# Patient Record
Sex: FEMALE | Race: WHITE | NOT HISPANIC OR LATINO | Employment: OTHER | URBAN - METROPOLITAN AREA
[De-identification: names, ages, dates, MRNs, and addresses within clinical notes are randomized per-mention and may not be internally consistent; named-entity substitution may affect disease eponyms.]

---

## 2017-04-11 ENCOUNTER — COMMUNICATION - HEALTHEAST (OUTPATIENT)
Dept: INTERNAL MEDICINE | Facility: CLINIC | Age: 72
End: 2017-04-11

## 2017-07-13 ENCOUNTER — HOSPITAL ENCOUNTER (OUTPATIENT)
Dept: MAMMOGRAPHY | Facility: HOSPITAL | Age: 72
Discharge: HOME | End: 2017-07-13
Attending: INTERNAL MEDICINE

## 2017-07-13 ENCOUNTER — OFFICE VISIT - HEALTHEAST (OUTPATIENT)
Dept: INTERNAL MEDICINE | Facility: CLINIC | Age: 72
End: 2017-07-13

## 2017-07-13 DIAGNOSIS — Z78.9 FULL CODE STATUS: ICD-10-CM

## 2017-07-13 DIAGNOSIS — M79.605 LOW BACK PAIN RADIATING TO LEFT LEG: ICD-10-CM

## 2017-07-13 DIAGNOSIS — K21.9 GERD (GASTROESOPHAGEAL REFLUX DISEASE): ICD-10-CM

## 2017-07-13 DIAGNOSIS — M25.562 BILATERAL KNEE PAIN: ICD-10-CM

## 2017-07-13 DIAGNOSIS — N95.1 MENOPAUSAL HOT FLUSHES: ICD-10-CM

## 2017-07-13 DIAGNOSIS — M54.50 LOW BACK PAIN: ICD-10-CM

## 2017-07-13 DIAGNOSIS — G43.909 MIGRAINE HEADACHE: ICD-10-CM

## 2017-07-13 DIAGNOSIS — M25.561 BILATERAL KNEE PAIN: ICD-10-CM

## 2017-07-13 DIAGNOSIS — M54.50 LOW BACK PAIN RADIATING TO LEFT LEG: ICD-10-CM

## 2017-07-13 DIAGNOSIS — Z12.31 VISIT FOR SCREENING MAMMOGRAM: ICD-10-CM

## 2017-07-13 DIAGNOSIS — Z00.00 ROUTINE PHYSICAL EXAMINATION: ICD-10-CM

## 2017-07-13 DIAGNOSIS — L82.1 SK (SEBORRHEIC KERATOSIS): ICD-10-CM

## 2018-07-19 ENCOUNTER — COMMUNICATION - HEALTHEAST (OUTPATIENT)
Dept: INTERNAL MEDICINE | Facility: CLINIC | Age: 73
End: 2018-07-19

## 2018-08-02 ENCOUNTER — COMMUNICATION - HEALTHEAST (OUTPATIENT)
Dept: INTERNAL MEDICINE | Facility: CLINIC | Age: 73
End: 2018-08-02

## 2018-09-06 ENCOUNTER — OFFICE VISIT - HEALTHEAST (OUTPATIENT)
Dept: INTERNAL MEDICINE | Facility: CLINIC | Age: 73
End: 2018-09-06

## 2018-09-06 DIAGNOSIS — Z98.890 S/P COLONOSCOPY: ICD-10-CM

## 2018-09-06 DIAGNOSIS — R53.83 FATIGUE: ICD-10-CM

## 2018-09-06 DIAGNOSIS — F41.9 ANXIETY: ICD-10-CM

## 2018-09-06 DIAGNOSIS — Z53.20 COLONOSCOPY REFUSED: ICD-10-CM

## 2018-09-06 DIAGNOSIS — L98.9 SKIN LESION: ICD-10-CM

## 2018-09-06 DIAGNOSIS — M25.50 MULTIPLE JOINT PAIN: ICD-10-CM

## 2018-09-06 DIAGNOSIS — N95.1 MENOPAUSAL HOT FLUSHES: ICD-10-CM

## 2018-09-06 DIAGNOSIS — Z00.00 ROUTINE PHYSICAL EXAMINATION: ICD-10-CM

## 2018-09-06 LAB
ANION GAP SERPL CALCULATED.3IONS-SCNC: 9 MMOL/L (ref 5–18)
BUN SERPL-MCNC: 18 MG/DL (ref 8–28)
C REACTIVE PROTEIN LHE: 0.6 MG/DL (ref 0–0.8)
CALCIUM SERPL-MCNC: 10.4 MG/DL (ref 8.5–10.5)
CHLORIDE BLD-SCNC: 108 MMOL/L (ref 98–107)
CO2 SERPL-SCNC: 27 MMOL/L (ref 22–31)
CREAT SERPL-MCNC: 0.8 MG/DL (ref 0.6–1.1)
ERYTHROCYTE [DISTWIDTH] IN BLOOD BY AUTOMATED COUNT: 12.1 % (ref 11–14.5)
ERYTHROCYTE [SEDIMENTATION RATE] IN BLOOD BY WESTERGREN METHOD: 9 MM/HR (ref 0–20)
GFR SERPL CREATININE-BSD FRML MDRD: >60 ML/MIN/1.73M2
GLUCOSE BLD-MCNC: 86 MG/DL (ref 70–125)
HCT VFR BLD AUTO: 41.6 % (ref 35–47)
HGB BLD-MCNC: 13.9 G/DL (ref 12–16)
MCH RBC QN AUTO: 30 PG (ref 27–34)
MCHC RBC AUTO-ENTMCNC: 33.5 G/DL (ref 32–36)
MCV RBC AUTO: 90 FL (ref 80–100)
PLATELET # BLD AUTO: 288 THOU/UL (ref 140–440)
PMV BLD AUTO: 7.6 FL (ref 7–10)
POTASSIUM BLD-SCNC: 4 MMOL/L (ref 3.5–5)
RBC # BLD AUTO: 4.64 MILL/UL (ref 3.8–5.4)
SODIUM SERPL-SCNC: 144 MMOL/L (ref 136–145)
TSH SERPL DL<=0.005 MIU/L-ACNC: 0.91 UIU/ML (ref 0.3–5)
WBC: 6.2 THOU/UL (ref 4–11)

## 2018-09-06 ASSESSMENT — MIFFLIN-ST. JEOR: SCORE: 1396.14

## 2018-09-13 ENCOUNTER — HOSPITAL ENCOUNTER (OUTPATIENT)
Dept: MAMMOGRAPHY | Facility: CLINIC | Age: 73
Discharge: HOME | End: 2018-09-13
Attending: INTERNAL MEDICINE

## 2018-09-13 DIAGNOSIS — Z12.31 VISIT FOR SCREENING MAMMOGRAM: ICD-10-CM

## 2018-10-17 ENCOUNTER — COMMUNICATION - HEALTHEAST (OUTPATIENT)
Dept: INTERNAL MEDICINE | Facility: CLINIC | Age: 73
End: 2018-10-17

## 2018-11-05 ENCOUNTER — COMMUNICATION - HEALTHEAST (OUTPATIENT)
Dept: INTERNAL MEDICINE | Facility: CLINIC | Age: 73
End: 2018-11-05

## 2018-11-07 ENCOUNTER — COMMUNICATION - HEALTHEAST (OUTPATIENT)
Dept: INTERNAL MEDICINE | Facility: CLINIC | Age: 73
End: 2018-11-07

## 2019-01-11 ENCOUNTER — COMMUNICATION - HEALTHEAST (OUTPATIENT)
Dept: INTERNAL MEDICINE | Facility: CLINIC | Age: 74
End: 2019-01-11

## 2019-01-12 ENCOUNTER — COMMUNICATION - HEALTHEAST (OUTPATIENT)
Dept: INTERNAL MEDICINE | Facility: CLINIC | Age: 74
End: 2019-01-12

## 2019-03-26 ENCOUNTER — COMMUNICATION - HEALTHEAST (OUTPATIENT)
Dept: INTERNAL MEDICINE | Facility: CLINIC | Age: 74
End: 2019-03-26

## 2019-04-16 ENCOUNTER — COMMUNICATION - HEALTHEAST (OUTPATIENT)
Dept: SCHEDULING | Facility: CLINIC | Age: 74
End: 2019-04-16

## 2019-04-16 ENCOUNTER — OFFICE VISIT - HEALTHEAST (OUTPATIENT)
Dept: INTERNAL MEDICINE | Facility: CLINIC | Age: 74
End: 2019-04-16

## 2019-04-16 DIAGNOSIS — M54.41 ACUTE BILATERAL LOW BACK PAIN WITH BILATERAL SCIATICA: ICD-10-CM

## 2019-04-16 DIAGNOSIS — M54.42 ACUTE BILATERAL LOW BACK PAIN WITH BILATERAL SCIATICA: ICD-10-CM

## 2019-04-16 DIAGNOSIS — Z12.11 SCREEN FOR COLON CANCER: ICD-10-CM

## 2019-04-19 ENCOUNTER — OFFICE VISIT - HEALTHEAST (OUTPATIENT)
Dept: INTERNAL MEDICINE | Facility: CLINIC | Age: 74
End: 2019-04-19

## 2019-04-19 ENCOUNTER — COMMUNICATION - HEALTHEAST (OUTPATIENT)
Dept: PHARMACY | Facility: HOSPITAL | Age: 74
End: 2019-04-19

## 2019-04-19 DIAGNOSIS — M54.41 ACUTE RIGHT-SIDED LOW BACK PAIN WITH RIGHT-SIDED SCIATICA: ICD-10-CM

## 2019-04-19 DIAGNOSIS — L98.9 SKIN LESION: ICD-10-CM

## 2019-04-19 DIAGNOSIS — Z53.20 COLONOSCOPY REFUSED: ICD-10-CM

## 2019-04-20 ENCOUNTER — COMMUNICATION - HEALTHEAST (OUTPATIENT)
Dept: INTERNAL MEDICINE | Facility: CLINIC | Age: 74
End: 2019-04-20

## 2019-04-21 ENCOUNTER — COMMUNICATION - HEALTHEAST (OUTPATIENT)
Dept: INTERNAL MEDICINE | Facility: CLINIC | Age: 74
End: 2019-04-21

## 2019-04-21 DIAGNOSIS — M54.41 ACUTE RIGHT-SIDED LOW BACK PAIN WITH RIGHT-SIDED SCIATICA: ICD-10-CM

## 2019-04-25 ENCOUNTER — COMMUNICATION - HEALTHEAST (OUTPATIENT)
Dept: INTERNAL MEDICINE | Facility: CLINIC | Age: 74
End: 2019-04-25

## 2019-05-03 ENCOUNTER — OFFICE VISIT - HEALTHEAST (OUTPATIENT)
Dept: INTERNAL MEDICINE | Facility: CLINIC | Age: 74
End: 2019-05-03

## 2019-05-03 DIAGNOSIS — M54.41 RIGHT-SIDED LOW BACK PAIN WITH RIGHT-SIDED SCIATICA, UNSPECIFIED CHRONICITY: ICD-10-CM

## 2019-05-03 DIAGNOSIS — R11.0 NAUSEA: ICD-10-CM

## 2019-05-07 ENCOUNTER — COMMUNICATION - HEALTHEAST (OUTPATIENT)
Dept: INTERNAL MEDICINE | Facility: CLINIC | Age: 74
End: 2019-05-07

## 2019-05-07 DIAGNOSIS — M54.41 RIGHT-SIDED LOW BACK PAIN WITH RIGHT-SIDED SCIATICA, UNSPECIFIED CHRONICITY: ICD-10-CM

## 2019-05-10 ENCOUNTER — COMMUNICATION - HEALTHEAST (OUTPATIENT)
Dept: INTERNAL MEDICINE | Facility: CLINIC | Age: 74
End: 2019-05-10

## 2019-05-10 ENCOUNTER — RECORDS - HEALTHEAST (OUTPATIENT)
Dept: ADMINISTRATIVE | Facility: OTHER | Age: 74
End: 2019-05-10

## 2019-05-10 DIAGNOSIS — M54.41 RIGHT-SIDED LOW BACK PAIN WITH RIGHT-SIDED SCIATICA, UNSPECIFIED CHRONICITY: ICD-10-CM

## 2019-05-14 ENCOUNTER — COMMUNICATION - HEALTHEAST (OUTPATIENT)
Dept: INTERNAL MEDICINE | Facility: CLINIC | Age: 74
End: 2019-05-14

## 2019-05-15 ENCOUNTER — COMMUNICATION - HEALTHEAST (OUTPATIENT)
Dept: INTERNAL MEDICINE | Facility: CLINIC | Age: 74
End: 2019-05-15

## 2019-05-18 ENCOUNTER — COMMUNICATION - HEALTHEAST (OUTPATIENT)
Dept: INTERNAL MEDICINE | Facility: CLINIC | Age: 74
End: 2019-05-18

## 2019-05-20 ENCOUNTER — COMMUNICATION - HEALTHEAST (OUTPATIENT)
Dept: INTERNAL MEDICINE | Facility: CLINIC | Age: 74
End: 2019-05-20

## 2019-05-21 ENCOUNTER — COMMUNICATION - HEALTHEAST (OUTPATIENT)
Dept: INTERNAL MEDICINE | Facility: CLINIC | Age: 74
End: 2019-05-21

## 2019-05-23 ENCOUNTER — HOSPITAL ENCOUNTER (OUTPATIENT)
Dept: LAB | Age: 74
Setting detail: SPECIMEN
Discharge: HOME | End: 2019-05-23

## 2019-05-23 ENCOUNTER — OFFICE VISIT - HEALTHEAST (OUTPATIENT)
Dept: INTERNAL MEDICINE | Facility: CLINIC | Age: 74
End: 2019-05-23

## 2019-05-23 DIAGNOSIS — Z01.810 PREOPERATIVE CARDIOVASCULAR EXAMINATION: ICD-10-CM

## 2019-05-23 DIAGNOSIS — Z78.9 FULL CODE STATUS: ICD-10-CM

## 2019-05-23 DIAGNOSIS — Z98.890 PONV (POSTOPERATIVE NAUSEA AND VOMITING): ICD-10-CM

## 2019-05-23 DIAGNOSIS — C43.61 MELANOMA OF UPPER ARM, RIGHT (H): ICD-10-CM

## 2019-05-23 DIAGNOSIS — M54.41 RIGHT-SIDED LOW BACK PAIN WITH RIGHT-SIDED SCIATICA, UNSPECIFIED CHRONICITY: ICD-10-CM

## 2019-05-23 DIAGNOSIS — K21.9 GASTROESOPHAGEAL REFLUX DISEASE, ESOPHAGITIS PRESENCE NOT SPECIFIED: ICD-10-CM

## 2019-05-23 DIAGNOSIS — J31.0 CHRONIC RHINITIS: ICD-10-CM

## 2019-05-23 DIAGNOSIS — R11.2 PONV (POSTOPERATIVE NAUSEA AND VOMITING): ICD-10-CM

## 2019-05-23 DIAGNOSIS — Z78.9 NONSMOKER: ICD-10-CM

## 2019-05-23 DIAGNOSIS — G60.9 IDIOPATHIC PERIPHERAL NEUROPATHY: ICD-10-CM

## 2019-05-23 LAB
ANION GAP SERPL CALCULATED.3IONS-SCNC: 9 MMOL/L (ref 5–18)
ATRIAL RATE - MUSE: 83 BPM
BUN SERPL-MCNC: 14 MG/DL (ref 8–28)
CALCIUM SERPL-MCNC: 10.3 MG/DL (ref 8.5–10.5)
CHLORIDE BLD-SCNC: 107 MMOL/L (ref 98–107)
CO2 SERPL-SCNC: 27 MMOL/L (ref 22–31)
CREAT SERPL-MCNC: 0.81 MG/DL (ref 0.6–1.1)
DIASTOLIC BLOOD PRESSURE - MUSE: NORMAL MMHG
ERYTHROCYTE [DISTWIDTH] IN BLOOD BY AUTOMATED COUNT: 12.5 % (ref 11–14.5)
GFR SERPL CREATININE-BSD FRML MDRD: >60 ML/MIN/1.73M2
GLUCOSE BLD-MCNC: 96 MG/DL (ref 70–125)
HCT VFR BLD AUTO: 41.6 % (ref 35–47)
HGB BLD-MCNC: 14 G/DL (ref 12–16)
INTERPRETATION ECG - MUSE: NORMAL
MCH RBC QN AUTO: 30.4 PG (ref 27–34)
MCHC RBC AUTO-ENTMCNC: 33.6 G/DL (ref 32–36)
MCV RBC AUTO: 90 FL (ref 80–100)
P AXIS - MUSE: 50 DEGREES
PLATELET # BLD AUTO: 313 THOU/UL (ref 140–440)
PMV BLD AUTO: 7 FL (ref 7–10)
POTASSIUM BLD-SCNC: 4.2 MMOL/L (ref 3.5–5)
PR INTERVAL - MUSE: 154 MS
QRS DURATION - MUSE: 76 MS
QT - MUSE: 378 MS
QTC - MUSE: 444 MS
R AXIS - MUSE: 16 DEGREES
RBC # BLD AUTO: 4.6 MILL/UL (ref 3.8–5.4)
SODIUM SERPL-SCNC: 143 MMOL/L (ref 136–145)
SYSTOLIC BLOOD PRESSURE - MUSE: NORMAL MMHG
T AXIS - MUSE: 88 DEGREES
VENTRICULAR RATE- MUSE: 83 BPM
WBC: 5.2 THOU/UL (ref 4–11)

## 2019-05-23 ASSESSMENT — MIFFLIN-ST. JEOR: SCORE: 1325.26

## 2019-05-28 ENCOUNTER — RECORDS - HEALTHEAST (OUTPATIENT)
Dept: ADMINISTRATIVE | Facility: OTHER | Age: 74
End: 2019-05-28

## 2019-05-29 ENCOUNTER — COMMUNICATION - HEALTHEAST (OUTPATIENT)
Dept: INTERNAL MEDICINE | Facility: CLINIC | Age: 74
End: 2019-05-29

## 2019-05-31 ENCOUNTER — RECORDS - HEALTHEAST (OUTPATIENT)
Dept: ADMINISTRATIVE | Facility: OTHER | Age: 74
End: 2019-05-31

## 2019-06-05 ENCOUNTER — COMMUNICATION - HEALTHEAST (OUTPATIENT)
Dept: INTERNAL MEDICINE | Facility: CLINIC | Age: 74
End: 2019-06-05

## 2019-06-10 ENCOUNTER — RECORDS - HEALTHEAST (OUTPATIENT)
Dept: ADMINISTRATIVE | Facility: OTHER | Age: 74
End: 2019-06-10

## 2019-06-11 ENCOUNTER — COMMUNICATION - HEALTHEAST (OUTPATIENT)
Dept: INTERNAL MEDICINE | Facility: CLINIC | Age: 74
End: 2019-06-11

## 2019-06-12 ENCOUNTER — RECORDS - HEALTHEAST (OUTPATIENT)
Dept: ADMINISTRATIVE | Facility: OTHER | Age: 74
End: 2019-06-12

## 2019-06-17 ENCOUNTER — RECORDS - HEALTHEAST (OUTPATIENT)
Dept: ADMINISTRATIVE | Facility: OTHER | Age: 74
End: 2019-06-17

## 2019-06-19 ENCOUNTER — COMMUNICATION - HEALTHEAST (OUTPATIENT)
Dept: INTERNAL MEDICINE | Facility: CLINIC | Age: 74
End: 2019-06-19

## 2019-06-20 ENCOUNTER — COMMUNICATION - HEALTHEAST (OUTPATIENT)
Dept: INTERNAL MEDICINE | Facility: CLINIC | Age: 74
End: 2019-06-20

## 2019-06-25 ENCOUNTER — RECORDS - HEALTHEAST (OUTPATIENT)
Dept: ADMINISTRATIVE | Facility: OTHER | Age: 74
End: 2019-06-25

## 2019-07-01 ENCOUNTER — COMMUNICATION - HEALTHEAST (OUTPATIENT)
Dept: INTERNAL MEDICINE | Facility: CLINIC | Age: 74
End: 2019-07-01

## 2019-07-01 ENCOUNTER — OFFICE VISIT - HEALTHEAST (OUTPATIENT)
Dept: INTERNAL MEDICINE | Facility: CLINIC | Age: 74
End: 2019-07-01

## 2019-07-01 DIAGNOSIS — G89.29 CHRONIC MIDLINE LOW BACK PAIN WITH RIGHT-SIDED SCIATICA: ICD-10-CM

## 2019-07-01 DIAGNOSIS — R94.8 ABNORMAL POSITRON EMISSION TOMOGRAPHY (PET) SCAN: ICD-10-CM

## 2019-07-01 DIAGNOSIS — C43.61 MELANOMA OF RIGHT UPPER ARM (H): ICD-10-CM

## 2019-07-01 DIAGNOSIS — M54.41 CHRONIC MIDLINE LOW BACK PAIN WITH RIGHT-SIDED SCIATICA: ICD-10-CM

## 2019-07-01 DIAGNOSIS — R20.0 LEFT LEG NUMBNESS: ICD-10-CM

## 2019-07-02 ENCOUNTER — COMMUNICATION - HEALTHEAST (OUTPATIENT)
Dept: INTERNAL MEDICINE | Facility: CLINIC | Age: 74
End: 2019-07-02

## 2019-07-11 ENCOUNTER — HOSPITAL ENCOUNTER (OUTPATIENT)
Dept: MRI IMAGING | Facility: HOSPITAL | Age: 74
Discharge: HOME | End: 2019-07-11
Attending: INTERNAL MEDICINE

## 2019-07-11 ENCOUNTER — HOSPITAL ENCOUNTER (OUTPATIENT)
Dept: MRI IMAGING | Facility: HOSPITAL | Age: 74
Discharge: HOME | End: 2019-07-11

## 2019-07-11 DIAGNOSIS — C43.9 CUTANEOUS MELANOMA (H): ICD-10-CM

## 2019-07-11 DIAGNOSIS — G89.29 CHRONIC MIDLINE LOW BACK PAIN WITH RIGHT-SIDED SCIATICA: ICD-10-CM

## 2019-07-11 DIAGNOSIS — M54.41 CHRONIC MIDLINE LOW BACK PAIN WITH RIGHT-SIDED SCIATICA: ICD-10-CM

## 2019-07-11 LAB
CREAT BLD-MCNC: 0.8 MG/DL (ref 0.6–1.1)
GFR SERPL CREATININE-BSD FRML MDRD: >60 ML/MIN/1.73M2

## 2019-07-12 ENCOUNTER — COMMUNICATION - HEALTHEAST (OUTPATIENT)
Dept: INTERNAL MEDICINE | Facility: CLINIC | Age: 74
End: 2019-07-12

## 2019-07-18 ENCOUNTER — RECORDS - HEALTHEAST (OUTPATIENT)
Dept: ADMINISTRATIVE | Facility: OTHER | Age: 74
End: 2019-07-18

## 2019-07-24 ENCOUNTER — COMMUNICATION - HEALTHEAST (OUTPATIENT)
Dept: INTERNAL MEDICINE | Facility: CLINIC | Age: 74
End: 2019-07-24

## 2019-07-30 ENCOUNTER — OFFICE VISIT - HEALTHEAST (OUTPATIENT)
Dept: INTERNAL MEDICINE | Facility: CLINIC | Age: 74
End: 2019-07-30

## 2019-07-30 ENCOUNTER — COMMUNICATION - HEALTHEAST (OUTPATIENT)
Dept: INTERNAL MEDICINE | Facility: CLINIC | Age: 74
End: 2019-07-30

## 2019-07-30 DIAGNOSIS — K21.9 GASTROESOPHAGEAL REFLUX DISEASE, ESOPHAGITIS PRESENCE NOT SPECIFIED: ICD-10-CM

## 2019-07-30 DIAGNOSIS — C43.61 MELANOMA OF RIGHT UPPER ARM (H): ICD-10-CM

## 2019-07-30 DIAGNOSIS — M54.17 LUMBOSACRAL RADICULOPATHY AT L5: ICD-10-CM

## 2019-07-30 DIAGNOSIS — M48.062 SPINAL STENOSIS, LUMBAR REGION, WITH NEUROGENIC CLAUDICATION: ICD-10-CM

## 2019-08-08 ENCOUNTER — RECORDS - HEALTHEAST (OUTPATIENT)
Dept: ADMINISTRATIVE | Facility: OTHER | Age: 74
End: 2019-08-08

## 2019-08-12 ENCOUNTER — HOSPITAL ENCOUNTER (OUTPATIENT)
Dept: INTERVENTIONAL RADIOLOGY/VASCULAR | Facility: HOSPITAL | Age: 74
Discharge: HOME | End: 2019-08-12
Attending: INTERNAL MEDICINE | Admitting: RADIOLOGY

## 2019-08-12 ENCOUNTER — COMMUNICATION - HEALTHEAST (OUTPATIENT)
Dept: INTERNAL MEDICINE | Facility: CLINIC | Age: 74
End: 2019-08-12

## 2019-08-12 DIAGNOSIS — M54.17 LUMBOSACRAL RADICULOPATHY AT L5: ICD-10-CM

## 2019-08-12 DIAGNOSIS — M48.062 SPINAL STENOSIS, LUMBAR REGION, WITH NEUROGENIC CLAUDICATION: ICD-10-CM

## 2019-08-12 ASSESSMENT — MIFFLIN-ST. JEOR: SCORE: 1334.57

## 2019-08-23 ENCOUNTER — COMMUNICATION - HEALTHEAST (OUTPATIENT)
Dept: INTERNAL MEDICINE | Facility: CLINIC | Age: 74
End: 2019-08-23

## 2019-08-27 ENCOUNTER — COMMUNICATION - HEALTHEAST (OUTPATIENT)
Dept: INTERNAL MEDICINE | Facility: CLINIC | Age: 74
End: 2019-08-27

## 2019-08-29 ENCOUNTER — RECORDS - HEALTHEAST (OUTPATIENT)
Dept: ADMINISTRATIVE | Facility: OTHER | Age: 74
End: 2019-08-29

## 2019-09-17 ENCOUNTER — HOSPITAL ENCOUNTER (OUTPATIENT)
Dept: MAMMOGRAPHY | Facility: CLINIC | Age: 74
Discharge: HOME | End: 2019-09-17
Attending: INTERNAL MEDICINE

## 2019-09-17 ENCOUNTER — OFFICE VISIT - HEALTHEAST (OUTPATIENT)
Dept: INTERNAL MEDICINE | Facility: CLINIC | Age: 74
End: 2019-09-17

## 2019-09-17 DIAGNOSIS — G89.29 CHRONIC MIDLINE LOW BACK PAIN WITH RIGHT-SIDED SCIATICA: ICD-10-CM

## 2019-09-17 DIAGNOSIS — G43.909 MIGRAINE WITHOUT STATUS MIGRAINOSUS, NOT INTRACTABLE, UNSPECIFIED MIGRAINE TYPE: ICD-10-CM

## 2019-09-17 DIAGNOSIS — G60.9 IDIOPATHIC PERIPHERAL NEUROPATHY: ICD-10-CM

## 2019-09-17 DIAGNOSIS — M48.062 SPINAL STENOSIS, LUMBAR REGION, WITH NEUROGENIC CLAUDICATION: ICD-10-CM

## 2019-09-17 DIAGNOSIS — Z12.31 VISIT FOR SCREENING MAMMOGRAM: ICD-10-CM

## 2019-09-17 DIAGNOSIS — Z00.00 MEDICARE ANNUAL WELLNESS VISIT, SUBSEQUENT: ICD-10-CM

## 2019-09-17 DIAGNOSIS — M54.41 CHRONIC MIDLINE LOW BACK PAIN WITH RIGHT-SIDED SCIATICA: ICD-10-CM

## 2019-09-17 DIAGNOSIS — N95.1 MENOPAUSAL HOT FLUSHES: ICD-10-CM

## 2019-09-17 DIAGNOSIS — M54.17 LUMBOSACRAL RADICULOPATHY AT L5: ICD-10-CM

## 2019-09-17 DIAGNOSIS — K21.9 GASTROESOPHAGEAL REFLUX DISEASE, ESOPHAGITIS PRESENCE NOT SPECIFIED: ICD-10-CM

## 2019-09-17 DIAGNOSIS — C43.61 MELANOMA OF RIGHT UPPER ARM (H): ICD-10-CM

## 2019-09-17 ASSESSMENT — ANXIETY QUESTIONNAIRES
5. BEING SO RESTLESS THAT IT IS HARD TO SIT STILL: NOT AT ALL
1. FEELING NERVOUS, ANXIOUS, OR ON EDGE: NOT AT ALL
GAD7 TOTAL SCORE: 3
3. WORRYING TOO MUCH ABOUT DIFFERENT THINGS: SEVERAL DAYS
6. BECOMING EASILY ANNOYED OR IRRITABLE: NOT AT ALL
IF YOU CHECKED OFF ANY PROBLEMS ON THIS QUESTIONNAIRE, HOW DIFFICULT HAVE THESE PROBLEMS MADE IT FOR YOU TO DO YOUR WORK, TAKE CARE OF THINGS AT HOME, OR GET ALONG WITH OTHER PEOPLE: SOMEWHAT DIFFICULT
4. TROUBLE RELAXING: SEVERAL DAYS
7. FEELING AFRAID AS IF SOMETHING AWFUL MIGHT HAPPEN: NOT AT ALL
2. NOT BEING ABLE TO STOP OR CONTROL WORRYING: SEVERAL DAYS

## 2019-09-17 ASSESSMENT — MIFFLIN-ST. JEOR: SCORE: 1358.95

## 2019-09-19 ENCOUNTER — RECORDS - HEALTHEAST (OUTPATIENT)
Dept: ADMINISTRATIVE | Facility: OTHER | Age: 74
End: 2019-09-19

## 2019-09-23 ENCOUNTER — COMMUNICATION - HEALTHEAST (OUTPATIENT)
Dept: INTERNAL MEDICINE | Facility: CLINIC | Age: 74
End: 2019-09-23

## 2019-09-27 ENCOUNTER — COMMUNICATION - HEALTHEAST (OUTPATIENT)
Dept: INTERNAL MEDICINE | Facility: CLINIC | Age: 74
End: 2019-09-27

## 2019-10-03 ENCOUNTER — COMMUNICATION - HEALTHEAST (OUTPATIENT)
Dept: INTERNAL MEDICINE | Facility: CLINIC | Age: 74
End: 2019-10-03

## 2019-10-10 ENCOUNTER — RECORDS - HEALTHEAST (OUTPATIENT)
Dept: ADMINISTRATIVE | Facility: OTHER | Age: 74
End: 2019-10-10

## 2019-10-29 ENCOUNTER — COMMUNICATION - HEALTHEAST (OUTPATIENT)
Dept: INTERNAL MEDICINE | Facility: CLINIC | Age: 74
End: 2019-10-29

## 2019-11-12 ENCOUNTER — HOSPITAL ENCOUNTER (OUTPATIENT)
Dept: PHYSICAL MEDICINE AND REHAB | Facility: CLINIC | Age: 74
Discharge: HOME | End: 2019-11-12
Attending: INTERNAL MEDICINE

## 2019-11-12 DIAGNOSIS — M79.18 MYOFASCIAL PAIN: ICD-10-CM

## 2019-11-12 DIAGNOSIS — M48.062 SPINAL STENOSIS OF LUMBAR REGION WITH NEUROGENIC CLAUDICATION: ICD-10-CM

## 2019-11-12 DIAGNOSIS — M51.26 LUMBAR DISC HERNIATION: ICD-10-CM

## 2019-11-12 DIAGNOSIS — M47.816 SPONDYLOSIS OF LUMBAR REGION WITHOUT MYELOPATHY OR RADICULOPATHY: ICD-10-CM

## 2019-11-12 DIAGNOSIS — M54.16 LUMBAR RADICULITIS: ICD-10-CM

## 2019-11-12 ASSESSMENT — MIFFLIN-ST. JEOR: SCORE: 1367.68

## 2019-11-19 ENCOUNTER — AMBULATORY - HEALTHEAST (OUTPATIENT)
Dept: OTHER | Facility: CLINIC | Age: 74
End: 2019-11-19

## 2019-11-21 ENCOUNTER — RECORDS - HEALTHEAST (OUTPATIENT)
Dept: ADMINISTRATIVE | Facility: OTHER | Age: 74
End: 2019-11-21

## 2019-11-25 ENCOUNTER — RECORDS - HEALTHEAST (OUTPATIENT)
Dept: ADMINISTRATIVE | Facility: OTHER | Age: 74
End: 2019-11-25

## 2019-12-05 ENCOUNTER — HOSPITAL ENCOUNTER (OUTPATIENT)
Dept: CT IMAGING | Facility: HOSPITAL | Age: 74
Discharge: HOME | End: 2019-12-05

## 2019-12-05 DIAGNOSIS — C43.9 MALIGNANT MELANOMA (H): ICD-10-CM

## 2019-12-05 LAB
CREAT BLD-MCNC: 0.8 MG/DL (ref 0.6–1.1)
GFR SERPL CREATININE-BSD FRML MDRD: >60 ML/MIN/1.73M2

## 2019-12-06 ENCOUNTER — COMMUNICATION - HEALTHEAST (OUTPATIENT)
Dept: INTERNAL MEDICINE | Facility: CLINIC | Age: 74
End: 2019-12-06

## 2019-12-10 ENCOUNTER — COMMUNICATION - HEALTHEAST (OUTPATIENT)
Dept: PHYSICAL MEDICINE AND REHAB | Facility: CLINIC | Age: 74
End: 2019-12-10

## 2019-12-12 ENCOUNTER — RECORDS - HEALTHEAST (OUTPATIENT)
Dept: ADMINISTRATIVE | Facility: OTHER | Age: 74
End: 2019-12-12

## 2019-12-28 ENCOUNTER — COMMUNICATION - HEALTHEAST (OUTPATIENT)
Dept: PHYSICAL MEDICINE AND REHAB | Facility: CLINIC | Age: 74
End: 2019-12-28

## 2019-12-28 DIAGNOSIS — M51.26 LUMBAR DISC HERNIATION: ICD-10-CM

## 2019-12-28 DIAGNOSIS — M54.16 LUMBAR RADICULITIS: ICD-10-CM

## 2019-12-28 DIAGNOSIS — M48.062 SPINAL STENOSIS OF LUMBAR REGION WITH NEUROGENIC CLAUDICATION: ICD-10-CM

## 2019-12-29 ENCOUNTER — COMMUNICATION - HEALTHEAST (OUTPATIENT)
Dept: INTERNAL MEDICINE | Facility: CLINIC | Age: 74
End: 2019-12-29

## 2019-12-30 ENCOUNTER — COMMUNICATION - HEALTHEAST (OUTPATIENT)
Dept: PHYSICAL MEDICINE AND REHAB | Facility: CLINIC | Age: 74
End: 2019-12-30

## 2020-02-13 ENCOUNTER — RECORDS - HEALTHEAST (OUTPATIENT)
Dept: ADMINISTRATIVE | Facility: OTHER | Age: 75
End: 2020-02-13

## 2020-02-22 ENCOUNTER — HOME CARE/HOSPICE - HEALTHEAST (OUTPATIENT)
Dept: HOME HEALTH SERVICES | Facility: HOME HEALTH | Age: 75
End: 2020-02-22

## 2020-02-23 ENCOUNTER — COMMUNICATION - HEALTHEAST (OUTPATIENT)
Dept: HOME HEALTH SERVICES | Facility: HOME HEALTH | Age: 75
End: 2020-02-23

## 2020-02-25 ENCOUNTER — COMMUNICATION - HEALTHEAST (OUTPATIENT)
Dept: HOME HEALTH SERVICES | Facility: HOME HEALTH | Age: 75
End: 2020-02-25

## 2020-02-25 ENCOUNTER — HOME CARE/HOSPICE - HEALTHEAST (OUTPATIENT)
Dept: HOME HEALTH SERVICES | Facility: HOME HEALTH | Age: 75
End: 2020-02-25

## 2020-02-26 ENCOUNTER — HOME CARE/HOSPICE - HEALTHEAST (OUTPATIENT)
Dept: HOME HEALTH SERVICES | Facility: HOME HEALTH | Age: 75
End: 2020-02-26

## 2020-02-27 ENCOUNTER — OFFICE VISIT - HEALTHEAST (OUTPATIENT)
Dept: INTERNAL MEDICINE | Facility: CLINIC | Age: 75
End: 2020-02-27

## 2020-02-27 DIAGNOSIS — G25.0 ESSENTIAL TREMOR: ICD-10-CM

## 2020-02-27 DIAGNOSIS — M65.4 TENOSYNOVITIS, DE QUERVAIN: ICD-10-CM

## 2020-02-27 DIAGNOSIS — I63.9 ISCHEMIC CEREBROVASCULAR ACCIDENT (CVA) OF FRONTAL LOBE (H): ICD-10-CM

## 2020-02-27 DIAGNOSIS — C43.61 MELANOMA OF RIGHT UPPER ARM (H): ICD-10-CM

## 2020-02-27 DIAGNOSIS — R47.01 APHASIA: ICD-10-CM

## 2020-02-27 DIAGNOSIS — M48.062 SPINAL STENOSIS OF LUMBAR REGION WITH NEUROGENIC CLAUDICATION: ICD-10-CM

## 2020-02-27 DIAGNOSIS — Z09 HOSPITAL DISCHARGE FOLLOW-UP: ICD-10-CM

## 2020-02-27 DIAGNOSIS — N95.1 MENOPAUSAL HOT FLUSHES: ICD-10-CM

## 2020-02-27 DIAGNOSIS — G43.909 MIGRAINE WITHOUT STATUS MIGRAINOSUS, NOT INTRACTABLE, UNSPECIFIED MIGRAINE TYPE: ICD-10-CM

## 2020-02-27 ASSESSMENT — MIFFLIN-ST. JEOR: SCORE: 1386.73

## 2020-02-28 ENCOUNTER — HOME CARE/HOSPICE - HEALTHEAST (OUTPATIENT)
Dept: HOME HEALTH SERVICES | Facility: HOME HEALTH | Age: 75
End: 2020-02-28

## 2020-02-28 ENCOUNTER — AMBULATORY - HEALTHEAST (OUTPATIENT)
Dept: PHARMACY | Facility: CLINIC | Age: 75
End: 2020-02-28

## 2020-02-28 ENCOUNTER — COMMUNICATION - HEALTHEAST (OUTPATIENT)
Dept: INTERNAL MEDICINE | Facility: CLINIC | Age: 75
End: 2020-02-28

## 2020-02-28 DIAGNOSIS — N95.1 MENOPAUSAL HOT FLUSHES: ICD-10-CM

## 2020-02-28 DIAGNOSIS — M48.062 SPINAL STENOSIS OF LUMBAR REGION WITH NEUROGENIC CLAUDICATION: ICD-10-CM

## 2020-02-28 DIAGNOSIS — K21.9 GASTROESOPHAGEAL REFLUX DISEASE, ESOPHAGITIS PRESENCE NOT SPECIFIED: ICD-10-CM

## 2020-02-28 DIAGNOSIS — J30.2 SEASONAL ALLERGIC RHINITIS, UNSPECIFIED TRIGGER: ICD-10-CM

## 2020-02-28 DIAGNOSIS — I63.9 ISCHEMIC CEREBROVASCULAR ACCIDENT (CVA) OF FRONTAL LOBE (H): ICD-10-CM

## 2020-03-01 ENCOUNTER — COMMUNICATION - HEALTHEAST (OUTPATIENT)
Dept: PHYSICAL MEDICINE AND REHAB | Facility: CLINIC | Age: 75
End: 2020-03-01

## 2020-03-02 ENCOUNTER — HOME CARE/HOSPICE - HEALTHEAST (OUTPATIENT)
Dept: HOME HEALTH SERVICES | Facility: HOME HEALTH | Age: 75
End: 2020-03-02

## 2020-03-04 ENCOUNTER — HOME CARE/HOSPICE - HEALTHEAST (OUTPATIENT)
Dept: HOME HEALTH SERVICES | Facility: HOME HEALTH | Age: 75
End: 2020-03-04

## 2020-03-05 ENCOUNTER — RECORDS - HEALTHEAST (OUTPATIENT)
Dept: ADMINISTRATIVE | Facility: OTHER | Age: 75
End: 2020-03-05

## 2020-03-06 ENCOUNTER — COMMUNICATION - HEALTHEAST (OUTPATIENT)
Dept: INTERNAL MEDICINE | Facility: CLINIC | Age: 75
End: 2020-03-06

## 2020-03-10 ENCOUNTER — HOSPITAL ENCOUNTER (OUTPATIENT)
Dept: CARDIOLOGY | Facility: HOSPITAL | Age: 75
Discharge: HOME | End: 2020-03-10
Attending: INTERNAL MEDICINE

## 2020-03-10 DIAGNOSIS — I63.9 ISCHEMIC CEREBROVASCULAR ACCIDENT (CVA) OF FRONTAL LOBE (H): ICD-10-CM

## 2020-03-11 ENCOUNTER — HOME CARE/HOSPICE - HEALTHEAST (OUTPATIENT)
Dept: HOME HEALTH SERVICES | Facility: HOME HEALTH | Age: 75
End: 2020-03-11

## 2020-03-13 ENCOUNTER — HOME CARE/HOSPICE - HEALTHEAST (OUTPATIENT)
Dept: HOME HEALTH SERVICES | Facility: HOME HEALTH | Age: 75
End: 2020-03-13

## 2020-03-13 ENCOUNTER — COMMUNICATION - HEALTHEAST (OUTPATIENT)
Dept: PHYSICAL MEDICINE AND REHAB | Facility: CLINIC | Age: 75
End: 2020-03-13

## 2020-03-13 DIAGNOSIS — M48.062 SPINAL STENOSIS OF LUMBAR REGION WITH NEUROGENIC CLAUDICATION: ICD-10-CM

## 2020-03-13 DIAGNOSIS — M54.16 LUMBAR RADICULITIS: ICD-10-CM

## 2020-03-13 DIAGNOSIS — M51.26 LUMBAR DISC HERNIATION: ICD-10-CM

## 2020-03-16 ENCOUNTER — COMMUNICATION - HEALTHEAST (OUTPATIENT)
Dept: PHYSICAL MEDICINE AND REHAB | Facility: CLINIC | Age: 75
End: 2020-03-16

## 2020-03-16 ENCOUNTER — HOME CARE/HOSPICE - HEALTHEAST (OUTPATIENT)
Dept: HOME HEALTH SERVICES | Facility: HOME HEALTH | Age: 75
End: 2020-03-16

## 2020-03-16 DIAGNOSIS — M48.062 SPINAL STENOSIS OF LUMBAR REGION WITH NEUROGENIC CLAUDICATION: ICD-10-CM

## 2020-03-16 DIAGNOSIS — M54.16 LUMBAR RADICULITIS: ICD-10-CM

## 2020-03-16 DIAGNOSIS — M51.26 LUMBAR DISC HERNIATION: ICD-10-CM

## 2020-03-17 ENCOUNTER — COMMUNICATION - HEALTHEAST (OUTPATIENT)
Dept: INTERNAL MEDICINE | Facility: CLINIC | Age: 75
End: 2020-03-17

## 2020-03-17 ENCOUNTER — COMMUNICATION - HEALTHEAST (OUTPATIENT)
Dept: PHYSICAL MEDICINE AND REHAB | Facility: CLINIC | Age: 75
End: 2020-03-17

## 2020-03-17 DIAGNOSIS — I63.9 ISCHEMIC CEREBROVASCULAR ACCIDENT (CVA) OF FRONTAL LOBE (H): ICD-10-CM

## 2020-03-17 DIAGNOSIS — K21.9 GASTROESOPHAGEAL REFLUX DISEASE, ESOPHAGITIS PRESENCE NOT SPECIFIED: ICD-10-CM

## 2020-03-17 DIAGNOSIS — N95.1 MENOPAUSAL HOT FLUSHES: ICD-10-CM

## 2020-03-18 ENCOUNTER — HOME CARE/HOSPICE - HEALTHEAST (OUTPATIENT)
Dept: HOME HEALTH SERVICES | Facility: HOME HEALTH | Age: 75
End: 2020-03-18

## 2020-03-23 ENCOUNTER — HOME CARE/HOSPICE - HEALTHEAST (OUTPATIENT)
Dept: HOME HEALTH SERVICES | Facility: HOME HEALTH | Age: 75
End: 2020-03-23

## 2020-03-24 ENCOUNTER — COMMUNICATION - HEALTHEAST (OUTPATIENT)
Dept: INTERNAL MEDICINE | Facility: CLINIC | Age: 75
End: 2020-03-24

## 2020-03-24 DIAGNOSIS — G89.29 CHRONIC MIDLINE LOW BACK PAIN WITH RIGHT-SIDED SCIATICA: ICD-10-CM

## 2020-03-24 DIAGNOSIS — M54.41 CHRONIC MIDLINE LOW BACK PAIN WITH RIGHT-SIDED SCIATICA: ICD-10-CM

## 2020-03-24 DIAGNOSIS — J31.0 CHRONIC RHINITIS: ICD-10-CM

## 2020-03-25 ENCOUNTER — HOME CARE/HOSPICE - HEALTHEAST (OUTPATIENT)
Dept: HOME HEALTH SERVICES | Facility: HOME HEALTH | Age: 75
End: 2020-03-25

## 2020-03-30 ENCOUNTER — HOME CARE/HOSPICE - HEALTHEAST (OUTPATIENT)
Dept: HOME HEALTH SERVICES | Facility: HOME HEALTH | Age: 75
End: 2020-03-30

## 2020-04-01 ENCOUNTER — HOME CARE/HOSPICE - HEALTHEAST (OUTPATIENT)
Dept: HOME HEALTH SERVICES | Facility: HOME HEALTH | Age: 75
End: 2020-04-01

## 2020-04-01 ENCOUNTER — COMMUNICATION - HEALTHEAST (OUTPATIENT)
Dept: PHYSICAL MEDICINE AND REHAB | Facility: CLINIC | Age: 75
End: 2020-04-01

## 2020-04-02 ENCOUNTER — COMMUNICATION - HEALTHEAST (OUTPATIENT)
Dept: INTERNAL MEDICINE | Facility: CLINIC | Age: 75
End: 2020-04-02

## 2020-04-02 ENCOUNTER — COMMUNICATION - HEALTHEAST (OUTPATIENT)
Dept: PHYSICAL MEDICINE AND REHAB | Facility: CLINIC | Age: 75
End: 2020-04-02

## 2020-04-02 ENCOUNTER — HOSPITAL ENCOUNTER (OUTPATIENT)
Dept: PHYSICAL MEDICINE AND REHAB | Facility: CLINIC | Age: 75
Discharge: HOME | End: 2020-04-02
Attending: PAIN MEDICINE

## 2020-04-02 DIAGNOSIS — G60.9 IDIOPATHIC PERIPHERAL NEUROPATHY: ICD-10-CM

## 2020-04-02 DIAGNOSIS — M54.17 LUMBOSACRAL RADICULOPATHY AT L5: ICD-10-CM

## 2020-04-02 DIAGNOSIS — M48.062 SPINAL STENOSIS OF LUMBAR REGION WITH NEUROGENIC CLAUDICATION: ICD-10-CM

## 2020-04-02 DIAGNOSIS — M79.18 MYOFASCIAL PAIN: ICD-10-CM

## 2020-04-02 DIAGNOSIS — R26.81 GAIT INSTABILITY: ICD-10-CM

## 2020-04-02 DIAGNOSIS — M47.816 SPONDYLOSIS OF LUMBAR REGION WITHOUT MYELOPATHY OR RADICULOPATHY: ICD-10-CM

## 2020-04-02 DIAGNOSIS — M54.16 LUMBAR RADICULITIS: ICD-10-CM

## 2020-04-02 DIAGNOSIS — M51.26 LUMBAR DISC HERNIATION: ICD-10-CM

## 2020-04-03 ENCOUNTER — HOME CARE/HOSPICE - HEALTHEAST (OUTPATIENT)
Dept: HOME HEALTH SERVICES | Facility: HOME HEALTH | Age: 75
End: 2020-04-03

## 2020-04-04 ENCOUNTER — COMMUNICATION - HEALTHEAST (OUTPATIENT)
Dept: HOME HEALTH SERVICES | Facility: HOME HEALTH | Age: 75
End: 2020-04-04

## 2020-04-04 ENCOUNTER — HOME CARE/HOSPICE - HEALTHEAST (OUTPATIENT)
Dept: HOME HEALTH SERVICES | Facility: HOME HEALTH | Age: 75
End: 2020-04-04

## 2020-04-06 ENCOUNTER — HOME CARE/HOSPICE - HEALTHEAST (OUTPATIENT)
Dept: HOME HEALTH SERVICES | Facility: HOME HEALTH | Age: 75
End: 2020-04-06

## 2020-04-06 RX ORDER — MENTHOL 40 MG/ML
1 GEL TOPICAL DAILY PRN
Status: SHIPPED | COMMUNITY
Start: 2020-04-06

## 2020-04-07 ENCOUNTER — HOME CARE/HOSPICE - HEALTHEAST (OUTPATIENT)
Dept: HOME HEALTH SERVICES | Facility: HOME HEALTH | Age: 75
End: 2020-04-07

## 2020-04-08 ENCOUNTER — HOME CARE/HOSPICE - HEALTHEAST (OUTPATIENT)
Dept: HOME HEALTH SERVICES | Facility: HOME HEALTH | Age: 75
End: 2020-04-08

## 2020-04-09 ENCOUNTER — HOME CARE/HOSPICE - HEALTHEAST (OUTPATIENT)
Dept: HOME HEALTH SERVICES | Facility: HOME HEALTH | Age: 75
End: 2020-04-09

## 2020-04-13 ENCOUNTER — HOME CARE/HOSPICE - HEALTHEAST (OUTPATIENT)
Dept: HOME HEALTH SERVICES | Facility: HOME HEALTH | Age: 75
End: 2020-04-13

## 2020-04-15 ENCOUNTER — AMBULATORY - HEALTHEAST (OUTPATIENT)
Dept: OTHER | Facility: CLINIC | Age: 75
End: 2020-04-15

## 2020-04-15 ENCOUNTER — DOCUMENTATION ONLY (OUTPATIENT)
Dept: OTHER | Facility: CLINIC | Age: 75
End: 2020-04-15

## 2020-04-16 ENCOUNTER — RECORDS - HEALTHEAST (OUTPATIENT)
Dept: ADMINISTRATIVE | Facility: OTHER | Age: 75
End: 2020-04-16

## 2020-04-17 ENCOUNTER — HOME CARE/HOSPICE - HEALTHEAST (OUTPATIENT)
Dept: HOME HEALTH SERVICES | Facility: HOME HEALTH | Age: 75
End: 2020-04-17

## 2020-04-17 ENCOUNTER — COMMUNICATION - HEALTHEAST (OUTPATIENT)
Dept: HOME HEALTH SERVICES | Facility: HOME HEALTH | Age: 75
End: 2020-04-17

## 2020-04-21 ENCOUNTER — HOME CARE/HOSPICE - HEALTHEAST (OUTPATIENT)
Dept: HOME HEALTH SERVICES | Facility: HOME HEALTH | Age: 75
End: 2020-04-21

## 2020-04-23 ENCOUNTER — HOME CARE/HOSPICE - HEALTHEAST (OUTPATIENT)
Dept: HOME HEALTH SERVICES | Facility: HOME HEALTH | Age: 75
End: 2020-04-23

## 2020-04-28 ENCOUNTER — HOME CARE/HOSPICE - HEALTHEAST (OUTPATIENT)
Dept: HOME HEALTH SERVICES | Facility: HOME HEALTH | Age: 75
End: 2020-04-28

## 2020-04-30 ENCOUNTER — HOME CARE/HOSPICE - HEALTHEAST (OUTPATIENT)
Dept: HOME HEALTH SERVICES | Facility: HOME HEALTH | Age: 75
End: 2020-04-30

## 2020-04-30 ENCOUNTER — COMMUNICATION - HEALTHEAST (OUTPATIENT)
Dept: INTERNAL MEDICINE | Facility: CLINIC | Age: 75
End: 2020-04-30

## 2020-04-30 ENCOUNTER — HOSPITAL ENCOUNTER (OUTPATIENT)
Dept: PHYSICAL MEDICINE AND REHAB | Facility: CLINIC | Age: 75
Discharge: HOME | End: 2020-04-30
Attending: PAIN MEDICINE

## 2020-04-30 ENCOUNTER — COMMUNICATION - HEALTHEAST (OUTPATIENT)
Dept: PHYSICAL MEDICINE AND REHAB | Facility: CLINIC | Age: 75
End: 2020-04-30

## 2020-04-30 DIAGNOSIS — M54.16 LUMBAR RADICULITIS: ICD-10-CM

## 2020-04-30 DIAGNOSIS — M47.816 SPONDYLOSIS OF LUMBAR REGION WITHOUT MYELOPATHY OR RADICULOPATHY: ICD-10-CM

## 2020-04-30 DIAGNOSIS — M79.18 MYOFASCIAL PAIN: ICD-10-CM

## 2020-04-30 DIAGNOSIS — N30.00 ACUTE CYSTITIS WITHOUT HEMATURIA: ICD-10-CM

## 2020-04-30 DIAGNOSIS — M48.062 SPINAL STENOSIS OF LUMBAR REGION WITH NEUROGENIC CLAUDICATION: ICD-10-CM

## 2020-04-30 DIAGNOSIS — M51.26 LUMBAR DISC HERNIATION: ICD-10-CM

## 2020-05-01 ENCOUNTER — COMMUNICATION - HEALTHEAST (OUTPATIENT)
Dept: INTERNAL MEDICINE | Facility: CLINIC | Age: 75
End: 2020-05-01

## 2020-05-07 ENCOUNTER — RECORDS - HEALTHEAST (OUTPATIENT)
Dept: ADMINISTRATIVE | Facility: OTHER | Age: 75
End: 2020-05-07

## 2020-05-11 ENCOUNTER — COMMUNICATION - HEALTHEAST (OUTPATIENT)
Dept: SCHEDULING | Facility: CLINIC | Age: 75
End: 2020-05-11

## 2020-05-11 ENCOUNTER — COMMUNICATION - HEALTHEAST (OUTPATIENT)
Dept: INTERNAL MEDICINE | Facility: CLINIC | Age: 75
End: 2020-05-11

## 2020-05-11 ENCOUNTER — RECORDS - HEALTHEAST (OUTPATIENT)
Dept: ADMINISTRATIVE | Facility: OTHER | Age: 75
End: 2020-05-11

## 2020-05-12 ENCOUNTER — OFFICE VISIT - HEALTHEAST (OUTPATIENT)
Dept: INTERNAL MEDICINE | Facility: CLINIC | Age: 75
End: 2020-05-12

## 2020-05-12 DIAGNOSIS — K21.9 GASTROESOPHAGEAL REFLUX DISEASE, ESOPHAGITIS PRESENCE NOT SPECIFIED: ICD-10-CM

## 2020-05-12 DIAGNOSIS — R11.0 NAUSEA: ICD-10-CM

## 2020-05-12 DIAGNOSIS — N30.00 ACUTE CYSTITIS WITHOUT HEMATURIA: ICD-10-CM

## 2020-05-12 DIAGNOSIS — R79.89 ELEVATED LFTS: ICD-10-CM

## 2020-05-12 DIAGNOSIS — Z79.899 ON ANTINEOPLASTIC CHEMOTHERAPY: ICD-10-CM

## 2020-05-12 DIAGNOSIS — K80.20 CALCULUS OF GALLBLADDER WITHOUT CHOLECYSTITIS WITHOUT OBSTRUCTION: ICD-10-CM

## 2020-05-12 DIAGNOSIS — R30.0 DYSURIA: ICD-10-CM

## 2020-05-12 DIAGNOSIS — R00.2 PALPITATIONS: ICD-10-CM

## 2020-05-12 DIAGNOSIS — R51.9 NONINTRACTABLE HEADACHE, UNSPECIFIED CHRONICITY PATTERN, UNSPECIFIED HEADACHE TYPE: ICD-10-CM

## 2020-05-12 DIAGNOSIS — M65.311 TRIGGER THUMB, RIGHT THUMB: ICD-10-CM

## 2020-05-13 ENCOUNTER — AMBULATORY - HEALTHEAST (OUTPATIENT)
Dept: LAB | Facility: CLINIC | Age: 75
End: 2020-05-13

## 2020-05-13 ENCOUNTER — COMMUNICATION - HEALTHEAST (OUTPATIENT)
Dept: INTERNAL MEDICINE | Facility: CLINIC | Age: 75
End: 2020-05-13

## 2020-05-13 DIAGNOSIS — R30.0 DYSURIA: ICD-10-CM

## 2020-05-13 DIAGNOSIS — N30.00 ACUTE CYSTITIS WITHOUT HEMATURIA: ICD-10-CM

## 2020-05-13 LAB
ALBUMIN UR-MCNC: NEGATIVE MG/DL
APPEARANCE UR: CLEAR
BACTERIA #/AREA URNS HPF: ABNORMAL HPF
BILIRUB UR QL STRIP: NEGATIVE
COLOR UR AUTO: YELLOW
GLUCOSE UR STRIP-MCNC: NEGATIVE MG/DL
HGB UR QL STRIP: NEGATIVE
KETONES UR STRIP-MCNC: NEGATIVE MG/DL
LEUKOCYTE ESTERASE UR QL STRIP: ABNORMAL
NITRATE UR QL: NEGATIVE
PH UR STRIP: 6 [PH] (ref 5–8)
RBC #/AREA URNS AUTO: ABNORMAL HPF
SP GR UR STRIP: 1.01 (ref 1–1.03)
SQUAMOUS #/AREA URNS AUTO: ABNORMAL LPF
UROBILINOGEN UR STRIP-ACNC: ABNORMAL
WBC #/AREA URNS AUTO: ABNORMAL HPF

## 2020-05-15 LAB — BACTERIA SPEC CULT: ABNORMAL

## 2020-05-21 ENCOUNTER — OFFICE VISIT - HEALTHEAST (OUTPATIENT)
Dept: INTERNAL MEDICINE | Facility: CLINIC | Age: 75
End: 2020-05-21

## 2020-05-21 DIAGNOSIS — R79.89 ELEVATED LFTS: ICD-10-CM

## 2020-05-21 DIAGNOSIS — R05.9 COUGH: ICD-10-CM

## 2020-05-21 DIAGNOSIS — M54.2 NECK PAIN: ICD-10-CM

## 2020-05-21 DIAGNOSIS — C43.61 MELANOMA OF RIGHT UPPER ARM (H): ICD-10-CM

## 2020-05-21 DIAGNOSIS — N30.00 ACUTE CYSTITIS WITHOUT HEMATURIA: ICD-10-CM

## 2020-05-21 DIAGNOSIS — Z79.899 ON ANTINEOPLASTIC CHEMOTHERAPY: ICD-10-CM

## 2020-05-21 DIAGNOSIS — M65.311 TRIGGER THUMB, RIGHT THUMB: ICD-10-CM

## 2020-05-21 DIAGNOSIS — R00.2 PALPITATIONS: ICD-10-CM

## 2020-05-21 DIAGNOSIS — G25.0 ESSENTIAL TREMOR: ICD-10-CM

## 2020-05-21 DIAGNOSIS — K80.20 CALCULUS OF GALLBLADDER WITHOUT CHOLECYSTITIS WITHOUT OBSTRUCTION: ICD-10-CM

## 2020-05-21 LAB
ALBUMIN SERPL-MCNC: 3.7 G/DL (ref 3.5–5)
ALP SERPL-CCNC: 436 U/L (ref 45–120)
ALT SERPL W P-5'-P-CCNC: 274 U/L (ref 0–45)
ANION GAP SERPL CALCULATED.3IONS-SCNC: 10 MMOL/L (ref 5–18)
AST SERPL W P-5'-P-CCNC: 231 U/L (ref 0–40)
BILIRUB SERPL-MCNC: 0.5 MG/DL (ref 0–1)
BUN SERPL-MCNC: 13 MG/DL (ref 8–28)
C REACTIVE PROTEIN LHE: 1 MG/DL (ref 0–0.8)
CALCIUM SERPL-MCNC: 9.5 MG/DL (ref 8.5–10.5)
CHLORIDE BLD-SCNC: 108 MMOL/L (ref 98–107)
CO2 SERPL-SCNC: 24 MMOL/L (ref 22–31)
CREAT SERPL-MCNC: 0.75 MG/DL (ref 0.6–1.1)
ERYTHROCYTE [DISTWIDTH] IN BLOOD BY AUTOMATED COUNT: 12.5 % (ref 11–14.5)
GFR SERPL CREATININE-BSD FRML MDRD: >60 ML/MIN/1.73M2
GLUCOSE BLD-MCNC: 104 MG/DL (ref 70–125)
HCT VFR BLD AUTO: 38.1 % (ref 35–47)
HGB BLD-MCNC: 12.7 G/DL (ref 12–16)
LIPASE SERPL-CCNC: 44 U/L (ref 0–52)
MCH RBC QN AUTO: 30.2 PG (ref 27–34)
MCHC RBC AUTO-ENTMCNC: 33.3 G/DL (ref 32–36)
MCV RBC AUTO: 91 FL (ref 80–100)
PLATELET # BLD AUTO: 306 THOU/UL (ref 140–440)
PMV BLD AUTO: 7 FL (ref 7–10)
POTASSIUM BLD-SCNC: 4.1 MMOL/L (ref 3.5–5)
PROT SERPL-MCNC: 6.5 G/DL (ref 6–8)
RBC # BLD AUTO: 4.2 MILL/UL (ref 3.8–5.4)
SODIUM SERPL-SCNC: 142 MMOL/L (ref 136–145)
WBC: 5.8 THOU/UL (ref 4–11)

## 2020-05-22 ENCOUNTER — COMMUNICATION - HEALTHEAST (OUTPATIENT)
Dept: INTERNAL MEDICINE | Facility: CLINIC | Age: 75
End: 2020-05-22

## 2020-05-22 DIAGNOSIS — K80.20 CALCULUS OF GALLBLADDER WITHOUT CHOLECYSTITIS WITHOUT OBSTRUCTION: ICD-10-CM

## 2020-05-22 DIAGNOSIS — R79.89 ELEVATED LFTS: ICD-10-CM

## 2020-05-26 ENCOUNTER — COMMUNICATION - HEALTHEAST (OUTPATIENT)
Dept: INTERNAL MEDICINE | Facility: CLINIC | Age: 75
End: 2020-05-26

## 2020-05-26 ENCOUNTER — RECORDS - HEALTHEAST (OUTPATIENT)
Dept: ADMINISTRATIVE | Facility: OTHER | Age: 75
End: 2020-05-26

## 2020-05-26 LAB
ALT SERPL W/O P-5'-P-CCNC: 212 U/L (ref 7–40)
AST SERPL-CCNC: 119 U/L (ref 13–40)
CREAT SERPL-MCNC: 0.87 MG/DL (ref 0.5–1.2)
GFR ESTIMATE EXT - HISTORICAL: 65.1 ML/MIN/1.73M^2

## 2020-05-27 ENCOUNTER — HOSPITAL ENCOUNTER (OUTPATIENT)
Dept: MRI IMAGING | Facility: HOSPITAL | Age: 75
Discharge: HOME | End: 2020-05-27
Attending: INTERNAL MEDICINE

## 2020-05-27 DIAGNOSIS — K80.20 CALCULUS OF GALLBLADDER WITHOUT CHOLECYSTITIS WITHOUT OBSTRUCTION: ICD-10-CM

## 2020-05-27 DIAGNOSIS — R79.89 ELEVATED LFTS: ICD-10-CM

## 2020-05-27 DIAGNOSIS — C43.61 MELANOMA OF RIGHT UPPER ARM (H): ICD-10-CM

## 2020-05-28 ENCOUNTER — RECORDS - HEALTHEAST (OUTPATIENT)
Dept: ADMINISTRATIVE | Facility: OTHER | Age: 75
End: 2020-05-28

## 2020-05-29 ENCOUNTER — OFFICE VISIT - HEALTHEAST (OUTPATIENT)
Dept: INTERNAL MEDICINE | Facility: CLINIC | Age: 75
End: 2020-05-29

## 2020-05-29 DIAGNOSIS — C43.61 MELANOMA OF RIGHT UPPER ARM (H): ICD-10-CM

## 2020-05-29 DIAGNOSIS — Z79.899 ON ANTINEOPLASTIC CHEMOTHERAPY: ICD-10-CM

## 2020-05-29 DIAGNOSIS — R79.89 ELEVATED LFTS: ICD-10-CM

## 2020-05-29 DIAGNOSIS — M48.062 SPINAL STENOSIS OF LUMBAR REGION WITH NEUROGENIC CLAUDICATION: ICD-10-CM

## 2020-05-29 DIAGNOSIS — K80.50 CHOLEDOCHOLITHIASIS: ICD-10-CM

## 2020-05-29 DIAGNOSIS — K80.71 CALCULUS OF GALLBLADDER AND BILE DUCT WITH OBSTRUCTION WITHOUT CHOLECYSTITIS: ICD-10-CM

## 2020-05-29 DIAGNOSIS — R93.3 ABNORMAL MAGNETIC RESONANCE CHOLANGIOPANCREATOGRAPHY (MRCP): ICD-10-CM

## 2020-05-29 DIAGNOSIS — I63.9 ISCHEMIC CEREBROVASCULAR ACCIDENT (CVA) OF FRONTAL LOBE (H): ICD-10-CM

## 2020-05-29 ASSESSMENT — PATIENT HEALTH QUESTIONNAIRE - PHQ9: SUM OF ALL RESPONSES TO PHQ QUESTIONS 1-9: 2

## 2020-05-30 ENCOUNTER — AMBULATORY - HEALTHEAST (OUTPATIENT)
Dept: INTERNAL MEDICINE | Facility: CLINIC | Age: 75
End: 2020-05-30

## 2020-05-30 DIAGNOSIS — N30.00 ACUTE CYSTITIS WITHOUT HEMATURIA: ICD-10-CM

## 2020-06-01 ENCOUNTER — COMMUNICATION - HEALTHEAST (OUTPATIENT)
Dept: INTERNAL MEDICINE | Facility: CLINIC | Age: 75
End: 2020-06-01

## 2020-06-03 ENCOUNTER — RECORDS - HEALTHEAST (OUTPATIENT)
Dept: ADMINISTRATIVE | Facility: OTHER | Age: 75
End: 2020-06-03

## 2020-06-04 ENCOUNTER — COMMUNICATION - HEALTHEAST (OUTPATIENT)
Dept: INTERNAL MEDICINE | Facility: CLINIC | Age: 75
End: 2020-06-04

## 2020-06-04 ENCOUNTER — AMBULATORY - HEALTHEAST (OUTPATIENT)
Dept: SURGERY | Facility: HOSPITAL | Age: 75
End: 2020-06-04

## 2020-06-04 DIAGNOSIS — Z11.59 ENCOUNTER FOR SCREENING FOR OTHER VIRAL DISEASES: ICD-10-CM

## 2020-06-05 ENCOUNTER — RECORDS - HEALTHEAST (OUTPATIENT)
Dept: HEALTH INFORMATION MANAGEMENT | Facility: CLINIC | Age: 75
End: 2020-06-05

## 2020-06-09 ENCOUNTER — HOSPITAL ENCOUNTER (OUTPATIENT)
Dept: PET IMAGING | Facility: HOSPITAL | Age: 75
Discharge: HOME | End: 2020-06-09

## 2020-06-09 DIAGNOSIS — C43.61 MELANOMA OF RIGHT UPPER ARM (H): ICD-10-CM

## 2020-06-09 LAB — GLUCOSE BLDC GLUCOMTR-MCNC: 101 MG/DL (ref 70–139)

## 2020-06-09 ASSESSMENT — MIFFLIN-ST. JEOR: SCORE: 1375.39

## 2020-06-10 ENCOUNTER — COMMUNICATION - HEALTHEAST (OUTPATIENT)
Dept: INTERNAL MEDICINE | Facility: CLINIC | Age: 75
End: 2020-06-10

## 2020-06-16 ENCOUNTER — OFFICE VISIT - HEALTHEAST (OUTPATIENT)
Dept: INTERNAL MEDICINE | Facility: CLINIC | Age: 75
End: 2020-06-16

## 2020-06-16 DIAGNOSIS — R79.89 ELEVATED LFTS: ICD-10-CM
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
## 2020-06-16 DIAGNOSIS — K21.9 GASTROESOPHAGEAL REFLUX DISEASE, ESOPHAGITIS PRESENCE NOT SPECIFIED: ICD-10-CM

## 2020-06-16 DIAGNOSIS — K80.71 CALCULUS OF GALLBLADDER AND BILE DUCT WITH OBSTRUCTION WITHOUT CHOLECYSTITIS: ICD-10-CM

## 2020-06-16 DIAGNOSIS — Z01.810 PREOPERATIVE CARDIOVASCULAR EXAMINATION: ICD-10-CM

## 2020-06-16 DIAGNOSIS — M51.26 LUMBAR DISC HERNIATION: ICD-10-CM

## 2020-06-16 DIAGNOSIS — R26.81 GAIT INSTABILITY: ICD-10-CM

## 2020-06-16 DIAGNOSIS — M54.17 LUMBOSACRAL RADICULOPATHY AT L5: ICD-10-CM

## 2020-06-16 DIAGNOSIS — K80.50 CHOLEDOCHOLITHIASIS: ICD-10-CM

## 2020-06-16 DIAGNOSIS — M54.16 LUMBAR RADICULITIS: ICD-10-CM

## 2020-06-16 DIAGNOSIS — M48.062 SPINAL STENOSIS OF LUMBAR REGION WITH NEUROGENIC CLAUDICATION: ICD-10-CM

## 2020-06-16 DIAGNOSIS — C43.61 MELANOMA OF RIGHT UPPER ARM (H): ICD-10-CM

## 2020-06-17 ENCOUNTER — AMBULATORY - HEALTHEAST (OUTPATIENT)
Dept: FAMILY MEDICINE | Facility: CLINIC | Age: 75
End: 2020-06-17

## 2020-06-17 ENCOUNTER — COMMUNICATION - HEALTHEAST (OUTPATIENT)
Dept: PHYSICAL MEDICINE AND REHAB | Facility: CLINIC | Age: 75
End: 2020-06-17

## 2020-06-17 ENCOUNTER — COMMUNICATION - HEALTHEAST (OUTPATIENT)
Dept: INTERNAL MEDICINE | Facility: CLINIC | Age: 75
End: 2020-06-17

## 2020-06-17 DIAGNOSIS — Z11.59 ENCOUNTER FOR SCREENING FOR OTHER VIRAL DISEASES: ICD-10-CM

## 2020-06-18 ASSESSMENT — MIFFLIN-ST. JEOR: SCORE: 1406

## 2020-06-19 ENCOUNTER — SURGERY - HEALTHEAST (OUTPATIENT)
Dept: SURGERY | Facility: HOSPITAL | Age: 75
End: 2020-06-19

## 2020-06-19 ENCOUNTER — ANESTHESIA - HEALTHEAST (OUTPATIENT)
Dept: SURGERY | Facility: HOSPITAL | Age: 75
End: 2020-06-19

## 2020-06-19 LAB
ALT SERPL W/O P-5'-P-CCNC: 15 U/L (ref 7–40)
AST SERPL-CCNC: 23 U/L (ref 13–40)
CREAT SERPL-MCNC: 0.81 MG/DL (ref 0.5–1.2)
GFR ESTIMATE EXT - HISTORICAL: 70.9 ML/MIN/1.73M2

## 2020-06-21 ENCOUNTER — COMMUNICATION - HEALTHEAST (OUTPATIENT)
Dept: INTERNAL MEDICINE | Facility: CLINIC | Age: 75
End: 2020-06-21

## 2020-06-22 ENCOUNTER — COMMUNICATION - HEALTHEAST (OUTPATIENT)
Dept: ADMINISTRATIVE | Facility: CLINIC | Age: 75
End: 2020-06-22

## 2020-06-23 ENCOUNTER — OFFICE VISIT - HEALTHEAST (OUTPATIENT)
Dept: SURGERY | Facility: CLINIC | Age: 75
End: 2020-06-23

## 2020-06-23 ENCOUNTER — COMMUNICATION - HEALTHEAST (OUTPATIENT)
Dept: INTERNAL MEDICINE | Facility: CLINIC | Age: 75
End: 2020-06-23

## 2020-06-23 DIAGNOSIS — K80.50 CHOLEDOCHOLITHIASIS: ICD-10-CM

## 2020-06-26 ENCOUNTER — HOSPITAL ENCOUNTER (OUTPATIENT)
Dept: ADMINISTRATIVE | Facility: OTHER | Age: 75
Discharge: HOME | End: 2020-06-26

## 2020-06-26 ENCOUNTER — OFFICE VISIT - HEALTHEAST (OUTPATIENT)
Dept: INTERNAL MEDICINE | Facility: CLINIC | Age: 75
End: 2020-06-26

## 2020-06-26 DIAGNOSIS — Z01.810 PREOPERATIVE CARDIOVASCULAR EXAMINATION: ICD-10-CM

## 2020-06-26 DIAGNOSIS — K80.20 SYMPTOMATIC CHOLELITHIASIS: ICD-10-CM

## 2020-06-26 DIAGNOSIS — K80.50 CHOLEDOCHOLITHIASIS: ICD-10-CM

## 2020-06-26 DIAGNOSIS — I63.9 ISCHEMIC CEREBROVASCULAR ACCIDENT (CVA) OF FRONTAL LOBE (H): ICD-10-CM

## 2020-06-26 DIAGNOSIS — C43.61 MELANOMA OF RIGHT UPPER ARM (H): ICD-10-CM

## 2020-06-26 DIAGNOSIS — R79.89 ELEVATED LFTS: ICD-10-CM

## 2020-06-26 DIAGNOSIS — K21.9 GASTROESOPHAGEAL REFLUX DISEASE, ESOPHAGITIS PRESENCE NOT SPECIFIED: ICD-10-CM

## 2020-06-26 DIAGNOSIS — K80.71 CALCULUS OF GALLBLADDER AND BILE DUCT WITH OBSTRUCTION WITHOUT CHOLECYSTITIS: ICD-10-CM

## 2020-06-26 DIAGNOSIS — M48.062 SPINAL STENOSIS OF LUMBAR REGION WITH NEUROGENIC CLAUDICATION: ICD-10-CM

## 2020-06-26 LAB
ALBUMIN SERPL-MCNC: 3.8 G/DL (ref 3.5–5)
ALBUMIN UR-MCNC: NEGATIVE MG/DL
ALP SERPL-CCNC: 100 U/L (ref 45–120)
ALT SERPL W P-5'-P-CCNC: 19 U/L (ref 0–45)
ANION GAP SERPL CALCULATED.3IONS-SCNC: 9 MMOL/L (ref 5–18)
APPEARANCE UR: CLEAR
AST SERPL W P-5'-P-CCNC: 17 U/L (ref 0–40)
BILIRUB DIRECT SERPL-MCNC: 0.2 MG/DL
BILIRUB SERPL-MCNC: 0.4 MG/DL (ref 0–1)
BILIRUB UR QL STRIP: NEGATIVE
BUN SERPL-MCNC: 13 MG/DL (ref 8–28)
CALCIUM SERPL-MCNC: 9.3 MG/DL (ref 8.5–10.5)
CHLORIDE BLD-SCNC: 107 MMOL/L (ref 98–107)
CO2 SERPL-SCNC: 25 MMOL/L (ref 22–31)
COLOR UR AUTO: YELLOW
CREAT SERPL-MCNC: 0.81 MG/DL (ref 0.6–1.1)
ERYTHROCYTE [DISTWIDTH] IN BLOOD BY AUTOMATED COUNT: 12 % (ref 11–14.5)
GFR SERPL CREATININE-BSD FRML MDRD: >60 ML/MIN/1.73M2
GLUCOSE BLD-MCNC: 94 MG/DL (ref 70–125)
GLUCOSE UR STRIP-MCNC: NEGATIVE MG/DL
HCT VFR BLD AUTO: 39 % (ref 35–47)
HGB BLD-MCNC: 13.3 G/DL (ref 12–16)
HGB UR QL STRIP: NEGATIVE
KETONES UR STRIP-MCNC: NEGATIVE MG/DL
LEUKOCYTE ESTERASE UR QL STRIP: NEGATIVE
LIPASE SERPL-CCNC: 36 U/L (ref 0–52)
MCH RBC QN AUTO: 29.8 PG (ref 27–34)
MCHC RBC AUTO-ENTMCNC: 34.1 G/DL (ref 32–36)
MCV RBC AUTO: 87 FL (ref 80–100)
NITRATE UR QL: NEGATIVE
PH UR STRIP: 7 [PH] (ref 5–8)
PLATELET # BLD AUTO: 274 THOU/UL (ref 140–440)
PMV BLD AUTO: 7 FL (ref 7–10)
POTASSIUM BLD-SCNC: 4.2 MMOL/L (ref 3.5–5)
PROT SERPL-MCNC: 6.2 G/DL (ref 6–8)
RBC # BLD AUTO: 4.46 MILL/UL (ref 3.8–5.4)
SODIUM SERPL-SCNC: 141 MMOL/L (ref 136–145)
SP GR UR STRIP: 1.01 (ref 1–1.03)
UROBILINOGEN UR STRIP-ACNC: NORMAL
WBC: 6.1 THOU/UL (ref 4–11)

## 2020-06-26 ASSESSMENT — MIFFLIN-ST. JEOR: SCORE: 1390.13

## 2020-06-30 ENCOUNTER — AMBULATORY - HEALTHEAST (OUTPATIENT)
Dept: SURGERY | Facility: AMBULATORY SURGERY CENTER | Age: 75
End: 2020-06-30

## 2020-06-30 ENCOUNTER — COMMUNICATION - HEALTHEAST (OUTPATIENT)
Dept: SURGERY | Facility: CLINIC | Age: 75
End: 2020-06-30

## 2020-06-30 DIAGNOSIS — Z11.59 ENCOUNTER FOR SCREENING FOR OTHER VIRAL DISEASES: ICD-10-CM

## 2020-07-02 ENCOUNTER — RECORDS - HEALTHEAST (OUTPATIENT)
Dept: ADMINISTRATIVE | Facility: OTHER | Age: 75
End: 2020-07-02

## 2020-07-03 ENCOUNTER — COMMUNICATION - HEALTHEAST (OUTPATIENT)
Dept: INTERNAL MEDICINE | Facility: CLINIC | Age: 75
End: 2020-07-03

## 2020-07-03 DIAGNOSIS — N95.1 MENOPAUSAL HOT FLUSHES: ICD-10-CM

## 2020-07-03 DIAGNOSIS — I63.9 ISCHEMIC CEREBROVASCULAR ACCIDENT (CVA) OF FRONTAL LOBE (H): ICD-10-CM

## 2020-07-03 DIAGNOSIS — K21.9 GASTROESOPHAGEAL REFLUX DISEASE, ESOPHAGITIS PRESENCE NOT SPECIFIED: ICD-10-CM

## 2020-07-09 ENCOUNTER — RECORDS - HEALTHEAST (OUTPATIENT)
Dept: ADMINISTRATIVE | Facility: OTHER | Age: 75
End: 2020-07-09

## 2020-07-09 ASSESSMENT — MIFFLIN-ST. JEOR: SCORE: 1367.45

## 2020-07-10 ENCOUNTER — COMMUNICATION - HEALTHEAST (OUTPATIENT)
Dept: INTERNAL MEDICINE | Facility: CLINIC | Age: 75
End: 2020-07-10

## 2020-07-10 DIAGNOSIS — K80.20 SYMPTOMATIC CHOLELITHIASIS: ICD-10-CM

## 2020-07-10 DIAGNOSIS — K80.50 CHOLEDOCHOLITHIASIS: ICD-10-CM

## 2020-07-10 DIAGNOSIS — R79.89 ELEVATED LFTS: ICD-10-CM

## 2020-07-11 ENCOUNTER — AMBULATORY - HEALTHEAST (OUTPATIENT)
Dept: FAMILY MEDICINE | Facility: CLINIC | Age: 75
End: 2020-07-11

## 2020-07-11 DIAGNOSIS — Z11.59 ENCOUNTER FOR SCREENING FOR OTHER VIRAL DISEASES: ICD-10-CM

## 2020-07-13 ENCOUNTER — COMMUNICATION - HEALTHEAST (OUTPATIENT)
Dept: INTERNAL MEDICINE | Facility: CLINIC | Age: 75
End: 2020-07-13

## 2020-07-13 ENCOUNTER — AMBULATORY - HEALTHEAST (OUTPATIENT)
Dept: LAB | Facility: CLINIC | Age: 75
End: 2020-07-13

## 2020-07-13 ENCOUNTER — ANESTHESIA - HEALTHEAST (OUTPATIENT)
Dept: SURGERY | Facility: AMBULATORY SURGERY CENTER | Age: 75
End: 2020-07-13

## 2020-07-13 DIAGNOSIS — K80.50 CHOLEDOCHOLITHIASIS: ICD-10-CM

## 2020-07-13 DIAGNOSIS — R79.89 ELEVATED LFTS: ICD-10-CM

## 2020-07-13 DIAGNOSIS — K80.20 SYMPTOMATIC CHOLELITHIASIS: ICD-10-CM

## 2020-07-13 LAB
ALBUMIN SERPL-MCNC: 3.6 G/DL (ref 3.5–5)
ALP SERPL-CCNC: 582 U/L (ref 45–120)
ALT SERPL W P-5'-P-CCNC: 248 U/L (ref 0–45)
ANION GAP SERPL CALCULATED.3IONS-SCNC: 11 MMOL/L (ref 5–18)
AST SERPL W P-5'-P-CCNC: 230 U/L (ref 0–40)
BILIRUB SERPL-MCNC: 2.4 MG/DL (ref 0–1)
BUN SERPL-MCNC: 11 MG/DL (ref 8–28)
C REACTIVE PROTEIN LHE: 1.2 MG/DL (ref 0–0.8)
CALCIUM SERPL-MCNC: 9.3 MG/DL (ref 8.5–10.5)
CHLORIDE BLD-SCNC: 108 MMOL/L (ref 98–107)
CO2 SERPL-SCNC: 25 MMOL/L (ref 22–31)
CREAT SERPL-MCNC: 0.77 MG/DL (ref 0.6–1.1)
ERYTHROCYTE [DISTWIDTH] IN BLOOD BY AUTOMATED COUNT: 12.5 % (ref 11–14.5)
GFR SERPL CREATININE-BSD FRML MDRD: >60 ML/MIN/1.73M2
GLUCOSE BLD-MCNC: 99 MG/DL (ref 70–125)
HCT VFR BLD AUTO: 38.8 % (ref 35–47)
HGB BLD-MCNC: 13 G/DL (ref 12–16)
MCH RBC QN AUTO: 29.1 PG (ref 27–34)
MCHC RBC AUTO-ENTMCNC: 33.4 G/DL (ref 32–36)
MCV RBC AUTO: 87 FL (ref 80–100)
PLATELET # BLD AUTO: 277 THOU/UL (ref 140–440)
PMV BLD AUTO: 7.9 FL (ref 7–10)
POTASSIUM BLD-SCNC: 3.7 MMOL/L (ref 3.5–5)
PROT SERPL-MCNC: 6.5 G/DL (ref 6–8)
RBC # BLD AUTO: 4.45 MILL/UL (ref 3.8–5.4)
SODIUM SERPL-SCNC: 144 MMOL/L (ref 136–145)
WBC: 5.4 THOU/UL (ref 4–11)

## 2020-07-14 ENCOUNTER — SURGERY - HEALTHEAST (OUTPATIENT)
Dept: SURGERY | Facility: AMBULATORY SURGERY CENTER | Age: 75
End: 2020-07-14
Payer: MEDICARE

## 2020-07-14 ENCOUNTER — SURGERY - HEALTHEAST (OUTPATIENT)
Dept: SURGERY | Facility: HOSPITAL | Age: 75
End: 2020-07-14

## 2020-07-14 ENCOUNTER — ANESTHESIA - HEALTHEAST (OUTPATIENT)
Dept: SURGERY | Facility: HOSPITAL | Age: 75
End: 2020-07-14

## 2020-07-14 ASSESSMENT — MIFFLIN-ST. JEOR
SCORE: 1376.52
SCORE: 1376.52

## 2020-07-15 ENCOUNTER — ANESTHESIA - HEALTHEAST (OUTPATIENT)
Dept: SURGERY | Facility: HOSPITAL | Age: 75
End: 2020-07-15

## 2020-07-15 ENCOUNTER — SURGERY - HEALTHEAST (OUTPATIENT)
Dept: SURGERY | Facility: HOSPITAL | Age: 75
End: 2020-07-15

## 2020-07-15 ASSESSMENT — MIFFLIN-ST. JEOR
SCORE: 1400.11
SCORE: 1505.79
SCORE: 1505.79
SCORE: 1400.11

## 2020-07-16 ENCOUNTER — COMMUNICATION - HEALTHEAST (OUTPATIENT)
Dept: INTERNAL MEDICINE | Facility: CLINIC | Age: 75
End: 2020-07-16

## 2020-07-17 ENCOUNTER — COMMUNICATION - HEALTHEAST (OUTPATIENT)
Dept: NURSING | Facility: CLINIC | Age: 75
End: 2020-07-17

## 2020-07-23 ENCOUNTER — HOSPITAL ENCOUNTER (OUTPATIENT)
Dept: CT IMAGING | Facility: HOSPITAL | Age: 75
Discharge: HOME | End: 2020-07-23

## 2020-07-23 DIAGNOSIS — C43.9 MALIGNANT MELANOMA OF SKIN (H): ICD-10-CM

## 2020-07-24 ENCOUNTER — COMMUNICATION - HEALTHEAST (OUTPATIENT)
Dept: PHYSICAL MEDICINE AND REHAB | Facility: CLINIC | Age: 75
End: 2020-07-24

## 2020-07-28 ENCOUNTER — COMMUNICATION - HEALTHEAST (OUTPATIENT)
Dept: INTERNAL MEDICINE | Facility: CLINIC | Age: 75
End: 2020-07-28

## 2020-07-28 ENCOUNTER — OFFICE VISIT - HEALTHEAST (OUTPATIENT)
Dept: INTERNAL MEDICINE | Facility: CLINIC | Age: 75
End: 2020-07-28

## 2020-07-28 DIAGNOSIS — R79.89 ELEVATED LFTS: ICD-10-CM

## 2020-07-28 DIAGNOSIS — M48.062 SPINAL STENOSIS OF LUMBAR REGION WITH NEUROGENIC CLAUDICATION: ICD-10-CM

## 2020-07-28 DIAGNOSIS — Z98.890 HISTORY OF MELANOMA EXCISION: ICD-10-CM

## 2020-07-28 DIAGNOSIS — R26.81 GAIT INSTABILITY: ICD-10-CM

## 2020-07-28 DIAGNOSIS — C43.61 MELANOMA OF RIGHT UPPER ARM (H): ICD-10-CM

## 2020-07-28 DIAGNOSIS — Z91.89 DRIVING SAFETY ISSUE: ICD-10-CM

## 2020-07-28 DIAGNOSIS — Z85.820 HISTORY OF MELANOMA EXCISION: ICD-10-CM

## 2020-07-28 DIAGNOSIS — K80.50 CHOLEDOCHOLITHIASIS: ICD-10-CM

## 2020-07-28 DIAGNOSIS — Z86.006 PERSONAL HISTORY OF MELANOMA IN-SITU: ICD-10-CM

## 2020-07-28 DIAGNOSIS — Z90.49 S/P CHOLECYSTECTOMY: ICD-10-CM

## 2020-07-28 LAB
ALBUMIN SERPL-MCNC: 3.6 G/DL (ref 3.5–5)
ALP SERPL-CCNC: 733 U/L (ref 45–120)
ALT SERPL W P-5'-P-CCNC: 668 U/L (ref 0–45)
AST SERPL W P-5'-P-CCNC: 748 U/L (ref 0–40)
BILIRUB DIRECT SERPL-MCNC: 1.3 MG/DL
BILIRUB SERPL-MCNC: 2 MG/DL (ref 0–1)
PROT SERPL-MCNC: 6.9 G/DL (ref 6–8)

## 2020-07-29 ENCOUNTER — COMMUNICATION - HEALTHEAST (OUTPATIENT)
Dept: INTERNAL MEDICINE | Facility: CLINIC | Age: 75
End: 2020-07-29

## 2020-07-29 ENCOUNTER — HOSPITAL ENCOUNTER (OUTPATIENT)
Dept: PHYSICAL MEDICINE AND REHAB | Facility: CLINIC | Age: 75
Discharge: HOME | End: 2020-07-29
Attending: PAIN MEDICINE

## 2020-07-29 DIAGNOSIS — M48.062 SPINAL STENOSIS OF LUMBAR REGION WITH NEUROGENIC CLAUDICATION: ICD-10-CM

## 2020-07-29 DIAGNOSIS — M54.16 LUMBAR RADICULITIS: ICD-10-CM

## 2020-07-29 DIAGNOSIS — M51.26 LUMBAR DISC HERNIATION: ICD-10-CM

## 2020-07-30 ENCOUNTER — COMMUNICATION - HEALTHEAST (OUTPATIENT)
Dept: INTERNAL MEDICINE | Facility: CLINIC | Age: 75
End: 2020-07-30

## 2020-07-30 ENCOUNTER — HOME CARE/HOSPICE - HEALTHEAST (OUTPATIENT)
Dept: HOME HEALTH SERVICES | Facility: HOME HEALTH | Age: 75
End: 2020-07-30

## 2020-07-30 ENCOUNTER — RECORDS - HEALTHEAST (OUTPATIENT)
Dept: ADMINISTRATIVE | Facility: OTHER | Age: 75
End: 2020-07-30

## 2020-07-31 ENCOUNTER — COMMUNICATION - HEALTHEAST (OUTPATIENT)
Dept: HOME HEALTH SERVICES | Facility: HOME HEALTH | Age: 75
End: 2020-07-31

## 2020-07-31 ENCOUNTER — OFFICE VISIT - HEALTHEAST (OUTPATIENT)
Dept: SURGERY | Facility: CLINIC | Age: 75
End: 2020-07-31

## 2020-07-31 DIAGNOSIS — K80.50 CHOLEDOCHOLITHIASIS: ICD-10-CM

## 2020-07-31 DIAGNOSIS — K81.9 CHOLECYSTITIS: ICD-10-CM

## 2020-08-05 ENCOUNTER — COMMUNICATION - HEALTHEAST (OUTPATIENT)
Dept: INTERNAL MEDICINE | Facility: CLINIC | Age: 75
End: 2020-08-05

## 2020-08-05 ENCOUNTER — AMBULATORY - HEALTHEAST (OUTPATIENT)
Dept: LAB | Facility: CLINIC | Age: 75
End: 2020-08-05

## 2020-08-05 DIAGNOSIS — Z98.890 HISTORY OF MELANOMA EXCISION: ICD-10-CM

## 2020-08-05 DIAGNOSIS — Z85.820 HISTORY OF MELANOMA EXCISION: ICD-10-CM

## 2020-08-05 DIAGNOSIS — R79.89 ELEVATED LFTS: ICD-10-CM

## 2020-08-05 LAB
ALBUMIN SERPL-MCNC: 3.7 G/DL (ref 3.5–5)
ALP SERPL-CCNC: 470 U/L (ref 45–120)
ALT SERPL W P-5'-P-CCNC: 357 U/L (ref 0–45)
ANION GAP SERPL CALCULATED.3IONS-SCNC: 9 MMOL/L (ref 5–18)
AST SERPL W P-5'-P-CCNC: 224 U/L (ref 0–40)
BILIRUB SERPL-MCNC: 1.4 MG/DL (ref 0–1)
BUN SERPL-MCNC: 13 MG/DL (ref 8–28)
C REACTIVE PROTEIN LHE: 0.3 MG/DL (ref 0–0.8)
CALCIUM SERPL-MCNC: 9.5 MG/DL (ref 8.5–10.5)
CHLORIDE BLD-SCNC: 104 MMOL/L (ref 98–107)
CO2 SERPL-SCNC: 26 MMOL/L (ref 22–31)
CREAT SERPL-MCNC: 0.77 MG/DL (ref 0.6–1.1)
ERYTHROCYTE [DISTWIDTH] IN BLOOD BY AUTOMATED COUNT: 12.9 % (ref 11–14.5)
GFR SERPL CREATININE-BSD FRML MDRD: >60 ML/MIN/1.73M2
GLUCOSE BLD-MCNC: 88 MG/DL (ref 70–125)
HCT VFR BLD AUTO: 37.5 % (ref 35–47)
HGB BLD-MCNC: 12.4 G/DL (ref 12–16)
INR PPP: 1.09 (ref 0.9–1.1)
MCH RBC QN AUTO: 28.9 PG (ref 27–34)
MCHC RBC AUTO-ENTMCNC: 33.1 G/DL (ref 32–36)
MCV RBC AUTO: 87 FL (ref 80–100)
PLATELET # BLD AUTO: 298 THOU/UL (ref 140–440)
PMV BLD AUTO: 7.6 FL (ref 7–10)
POTASSIUM BLD-SCNC: 4.3 MMOL/L (ref 3.5–5)
PROT SERPL-MCNC: 6.6 G/DL (ref 6–8)
RBC # BLD AUTO: 4.31 MILL/UL (ref 3.8–5.4)
SODIUM SERPL-SCNC: 139 MMOL/L (ref 136–145)
WBC: 4.3 THOU/UL (ref 4–11)

## 2020-08-06 ENCOUNTER — COMMUNICATION - HEALTHEAST (OUTPATIENT)
Dept: INTERNAL MEDICINE | Facility: CLINIC | Age: 75
End: 2020-08-06

## 2020-08-06 ENCOUNTER — RECORDS - HEALTHEAST (OUTPATIENT)
Dept: HEALTH INFORMATION MANAGEMENT | Facility: CLINIC | Age: 75
End: 2020-08-06

## 2020-08-06 DIAGNOSIS — R79.89 ELEVATED LFTS: ICD-10-CM

## 2020-08-07 ENCOUNTER — COMMUNICATION - HEALTHEAST (OUTPATIENT)
Dept: INTERNAL MEDICINE | Facility: CLINIC | Age: 75
End: 2020-08-07

## 2020-08-11 ENCOUNTER — HOSPITAL ENCOUNTER (OUTPATIENT)
Dept: PHYSICAL MEDICINE AND REHAB | Facility: CLINIC | Age: 75
Discharge: HOME | End: 2020-08-11
Attending: PAIN MEDICINE

## 2020-08-11 ENCOUNTER — RECORDS - HEALTHEAST (OUTPATIENT)
Dept: ADMINISTRATIVE | Facility: OTHER | Age: 75
End: 2020-08-11

## 2020-08-11 DIAGNOSIS — M51.26 LUMBAR DISC HERNIATION: ICD-10-CM

## 2020-08-11 DIAGNOSIS — M54.16 LUMBAR RADICULITIS: ICD-10-CM

## 2020-08-11 DIAGNOSIS — M48.062 SPINAL STENOSIS OF LUMBAR REGION WITH NEUROGENIC CLAUDICATION: ICD-10-CM

## 2020-08-12 ENCOUNTER — COMMUNICATION - HEALTHEAST (OUTPATIENT)
Dept: INTERNAL MEDICINE | Facility: CLINIC | Age: 75
End: 2020-08-12

## 2020-08-12 DIAGNOSIS — G60.9 IDIOPATHIC PERIPHERAL NEUROPATHY: ICD-10-CM

## 2020-08-12 DIAGNOSIS — M48.062 SPINAL STENOSIS OF LUMBAR REGION WITH NEUROGENIC CLAUDICATION: ICD-10-CM

## 2020-08-13 ENCOUNTER — HOME CARE/HOSPICE - HEALTHEAST (OUTPATIENT)
Dept: HOME HEALTH SERVICES | Facility: HOME HEALTH | Age: 75
End: 2020-08-13

## 2020-08-14 ENCOUNTER — HOME CARE/HOSPICE - HEALTHEAST (OUTPATIENT)
Dept: HOME HEALTH SERVICES | Facility: HOME HEALTH | Age: 75
End: 2020-08-14

## 2020-08-14 ENCOUNTER — COMMUNICATION - HEALTHEAST (OUTPATIENT)
Dept: HOME HEALTH SERVICES | Facility: HOME HEALTH | Age: 75
End: 2020-08-14

## 2020-08-17 ENCOUNTER — HOME CARE/HOSPICE - HEALTHEAST (OUTPATIENT)
Dept: HOME HEALTH SERVICES | Facility: HOME HEALTH | Age: 75
End: 2020-08-17

## 2020-08-18 ENCOUNTER — HOME CARE/HOSPICE - HEALTHEAST (OUTPATIENT)
Dept: HOME HEALTH SERVICES | Facility: HOME HEALTH | Age: 75
End: 2020-08-18

## 2020-08-19 ENCOUNTER — RECORDS - HEALTHEAST (OUTPATIENT)
Dept: ADMINISTRATIVE | Facility: OTHER | Age: 75
End: 2020-08-19

## 2020-08-20 ENCOUNTER — HOME CARE/HOSPICE - HEALTHEAST (OUTPATIENT)
Dept: HOME HEALTH SERVICES | Facility: HOME HEALTH | Age: 75
End: 2020-08-20

## 2020-08-21 ENCOUNTER — COMMUNICATION - HEALTHEAST (OUTPATIENT)
Dept: HOME HEALTH SERVICES | Facility: HOME HEALTH | Age: 75
End: 2020-08-21

## 2020-08-25 ENCOUNTER — HOSPITAL ENCOUNTER (OUTPATIENT)
Dept: PHYSICAL MEDICINE AND REHAB | Facility: CLINIC | Age: 75
Discharge: HOME | End: 2020-08-25
Attending: PAIN MEDICINE

## 2020-08-25 ENCOUNTER — HOME CARE/HOSPICE - HEALTHEAST (OUTPATIENT)
Dept: HOME HEALTH SERVICES | Facility: HOME HEALTH | Age: 75
End: 2020-08-25

## 2020-08-25 ENCOUNTER — COMMUNICATION - HEALTHEAST (OUTPATIENT)
Dept: PHYSICAL MEDICINE AND REHAB | Facility: CLINIC | Age: 75
End: 2020-08-25

## 2020-08-25 DIAGNOSIS — M54.16 LUMBAR RADICULITIS: ICD-10-CM

## 2020-08-25 DIAGNOSIS — M47.816 SPONDYLOSIS OF LUMBAR REGION WITHOUT MYELOPATHY OR RADICULOPATHY: ICD-10-CM

## 2020-08-25 DIAGNOSIS — M51.26 LUMBAR DISC HERNIATION: ICD-10-CM

## 2020-08-25 DIAGNOSIS — M79.18 MYOFASCIAL PAIN: ICD-10-CM

## 2020-08-25 DIAGNOSIS — M48.062 SPINAL STENOSIS OF LUMBAR REGION WITH NEUROGENIC CLAUDICATION: ICD-10-CM

## 2020-08-26 ENCOUNTER — COMMUNICATION - HEALTHEAST (OUTPATIENT)
Dept: PHYSICAL MEDICINE AND REHAB | Facility: CLINIC | Age: 75
End: 2020-08-26

## 2020-08-26 ENCOUNTER — AMBULATORY - HEALTHEAST (OUTPATIENT)
Dept: LAB | Facility: CLINIC | Age: 75
End: 2020-08-26

## 2020-08-26 ENCOUNTER — HOME CARE/HOSPICE - HEALTHEAST (OUTPATIENT)
Dept: HOME HEALTH SERVICES | Facility: HOME HEALTH | Age: 75
End: 2020-08-26

## 2020-08-26 DIAGNOSIS — R79.89 ELEVATED LFTS: ICD-10-CM

## 2020-08-26 LAB
ALBUMIN SERPL-MCNC: 3.9 G/DL (ref 3.5–5)
ALP SERPL-CCNC: 163 U/L (ref 45–120)
ALT SERPL W P-5'-P-CCNC: 23 U/L (ref 0–45)
AST SERPL W P-5'-P-CCNC: 23 U/L (ref 0–40)
BILIRUB DIRECT SERPL-MCNC: 0.4 MG/DL
BILIRUB SERPL-MCNC: 1 MG/DL (ref 0–1)
PROT SERPL-MCNC: 6.5 G/DL (ref 6–8)

## 2020-08-27 ENCOUNTER — COMMUNICATION - HEALTHEAST (OUTPATIENT)
Dept: INTERNAL MEDICINE | Facility: CLINIC | Age: 75
End: 2020-08-27

## 2020-08-27 ENCOUNTER — HOME CARE/HOSPICE - HEALTHEAST (OUTPATIENT)
Dept: HOME HEALTH SERVICES | Facility: HOME HEALTH | Age: 75
End: 2020-08-27

## 2020-09-01 ENCOUNTER — COMMUNICATION - HEALTHEAST (OUTPATIENT)
Dept: INTERNAL MEDICINE | Facility: CLINIC | Age: 75
End: 2020-09-01

## 2020-09-01 ENCOUNTER — HOME CARE/HOSPICE - HEALTHEAST (OUTPATIENT)
Dept: HOME HEALTH SERVICES | Facility: HOME HEALTH | Age: 75
End: 2020-09-01

## 2020-09-03 ENCOUNTER — HOME CARE/HOSPICE - HEALTHEAST (OUTPATIENT)
Dept: HOME HEALTH SERVICES | Facility: HOME HEALTH | Age: 75
End: 2020-09-03

## 2020-09-08 ENCOUNTER — HOME CARE/HOSPICE - HEALTHEAST (OUTPATIENT)
Dept: HOME HEALTH SERVICES | Facility: HOME HEALTH | Age: 75
End: 2020-09-08

## 2020-09-11 ENCOUNTER — HOME CARE/HOSPICE - HEALTHEAST (OUTPATIENT)
Dept: HOME HEALTH SERVICES | Facility: HOME HEALTH | Age: 75
End: 2020-09-11

## 2020-09-14 ENCOUNTER — HOME CARE/HOSPICE - HEALTHEAST (OUTPATIENT)
Dept: ADMINISTRATIVE | Facility: OTHER | Age: 75
End: 2020-09-14

## 2020-09-17 ENCOUNTER — COMMUNICATION - HEALTHEAST (OUTPATIENT)
Dept: INTERNAL MEDICINE | Facility: CLINIC | Age: 75
End: 2020-09-17

## 2020-09-17 DIAGNOSIS — M54.41 CHRONIC MIDLINE LOW BACK PAIN WITH RIGHT-SIDED SCIATICA: ICD-10-CM

## 2020-09-17 DIAGNOSIS — N95.1 MENOPAUSAL HOT FLUSHES: ICD-10-CM

## 2020-09-17 DIAGNOSIS — G89.29 CHRONIC MIDLINE LOW BACK PAIN WITH RIGHT-SIDED SCIATICA: ICD-10-CM

## 2020-09-17 RX ORDER — NAPROXEN 500 MG/1
TABLET ORAL
Qty: 180 TABLET | Refills: 3 | Status: SHIPPED | OUTPATIENT
Start: 2020-09-17 | End: 2022-07-06

## 2020-09-18 ENCOUNTER — OFFICE VISIT - HEALTHEAST (OUTPATIENT)
Dept: INTERNAL MEDICINE | Facility: CLINIC | Age: 75
End: 2020-09-18

## 2020-09-18 ENCOUNTER — COMMUNICATION - HEALTHEAST (OUTPATIENT)
Dept: INTERNAL MEDICINE | Facility: CLINIC | Age: 75
End: 2020-09-18

## 2020-09-18 DIAGNOSIS — Z53.20 COLONOSCOPY REFUSED: ICD-10-CM

## 2020-09-18 DIAGNOSIS — Z00.00 MEDICARE ANNUAL WELLNESS VISIT, SUBSEQUENT: ICD-10-CM

## 2020-09-18 DIAGNOSIS — G25.0 ESSENTIAL TREMOR: ICD-10-CM

## 2020-09-18 DIAGNOSIS — N95.1 MENOPAUSAL HOT FLUSHES: ICD-10-CM

## 2020-09-18 DIAGNOSIS — M25.50 MULTIPLE JOINT PAIN: ICD-10-CM

## 2020-09-18 DIAGNOSIS — M48.062 SPINAL STENOSIS OF LUMBAR REGION WITH NEUROGENIC CLAUDICATION: ICD-10-CM

## 2020-09-18 DIAGNOSIS — K21.9 GASTROESOPHAGEAL REFLUX DISEASE, ESOPHAGITIS PRESENCE NOT SPECIFIED: ICD-10-CM

## 2020-09-18 DIAGNOSIS — H93.13 TINNITUS OF BOTH EARS: ICD-10-CM

## 2020-09-18 DIAGNOSIS — M54.2 NECK PAIN: ICD-10-CM

## 2020-09-18 DIAGNOSIS — Z23 IMMUNIZATION DUE: ICD-10-CM

## 2020-09-18 DIAGNOSIS — G60.9 IDIOPATHIC PERIPHERAL NEUROPATHY: ICD-10-CM

## 2020-09-18 DIAGNOSIS — I63.9 ISCHEMIC CEREBROVASCULAR ACCIDENT (CVA) OF FRONTAL LOBE (H): ICD-10-CM

## 2020-09-18 DIAGNOSIS — K80.50 CHOLEDOCHOLITHIASIS: ICD-10-CM

## 2020-09-18 DIAGNOSIS — G43.909 MIGRAINE WITHOUT STATUS MIGRAINOSUS, NOT INTRACTABLE, UNSPECIFIED MIGRAINE TYPE: ICD-10-CM

## 2020-09-18 DIAGNOSIS — C43.61 MELANOMA OF RIGHT UPPER ARM (H): ICD-10-CM

## 2020-09-18 LAB
ALBUMIN SERPL-MCNC: 3.7 G/DL (ref 3.5–5)
ALP SERPL-CCNC: 83 U/L (ref 45–120)
ALT SERPL W P-5'-P-CCNC: 17 U/L (ref 0–45)
ANION GAP SERPL CALCULATED.3IONS-SCNC: 8 MMOL/L (ref 5–18)
AST SERPL W P-5'-P-CCNC: 21 U/L (ref 0–40)
BILIRUB SERPL-MCNC: 0.5 MG/DL (ref 0–1)
BUN SERPL-MCNC: 18 MG/DL (ref 8–28)
C REACTIVE PROTEIN LHE: 0.3 MG/DL (ref 0–0.8)
CALCIUM SERPL-MCNC: 9.5 MG/DL (ref 8.5–10.5)
CHLORIDE BLD-SCNC: 109 MMOL/L (ref 98–107)
CHOLEST SERPL-MCNC: 157 MG/DL
CO2 SERPL-SCNC: 26 MMOL/L (ref 22–31)
CREAT SERPL-MCNC: 0.8 MG/DL (ref 0.6–1.1)
ERYTHROCYTE [DISTWIDTH] IN BLOOD BY AUTOMATED COUNT: 13.3 % (ref 11–14.5)
ERYTHROCYTE [SEDIMENTATION RATE] IN BLOOD BY WESTERGREN METHOD: 8 MM/HR (ref 0–20)
FASTING STATUS PATIENT QL REPORTED: ABNORMAL
GFR SERPL CREATININE-BSD FRML MDRD: >60 ML/MIN/1.73M2
GLUCOSE BLD-MCNC: 96 MG/DL (ref 70–125)
HCT VFR BLD AUTO: 38.3 % (ref 35–47)
HDLC SERPL-MCNC: 49 MG/DL
HGB BLD-MCNC: 12.6 G/DL (ref 12–16)
LDLC SERPL CALC-MCNC: 89 MG/DL
MCH RBC QN AUTO: 28.2 PG (ref 27–34)
MCHC RBC AUTO-ENTMCNC: 32.9 G/DL (ref 32–36)
MCV RBC AUTO: 86 FL (ref 80–100)
PLATELET # BLD AUTO: 240 THOU/UL (ref 140–440)
PMV BLD AUTO: 7 FL (ref 7–10)
POTASSIUM BLD-SCNC: 4.9 MMOL/L (ref 3.5–5)
PROT SERPL-MCNC: 6 G/DL (ref 6–8)
RBC # BLD AUTO: 4.46 MILL/UL (ref 3.8–5.4)
SODIUM SERPL-SCNC: 143 MMOL/L (ref 136–145)
TRIGL SERPL-MCNC: 95 MG/DL
WBC: 5.8 THOU/UL (ref 4–11)

## 2020-09-18 ASSESSMENT — ANXIETY QUESTIONNAIRES
4. TROUBLE RELAXING: SEVERAL DAYS
3. WORRYING TOO MUCH ABOUT DIFFERENT THINGS: NOT AT ALL
5. BEING SO RESTLESS THAT IT IS HARD TO SIT STILL: NOT AT ALL
7. FEELING AFRAID AS IF SOMETHING AWFUL MIGHT HAPPEN: NOT AT ALL
6. BECOMING EASILY ANNOYED OR IRRITABLE: NOT AT ALL
GAD7 TOTAL SCORE: 1
1. FEELING NERVOUS, ANXIOUS, OR ON EDGE: NOT AT ALL
IF YOU CHECKED OFF ANY PROBLEMS ON THIS QUESTIONNAIRE, HOW DIFFICULT HAVE THESE PROBLEMS MADE IT FOR YOU TO DO YOUR WORK, TAKE CARE OF THINGS AT HOME, OR GET ALONG WITH OTHER PEOPLE: SOMEWHAT DIFFICULT
2. NOT BEING ABLE TO STOP OR CONTROL WORRYING: NOT AT ALL

## 2020-09-18 ASSESSMENT — PATIENT HEALTH QUESTIONNAIRE - PHQ9: SUM OF ALL RESPONSES TO PHQ QUESTIONS 1-9: 3

## 2020-09-18 ASSESSMENT — MIFFLIN-ST. JEOR: SCORE: 1391.49

## 2020-09-19 ENCOUNTER — AMBULATORY - HEALTHEAST (OUTPATIENT)
Dept: INTERNAL MEDICINE | Facility: CLINIC | Age: 75
End: 2020-09-19

## 2020-09-19 ENCOUNTER — COMMUNICATION - HEALTHEAST (OUTPATIENT)
Dept: INTERNAL MEDICINE | Facility: CLINIC | Age: 75
End: 2020-09-19

## 2020-09-19 DIAGNOSIS — E78.2 MIXED HYPERLIPIDEMIA: ICD-10-CM

## 2020-09-19 RX ORDER — ROSUVASTATIN CALCIUM 5 MG/1
5 TABLET, COATED ORAL DAILY
Qty: 90 TABLET | Refills: 3 | Status: SHIPPED | OUTPATIENT
Start: 2020-09-19 | End: 2021-08-12

## 2020-10-07 ENCOUNTER — HOSPITAL ENCOUNTER (OUTPATIENT)
Dept: CT IMAGING | Facility: HOSPITAL | Age: 75
Discharge: HOME | End: 2020-10-07

## 2020-10-07 DIAGNOSIS — C43.9 MELANOMA OF SKIN (H): ICD-10-CM

## 2020-10-14 ENCOUNTER — HOSPITAL ENCOUNTER (OUTPATIENT)
Dept: MAMMOGRAPHY | Facility: CLINIC | Age: 75
Discharge: HOME | End: 2020-10-14
Attending: INTERNAL MEDICINE

## 2020-10-14 DIAGNOSIS — Z12.31 VISIT FOR SCREENING MAMMOGRAM: ICD-10-CM

## 2020-10-20 ENCOUNTER — HOSPITAL ENCOUNTER (OUTPATIENT)
Dept: PHYSICAL MEDICINE AND REHAB | Facility: CLINIC | Age: 75
Discharge: HOME | End: 2020-10-20
Attending: PAIN MEDICINE

## 2020-10-20 DIAGNOSIS — M51.26 LUMBAR DISC HERNIATION: ICD-10-CM

## 2020-10-20 DIAGNOSIS — M48.062 SPINAL STENOSIS OF LUMBAR REGION WITH NEUROGENIC CLAUDICATION: ICD-10-CM

## 2020-10-20 DIAGNOSIS — M47.816 SPONDYLOSIS OF LUMBAR REGION WITHOUT MYELOPATHY OR RADICULOPATHY: ICD-10-CM

## 2020-10-20 DIAGNOSIS — M79.18 MYOFASCIAL PAIN: ICD-10-CM

## 2020-10-20 DIAGNOSIS — M54.16 LUMBAR RADICULITIS: ICD-10-CM

## 2020-11-07 ENCOUNTER — COMMUNICATION - HEALTHEAST (OUTPATIENT)
Dept: INTERNAL MEDICINE | Facility: CLINIC | Age: 75
End: 2020-11-07

## 2020-11-07 DIAGNOSIS — M51.26 LUMBAR DISC HERNIATION: ICD-10-CM

## 2020-11-07 DIAGNOSIS — M48.062 SPINAL STENOSIS OF LUMBAR REGION WITH NEUROGENIC CLAUDICATION: ICD-10-CM

## 2020-11-07 DIAGNOSIS — M54.16 LUMBAR RADICULITIS: ICD-10-CM

## 2020-11-17 ENCOUNTER — COMMUNICATION - HEALTHEAST (OUTPATIENT)
Dept: INTERNAL MEDICINE | Facility: CLINIC | Age: 75
End: 2020-11-17

## 2020-12-01 ENCOUNTER — COMMUNICATION - HEALTHEAST (OUTPATIENT)
Dept: INTERNAL MEDICINE | Facility: CLINIC | Age: 75
End: 2020-12-01

## 2020-12-01 DIAGNOSIS — M48.062 SPINAL STENOSIS OF LUMBAR REGION WITH NEUROGENIC CLAUDICATION: ICD-10-CM

## 2020-12-01 DIAGNOSIS — M51.26 LUMBAR DISC HERNIATION: ICD-10-CM

## 2020-12-01 RX ORDER — CYCLOBENZAPRINE HCL 5 MG
5 TABLET ORAL 3 TIMES DAILY PRN
Qty: 90 TABLET | Refills: 1 | Status: SHIPPED | OUTPATIENT
Start: 2020-12-01 | End: 2021-12-21

## 2020-12-10 ENCOUNTER — RECORDS - HEALTHEAST (OUTPATIENT)
Dept: ADMINISTRATIVE | Facility: OTHER | Age: 75
End: 2020-12-10

## 2021-01-03 ENCOUNTER — COMMUNICATION - HEALTHEAST (OUTPATIENT)
Dept: INTERNAL MEDICINE | Facility: CLINIC | Age: 76
End: 2021-01-03

## 2021-01-18 ENCOUNTER — COMMUNICATION - HEALTHEAST (OUTPATIENT)
Dept: INTERNAL MEDICINE | Facility: CLINIC | Age: 76
End: 2021-01-18

## 2021-01-21 ENCOUNTER — OFFICE VISIT - HEALTHEAST (OUTPATIENT)
Dept: INTERNAL MEDICINE | Facility: CLINIC | Age: 76
End: 2021-01-21

## 2021-01-21 DIAGNOSIS — M79.642 BILATERAL HAND PAIN: ICD-10-CM

## 2021-01-21 DIAGNOSIS — R19.5 DARK STOOLS: ICD-10-CM

## 2021-01-21 DIAGNOSIS — M79.641 BILATERAL HAND PAIN: ICD-10-CM

## 2021-01-21 DIAGNOSIS — R19.8 CHANGE IN BOWEL FUNCTION: ICD-10-CM

## 2021-01-21 DIAGNOSIS — G25.0 ESSENTIAL TREMOR: ICD-10-CM

## 2021-01-21 DIAGNOSIS — M48.062 SPINAL STENOSIS OF LUMBAR REGION WITH NEUROGENIC CLAUDICATION: ICD-10-CM

## 2021-01-21 DIAGNOSIS — M70.61 TROCHANTERIC BURSITIS OF RIGHT HIP: ICD-10-CM

## 2021-01-21 DIAGNOSIS — K59.00 CONSTIPATION, UNSPECIFIED CONSTIPATION TYPE: ICD-10-CM

## 2021-01-21 DIAGNOSIS — C43.61 MELANOMA OF RIGHT UPPER ARM (H): ICD-10-CM

## 2021-01-21 LAB
ALBUMIN SERPL-MCNC: 4.1 G/DL (ref 3.5–5)
ALP SERPL-CCNC: 64 U/L (ref 45–120)
ALT SERPL W P-5'-P-CCNC: 16 U/L (ref 0–45)
ANION GAP SERPL CALCULATED.3IONS-SCNC: 9 MMOL/L (ref 5–18)
AST SERPL W P-5'-P-CCNC: 20 U/L (ref 0–40)
BILIRUB SERPL-MCNC: 0.4 MG/DL (ref 0–1)
BUN SERPL-MCNC: 16 MG/DL (ref 8–28)
C REACTIVE PROTEIN LHE: 0.2 MG/DL (ref 0–0.8)
CALCIUM SERPL-MCNC: 9.4 MG/DL (ref 8.5–10.5)
CHLORIDE BLD-SCNC: 107 MMOL/L (ref 98–107)
CO2 SERPL-SCNC: 26 MMOL/L (ref 22–31)
CREAT SERPL-MCNC: 0.84 MG/DL (ref 0.6–1.1)
ERYTHROCYTE [DISTWIDTH] IN BLOOD BY AUTOMATED COUNT: 12.8 % (ref 11–14.5)
ERYTHROCYTE [SEDIMENTATION RATE] IN BLOOD BY WESTERGREN METHOD: 8 MM/HR (ref 0–20)
GFR SERPL CREATININE-BSD FRML MDRD: >60 ML/MIN/1.73M2
GLUCOSE BLD-MCNC: 104 MG/DL (ref 70–125)
HCT VFR BLD AUTO: 40.5 % (ref 35–47)
HGB BLD-MCNC: 13.4 G/DL (ref 12–16)
MCH RBC QN AUTO: 28.2 PG (ref 27–34)
MCHC RBC AUTO-ENTMCNC: 33 G/DL (ref 32–36)
MCV RBC AUTO: 86 FL (ref 80–100)
PLATELET # BLD AUTO: 248 THOU/UL (ref 140–440)
PMV BLD AUTO: 8.1 FL (ref 7–10)
POTASSIUM BLD-SCNC: 4.3 MMOL/L (ref 3.5–5)
PROT SERPL-MCNC: 6.6 G/DL (ref 6–8)
RBC # BLD AUTO: 4.73 MILL/UL (ref 3.8–5.4)
RHEUMATOID FACT SERPL-ACNC: <15 IU/ML (ref 0–30)
SODIUM SERPL-SCNC: 142 MMOL/L (ref 136–145)
WBC: 6.2 THOU/UL (ref 4–11)

## 2021-01-26 ENCOUNTER — COMMUNICATION - HEALTHEAST (OUTPATIENT)
Dept: PHYSICAL MEDICINE AND REHAB | Facility: CLINIC | Age: 76
End: 2021-01-26

## 2021-01-26 ENCOUNTER — COMMUNICATION - HEALTHEAST (OUTPATIENT)
Dept: INTERNAL MEDICINE | Facility: CLINIC | Age: 76
End: 2021-01-26

## 2021-01-26 ENCOUNTER — AMBULATORY - HEALTHEAST (OUTPATIENT)
Dept: INTERNAL MEDICINE | Facility: CLINIC | Age: 76
End: 2021-01-26

## 2021-01-26 LAB — CCP AB SER IA-ACNC: 0.6 U/ML

## 2021-01-27 ENCOUNTER — COMMUNICATION - HEALTHEAST (OUTPATIENT)
Dept: INTERNAL MEDICINE | Facility: CLINIC | Age: 76
End: 2021-01-27

## 2021-02-05 ENCOUNTER — OFFICE VISIT - HEALTHEAST (OUTPATIENT)
Dept: INTERNAL MEDICINE | Facility: CLINIC | Age: 76
End: 2021-02-05

## 2021-02-05 DIAGNOSIS — M48.062 SPINAL STENOSIS OF LUMBAR REGION WITH NEUROGENIC CLAUDICATION: ICD-10-CM

## 2021-02-05 DIAGNOSIS — K59.00 CONSTIPATION, UNSPECIFIED CONSTIPATION TYPE: ICD-10-CM

## 2021-02-05 DIAGNOSIS — M19.049 HAND ARTHRITIS: ICD-10-CM

## 2021-02-05 DIAGNOSIS — M70.61 TROCHANTERIC BURSITIS OF RIGHT HIP: ICD-10-CM

## 2021-02-06 ENCOUNTER — COMMUNICATION - HEALTHEAST (OUTPATIENT)
Dept: INTERNAL MEDICINE | Facility: CLINIC | Age: 76
End: 2021-02-06

## 2021-02-08 ENCOUNTER — COMMUNICATION - HEALTHEAST (OUTPATIENT)
Dept: INTERNAL MEDICINE | Facility: CLINIC | Age: 76
End: 2021-02-08

## 2021-02-15 ENCOUNTER — COMMUNICATION - HEALTHEAST (OUTPATIENT)
Dept: INTERNAL MEDICINE | Facility: CLINIC | Age: 76
End: 2021-02-15

## 2021-02-16 ENCOUNTER — OFFICE VISIT - HEALTHEAST (OUTPATIENT)
Dept: INTERNAL MEDICINE | Facility: CLINIC | Age: 76
End: 2021-02-16

## 2021-02-16 DIAGNOSIS — K59.00 CONSTIPATION, UNSPECIFIED CONSTIPATION TYPE: ICD-10-CM

## 2021-02-16 DIAGNOSIS — M19.049 HAND ARTHRITIS: ICD-10-CM

## 2021-02-16 DIAGNOSIS — K21.9 GASTROESOPHAGEAL REFLUX DISEASE, UNSPECIFIED WHETHER ESOPHAGITIS PRESENT: ICD-10-CM

## 2021-02-16 DIAGNOSIS — G25.0 ESSENTIAL TREMOR: ICD-10-CM

## 2021-02-16 DIAGNOSIS — N95.1 MENOPAUSAL HOT FLUSHES: ICD-10-CM

## 2021-02-16 DIAGNOSIS — M70.61 TROCHANTERIC BURSITIS OF RIGHT HIP: ICD-10-CM

## 2021-02-16 ASSESSMENT — PATIENT HEALTH QUESTIONNAIRE - PHQ9: SUM OF ALL RESPONSES TO PHQ QUESTIONS 1-9: 3

## 2021-02-19 ENCOUNTER — COMMUNICATION - HEALTHEAST (OUTPATIENT)
Dept: INTERNAL MEDICINE | Facility: CLINIC | Age: 76
End: 2021-02-19

## 2021-02-24 ENCOUNTER — AMBULATORY - HEALTHEAST (OUTPATIENT)
Dept: NURSING | Facility: CLINIC | Age: 76
End: 2021-02-24

## 2021-02-24 ENCOUNTER — COMMUNICATION - HEALTHEAST (OUTPATIENT)
Dept: INTERNAL MEDICINE | Facility: CLINIC | Age: 76
End: 2021-02-24

## 2021-02-26 ENCOUNTER — RECORDS - HEALTHEAST (OUTPATIENT)
Dept: ADMINISTRATIVE | Facility: OTHER | Age: 76
End: 2021-02-26

## 2021-03-01 ENCOUNTER — COMMUNICATION - HEALTHEAST (OUTPATIENT)
Dept: INTERNAL MEDICINE | Facility: CLINIC | Age: 76
End: 2021-03-01

## 2021-03-02 ENCOUNTER — COMMUNICATION - HEALTHEAST (OUTPATIENT)
Dept: INTERNAL MEDICINE | Facility: CLINIC | Age: 76
End: 2021-03-02

## 2021-03-02 DIAGNOSIS — M06.041 SERONEGATIVE RHEUMATOID ARTHRITIS OF BOTH HANDS (H): ICD-10-CM

## 2021-03-02 DIAGNOSIS — M06.042 SERONEGATIVE RHEUMATOID ARTHRITIS OF BOTH HANDS (H): ICD-10-CM

## 2021-03-02 RX ORDER — HYDROXYCHLOROQUINE SULFATE 200 MG/1
200 TABLET, FILM COATED ORAL 2 TIMES DAILY
Qty: 180 TABLET | Refills: 3 | Status: SHIPPED | OUTPATIENT
Start: 2021-03-02 | End: 2022-03-25

## 2021-03-07 ENCOUNTER — HEALTH MAINTENANCE LETTER (OUTPATIENT)
Age: 76
End: 2021-03-07

## 2021-03-08 ENCOUNTER — COMMUNICATION - HEALTHEAST (OUTPATIENT)
Dept: INTERNAL MEDICINE | Facility: CLINIC | Age: 76
End: 2021-03-08

## 2021-03-09 ENCOUNTER — COMMUNICATION - HEALTHEAST (OUTPATIENT)
Dept: INTERNAL MEDICINE | Facility: CLINIC | Age: 76
End: 2021-03-09

## 2021-03-09 ENCOUNTER — OFFICE VISIT - HEALTHEAST (OUTPATIENT)
Dept: INTERNAL MEDICINE | Facility: CLINIC | Age: 76
End: 2021-03-09

## 2021-03-09 DIAGNOSIS — J32.9 RHINOSINUSITIS: ICD-10-CM

## 2021-03-09 DIAGNOSIS — M19.049 HAND ARTHRITIS: ICD-10-CM

## 2021-03-09 DIAGNOSIS — M70.61 TROCHANTERIC BURSITIS OF RIGHT HIP: ICD-10-CM

## 2021-03-09 DIAGNOSIS — M65.322 TRIGGER FINGER, LEFT INDEX FINGER: ICD-10-CM

## 2021-03-09 DIAGNOSIS — G25.0 ESSENTIAL TREMOR: ICD-10-CM

## 2021-03-09 DIAGNOSIS — K59.00 CONSTIPATION, UNSPECIFIED CONSTIPATION TYPE: ICD-10-CM

## 2021-03-09 DIAGNOSIS — M65.332 TRIGGER FINGER, LEFT MIDDLE FINGER: ICD-10-CM

## 2021-03-09 DIAGNOSIS — M65.311 TRIGGER THUMB, RIGHT THUMB: ICD-10-CM

## 2021-03-11 ENCOUNTER — COMMUNICATION - HEALTHEAST (OUTPATIENT)
Dept: INTERNAL MEDICINE | Facility: CLINIC | Age: 76
End: 2021-03-11

## 2021-03-11 DIAGNOSIS — M48.062 SPINAL STENOSIS OF LUMBAR REGION WITH NEUROGENIC CLAUDICATION: ICD-10-CM

## 2021-03-11 DIAGNOSIS — M51.26 LUMBAR DISC HERNIATION: ICD-10-CM

## 2021-03-11 DIAGNOSIS — M54.16 LUMBAR RADICULITIS: ICD-10-CM

## 2021-03-11 RX ORDER — GABAPENTIN 300 MG/1
CAPSULE ORAL
Qty: 270 CAPSULE | Refills: 3 | Status: SHIPPED | OUTPATIENT
Start: 2021-03-11 | End: 2021-07-20

## 2021-03-12 ENCOUNTER — COMMUNICATION - HEALTHEAST (OUTPATIENT)
Dept: INTERNAL MEDICINE | Facility: CLINIC | Age: 76
End: 2021-03-12

## 2021-03-17 ENCOUNTER — AMBULATORY - HEALTHEAST (OUTPATIENT)
Dept: NURSING | Facility: CLINIC | Age: 76
End: 2021-03-17

## 2021-03-23 ENCOUNTER — COMMUNICATION - HEALTHEAST (OUTPATIENT)
Dept: INTERNAL MEDICINE | Facility: CLINIC | Age: 76
End: 2021-03-23

## 2021-03-23 DIAGNOSIS — J32.9 RHINOSINUSITIS: ICD-10-CM

## 2021-03-24 ENCOUNTER — COMMUNICATION - HEALTHEAST (OUTPATIENT)
Dept: INTERNAL MEDICINE | Facility: CLINIC | Age: 76
End: 2021-03-24

## 2021-04-01 ENCOUNTER — COMMUNICATION - HEALTHEAST (OUTPATIENT)
Dept: INTERNAL MEDICINE | Facility: CLINIC | Age: 76
End: 2021-04-01

## 2021-04-05 ENCOUNTER — HOSPITAL ENCOUNTER (OUTPATIENT)
Dept: PET IMAGING | Facility: HOSPITAL | Age: 76
Discharge: HOME | End: 2021-04-05

## 2021-04-05 DIAGNOSIS — C44.90 MALIGNANT NEOPLASM OF SKIN: ICD-10-CM

## 2021-04-05 DIAGNOSIS — C43.61 MALIGNANT MELANOMA OF RIGHT UPPER EXTREMITY (H): ICD-10-CM

## 2021-04-05 LAB — GLUCOSE BLDC GLUCOMTR-MCNC: 108 MG/DL (ref 70–139)

## 2021-04-06 ENCOUNTER — OFFICE VISIT - HEALTHEAST (OUTPATIENT)
Dept: INTERNAL MEDICINE | Facility: CLINIC | Age: 76
End: 2021-04-06

## 2021-04-06 DIAGNOSIS — M19.049 HAND ARTHRITIS: ICD-10-CM

## 2021-04-06 DIAGNOSIS — J32.9 RHINOSINUSITIS: ICD-10-CM

## 2021-04-06 DIAGNOSIS — C43.61 MELANOMA OF RIGHT UPPER ARM (H): ICD-10-CM

## 2021-04-06 DIAGNOSIS — R32 URINARY INCONTINENCE, UNSPECIFIED TYPE: ICD-10-CM

## 2021-04-06 DIAGNOSIS — R05.3 PERSISTENT COUGH: ICD-10-CM

## 2021-04-06 LAB
ALBUMIN UR-MCNC: NEGATIVE G/DL
APPEARANCE UR: CLEAR
BILIRUB UR QL STRIP: NEGATIVE
COLOR UR AUTO: YELLOW
GLUCOSE UR STRIP-MCNC: NEGATIVE MG/DL
HGB UR QL STRIP: NEGATIVE
KETONES UR STRIP-MCNC: NEGATIVE MG/DL
LEUKOCYTE ESTERASE UR QL STRIP: NEGATIVE
NITRATE UR QL: NEGATIVE
PH UR STRIP: 7 [PH] (ref 5–8)
SP GR UR STRIP: 1.02 (ref 1–1.03)
UROBILINOGEN UR STRIP-ACNC: NORMAL

## 2021-04-08 ENCOUNTER — RECORDS - HEALTHEAST (OUTPATIENT)
Dept: ADMINISTRATIVE | Facility: OTHER | Age: 76
End: 2021-04-08

## 2021-04-12 ENCOUNTER — COMMUNICATION - HEALTHEAST (OUTPATIENT)
Dept: PHYSICAL MEDICINE AND REHAB | Facility: CLINIC | Age: 76
End: 2021-04-12

## 2021-04-15 ENCOUNTER — COMMUNICATION - HEALTHEAST (OUTPATIENT)
Dept: INTERNAL MEDICINE | Facility: CLINIC | Age: 76
End: 2021-04-15

## 2021-04-15 DIAGNOSIS — K21.9 GASTROESOPHAGEAL REFLUX DISEASE: ICD-10-CM

## 2021-04-15 RX ORDER — PANTOPRAZOLE SODIUM 40 MG
TABLET, DELAYED RELEASE (ENTERIC COATED) ORAL
Qty: 90 TABLET | Refills: 3 | Status: SHIPPED | OUTPATIENT
Start: 2021-04-15 | End: 2021-08-19

## 2021-04-16 ENCOUNTER — RECORDS - HEALTHEAST (OUTPATIENT)
Dept: ADMINISTRATIVE | Facility: OTHER | Age: 76
End: 2021-04-16

## 2021-04-20 ENCOUNTER — HOSPITAL ENCOUNTER (OUTPATIENT)
Dept: PHYSICAL MEDICINE AND REHAB | Facility: CLINIC | Age: 76
Discharge: HOME | End: 2021-04-20
Attending: PAIN MEDICINE

## 2021-04-20 DIAGNOSIS — M54.16 LUMBAR RADICULITIS: ICD-10-CM

## 2021-04-20 DIAGNOSIS — M79.18 MYOFASCIAL PAIN: ICD-10-CM

## 2021-04-20 DIAGNOSIS — M48.062 SPINAL STENOSIS OF LUMBAR REGION WITH NEUROGENIC CLAUDICATION: ICD-10-CM

## 2021-04-20 DIAGNOSIS — M47.816 SPONDYLOSIS OF LUMBAR REGION WITHOUT MYELOPATHY OR RADICULOPATHY: ICD-10-CM

## 2021-04-20 DIAGNOSIS — M51.26 LUMBAR DISC HERNIATION: ICD-10-CM

## 2021-04-23 ENCOUNTER — COMMUNICATION - HEALTHEAST (OUTPATIENT)
Dept: INTERNAL MEDICINE | Facility: CLINIC | Age: 76
End: 2021-04-23

## 2021-04-27 ENCOUNTER — RECORDS - HEALTHEAST (OUTPATIENT)
Dept: ADMINISTRATIVE | Facility: OTHER | Age: 76
End: 2021-04-27

## 2021-05-07 ENCOUNTER — HOSPITAL ENCOUNTER (OUTPATIENT)
Dept: PHYSICAL MEDICINE AND REHAB | Facility: CLINIC | Age: 76
Discharge: HOME | End: 2021-05-07
Attending: PAIN MEDICINE

## 2021-05-07 DIAGNOSIS — M54.16 LUMBAR RADICULITIS: ICD-10-CM

## 2021-05-07 DIAGNOSIS — M48.062 SPINAL STENOSIS OF LUMBAR REGION WITH NEUROGENIC CLAUDICATION: ICD-10-CM

## 2021-05-07 DIAGNOSIS — M51.26 LUMBAR DISC HERNIATION: ICD-10-CM

## 2021-05-13 ENCOUNTER — COMMUNICATION - HEALTHEAST (OUTPATIENT)
Dept: INTERNAL MEDICINE | Facility: CLINIC | Age: 76
End: 2021-05-13
Payer: COMMERCIAL

## 2021-05-21 ENCOUNTER — HOSPITAL ENCOUNTER (OUTPATIENT)
Dept: PHYSICAL MEDICINE AND REHAB | Facility: CLINIC | Age: 76
Discharge: HOME | End: 2021-05-21
Attending: PAIN MEDICINE

## 2021-05-21 DIAGNOSIS — M51.26 LUMBAR DISC HERNIATION: ICD-10-CM

## 2021-05-21 DIAGNOSIS — M47.816 SPONDYLOSIS OF LUMBAR REGION WITHOUT MYELOPATHY OR RADICULOPATHY: ICD-10-CM

## 2021-05-21 DIAGNOSIS — M54.16 LUMBAR RADICULITIS: ICD-10-CM

## 2021-05-21 DIAGNOSIS — M79.18 MYOFASCIAL PAIN: ICD-10-CM

## 2021-05-21 DIAGNOSIS — M70.61 GREATER TROCHANTERIC BURSITIS OF RIGHT HIP: ICD-10-CM

## 2021-05-21 DIAGNOSIS — M48.062 SPINAL STENOSIS OF LUMBAR REGION WITH NEUROGENIC CLAUDICATION: ICD-10-CM

## 2021-05-25 ENCOUNTER — COMMUNICATION - HEALTHEAST (OUTPATIENT)
Dept: INTERNAL MEDICINE | Facility: CLINIC | Age: 76
End: 2021-05-25

## 2021-05-25 ENCOUNTER — RECORDS - HEALTHEAST (OUTPATIENT)
Dept: ADMINISTRATIVE | Facility: CLINIC | Age: 76
End: 2021-05-25

## 2021-05-28 ENCOUNTER — RECORDS - HEALTHEAST (OUTPATIENT)
Dept: ADMINISTRATIVE | Facility: CLINIC | Age: 76
End: 2021-05-28

## 2021-05-28 ASSESSMENT — ANXIETY QUESTIONNAIRES
GAD7 TOTAL SCORE: 1
GAD7 TOTAL SCORE: 3

## 2021-05-28 NOTE — PROGRESS NOTES
Internal Medicine Office Visit  [facility]  Patient Name: Lashawn Ortega  Patient Age: 74 y.o.  YOB: 1945  MRN: [id number]    Date of Visit: 2019  Reason for Office Visit:   Chief Complaint   Patient presents with     Hip Pain     Ongoing pain for 5 days on the right side of buttock/hip. Extends down the right leg.      Back Pain     Lower back pain. Helps with taking care of  and has had pain for 5 days.            Assessment / Plan / Medical Decision Makin. Acute bilateral low back pain with bilateral sciatica  - predniSONE (DELTASONE) 20 MG tablet; Take 40 mg by mouth daily.  Dispense: 10 tablet; Refill: 0  - cyclobenzaprine (FLEXERIL) 5 MG tablet; Take 1 tablet (5 mg total) by mouth at bedtime.  Dispense: 10 tablet; Refill: 0    Advised she may utilize over-the-counter pain reliever as needed she was scheduled for follow-up with her primary in 1 week, sooner if needed.    Patient advised if symptoms persist, worsen or new symptoms arise they are to seek medical care.  All patients questions addressed. Patient verbalized understanding and agreement with plan.     No orders of the defined types were placed in this encounter.  Followup: Return in about 3 days (around 2019). earlier if needed.    Health Maintenance Review  Health Maintenance   Topic Date Due     ZOSTER VACCINES (2 of 3) 2014     COLONOSCOPY  2020 (Originally 2019)     INFLUENZA VACCINE RULE BASED (Season Ended) 2019     FALL RISK ASSESSMENT  2020     MAMMOGRAM  2020     ADVANCE DIRECTIVES DISCUSSED WITH PATIENT  2022     TD 18+ HE  2025     DXA SCAN  Completed     PNEUMOCOCCAL POLYSACCHARIDE VACCINE AGE 65 AND OVER  Completed     PNEUMOCOCCAL CONJUGATE VACCINE FOR ADULTS (PCV13 OR PREVNAR)  Completed         I am having Rogelio Ortega start on predniSONE and cyclobenzaprine. I am also having her maintain her  diphenhydrAMINE, multivitamin with minerals, fluticasone propionate, pantoprazole, naproxen, and venlafaxine.      HPI:  Lashawn Ortega is a 74 y.o. year old who presents to the office today with a chief concern of back pain radiating down her right leg.  Patient has.  She reports that her back symptoms are intermittent.  She states that does not recall any specific injury but does do quite a bit of lifting.  She denies any changes in bowel or bladder.  She denies any.  She is utilizing some over-the-counter pain reliever has some mild relief though not total relief of symptoms.        Current Scheduled Meds:  Outpatient Encounter Medications as of 4/16/2019   Medication Sig Dispense Refill     diphenhydrAMINE (BENADRYL) 25 mg capsule Take 25 mg by mouth every 6 (six) hours as needed for itching.       fluticasone (FLONASE) 50 mcg/actuation nasal spray PLACE TWO SPRAYS INTO EACH NOSTRIL ONCE DAILY.  Please keep this Rx on file for the next RF. 48 g 3     multivitamin with minerals (THERA-M) 9 mg iron-400 mcg Tab tablet Take 1 tablet by mouth daily.       naproxen (NAPROSYN) 500 MG tablet Take 1 tablet (500 mg total) by mouth 2 (two) times a day as needed. Please keep this Rx on file for the next RF. 180 tablet 3     pantoprazole (PROTONIX) 40 MG tablet TAKE ONE TABLET BY MOUTH EVERY DAY 90 tablet 3     venlafaxine (EFFEXOR-XR) 37.5 MG 24 hr capsule Take 1 capsule (37.5 mg total) by mouth daily. 90 capsule 3     cyclobenzaprine (FLEXERIL) 5 MG tablet Take 1 tablet (5 mg total) by mouth at bedtime. 10 tablet 0     predniSONE (DELTASONE) 20 MG tablet Take 40 mg by mouth daily. 10 tablet 0     No facility-administered encounter medications on file as of 4/16/2019.      Past Medical History:   Diagnosis Date     GERD (gastroesophageal reflux disease)      LBP (low back pain)      Migraine headache      Nonsmoker      NORMAL COLONOSCOPY - 2009 - Average risk      Past Surgical History:   Procedure Laterality Date      "HYSTERECTOMY       TOTAL ABDOMINAL HYSTERECTOMY W/ BILATERAL SALPINGOOPHORECTOMY  1989     Social History     Tobacco Use     Smoking status: Never Smoker     Smokeless tobacco: Never Used   Substance Use Topics     Alcohol use: Yes     Alcohol/week: 0.0 - 0.6 oz     Drug use: Not on file       Objective / Physical Examination:  Vitals:    04/16/19 1148   BP: 122/66   Pulse: 86   Resp: 20   Temp: 97.8  F (36.6  C)   SpO2: 96%   Height: 5' 6.25\" (1.683 m)     Wt Readings from Last 3 Encounters:   04/19/19 186 lb (84.4 kg)   09/06/18 195 lb 3.2 oz (88.5 kg)   07/13/17 185 lb 12.8 oz (84.3 kg)     Body mass index is 31.27 kg/m .     General Appearance: Alert and oriented, cooperative, affect appropriate, speech clear, in no apparent distress  Head: Normocephalic, atraumatic  Eyes:   Conjunctivae clear and sclerae non-icteric  Throat: Lips and mucosa moist  Lungs:  Normal inspiratory and expiratory effort  Back: Assist in the seated position normal curvature.  She does right lower back discomfort upon palpation with a positive straight leg right.  Reflexes are 1+ and symmetrical strength of lower extremities equal throughout  Integumentary: Warm and dry. Without suspicious looking lesions  Neuro: Alert and oriented, follows commands appropriately.     No results found.  No results found for this or any previous visit (from the past 240 hour(s)).          Ce Castañeda, CNP    "

## 2021-05-28 NOTE — TELEPHONE ENCOUNTER
Medication Question or Clarification  Who is calling: [facility]  What medication are you calling about? (include dose and sig)    Disp Refills Start End    HYDROcodone-acetaminophen 5-325 mg per tablet 20 tablet 0 5/7/2019     Sig - Route: Take 1 tablet by mouth 3 (three) times a day as needed for pain. - Oral    Sent to pharmacy as: HYDROcodone-acetaminophen 5-325 mg per tablet    Earliest Fill Date: 5/7/2019    E-Prescribing Status: Receipt confirmed by pharmacy (5/7/2019 10:09 PM CDT)      Who prescribed the medication?: Renzo Newell MD   What is your question/concern?: Caller stated he just needs a verification that Renzo Newell MD is aware the patient has an allergy to codeine and that he is okay that patient is dispensed this medication. Please call the provider line back at [phone]. Caller stated patient wishes to have this shipped to her as soon as possible.  Pharmacy: n/a  Okay to leave a detailed message?: No  Site CMT - Please call the pharmacy to obtain any additional needed information.

## 2021-05-28 NOTE — PROGRESS NOTES
Wt Readings from Last 20 Encounters:   04/19/19 186 lb (84.4 kg)   09/06/18 195 lb 3.2 oz (88.5 kg)   07/13/17 185 lb 12.8 oz (84.3 kg)   07/11/16 187 lb 9.6 oz (85.1 kg)   08/11/15 180 lb (81.6 kg)   06/30/15 179 lb (81.2 kg)   05/19/15 181 lb (82.1 kg)

## 2021-05-28 NOTE — PATIENT INSTRUCTIONS - HE
Please follow up if you have any further issues.    You may contact me by phone or [employer] if you are worsening or if things are not improving.    Thank you for coming in today.

## 2021-05-28 NOTE — TELEPHONE ENCOUNTER
Please call [facility] pharmacy back.  May leave message.    Cancel hydrocodone prescription as it was sent to them in error.    Renzo Newell MD  [facility]  5/10/2019, 9:01 PM

## 2021-05-28 NOTE — TELEPHONE ENCOUNTER
Called to  pharmacy and this medication was shipped to pt. Informed that they spoke to pt and pt confirmed that she has taken medication in the past.  Called to [facility] pharmacy and informed that Rx will need to be on hold until next refill.

## 2021-05-28 NOTE — PATIENT INSTRUCTIONS - HE
The new shingles shot (Shingrix) is 2 separate shots  by 2-6 months.    The cost out of pocket is around $390 for the 2 shots, so you might want to see if your insurance covers it or a portion of it prior to having it done.  Often by having it done at a pharmacy rather than a clinic, it can be cheaper for you.    It is estimated that any person's risk of shingles over their lifetime is around 10-20%.    The old shingles vaccine (Zostavax) is about 50% effective, reducing your risk of shingles to about 5-10% over your lifetime.    The new shot is 90% effective, reducing your risk of shingles to about 1-2% over your lifetime.    Because the shot is strong, it is very common to have flu like symptoms for 2-3 days after the shot.  25-50% of patients will have fever, muscle aches or headache.    I believe that whether or not you have this vaccine is your own decision depending on your values and preferences.  The information above I give you to make an informed decision.    Renzo Newell MD  [facility]  ______________________________________________________________________      Future Appointments   Date Time Provider Department Center   5/3/2019  1:25 PM Renzo Newell MD [facility]

## 2021-05-29 ENCOUNTER — RECORDS - HEALTHEAST (OUTPATIENT)
Dept: ADMINISTRATIVE | Facility: CLINIC | Age: 76
End: 2021-05-29

## 2021-05-29 NOTE — PATIENT INSTRUCTIONS - HE
Please follow up if you have any further issues.    You may contact me by phone or "[employer]hart if you are worsening or if things are not improving.    Thank you for coming in today.

## 2021-05-29 NOTE — TELEPHONE ENCOUNTER
Please call patient -    ______________________________________________________________________     Home phone:  [phone] (home)     Cell phone:   Telephone Information:   Mobile [phone]       Other contacts:  Name Home Phone Work Phone Mobile Phone Relationship Lgl Grd   DEYSI [phone] NiJARON TateNE [phone]   Niece      ______________________________________________________________________     Please schedule a follow up appointment with me in the next 2 weeks in a reserved slot to follow up on her back issues and possible knee issues.    Thanks.    Renzo Newell MD  [facility]  6/19/2019, 10:00 PM

## 2021-05-29 NOTE — TELEPHONE ENCOUNTER
Please schedule this patient for an appointment with me on:    ______________________________________________________________________     Thursday, May 23rd  Time of appointment:  10:00 am     Reason for appointment:  Preop melanoma removal    ______________________________________________________________________     Patient already notified.  No need to notify the patient.    Thank you.    Renzo Newell MD  [facility]  5/21/2019, 7:36 AM

## 2021-05-30 NOTE — TELEPHONE ENCOUNTER
FYI - Status Update  Who is Calling: Patient  Update: Patient stated she was able to get in for her MRIs next Thursday.  Okay to leave a detailed message?:  No return call needed

## 2021-05-30 NOTE — PATIENT INSTRUCTIONS - HE
To call to schedule your test, please call the radiology scheduling line at [phone].  This can be done at [facility].    Please update me 1 week after it is done to let me know how it went.    Best wishes with your upcoming cancer treatment!    Renzo Newell MD  [facility]  7/30/2019, 10:51 PM      ______________________________________________________________________

## 2021-05-30 NOTE — PATIENT INSTRUCTIONS - HE
To call to schedule your test, please call the radiology scheduling line at [phone].  This can be done at [facility], [facility], or [facility].

## 2021-05-30 NOTE — TELEPHONE ENCOUNTER
Patient Returning Call  Reason for call:  Message from clinic  Information relayed to patient:  Shoshana Smith, CMA 2:27 PM   Note      LVM for pt to call back for message from PCP - please relay appt information and PCP note        Patient has additional questions:  No  If YES, what are your questions/concerns:  Patient will keep the July 1st appointment.  Okay to leave a detailed message?: No call back needed

## 2021-05-31 VITALS — BODY MASS INDEX: 29.54 KG/M2 | WEIGHT: 185.8 LBS

## 2021-05-31 NOTE — PROCEDURES
[facility] Post-Procedure    Procedure: Fluoroscopic guided transforaminal epidural steroid injection (Right L5-S1)    Radiologist: Allen Ragsdale    Contrast: 1 mL Omnipaque 180    Fluoro time: 1.3 minutes  Fluoro dose: 79 mGy    Medications:   Dexamethasone 1 cc (10 mg/cc)  Lidocaine 1% 3 cc    Sedation Time: N/A    EBL: Minimal  Complications: None    Preliminary findings: (see dictation for full detail)  Technically successful fluoroscopic guided transforaminal epidural steroid injection (Right L5-S1)    Assess/Plan:   Routine post procedure monitoing  Bedrest for 1 hour  Report to ordering provider    Allen Ragsdale  [phone]

## 2021-06-01 VITALS — BODY MASS INDEX: 31.37 KG/M2 | WEIGHT: 195.2 LBS | HEIGHT: 66 IN

## 2021-06-01 NOTE — PROGRESS NOTES
The patient was provided with suggestions to help her develop a healthy physical lifestyle.   Information on urinary incontinence and treatment options given to patient.  The patient was provided with suggestions to help her develop a healthy emotional lifestyle.   She is at risk for falling and has been provided with information to reduce the risk of falling at home.  Patient's advanced directive was discussed and I am comfortable with the patient's wishes.

## 2021-06-02 VITALS — HEIGHT: 66 IN | BODY MASS INDEX: 31.27 KG/M2

## 2021-06-02 VITALS — WEIGHT: 182.2 LBS | BODY MASS INDEX: 29.28 KG/M2 | HEIGHT: 66 IN

## 2021-06-02 VITALS — BODY MASS INDEX: 29.6 KG/M2 | WEIGHT: 184.8 LBS

## 2021-06-02 VITALS — WEIGHT: 186 LBS | BODY MASS INDEX: 29.8 KG/M2

## 2021-06-03 VITALS — BODY MASS INDEX: 30.99 KG/M2 | WEIGHT: 186 LBS | HEIGHT: 65 IN

## 2021-06-03 VITALS — BODY MASS INDEX: 29.24 KG/M2 | WEIGHT: 186.3 LBS | HEIGHT: 67 IN

## 2021-06-03 VITALS
SYSTOLIC BLOOD PRESSURE: 126 MMHG | HEIGHT: 66 IN | BODY MASS INDEX: 30.05 KG/M2 | WEIGHT: 187 LBS | DIASTOLIC BLOOD PRESSURE: 80 MMHG | HEART RATE: 87 BPM | OXYGEN SATURATION: 97 %

## 2021-06-03 VITALS — WEIGHT: 186 LBS | BODY MASS INDEX: 30.48 KG/M2

## 2021-06-03 VITALS — BODY MASS INDEX: 30.48 KG/M2 | WEIGHT: 186 LBS

## 2021-06-03 NOTE — PROGRESS NOTES
ASSESSMENT: Lashawn Ortega is a 74 y.o. female who presents for consultation at the request of HE PCP Renzo Newell MD, with a past medical history significant for reflux, migraine headache, low back pain, peripheral neuropathy, melanoma of right upper arm who presents today for new patient evaluation of low back pain and right greater than left lower extremity pain:    -Overall patient's physical exam is reassuring that she has normal strength and reflexes.  Her pain is likely secondary to lumbar stenosis with neurogenic claudication.    Patient is neurologically intact on exam. No myelopathic or red flag symptoms.     SANNA Score: 46    WHO 5: 17     Diagnoses and all orders for this visit:    Spinal stenosis of lumbar region with neurogenic claudication  -     gabapentin (NEURONTIN) 300 MG capsule; Take 1 capsule (300 mg total) by mouth 3 (three) times a day. Increase to 3 tablets three times a day as instructed.  Dispense: 270 capsule; Refill: 1  -     OPS TFESI Lumbar Sacral Unilateral; Future; Expected date: 11/12/2019    Lumbar disc herniation  -     gabapentin (NEURONTIN) 300 MG capsule; Take 1 capsule (300 mg total) by mouth 3 (three) times a day. Increase to 3 tablets three times a day as instructed.  Dispense: 270 capsule; Refill: 1  -     OPS TFESI Lumbar Sacral Unilateral; Future; Expected date: 11/12/2019    Lumbar radiculitis  -     gabapentin (NEURONTIN) 300 MG capsule; Take 1 capsule (300 mg total) by mouth 3 (three) times a day. Increase to 3 tablets three times a day as instructed.  Dispense: 270 capsule; Refill: 1  -     OPS TFESI Lumbar Sacral Unilateral; Future; Expected date: 11/12/2019    Myofascial pain    Spondylosis of lumbar region without myelopathy or radiculopathy      PLAN:  Reviewed spine anatomy and disease process. Discussed diagnosis and treatment options with the patient today. A shared decision making model was used.  The patient's values and choices were respected. The  following represents what was discussed and decided upon by the provider and the patient.      -DIAGNOSTIC TESTS:  Images were personally reviewed and interpreted and explained to patient today using spine model.   --MRI of the lumbar spine dated 7/11/2019 is personally reviewed and images interpreted and shown to patient.  There is multilevel degenerative changes in lumbar spine.  There is severe spinal canal stenosis at L3-4.  There is moderate spinal canal stenosis at L4-5.  There is mild to moderate spinal canal stenosis at L2-3.  There is no severe foraminal stenosis.    -PHYSICAL THERAPY: Patient has had physical therapy over the years and continues to do home exercises on a daily basis.  She has recently restarted during therapies at home.  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  Discussed the importance of core strengthening, ROM, stretching exercises with the patient and how each of these entities is important in decreasing pain.  Explained to the patient that the purpose of physical therapy is to teach the patient a home exercise program.  These exercises need to be performed every day in order to decrease pain and prevent future occurrences of pain.        -MEDICATIONS: Gabapentin and given her chart of how want her to increase this medication.  -  Discussed multiple medication options today with patient. Discussed risks, side effects, and proper use of medications. Patient verbalized understanding.    -INTERVENTIONS: I ordered a right L3-4 transforaminal epidural steroid injection.  She will need a  for this injection.  Discussed risks and benefits of injections with patient today.    -PATIENT EDUCATION: We discussed pain management in the multiple to fashion including physical therapy, medication management, future injections.    -FOLLOW-UP:   Patient will follow-up 2 weeks after the injection.    Advised patient to call the [facility] if symptoms worsen or you have problems controlling bladder and bowel function.   ______________________________________________________________________    SUBJECTIVE:  HPI:  Lashawn Ortega  Is a 74 y.o. female who presents today for new patient evaluation of low back pain.  Patient was seen by her primary care provider on 9/17/2019 with severe radicular pain down her right leg.  She is being treated for melanoma with chemotherapy invasive treatments at this time are likely not an option.  The patient was referred to the spine center.  Patient has had low back and right greater than left lower extremity pain that is worse with standing or walking.  This has been worsening since March 2019.  She can stand for 2 to 3 minutes before she needs to sit down.  If she sits down the pain does improve.  She is not able to walk far without needing to sit down as well.  She uses a walker which helps.  When she lays flat on her back pain is also worse.  Pain is down her right leg down the posterior lateral buttock thigh calf.  Down her left leg he has pain into the same distribution.  She does note some anterior right thigh pain as well.  She has numbness and tingling in her feet.  She denies any bowel or bladder changes, fevers, chills, unintentional weight loss.  Pain today is 5-6/10 at its worst is 10/10 at its best is 5/10.  She notes that in August 2019 she had a right L5-S1 transforaminal epidural steroid injection.  She did have 1 to 2 days of relief with this and has felt somewhat better since that injection, however continues to have pain.  Her  recently passed away.  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  She has been to physical therapy many times in the past and has religiously done therapies at home until over the last 2 years when her  was not doing well.  She has recently restarted doing home exercises without improvement.   She denies any bowel or bladder changes, fevers, chills, unintentional weight loss.  Of note she is recently been treated for melanoma is on immunotherapy for the next 6 months.    -Treatment to Date: MRI of lumbar spine dated 7/11/2019.  Right L5-S1 transforaminal epidural steroid injection done on 8/12/2019.    -Medications:    Current Outpatient Medications on File Prior to Encounter   Medication Sig Dispense Refill     acetaminophen (TYLENOL EX STR RAPID RELEASE ORAL)        diphenhydrAMINE-acetaminophen (PERCOGESIC) 12.5-325 mg Tab        fluticasone propionate (FLONASE) 50 mcg/actuation nasal spray PLACE TWO SPRAYS INTO EACH NOSTRIL ONCE DAILY. 48 g 3     naproxen (NAPROSYN) 500 MG tablet Take 1 tablet (500 mg total) by mouth 2 (two) times a day as needed. Please keep this Rx on file for the next RF. 180 tablet 3     pantoprazole (PROTONIX) 40 MG tablet Take 1 tablet (40 mg total) by mouth daily. 90 tablet 3     venlafaxine (EFFEXOR XR) 37.5 MG 24 hr capsule Take 1 capsule (37.5 mg total) by mouth daily. 90 capsule 3     ondansetron (ZOFRAN ODT) 4 MG disintegrating tablet Take 1 tablet (4 mg total) by mouth every 8 (eight) hours as needed for nausea. 20 tablet 0     No current facility-administered medications on file prior to encounter.        Allergies   Allergen Reactions     Codeine Dizziness     Other: extreme weakness in legspt stopped taking medication and sxs were gone within a few hours       Erythromycin Base      Hydrocodone-Acetaminophen Headache and Nausea Only     Other Environmental Allergy      mildew     Other Food Allergy      Blue crest tosthpaste     Penicillins      Retinol      HYDROGEL RETINOL CAPSULE     Shellfish Containing Products      shrimp     Sulfa (Sulfonamide Antibiotics)      Tramadol Diarrhea, Dizziness, Headache and Nausea And Vomiting     Vitamin D3 Complete [Mv-Mn-Iron-Fa-Herbal Cmplx#190]      Higher dosage makes her break out       Past Medical History:   Diagnosis Date  "    Asthma      GERD (gastroesophageal reflux disease)      LBP (low back pain)      Migraine headache      Nonsmoker      NORMAL COLONOSCOPY - 2009 - Average risk      PN (peripheral neuropathy)         Patient Active Problem List   Diagnosis     GERD (gastroesophageal reflux disease)     Migraine headache     LBP (low back pain)     Nonsmoker     Menopausal hot flushes     Full code status     NORMAL COLONOSCOPY - 2009 - Average risk     PN (peripheral neuropathy)     Melanoma of right upper arm (H) - On Keytruda       Past Surgical History:   Procedure Laterality Date     HYSTERECTOMY  1988     IR LUMBAR EPIDURAL STEROID INJECTION  8/12/2019     TOTAL ABDOMINAL HYSTERECTOMY W/ BILATERAL SALPINGOOPHORECTOMY  1988       Family History   Problem Relation Age of Onset     Pancreatic cancer Mother 82     Melanoma Father 75        Metastatic to colon       Reviewed past medical, surgical, and family history with patient found on new patient intake packet located in EMR Media tab.     SOCIAL HX: The patient is recently .  She denies smoking or using recreational drugs.  She drinks alcohol occasionally.    ROS: Specifically negative for bowel/bladder dysfunction, balance changes, headache, dizziness, foot drop, fevers, chills, appetite changes, nausea/vomiting, unexplained weight loss. Otherwise 13 systems reviewed are negative. Please see the patient's intake questionnaire from today for details.    OBJECTIVE:  /72 (Patient Site: Right Arm, Patient Position: Sitting, Cuff Size: Adult Large)   Pulse 92   Temp 98.5  F (36.9  C) (Oral)   Resp 18   Ht 5' 7\" (1.702 m)   Wt 186 lb 4.8 oz (84.5 kg)   LMP 09/17/1988   SpO2 98%   BMI 29.18 kg/m      PHYSICAL EXAMINATION:    --CONSTITUTIONAL:  Vital signs as above.  No acute distress.  The patient is well nourished and well groomed.  --PSYCHIATRIC:  Appropriate mood and affect. The patient is awake, alert, oriented to person, place, time and answering " questions appropriately with clear speech.    --SKIN:  Skin over the face, bilateral lower extremities, and posterior torso is clean, dry, intact without rashes.    --RESPIRATORY: Normal rhythm and effort. No abnormal accessory muscle breathing patterns noted.   --ABDOMINAL:  Soft, non-distended, non-tender throughout all quadrants.   --STANDING EXAMINATION:  Normal lumbar lordosis noted, no lateral shift.  --MUSCULOSKELETAL: Lumbar spine inspection reveals no evidence of deformity. Range of motion is not limited in lumbar flexion, extension, lateral rotation.  She has tenderness palpation along the bilateral lower paraspinal muscles in the lumbar spine. Straight leg raising in the supine position is positive for radicular pain on the right.   --SACROILIAC JOINT: Negative distraction.  Positive bilaterally Diogenes's with reproduction of pain to affected extremity.  One Finger point test is negative.  --GROSS MOTOR: Gait is non-antalgic. Easily arises from a seated position. Toe walking and heel walking are normal without significant difficulty.    --LOWER EXTREMITY MOTOR TESTING:  Plantar flexion left 5/5, right 5/5   Dorsiflexion left 5/5, right 5/5   Great toe MTP extension left 5/5, right 5/5  Knee flexion left 5/5, right 5/5  Knee extension left 5/5, right 5/5   Hip flexion left 5/5, right 5/5  Hip abduction left 5/5, right 5/5  Hip adduction left 5/5, right 5/5   --HIPS: Full range of motion bilaterally.  Stinchfield test produces low back pain.  --NEUROLOGICAL:  2/4 patellar, medial hamstring, and achilles reflexes bilaterally.  Sensation is decreased in bilateral feet.  Babinski is negative. No clonus.    --VASCULAR:  2/4 dorsalis pedis and posterior tibialsi pulses bilaterally.  Bilateral lower extremities are warm.  There is no pitting edema of the bilateral lower extremities.    RESULTS: Prior medical records from [facility] primary care provider were reviewed today.    Imaging: Lumbar spine Imaging was  personally reviewed and interpreted today. The images were shown to the patient and the findings were explained using a spine model.

## 2021-06-03 NOTE — PATIENT INSTRUCTIONS - HE
Prescribed Gabapentin today, 300 mg tablets, to be titrated up to 3 tablets 3 times a day as tolerated for your nerve pain. Please follow Gabapentin dosing chart below.    Gabapentin 300mg Dosing Chart    DATE  MORNING AFTERNOON BEDTIME    Day 1 0 0 1    Day 2 0 0 1    Day 3 0 0 1    Day 4 1 0 1    Day 5 1 0 1    Day 6 1 0 1    Day 7 1 1 1    Day 8 1 1 1    Day 9 1 1 1    Day 10 1 1 2    Day 11 1 1 2    Day 12 1 1 2    Day 13 2 1 2    Day 14 2 1 2    Day 15 2 1 2    Day 16 2 2 2    Day 17 2 2 2    Day 18 2 2 2    Day 19 2 2 3    Day 20 2 2 3    Day 21 2 2 3    Day 22 3 2 3    Day 23 3 2 3    Day 24 3 2 3    Day 25 3 3 3    Day 26 3 3 3    Day 27 3 3 3     Continue medication, taking 3 capsules three times daily    Please call if you have any questions regarding how to take your medication  Clinic Phone # [phone]        I recommend that you continue doing your home exercises on a daily basis.    I have ordered an injection for you.  You will need a  for this injection.    ~Please call Nurse Navigation line [phone] with any questions or concerns about your treatment plan, if symptoms worsen and you would like to be seen urgently, or if you have problems controlling bladder and bowel function.

## 2021-06-04 VITALS
WEIGHT: 188 LBS | BODY MASS INDEX: 29.89 KG/M2 | WEIGHT: 188 LBS | HEIGHT: 67 IN | BODY MASS INDEX: 29.89 KG/M2 | BODY MASS INDEX: 29.89 KG/M2 | BODY MASS INDEX: 29.89 KG/M2 | HEIGHT: 67 IN

## 2021-06-04 VITALS — HEIGHT: 67 IN | WEIGHT: 188 LBS | BODY MASS INDEX: 29.51 KG/M2

## 2021-06-04 NOTE — TELEPHONE ENCOUNTER
Could you please see what amount of gabapentin the patient is taking now? This could then be updated with pharmacy so that it doesn't look like the supply is 3 months.  I could then see her at her scheduled appointment.    Thanks

## 2021-06-04 NOTE — TELEPHONE ENCOUNTER
"Phone call to patient. She reports she is taking 900 mg three times a day. She is finding to be somewhat helpful. \"It's not helping as much as I was hoping for. Insurance denied it because they were saying I was filling it too soon. Also I think they want me to fill for 90 days.\" Explained provider would be updated. Prescription will be rewritten to show current dosing. If [facility] would like to fill a 90 day supply, that will be 810 capsules. Would you lie to fill with 90 capsules of Gabapentin 600 mg and 90 capsules of Gabapentin 300 mg to equal the 900 mg three times a day?   "

## 2021-06-04 NOTE — TELEPHONE ENCOUNTER
Phone call to patient to discuss. Phone call to patient to inform that prescription was authorized and has been sent to her pharmacy. Left detailed message on dedicated voicemail.

## 2021-06-04 NOTE — TELEPHONE ENCOUNTER
CT Chest Abdomen Pelvis Without Oral With IV Contrast  Narrative: EXAM: CT CHEST ABDOMEN PELVIS WO ORAL W IV CONTRAST  LOCATION: [facility]  DATE/TIME: 12/5/2019 12:26 PM    INDICATION: Malignant melanoma of skin, unspecified. Clinical stage IB. Pathologic stage IIIA.  COMPARISON: PET/CT 06/10/2019.  TECHNIQUE: CT scan of the chest, abdomen, and pelvis was performed following injection of IV contrast. Multiplanar reformats were obtained. Dose reduction techniques were used.   CONTRAST: Iohexol (Omni) 100 mL.    FINDINGS:   LUNGS AND PLEURA: Motion artifact. Stable 3 mm left lower lobe subpleural nodule (series 4, image 139). 3 mm medial left lower lobe nodule may have been present on the prior PET/CT (image 158), though compromised from motion on that exam. 3 mm left lower   lobe nodule (image 128), also difficult in stability on the prior. No pleural effusion.    MEDIASTINUM/AXILLAE: No lower cervical, mediastinal, hilar or axillary adenopathy. Minimal left ventricular apex subendocardial fat for which old myocardial infarction, cannot be excluded.    HEPATOBILIARY: 5 mm hepatic dome and 8 mm gallbladder fossa hepatic hypodensities were difficult to see on the the prior noncontrast PET/CT (series 4, image 221). Stable other hepatic hypodensities up to 70 mm, larger lesions suggestive of cysts.     PANCREAS: Negative.    SPLEEN: Normal.    ADRENAL GLANDS: Normal.    KIDNEYS/BLADDER: Stable right renal 5.1 cm cyst. No mass.    BOWEL: Unremarkable.    LYMPH NODES: No abdominal or pelvic adenopathy. No peritoneal nodularity.    VASCULATURE: Unremarkable.    PELVIC ORGANS: Hysterectomy.    OTHER: None.    MUSCULOSKELETAL: Stable heterogeneous lucency right iliac measuring roughly 9-10 x 35 mm (series 4, image 366) and minimal lucency left iliac (image 360). These were not FDG avid on prior PET/CT. Stable tiny L1 sclerotic focus (sagittal series image 98).  Impression: 1.  Some findings are difficult in  stability to the prior PET/CT secondary to small size, noncontrast PET/CT exam and PET/CT motion artifact.    2.  A few tiny pulmonary nodules are seen on today's exam, two are difficult in stability from size and motion artifact on the prior PET/CT.    3.  Several stable hepatic hypodensities, mostly suggestive of cysts. However, two smaller hypodensities were not well seen on the prior PET/CT, which could reflect noncontrast PET/CT exam.    4.  Previously stated findings probably represent differences in exams. However, recommend stability.    5.  Otherwise, no evidence of metastatic disease in the chest, abdomen or pelvis.    NOTE: ABNORMAL REPORT    THE DICTATION ABOVE DESCRIBES AN ABNORMALITY FOR WHICH FOLLOW-UP IS NEEDED.

## 2021-06-06 NOTE — TELEPHONE ENCOUNTER
Physical Therapy [facility] is requesting verbal ok for PT eval for home care needs, home safety and management.  SLP evaluation for aphasia   You can respond to this inbasket with the ok or call and leave a message at [phone]. Thank you

## 2021-06-06 NOTE — TELEPHONE ENCOUNTER
Okay to proceed.    Renzo Newell MD  General Internal Medicine  [facility]  2/25/2020, 2:40 PM

## 2021-06-06 NOTE — PATIENT INSTRUCTIONS - HE
Please follow up if you have any further issues.    You may contact me by phone or MyChart if you are worsening or if things are not improving.    Thank you for coming in today.      ______________________________________________________________________      Future Appointments   Date Time Provider Department Center   2/28/2020  2:30 PM Antoni Cortes, PharmD [facility]

## 2021-06-06 NOTE — TELEPHONE ENCOUNTER
Last Office Visit  2/27/2020 Renzo Newell MD  Notes:      Last Filled:  atorvastatin (LIPITOR) 40 MG tablet  30 tablet  0  2/22/2020   No    Sig - Route: Take 1 tablet (40 mg total) by mouth at bedtime. - Oral    Sent to pharmacy as: atorvastatin 40 mg tablet (LIPITOR)    E-Prescribing Status: Receipt confirmed by pharmacy (2/22/2020 12:17 PM CST)      Lab Results   Component Value Date    CHOL 174 02/22/2020    CHOL 187 07/11/2016    CHOL 212 (H) 06/25/2014     Lab Results   Component Value Date    HDL 45 (L) 02/22/2020    HDL 57 07/11/2016    HDL 59 06/25/2014     Lab Results   Component Value Date    LDLCALC 108 02/22/2020    LDLCALC 98 07/11/2016    LDLCALC 125 06/25/2014     Lab Results   Component Value Date    TRIG 104 02/22/2020    TRIG 160 (H) 07/11/2016    TRIG 140 06/25/2014       Next OV:  Visit date not found        Medication teed up for provider signature

## 2021-06-06 NOTE — PROGRESS NOTES
______________________________________________________________________    Review of HCC items was performed at this visit.    Encounter Diagnoses   Name Primary?     Tenosynovitis, de Quervain Yes     Ischemic cerebrovascular accident (CVA) of frontal lobe (H) Work-up reviewed and discussed today.     Melanoma of right upper arm (H) Follow up with dermatology and oncology          ______________________________________________________________________

## 2021-06-06 NOTE — TELEPHONE ENCOUNTER
Dr. Newell or covering MD    New verbal order needed for PHYSICAL THERAPY start of care on 2/25/20 due to patient's request.     This is outside of the original 48 hour referral order from hospital.     Tiffany Villa RN

## 2021-06-06 NOTE — TELEPHONE ENCOUNTER
Last appointment: 11/12/2019  Next appointment: None    Notes/Comments: Dr. Worthington patient      Rx request(s) has been reviewed. Rx(s) cued up for auth, to be e-scribed to pharm. Thanks.

## 2021-06-06 NOTE — PROGRESS NOTES
MTM Transition of Care Encounter  Assessment & Plan                                                     Post Discharge Medication Reconciliation Status: discharge medications reconciled and changed, per note/orders (see AVS)    .Medication Adherence/Access: No concerns noted - Pt seems very involved in her medications and has an appropriate understanding of how to use them.     CVA: Improved - Pt appropriately on statin and aspirin. BP at goal <130/80. Speech improving, continuing with speech therapy. Pt is scheduled for Holter monitor placement.   -Continue current therapy       Spinal Stenosis/Lumbar Radiculitis: Partially improved - Pt feels like gabapentin is helping, but still has some inflammatory pain, especially with increased activity. Discussed that Naproxen can be hard on the stomach and kidneys. However, pt's BP is at goal <130/80, BMP WNL, and not having worsening GERD symptoms so likely okay to continue PRN use at this time. Could consider topical Diclofenac as a safer alternative, if needed. Reviewed that Diphenhydramine is typically not recommended in older adults due to anticholinergic side effects, which pt is not experiencing. However, for safety, may benefit from switching to plain Acetaminophen product. May get better benefit from scheduled doses of Acetaminophen. With low dose scheduled Acetaminophen, pt could still use an additional tablet of Percogesic if needed for migraines.   -Okay to continue Naproxen as needed   -Consider Topical Diclofenac 1% gel. Pt to discuss with PCP   -Recommend taking Acetaminophen 325 mg three times a day, scheduled   -Okay to use 1 tab of Percogesic as needed in addition to plain Acetaminophen       GERD: Improved - symptoms well controlled per pt report. Naproxen use does not appear to be worsening reflux symptoms.   -Continue current therapy       Menopausal hot flushes: Improved- symptoms well controlled with current therapy.   -Continue current therapy  "    Allergies: Improved - symptoms well controlled with Flonase use.   -Continue current therapy          Follow Up  No follow-ups on file.  Schedule with PharmD as needed       Subjective & Objective                                                       Lashawn Ortega is a 74 y.o. female called for a transitions of care visit. she was discharged from [facility] on 2/22/2020 for Ischemic cerebrovascular accident (CVA) of the frontal lobe. .    Chief Complaint: Medication Management - questions about pain medicines.     Medication Adherence/Access: Pt prefers 3 month medications via mail order. Working to transfer to [facility].     CVA: EKG with heart rate of 82, QTc 439, normal sinus rhythm. Started on Atorvastatin 40 mg daily and Aspirin 81 mg daily. \"Patient to follow-up with neurology in 6 weeks as well as to be scheduled for 30-day Holter event monitor, to assess for atrial fibrillation or other arrhythmias.\"    Speech has improved since discharge. Pt is practicing by talking to herself. Does still have some hiccups and trouble with finding words. Is engaged in speech therapy.     Is scheduled on 3/10 to have a Holter monitor put on.      BP Readings from Last 3 Encounters:   02/27/20 122/60   02/26/20 128/71   02/25/20 142/90         Spinal Stenosis/Lumbar Radiculitis: Prescribed Gabapentin 300 mg 3 caps three times a day, Lidocaine 4% cream four times a day as needed, Naproxen 500 mg two times a day, Tizanidine 2 mg Q8H as needed. Gabapentin is getting better at helping with pain. Wondering what the risks are of long-term use of Naproxen and Percogesic.     Rarely uses Tizanidine - has it for \"emergencies\" when bursitis flares.     Tends to alternate Percogesic with Naproxen      Denies dry mouth, regulates constipation with olive oil and raisins. Denies drowsiness.     Doesn't feel like Acetaminophen helps when pain flares from activity. When she gets migraines, percogesic is the only thing that " helps prevent getting       GERD: Prescribed Pantoprazole 40 mg daily. Has acid reflux. Has been trying to calm this down with diet. Symptoms tend to flare if she's eaten and having to bed over to pick something up, otherwise if sitting and doing regular activities it hasn't been bothersome.       Menopausal hot flushes: Prescribed Venlafaxine ER 37.5 mg daily. Was still having mood swings going off of premarin. Didn't have this for the first couple of days in the hospital. Didn't have any mood swings, but did have hot flushes. Seem to be well controlled with medication.     Allergies: Prescribed Flonase 50 mcg 1 spray two times a day. Spring and Fall are the worst time so she has been using more regularly since discharge.         PMH: reviewed in EPIC   Allergies/ADRs: reviewed in EPIC   Alcohol:   Social History     Substance and Sexual Activity   Alcohol Use Yes     Alcohol/week: 0.0 - 1.0 standard drinks       Tobacco:   Social History     Tobacco Use   Smoking Status Former Smoker     Packs/day: 0.00   Smokeless Tobacco Never Used     Recent Vitals:   BP Readings from Last 3 Encounters:   02/25/20 142/90   02/22/20 161/85   11/12/19 138/72      Wt Readings from Last 3 Encounters:   02/21/20 192 lb 11.2 oz (87.4 kg)   11/12/19 186 lb 4.8 oz (84.5 kg)   09/17/19 187 lb (84.8 kg)     ----------------    The patient was given a summary of these recommendations by mail    I spent 45 minutes with this patient today; An extra 15 minutes was spent creating the Medication Action Plan.. All changes were made via collaborative practice agreement with Renzo Newell MD. A copy of the visit note was provided to the patient's provider.     Antoni Cortes, Seda  Medication Therapy Management (MTM) Pharmacist  [facility]           Current Outpatient Medications   Medication Sig Dispense Refill     aloe vera Gel Apply 1 application topically 2 (two) times a day as needed.       aspirin 81 MG EC tablet  Take 1 tablet (81 mg total) by mouth daily. 30 tablet 0     atorvastatin (LIPITOR) 40 MG tablet Take 1 tablet (40 mg total) by mouth at bedtime. 30 tablet 0     dextromethorphan-guaiFENesin (ROBAFEN DM)  mg/5 mL liquid Take 5 mL by mouth every 12 (twelve) hours as needed.       diphenhydrAMINE-acetaminophen (PERCOGESIC) 12.5-325 mg Tab Take 1-2 tablets by mouth every 8 (eight) hours as needed.        fluticasone propionate (FLONASE) 50 mcg/actuation nasal spray Apply 1-2 sprays into each nostril daily as needed for rhinitis.       gabapentin (NEURONTIN) 300 MG capsule Take 3 capsules three (3) times a day. 270 capsule 1     guaiFENesin-dextromethorphan (MUCINEX DM) 600-30 mg Tb12 Take 1 tablet by mouth 2 (two) times a day as needed.       lidocaine HCL (ASPERCREME, LIDOCAINE,) 4 % Crea Apply 1 application topically 4 (four) times a day as needed.       naproxen (NAPROSYN) 500 MG tablet Take 1 tablet (500 mg total) by mouth 2 (two) times a day as needed. Please keep this Rx on file for the next RF. 180 tablet 3     neomycin-bacitracin-polymyxin (NEOSPORIN) ointment Apply 1 application topically 2 (two) times a day as needed.       pantoprazole (PROTONIX) 40 MG tablet Take 1 tablet (40 mg total) by mouth daily. 90 tablet 3     sodium chloride 0.9% SolP 100 mL with pembrolizumab 25 mg/mL Soln 200 mg Infuse 200 mg into a venous catheter every 21 days. Indications: malignant melanoma, a type of skin cancer       tiZANidine (ZANAFLEX) 2 MG tablet Take 2 mg by mouth every 8 (eight) hours as needed.       venlafaxine (EFFEXOR XR) 37.5 MG 24 hr capsule Take 1 capsule (37.5 mg total) by mouth daily. 90 capsule 3     No current facility-administered medications for this visit.

## 2021-06-06 NOTE — TELEPHONE ENCOUNTER
Okay to proceed.    Renzo Newell MD  General Internal Medicine  [facility]  2/23/2020, 1:51 PM

## 2021-06-06 NOTE — TELEPHONE ENCOUNTER
Ice and heat  Ice helps prevent swelling and reduce pain. Place ice on the painful area for 10 to 15 minutes. Repeat the icing several times a day.   These can be crushed or cubed ice in a plastic bag or a bag of frozen vegetables wrapped in a thin towel.     Apply a heating pad or hot towels to the tendon for 20 to 30 minutes 2 or 3 times a day.  This can include a hot shower, hot bath, or a moist towel warmed in a microwave.     Try each and use the method that feels best, for 15 to 20 minutes several times a day.

## 2021-06-07 NOTE — TELEPHONE ENCOUNTER
Left message for pt to call back.    If pt calls back please inform pt    Dr. Newell would like the patient to schedule a virtual visit for follow up of her medical issues in the next 2 weeks if she is willing please schedule her.     If the patient has a smartphone with a camera, we can schedule a video visit using [employer] or FACETIME without having the patient signing up for AMWELL.      If the patient wants to use and IPAD, tablet or computer, then the patient should sign up for AMWELL (AW TOUCHPOINT) prior to the visit.  We can also use [email or web address] if they wish.     If patient has no device with a webcam, then please schedule a telephone visit for their follow up.     Please note DOXIMITY, AMWELL, or FACETIME in the reason for visit when scheduling the appointment.   Also note contact information (cell phone or email).

## 2021-06-07 NOTE — TELEPHONE ENCOUNTER
Please reach out to and call patient -    ______________________________________________________________________     Home phone:  [phone] (home)     Cell phone:   Telephone Information:   Mobile [phone]       Other contacts:  Name Home Phone Work Phone Mobile Phone Relationship Lgl Grd   DEYSI [phone] Niece CAMPBELL Cardenas [phone]   Niece No     ______________________________________________________________________     I would like the patient to schedule a virtual visit for follow up of her medical issues in the next 4 weeks if she is willing.    If the patient has a smartphone with a camera, we can schedule a video visit using DOXIMITY or FACETIME without having the patient signing up for AMWELL.     If the patient wants to use and IPAD, tablet or computer, then the patient should sign up for AMWELL (AW TOUCHPOINT) prior to the visit.  We can also use FACETIME if they wish.    If patient has no device with a webcam, then please schedule a telephone visit for their follow up.    Please note DOXIMITY, AMWELL, or FACETIME in the reason for visit when scheduling the appointment.   Also note contact information (cell phone or email).    Thank you!    Renzo Newell MD  [facility]  4/30/2020, 10:02 PM

## 2021-06-07 NOTE — TELEPHONE ENCOUNTER
Request for Orders    Who s Requesting: Home Care Physical Therapist    Orders being requested: extend home care physical therapy 2x/week x 2 weeks to continue to progress strength, balance, gait, pain management.     Where to send Orders: Please reply to this message    Thank you,   Samia Cason, PT

## 2021-06-07 NOTE — TELEPHONE ENCOUNTER
RN cannot approve Refill Request    RN can NOT refill this medication med is not covered by policy/route to provider and historical medication requested. Last office visit: 2/27/2020 Renzo Newell MD Last Physical: 9/17/2019 Last MTM visit: Visit date not found Last visit same specialty: 2/27/2020 Renzo Newell MD.  Next visit within 3 mo: Visit date not found  Next physical within 3 mo: Visit date not found      Stephie Goel, [facility] Triage/Med Refill 3/24/2020    Requested Prescriptions   Pending Prescriptions Disp Refills     naproxen (NAPROSYN) 500 MG tablet 180 tablet 3     Sig: Take 1 tablet (500 mg total) by mouth 2 (two) times a day as needed. Please keep this Rx on file for the next RF.       There is no refill protocol information for this order        fluticasone propionate (FLONASE) 50 mcg/actuation nasal spray 16 g 0     Sig: Apply 1-2 sprays into each nostril daily as needed for rhinitis.       Nasal Steroid Refill Protocol Passed - 3/24/2020  2:57 PM        Passed - Patient has had office visit/physical in last 2 years     Last office visit with prescriber/PCP: 2/27/2020 OR same dept: 2/27/2020 Renzo Newell MD OR same specialty: 2/27/2020 Renzo Newell MD Last physical: 9/17/2019 Last MTM visit: Visit date not found    Next appt within 3 mo: Visit date not found  Next physical within 3 mo: Visit date not found  Prescriber OR PCP: Renzo Newell MD  Last diagnosis associated with med order: 1. Chronic midline low back pain with right-sided sciatica  - naproxen (NAPROSYN) 500 MG tablet; Take 1 tablet (500 mg total) by mouth 2 (two) times a day as needed. Please keep this Rx on file for the next RF.  Dispense: 180 tablet; Refill: 3     If protocol passes may refill for 12 months if within 3 months of last provider visit (or a total of 15 months).                      Last office visit:2/27/20      Last refill: Historical       fluticasone propionate (FLONASE) 50 mcg/actuation  nasal spray      --    Sig - Route: Apply 1-2 sprays into each nostril daily as needed for rhinitis. - Each Nare    Class: Historical Med        naproxen (NAPROSYN) 500 MG tablet  180 tablet  3  9/17/2019   No    Sig - Route: Take 1 tablet (500 mg total) by mouth 2 (two) times a day as needed. Please keep this Rx on file for the next RF. - Oral    Sent to pharmacy as: naproxen (NAPROSYN) 500 MG tablet    E-Prescribing Status: Receipt confirmed by pharmacy (9/17/2019  9:21 AM CDT)

## 2021-06-07 NOTE — TELEPHONE ENCOUNTER
Called to patient and relayed message. Appointment is scheduled for 5/12. Patient will download aw touchpoint [email or web address]. Instructions sent via [employer]

## 2021-06-07 NOTE — PROGRESS NOTES
"Lashawn Ortega is a 75 y.o. female who is being evaluated via a billable telephone visit.      The patient has been notified of following:     \"This telephone visit will be conducted via a call between you and your physician/provider. We have found that certain health care needs can be provided without the need for a physical exam.  This service lets us provide the care you need with a short phone conversation.  If a prescription is necessary we can send it directly to your pharmacy.  If lab work is needed we can place an order for that and you can then stop by our lab to have the test done at a later time.    Telephone visits are billed at different rates depending on your insurance coverage. During this emergency period, for some insurers they may be billed the same as an in-person visit.  Please reach out to your insurance provider with any questions.    If during the course of the call the physician/provider feels a telephone visit is not appropriate, you will not be charged for this service.\"    Patient has given verbal consent to a Telephone visit? Yes    Patient would like to receive their AVS by AVS Preference: Salvador.    Additional provider notes:     Assessment:     Diagnoses and all orders for this visit:    Spinal stenosis of lumbar region with neurogenic claudication    Lumbar disc herniation    Lumbar radiculitis    Myofascial pain    Spondylosis of lumbar region without myelopathy or radiculopathy       Lashawn Ortega is a 75 y.o. y.o. female with past medical history significant for significant for reflux, migraine headache, low back pain, peripheral neuropathy, melanoma of right upper arm , stroke who presents today for follow-up regarding low back pain and left lower extremity pain with ambulation:    -Patient's pain is likely secondary to lumbar spinal stenosis with neurogenic claudication.  She will continue with at home physical therapy exercises and call if no progress is made or worsening " in the future.     Plan:     A shared decision making plan was used. The patient's values and choices were respected. Prior medical records from our last visit on 4/2/2020 were reviewed today. The following represents what was discussed and decided upon by the provider and the patient.        -DIAGNOSTIC TESTS: Images were personally reviewed and interpreted.   --MRI of the lumbar spine dated 7/11/2019 is personally reviewed and images interpreted and shown to patient.  There is multilevel degenerative changes in lumbar spine.  There is severe spinal canal stenosis at L3-4.  There is moderate spinal canal stenosis at L4-5.  There is mild to moderate spinal canal stenosis at L2-3.  There is no severe foraminal stenosis.    -INTERVENTIONS: We discussed right L3-4 transfemoral epidural steroid injection.  At this time we will [place] to COVID-19 pandemic.    -MEDICATIONS: Patient is currently taking gabapentin 900 mg 3 times a day as well as Tylenol and naproxen.  -  Discussed side effects of medications and proper use. Patient verbalized understanding.    -PHYSICAL THERAPY: She has had several sessions of in-home physical therapy which has been very helpful.  She is having her last session today and will continue with home exercises.  Discussed the importance of core strengthening, ROM, stretching exercises with the patient and how each of these entities is important in decreasing pain.  Explained to the patient that the purpose of physical therapy is to teach the patient a home exercise program.  These exercises need to be performed every day in order to decrease pain and prevent future occurrences of pain.        -PATIENT EDUCATION: We discussed pain management in multiple fashion including physical therapy, medication management, possible future injections.    -FOLLOW UP: Patient will follow-up as needed.  Advised to contact clinic if symptoms worsen or change.    Subjective:     Lashawn Ortega is a 75 y.o. female  who presents today for follow-up regarding low back and right lower extremity pain.  We attempted to do a video visit today, however only the audio is working on her phone and not her camera today.  This was converted into the telephone visit.  Patient reports that she continues to have low back pain and pain down her right lower extremity down her buttock posterior thigh and calf into the ankle.  This is unchanged from previous.  She does report that her pain has improved some with the physical therapy she has been having at home.  Her pain is worse with standing and walking.  Her pain today is 3/10.  Pain is also improved if she sits or lies down.  She is having her last session of in-home physical therapy today and will continue with doing her exercises as she started to see benefit especially in the last 3 days.  If things are to worsen then she would call in for another appointment.    -Treatment to Date: MRI of lumbar spine dated 7/11/2019.  Right L5-S1 transforaminal epidural steroid injection done on 8/12/2019.    Patient Active Problem List   Diagnosis     GERD (gastroesophageal reflux disease)     Migraine headache     LBP (low back pain)     Nonsmoker     Menopausal hot flushes     Full code status     NORMAL COLONOSCOPY - 2009 - Average risk     Peripheral neuropathy     Melanoma of right upper arm (H) - On Keytruda     Ischemic cerebrovascular accident (CVA) of frontal lobe (H) - 2/2020     Aphasia     Lumbar spinal stenosis     Essential tremor       Current Outpatient Medications on File Prior to Encounter   Medication Sig Dispense Refill     acetaminophen (TYLENOL) 500 MG tablet Take 1,000 mg by mouth 3 (three) times a day. Indications: pain       aloe vera Gel Apply 1 application topically 2 (two) times a day as needed.       aspirin 81 MG EC tablet Take 1 tablet (81 mg total) by mouth daily. 30 tablet 0     atorvastatin (LIPITOR) 40 MG tablet Take 1 tablet (40 mg total) by mouth at bedtime. 90  tablet 3     dextromethorphan-guaiFENesin (ROBAFEN DM)  mg/5 mL liquid Take 5 mL by mouth every 12 (twelve) hours as needed.       diphenhydrAMINE-acetaminophen (PERCOGESIC) 12.5-325 mg Tab Take 1-2 tablets by mouth every 8 (eight) hours as needed.        fluticasone propionate (FLONASE) 50 mcg/actuation nasal spray Apply 1-2 sprays into each nostril daily as needed for rhinitis. 16 g 1     gabapentin (NEURONTIN) 300 MG capsule Take 3 capsules three (3) times a day. 270 capsule 0     guaiFENesin-dextromethorphan (MUCINEX DM) 600-30 mg Tb12 Take 1 tablet by mouth 2 (two) times a day as needed.       menthol (BIOFREEZE, MENTHOL,) 4 % Gel Apply topically for pain  Indications: pain associated with arthritis       naproxen (NAPROSYN) 500 MG tablet Take 1 tablet (500 mg total) by mouth 2 (two) times a day as needed. Please keep this Rx on file for the next RF. 180 tablet 3     neomycin-bacitracin-polymyxin (NEOSPORIN) ointment Apply 1 application topically 2 (two) times a day as needed.       pantoprazole (PROTONIX) 40 MG tablet Take 1 tablet (40 mg total) by mouth daily. 90 tablet 3     sodium chloride 0.9% SolP 100 mL with pembrolizumab 25 mg/mL Soln 200 mg Infuse 200 mg into a venous catheter every 21 days. Indications: malignant melanoma, a type of skin cancer       UNABLE TO FIND Hurt Blocker Pro - apply to affected areas for pain  Indications: Pain       venlafaxine (EFFEXOR XR) 37.5 MG 24 hr capsule Take 1 capsule (37.5 mg total) by mouth daily. 90 capsule 3     tiZANidine (ZANAFLEX) 2 MG tablet Take 2 mg by mouth every 8 (eight) hours as needed.       No current facility-administered medications on file prior to encounter.        Allergies   Allergen Reactions     Codeine Dizziness     Other: extreme weakness in legspt stopped taking medication and sxs were gone within a few hours       Erythromycin Base Unknown     Hydrocodone Nausea Only and Headache     Tramadol Diarrhea, Dizziness, Headache and Nausea  And Vomiting     Other Environmental Allergy Rash     mildew     Penicillins Swelling and Rash     Retinol Rash     HYDROGEL RETINOL CAPSULE     Shellfish Containing Products Swelling and Rash     shrimp     Sulfa (Sulfonamide Antibiotics) Rash     Burning body rash     Vitamin D3 Complete [Mv-Mn-Iron-Fa-Herbal Cmplx#190] Rash     Higher dosage makes her break out       Past Medical History:   Diagnosis Date     Asthma      GERD (gastroesophageal reflux disease)      LBP (low back pain)      Lumbar spinal stenosis 2/28/2020    EXAM: MR LUMBAR SPINE W WO CONTRAST LOCATION: [facility] DATE/TIME: 7/11/2019 4:49 PM   INDICATION: Back pain going down the right leg.  Going on for 3 months.  Not improving. See above. History of melanoma. COMPARISON: None. CONTRAST: Gadavist 9. TECHNIQUE: Without and with IV contrast   FINDINGS:  Nomenclature is based on 5 lumbar type vertebral bodies. There is no concerning marrow rep     Malignant melanoma of right upper arm (H)      Migraine headache      Nonsmoker      NORMAL COLONOSCOPY - 2009 - Average risk      PN (peripheral neuropathy)         Review of Systems  ROS:  Specifically negative for bowel/bladder dysfunction, balance changes, headache, dizziness, foot drop, fevers, chills, appetite changes, nausea/vomiting, unexplained weight loss. Otherwise 13 systems reviewed are negative. Please see the patient's intake questionnaire from today for details.    Reviewed Social, Family, Past Medical and Past Surgical history with patient, no significant changes noted since prior visit.     Objective:       RESULTS:   Imaging: Lumbar spine imaging was reviewed today. The images were shown to the patient and the findings were explained using a spine model.    Phone call duration: 17 minutes    Carlos Alberto Worthington DO

## 2021-06-07 NOTE — TELEPHONE ENCOUNTER
Okay to proceed.    Renzo Newell MD  General Internal Medicine  [facility]  4/5/2020, 9:32 AM

## 2021-06-07 NOTE — PROGRESS NOTES
Patient goal: improve gait, balance strength, pain management, and improve ability to do household chores- with increased standing tolerance.  Reason for this episode: spinal stenosis, back pain, referred from PCP  Pertinent medical history: spinal stenosis, hx CVA, hx bursitis in shoulders  Precautions/safety: (ex. contact precautions, cognition, activity restrictions, falls, surgical precautions, etc.):falls risk  Prior Level of Funct  ion: I in ADLs and mobility, able to be I in household chores 1 year ago  Current Level of Function: Pt having increased difficulty doing household chores due to pain, and has no assistance with this. pain is 5-8/10, low back, radiates into buttocks and down B LEs. Decreased balance with TUG 27 seconds without AD. Pt ambulated 100 feet x 1 without AD and 150 feet x 1 with 4WW, SBA, foward flexed posture, decreased step length and foot c  learance, decreased gait speed, reaches for walls and furniture when not using AD, step length and foot clearance improves with use of walker, increased pain with ambulation- more without use of AD. Pt demonstrates I and safe transfers to/from furniture. SBA and cues needed for safe shower transfer and recommendations to use shower chair. B UE and LE MMT WNL. B UE and LE MMT generally 4/5 t/o. R innominate anterior rotation noted upon a  ssessment, with associated sharp pain at R SI joint.  Persons present for visit: pt, PT  Home Environment: independent 1 level home, no stairs to enter   Assistance Available: intermittent from family and neighbors as needed   Multidisciplinary Plan/Follow up Needs:   PT: 1x1, 2x2 PT/PTA visits for gait, balance, strength, transfers, and endurance training, pain management, education in HEP and falls prevention.

## 2021-06-07 NOTE — TELEPHONE ENCOUNTER
Germaine verbal orders for Home care Physical therapy as requested.    For home care, assisted living and nursing home needs for initiation of orders that pertain to nursing, physical therapy, occupational therapy and social work.  Established patient, seen by [facility] care provider within the last 2 years (2/27/20)

## 2021-06-07 NOTE — TELEPHONE ENCOUNTER
Medication discrepancies:    Pt reports taking Tylenol 500 mg tab, 2 tabs, 3x/day for pain. This is not on her med list, please advise.    Pt reports using using Biofreeze, PRN for pain management.    Pt reports she was told no longer to take naproxen or not to take it very often. However, admits to taking 1 tab about 2 nights ago for pain relief.    Pt reports no longer using lidocaine cream, because it didn't help.    Pt reports using: Hurt Blocker Pro (cream) PRN for pain management.     Please advise by replying to this message. Thank you,  Samia Cason, PT

## 2021-06-07 NOTE — TELEPHONE ENCOUNTER
Request for Orders    Who s Requesting: Home Care Physical Therapist    Orders being requested: Home Care Physical Therapy 1x/week x 1 week, 2x/week x 2 weeks, PT/PTA visits for gait, balance, strength, transfers, and endurance training, pain management, education in HEP and falls prevention.      Where to send Orders: please reply to this message      Thank you,  Samia Cason, PT

## 2021-06-07 NOTE — PROGRESS NOTES
Your patient was discharged from [facility] on 4/30/2020 because goals met, max potential reached.  Thank you for allowing us to provide care to this patient!  A Discharge summary is available upon requestâ  [phone]

## 2021-06-07 NOTE — PATIENT INSTRUCTIONS - HE
Please continue to do your physical therapy exercises.      ~Please call Nurse Navigation line [phone] with any questions or concerns about your treatment plan, if symptoms worsen and you would like to be seen urgently, or if you have problems controlling bladder and bowel function.

## 2021-06-07 NOTE — PROGRESS NOTES
"Lashawn Ortega is a 74 y.o. female who is being evaluated via a billable telephone visit.      The patient has been notified of following:     \"This telephone visit will be conducted via a call between you and your physician/provider. We have found that certain health care needs can be provided without the need for a physical exam.  This service lets us provide the care you need with a short phone conversation.  If a prescription is necessary we can send it directly to your pharmacy.  If lab work is needed we can place an order for that and you can then stop by our lab to have the test done at a later time.    If during the course of the call the physician/provider feels a telephone visit is not appropriate, you will not be charged for this service.\"     Patient has given verbal consent to a Telephone visit? Yes    Lashawn Ortega complains of    Chief Complaint   Patient presents with     Leg Pain     Back Pain       I have reviewed and updated the patient's Past Medical History, Social History, Family History and Medication List.    ALLERGIES  Codeine; Erythromycin base; Hydrocodone; Tramadol; Other environmental allergy; Penicillins; Retinol; Shellfish containing products; Sulfa (sulfonamide antibiotics); and Vitamin d3 complete [mv-mn-iron-fa-herbal cmplx#[id number]]    Assessment/plan  Lumbar spinal stenosis with neurogenic claudication  Lumbar disc herniation  Lumbar radiculitis  Lumbar spondylosis without myelopathy  Myofascial pain    Lashawn Ortega is a 74 y.o. female who spoken with on the phone over telephone visit with a past medical history significant for reflux, migraine headache, low back pain, peripheral neuropathy, melanoma of right upper arm , stroke, and is following up regarding low back and right greater than left lower extremity pain with ambulation:    Patient's pain is likely secondary to lumbar spinal stenosis with neurogenic claudication.  She also has lumbar spondylosis and could be " contributory to back pain especially when she is laying down.  She had a stroke and February 2020.  She no longer is taking naproxen because of this.  I recommend that she continues with gabapentin 900 mg 3 times a day.  I also recommend that she continue taking acetaminophen, however increase it to 1000 mg 3 times a day instead of 500 mg 2-3 times daily.  She has in-home physical therapy that been ordered.  I recommend that she does this.  Could be that she is not going to be able to do this for a while secondary to coronavirus, however I recommend that she call and get on the list.  I have ordered a right L3-4 transforaminal epidural steroid injection back in November 2019 when I saw her last.  We can consider this down the line, however I did let her know that we are not doing injections at this time.    Patient reports that she continues to have pain in her low back and right greater than left lower extremity.  Pain in her back is worse with sleeping positions and overdoing exercises.  Pain down her right greater than left lower extremity is worse with standing and walking.  All of these are improved with sitting and resting.  Her pain today is 5/10 as worst is 10/10 as best as 3/10.  She reports that her pain is worse over the last few weeks.  She is currently taking gabapentin 900 mg 3 times a day and finds it this is helpful.  She notes that she is able to walk a bit further than she had been without it.  She is taking Tylenol 500 mg 2 or 3 times daily and does not find that this is very helpful.  She no longer is taking naproxen as it was advised for her to stop this after she had her stroke.  She reports that she has no strength deficits after her stroke, however is having word finding trouble that is improving.  She just finished up speech therapy.  Her pain is in her low back right greater than left with pain down her right buttock posterior thigh and into the back of the calf on the right to just above  her ankle bone.  She denies any bowel or bladder changes, fevers, chills, unintentional weight loss.  She does use a walker to ambulate and this is helpful for her stability.    Phone call duration: 18 minutes    Carlos Alberto Worthington, DO

## 2021-06-07 NOTE — TELEPHONE ENCOUNTER
PSP:  Dr. Worthington  Last clinic visit:  Telephone visit on 4/30/2020  Reason for call: Gabapentin 300mg capsule refill  Clinical information:  Pt calling as she just had telephone visit with Dr. Worthington and forgot to mention that she needs a refill of her gabapentin 300mg. She is taking 3-3-3. Pharmacy verified.   Advice given to patient: Order prepped and will be sent to provider for review.   Provider to address: Please refill if appropriate.

## 2021-06-07 NOTE — TELEPHONE ENCOUNTER
Please reach out to and call patient -    ______________________________________________________________________     Home phone:  [phone] (home)     Cell phone:   Telephone Information:   Mobile [phone]       Other contacts:  Name Home Phone Work Phone Mobile Phone Relationship Lgl Grd   DEYSI [phone] Niece CAMPBELL Cardenas [phone]   Niece No     ______________________________________________________________________     I would like the patient to schedule a virtual visit for follow up of her medical issues in the next 2 weeks if she is willing.    If the patient has a smartphone with a camera, we can schedule a video visit using DOXIMITY or FACETIME without having the patient signing up for AMWELL.     If the patient wants to use and IPAD, tablet or computer, then the patient should sign up for AMWELL (AW TOUCHPOINT) prior to the visit.  We can also use FACETIME if they wish.    If patient has no device with a webcam, then please schedule a telephone visit for their follow up.    Please note DOXIMITY, AMWELL, or FACETIME in the reason for visit when scheduling the appointment.   Also note contact information (cell phone or email).    Thank you!    Renzo Newell MD  [facility]  5/1/2020, 2:39 PM

## 2021-06-08 NOTE — TELEPHONE ENCOUNTER
Please call patient -    ______________________________________________________________________     Home phone:  [phone] (home)     Cell phone:   Telephone Information:   Mobile [phone]       Other contacts:  Name Home Phone Work Phone Mobile Phone Relationship Lgl Grd   DEYSI [phone] Niece No   CAMPBELL BERRIOS [phone]   Niece No     ______________________________________________________________________     Please help the patient to schedule a follow up with me in clinic next week - Thursday or Friday.    Please also make sure she is not taking acetaminophen (Tylenol) or alcohol at this time.  She needs to hold on this if she is.    I left her a message related to her results in [facility].  If she has questions, the message is below.    Renzo Newell MD  [facility]  5/22/2020, 8:44 AM      ______________________________________________________________________     Your liver tests are worse.  This is likely related to your chemotherapy.  You will likely need to put this on hold.    Please also stop the atorvastatin (Lipitor) as this could contribute to this.    I will send a copy of the labs to Dr. Gloria but reach out to them as well.    Your pancreas tests were normal.     I would recommend an MRI scan of your liver and bile system to make sure nothing is going wrong with this.  This would likely be at our clinic or [facility].    I am going to have someone call you to schedule a follow up with me next week.    Thank you,    Renzo Newell MD  General Internal Medicine  [facility]  5/22/2020, 8:44 AM

## 2021-06-08 NOTE — PATIENT INSTRUCTIONS - HE
Please follow up if you have any further issues.    You may contact me by phone or e-channelhart if you are worsening or if things are not improving.    Thank you for coming in today.

## 2021-06-08 NOTE — TELEPHONE ENCOUNTER
Upcoming Appointment Question  When is the appointment: Tomorrow  What is your appointment for?: AW Touchpoint  Who is your appointment scheduled with?: Dr. Newell  What is your question/concern?: She needs to speak to the clinical assistant working with Dr. Newell.  She is having trouble getting set up for her video chat.  Okay to leave a detailed message?: Yes

## 2021-06-08 NOTE — PATIENT INSTRUCTIONS - HE
To schedule your appointment with [facility], please call [phone].     ______________________________________________________________________     Schedule consult with our general surgeons at [phone].    Some surgeons I recommend are the following:    Dr. Negro Montilla  ______________________________________________________________________      Future Appointments   Date Time Provider Department [place]   6/9/2020 10:30 AM JN PET CTC JN PET JN   6/9/2020 11:30 AM JN PET CT 1 JN PET JN   6/16/2020  3:15 PM [facility]

## 2021-06-08 NOTE — TELEPHONE ENCOUNTER
Rogelio is scheduled for a surgery consult on 6/23/20.  She wanted to wait until after her procedure on 6/19. Thanks!

## 2021-06-08 NOTE — TELEPHONE ENCOUNTER
Please call [facility] surgery.    I placed an order for this patient to have a consult in relationship to her gallstones with need for gallbladder removal.    She has not heard anything from them related to this.  She is having ERCP done on June 19th.    Please help her to schedule a consult..    Renzo Newell MD  General Internal Medicine  [facility]  6/4/2020, 9:19 AM

## 2021-06-08 NOTE — PATIENT INSTRUCTIONS - HE
1. Please come in to have a urinalysis done in the clinic in the next 24-48 hours.  2. Follow up in clinic on Thursday May 21st at 10:40 am.  We will do blood work at that time.  3. Let the oncology department know you have elevated liver tests.  4. Follow up if you are having increased abdominal symptoms or fever and chills.  5. I think you may have a trigger thumb causing your thumb pain.

## 2021-06-08 NOTE — TELEPHONE ENCOUNTER
Called and tried to help patient- she will sign up for AW  touchpoint and if she continues to have issues she will reach out

## 2021-06-08 NOTE — PATIENT INSTRUCTIONS - HE
Please follow up if you have any further issues.    You may contact me by phone or MyChart if you are worsening or if things are not improving.    Thank you for coming in today.    Future Appointments   Date Time Provider Department Center   6/23/2020 12:45 PM Lynn Christina MD [facility]

## 2021-06-08 NOTE — TELEPHONE ENCOUNTER
Triage call:     Issues over the last couple days   Saturday AM- changed her sheets- she noted that he heart rate was rapid and bounding  Became Dizzy and nauseated - she states that she laid down on her bed and fell asleep  Chest pressure in her chest   All day Sunday was very tired and couldn't stay awake  Area over her breast bone is very tender to touch    Today - she reports that she has a headache today and some sinus congestion - directly over her eyes  Last 30 minutes she wasn't able to open her right hand with out excruciating pain - now is sore and numb     Recent hx of a CVA     Triaged to call 911 for EMS evaluation - reviewed additional care advice with patient and she verbalizes understanding.     Patient states that she would prefer to not call 911 but if she cannot find a ride that she would press her life alert button. She states that she will go to the ER today to be evaluated.     Preeti Paredes RN BS[facility] Triage/Med Refill 5/11/2020 3:00 PM    Reason for Disposition    New neurologic deficit that is present NOW, sudden onset of ANY of the following: * Weakness of the face, arm, or leg on one side of the body * Numbness of the face, arm, or leg on one side of the body * Loss of speech or garbled speech    Protocols used: NEUROLOGIC DEFICIT-A-OH

## 2021-06-08 NOTE — TELEPHONE ENCOUNTER
Informed pt of Dr. Newell's message.    She states an understanding.    Pt states she has not taken Tylenol since yesterday and will hold Tylenol and alcohol at this time.     Pt scheduled for 5/29/20 at 11:05.    She thanked for the call.

## 2021-06-09 NOTE — ANESTHESIA POSTPROCEDURE EVALUATION
Patient: Lashawn Ortega  Procedure(s):  CHOLECYSTECTOMY, LAPAROSCOPIC  CHOLANGIOGRAMS  Anesthesia type: general    Patient location: PACU  Last vitals:   Vitals Value Taken Time   /70 7/14/2020  1:30 PM   Temp 36.9  C (98.4  F) 7/14/2020 12:50 PM   Pulse 69 7/14/2020  1:33 PM   Resp 25 7/14/2020  1:33 PM   SpO2 97 % 7/14/2020  1:33 PM   Vitals shown include unvalidated device data.  Post vital signs: stable  Level of consciousness: answers questions readily  Post-anesthesia pain: pain controlled  Post-anesthesia nausea and vomiting: no  Pulmonary: supplemental oxygen  Cardiovascular: stable and blood pressure at baseline  Hydration: adequate  Anesthetic events: no    QCDR Measures:  ASA# 11 - Edna-op Cardiac Arrest: ASA11B - Patient did NOT experience unanticipated cardiac arrest  ASA# 12 - Edna-op Mortality Rate: ASA12B - Patient did NOT die  ASA# 13 - PACU Re-Intubation Rate: ASA13B - Patient did NOT require a new airway mgmt  ASA# 10 - Composite Anes Safety: ASA10A - No serious adverse event    Additional Notes:

## 2021-06-09 NOTE — ANESTHESIA CARE TRANSFER NOTE
Last vitals:   Vitals:    06/19/20 1013   BP: 135/69   Pulse: 78   Resp: 16   Temp:    SpO2: 100%     Patient's level of consciousness is drowsy  Spontaneous respirations: yes  Maintains airway independently: yes  Dentition unchanged: yes  Oropharynx: oropharynx clear of all foreign objects    QCDR Measures:  ASA# 20 - Surgical Safety Checklist: WHO surgical safety checklist completed prior to induction    PQRS# 430 - Adult PONV Prevention: 4558F - Pt received => 2 anti-emetic agents (different classes) preop & intraop  ASA# 8 - Peds PONV Prevention: NA - Not pediatric patient, not GA or 2 or more risk factors NOT present  PQRS# 424 - Edna-op Temp Management: 4559F - At least one body temp DOCUMENTED => 35.5C or 95.9F within required timeframe  PQRS# 426 - PACU Transfer Protocol: - Transfer of care checklist used  ASA# 14 - Acute Post-op Pain: ASA14B - Patient did NOT experience pain >= 7 out of 10

## 2021-06-09 NOTE — TELEPHONE ENCOUNTER
Please call the patient and have her get a lab appointment today.  Needs this today as she has surgery scheduled tomorrow.    Renzo Newell MD  General Internal Medicine  [facility]  7/13/2020, 7:40 AM

## 2021-06-09 NOTE — ANESTHESIA PREPROCEDURE EVALUATION
Anesthesia Evaluation      Patient summary reviewed   History of anesthetic complications (severe PONV)     Airway   Mallampati: II  Neck ROM: full   Pulmonary - normal exam    breath sounds clear to auscultation  (+) asthma    (-) not a smoker                         Cardiovascular - negative ROS  (+) , hypercholesterolemia,     (-) murmur  Rhythm: regular  Rate: normal,    no murmur      Neuro/Psych    (+) neuromuscular disease (peripheral neuropathy),  CVA (aphasia) ,     Comments: Migraines    Endo/Other    (+) obesity,      GI/Hepatic/Renal    (+) GERD,        Other findings: 7/15/20: Na 140, K 4.2, BUN 10, Cr 0.71    7/13/20: Hgb 13.0, Plt 277    7/11/20: COVID negative      Dental - normal exam                        Anesthesia Plan  Planned anesthetic: general endotracheal and total IV anesthesia  Avoid midazolam, scopolamine, diphenhydramine and meperidine 2/2 age and increased risk of postop delirium.  GETA.  Hx of PONV and plan for scopolamine patch, dexamethasone, zofran and TIVA.    ASA 3   Induction: intravenous   Anesthetic plan and risks discussed with: patient  Anesthesia plan special considerations: antiemetics,   Post-op plan: routine recovery

## 2021-06-09 NOTE — ANESTHESIA CARE TRANSFER NOTE
Last vitals:   Vitals:    07/15/20 1537   BP: 146/76   Pulse: 66   Resp: 16   Temp: 36.2  C (97.1  F)   SpO2: 100%     Patient's level of consciousness is drowsy  Spontaneous respirations: yes  Maintains airway independently: yes  Dentition unchanged: yes  Oropharynx: oropharynx clear of all foreign objects    QCDR Measures:  ASA# 20 - Surgical Safety Checklist: WHO surgical safety checklist completed prior to induction    PQRS# 430 - Adult PONV Prevention: 4558F - Pt received => 2 anti-emetic agents (different classes) preop & intraop  ASA# 8 - Peds PONV Prevention: NA - Not pediatric patient, not GA or 2 or more risk factors NOT present  PQRS# 424 - Edna-op Temp Management: 4559F - At least one body temp DOCUMENTED => 35.5C or 95.9F within required timeframe  PQRS# 426 - PACU Transfer Protocol: - Transfer of care checklist used  ASA# 14 - Acute Post-op Pain: ASA14B - Patient did NOT experience pain >= 7 out of 10

## 2021-06-09 NOTE — TELEPHONE ENCOUNTER
Please help the patient by calling her to schedule a preop with me on Friday.    Renzo Newell MD  General Internal Medicine  [facility]  6/23/2020, 3:17 PM

## 2021-06-09 NOTE — ANESTHESIA POSTPROCEDURE EVALUATION
Patient: Lashawn Ortega  Procedure(s):  ENDOSCOPIC RETROGRADE CHOLANGIO-PANCREATOGRAPHY WITH SPHINCTEROTOMY AND STONE EXTRACTION  Anesthesia type: general    Patient location: Phase II Recovery  Last vitals:   Vitals Value Taken Time   /73 6/19/2020 11:00 AM   Temp 36.1  C (97  F) 6/19/2020 11:00 AM   Pulse 66 6/19/2020 11:16 AM   Resp 26 6/19/2020 11:16 AM   SpO2 93 % 6/19/2020 11:16 AM   Vitals shown include unvalidated device data.  Post vital signs: stable  Level of consciousness: awake and responds to simple questions  Post-anesthesia pain: pain controlled  Post-anesthesia nausea and vomiting: no  Pulmonary: unassisted, return to baseline  Cardiovascular: stable and blood pressure at baseline  Hydration: adequate  Anesthetic events: no    QCDR Measures:  ASA# 11 - Edna-op Cardiac Arrest: ASA11B - Patient did NOT experience unanticipated cardiac arrest  ASA# 12 - Edna-op Mortality Rate: ASA12B - Patient did NOT die  ASA# 13 - PACU Re-Intubation Rate: ASA13B - Patient did NOT require a new airway mgmt  ASA# 10 - Composite Anes Safety: ASA10A - No serious adverse event    Additional Notes:

## 2021-06-09 NOTE — PROGRESS NOTES
General Surgery Consultation    Consulting Provider: Dr. Newell    CC: choledocholithiasis    History:   Lashawn Ortega is a 75 y.o. female with h/o CVA,  GERD, melanoma on Keytruda, lumbar spinal stenosis referred to see me for choledocholithiasis. In May 2020, she visited the ER for evaluation of palpitations and was found to have transaminitis as well as cholelithiasis but no evidence of biliary dilatation or cholecystitis. Her LFTs continued to increase and she underwent MRCP a few weeks later which showed choledocholithiasis with a stone in the CBD. She then underwent ERCP on 6/19 by Dr. Das. A sphincterotomy was performed and a 6mm stone was extracted from the CBD. She has chronic GERD and some lower abdominal pain near her prior hysterectomy incision. She denies any history of epigastric or right upper quadrant pain. Denies fever or chills.     Allergies:  Codeine; Erythromycin base; Hydrocodone; Tramadol; Other environmental allergy; Penicillins; Retinol; Shellfish containing products; Sulfa (sulfonamide antibiotics); and Vitamin d3 complete [mv-mn-iron-fa-herbal cmplx#190]    Past medical history:  Past Medical History:   Diagnosis Date     Aphasia      Asthma      GERD (gastroesophageal reflux disease)      History of transfusion      Lumbar spinal stenosis 2/28/2020    EXAM: MR LUMBAR SPINE W WO CONTRAST LOCATION: [facility] DATE/TIME: 7/11/2019 4:49 PM   INDICATION: Back pain going down the right leg.  Going on for 3 months.  Not improving. See above. History of melanoma. COMPARISON: None. CONTRAST: Gadavist 9. TECHNIQUE: Without and with IV contrast   FINDINGS:  Nomenclature is based on 5 lumbar type vertebral bodies. There is no concerning marrow rep     Malignant melanoma of right upper arm (H)      Migraine headache      NORMAL COLONOSCOPY - 2009 - Average risk      On antineoplastic chemotherapy     pembrolizumab     PN (peripheral neuropathy)      PONV (postoperative nausea and vomiting)   "   \"cold sweats\"     Stroke (H) 02/2020       Past surgical history:  Past Surgical History:   Procedure Laterality Date     HYSTERECTOMY  1988     IR LUMBAR EPIDURAL STEROID INJECTION  8/12/2019     [place] ERCP DX COLLECTION SPECIMEN BRUSHING/WASHING N/A 6/19/2020    Procedure: ENDOSCOPIC RETROGRADE CHOLANGIO-PANCREATOGRAPHY WITH SPHINCTEROTOMY AND STONE EXTRACTION;  Surgeon: Daniel Das MD;  Location: [facility];  Service: Gastroenterology     XR ERCP BILIARY ONLY  6/19/2020       Medications:    Current Outpatient Medications:      acetaminophen (TYLENOL) 500 MG tablet, Take 1,000 mg by mouth 3 (three) times a day. Indications: pain, Disp: , Rfl:      aloe vera Gel, Apply 1 application topically 2 (two) times a day as needed., Disp: , Rfl:      aspirin 81 MG EC tablet, Take 1 tablet (81 mg total) by mouth daily., Disp: 30 tablet, Rfl: 0     dextromethorphan-guaiFENesin (ROBAFEN DM)  mg/5 mL liquid, Take 5 mL by mouth every 12 (twelve) hours as needed., Disp: , Rfl:      diphenhydrAMINE-acetaminophen (PERCOGESIC) 12.5-325 mg Tab, Take 1-2 tablets by mouth every 8 (eight) hours as needed. , Disp: , Rfl:      fluticasone propionate (FLONASE) 50 mcg/actuation nasal spray, Apply 1-2 sprays into each nostril daily as needed for rhinitis., Disp: 16 g, Rfl: 1     gabapentin (NEURONTIN) 300 MG capsule, Take 3 capsules three (3) times a day., Disp: 270 capsule, Rfl: 3     guaiFENesin-dextromethorphan (MUCINEX DM) 600-30 mg Tb12, Take 1 tablet by mouth 2 (two) times a day as needed., Disp: , Rfl:      menthol (BIOFREEZE, MENTHOL,) 4 % Gel, Apply topically for pain  Indications: pain associated with arthritis, Disp: , Rfl:      naproxen (NAPROSYN) 500 MG tablet, Take 1 tablet (500 mg total) by mouth 2 (two) times a day as needed. Please keep this Rx on file for the next RF., Disp: 180 tablet, Rfl: 3     neomycin-bacitracin-polymyxin (NEOSPORIN) ointment, Apply 1 application topically 2 (two) times a day as " needed., Disp: , Rfl:      pantoprazole (PROTONIX) 40 MG tablet, Take 1 tablet (40 mg total) by mouth daily., Disp: 90 tablet, Rfl: 3     sodium chloride 0.9% SolP 100 mL with pembrolizumab 25 mg/mL Soln 200 mg, Infuse 200 mg into a venous catheter every 21 days. Indications: malignant melanoma, a type of skin cancer, Disp: , Rfl:      venlafaxine (EFFEXOR XR) 37.5 MG 24 hr capsule, Take 1 capsule (37.5 mg total) by mouth daily., Disp: 90 capsule, Rfl: 3    Family history:  Family History   Problem Relation Age of Onset     Pancreatic cancer Mother 82     Melanoma Father 75        Metastatic to colon       Social history:   reports that she has quit smoking. She smoked 0.00 packs per day. She has never used smokeless tobacco. She reports current alcohol use. She reports that she does not use drugs.    Review of Systems:  General: No complaints or constitutional symptoms  Skin: h/o melanoma s/p excision, denies new rashes or lesions  Hematologic/Lymphatic: No symptoms or complaints  Psychiatric: No symptoms or complaints  Endocrine: no hypermetabolic symptoms reported  Respiratory: No cough, shortness of breath, or wheezing  Cardiovascular: No chest pain or dyspnea on exertion  Gastrointestinal: see HPI  Musculoskeletal: chronic lower back pain  Neurological: h/o CVA  Exam:  General : Alert, cooperative, appears stated age   Skin: Skin color, texture, turgor normal, well healed right upper arm scar  Lymphatic: No obvious adenopathy, no swelling   Eyes: No scleral icterus, pupils equal  HENT: No traumatic injury to the head or face, no gross abnormalities  Lungs: Normal respiratory effort, breath sounds equal bilaterally  Heart: Regular rate and rhythm  Abdomen: obese, soft, nondistended, nontender to palpation throughout, well healed lower transverse scar  Musculoskeletal: No obvious swelling  Neurologic: slow gait    Labs:  Lab Results   Component Value Date    WBC 5.8 05/21/2020    HGB 12.7 05/21/2020    HCT 38.1  05/21/2020    MCV 91 05/21/2020     05/21/2020         Lab Results   Component Value Date     (!) 05/26/2020     (!) 05/26/2020    ALKPHOS 436 (H) 05/21/2020    BILITOT 0.5 05/21/2020       Imaging:   Pertinent images personally reviewed by myself and discussed with the patient.  Radiology reports:  MRCP-IMPRESSION:   1.  Multiple gallstones filling the gallbladder.     2.  Choledocholithiasis with 7 mm stone in the common bile duct but no bile duct dilatation.    ERCP-INDICATION: Choledocholithiasis  COMPARISON: None.     TECHNIQUE: Exam performed by gastroenterologist.     FLUOROSCOPIC TIME: .8 minutes  NUMBER OF IMAGES: 3     FINDINGS:     BILE DUCTS: Retrograde cannulation of the common duct. Status post balloon sweeping of the common duct.  PANCREATIC DUCT: Not imaged.       Assessment/Plan:   Lashawn Ortega is a 75 y.o. female with signs and symptoms consistent with choledocholithiasis s/p ERCP 6/29.  I have explained the pathophysiology of gallstones and gallbladder disease in detail as well as the surgical versus non-operative management strategies. Although she was some chronic medical issues at this time, she looks well for her age and I think she would benefit from laparoscopic cholecystectomy to prevent further problems with her gallbladder such as recurrent choledocholithiasis, cholecystitis, cholangitis or pancreatitis.    The risks of surgery were discussed in detail which include, but are not limited to, bleeding, infection, injury to surrounding structures including the common bile duct and hepatic vessels, the need to convert to an open procedure, blood clots, stroke.      She understands everything which was discussed and has consented to proceed with a laparoscopic cholecystectomy.  We will schedule surgery accordingly.     Lynn Christina MD  General Surgery  [facility]  [phone]

## 2021-06-09 NOTE — ANESTHESIA POSTPROCEDURE EVALUATION
Patient: Lashawn Ortega  Procedure(s):  ENDOSCOPIC RETROGRADE CHOLANGIOPANCREATOGRAPHY  Anesthesia type: general    Patient location: PACU  Last vitals:   Vitals Value Taken Time   /66 7/15/2020  4:37 PM   Temp 36.5  C (97.7  F) 7/15/2020  4:37 PM   Pulse 62 7/15/2020  4:37 PM   Resp 18 7/15/2020  4:37 PM   SpO2 98 % 7/15/2020  4:37 PM     Post vital signs: stable  Level of consciousness: alert and conversant  Post-anesthesia pain: pain controlled  Post-anesthesia nausea and vomiting: no  Pulmonary: room air  Cardiovascular: stable and blood pressure at baseline  Hydration: adequate  Anesthetic events: no    QCDR Measures:  ASA# 11 - Edna-op Cardiac Arrest: ASA11B - Patient did NOT experience unanticipated cardiac arrest  ASA# 12 - Edna-op Mortality Rate: ASA12B - Patient did NOT die  ASA# 13 - PACU Re-Intubation Rate: ASA13B - Patient did NOT require a new airway mgmt  ASA# 10 - Composite Anes Safety: ASA10A - No serious adverse event    Additional Notes:

## 2021-06-09 NOTE — PATIENT INSTRUCTIONS - HE
If there are issues with scheduling your surgery, please call [phone] to find out the next steps.    No future appointments.

## 2021-06-09 NOTE — ANESTHESIA PREPROCEDURE EVALUATION
Anesthesia Evaluation      Patient summary reviewed   History of anesthetic complications (PONV, severe, after every single procedure)     Airway   Mallampati: I  Neck ROM: full   Pulmonary - normal exam   (+) asthma                           Cardiovascular - negative ROS  Exercise tolerance: > or = 4 METS  Rhythm: regular  Rate: normal,         Neuro/Psych    (+) neuromuscular disease,  CVA ,     Endo/Other    (+) obesity,      GI/Hepatic/Renal    (+) GERD well controlled,        Other findings: Frontal lobe CVA 2/2020, has aphasia (mild). Essential tremor.  Peripheral neuropathy.  Lumbar stenosis with chronic LBP. Severe PONV, had TIVA for ERCP 6/19/20 with zofran and scop patch and did fine.  Melanoma RUE.  HAs.    Hg 13, K 3.7, t-bili 2.4, elevated liver enzymes.  Covid test 7/11 negative.  Bubble study echo 2/21/20:  Normal left ventricular size with mild hypertrophy.  Left ventricle ejection fraction is normal. The calculated left ventricular ejection fraction is 70%.  Normal right ventricular size and systolic function.  When compared to the previous study dated 8/11/2006, no significant change.  No hemodynamically significant valvular heart abnormalities.      Dental - normal exam                          Anesthesia Plan  Planned anesthetic: general endotracheal and total IV anesthesia  TIVA,   Zofran 4  Decadron 10  Propofol infusion please  Scopolamine patch preop  ASA 3   Induction: intravenous   Anesthetic plan and risks discussed with: patient  Anesthesia plan special considerations: antiemetics,   Post-op plan: routine recovery

## 2021-06-09 NOTE — PROGRESS NOTES
Wt Readings from Last 20 Encounters:   06/26/20 193 lb (87.5 kg)   06/18/20 193 lb (87.5 kg)   06/16/20 193 lb (87.5 kg)   06/09/20 188 lb (85.3 kg)   05/29/20 192 lb (87.1 kg)   05/21/20 192 lb (87.1 kg)   05/11/20 189 lb (85.7 kg)   02/27/20 194 lb (88 kg)   02/21/20 192 lb 11.2 oz (87.4 kg)   11/12/19 186 lb 4.8 oz (84.5 kg)   09/17/19 187 lb (84.8 kg)   08/12/19 186 lb (84.4 kg)   07/30/19 186 lb (84.4 kg)   07/01/19 186 lb (84.4 kg)   05/23/19 182 lb 3.2 oz (82.6 kg)   05/03/19 184 lb 12.8 oz (83.8 kg)   04/19/19 186 lb (84.4 kg)   09/06/18 195 lb 3.2 oz (88.5 kg)   07/13/17 185 lb 12.8 oz (84.3 kg)   07/11/16 187 lb 9.6 oz (85.1 kg)

## 2021-06-09 NOTE — PROGRESS NOTES
"TCM DISCHARGE FOLLOW UP CALL      \"Hi, my name is  Brenda, and I am calling from  [facility].  I am calling to follow up and see how things are going for you after your recent hospitalization.      Tell me how you are doing now that you are home?\" Very sore and tired, I still have the back issue that is driving me crazy, everything from surgery came out very good, initially thought it was a stone because my liver count went up and my urine was getting dark. I was scheduled for an endoscopy the day after and Dr. Mccall doesn't want me going in and out, so they hit all up at once, cleared it all out, and everything came out clear, now that the gallbladder is out. My soreness is getting better where the incisions were made. Using walker, but the scar is pulling me back down a bit from walking straight plus the back pain. I plan to schedule appointments with Dr. Christina and Dr. Newell as well as a telehealth appointment with my spine doctor.      Discharge Instructions     Do you understand your discharge instructions?  Pt. Response: Yes      \"Has an appointment with your primary care provider been scheduled?\" No  \"If yes, when is that appointment?  N/A  If no, date of appointment scheduled: Plans to call to schedule   I can assist you to schedule that now.     CC refer to AVS to schedule when instructed.     Medications    \"Did you have any medication changes?   Yes, just added a pain medication, Naproxen.   Do you have any concerns with obtaining the medications or questions about your medications that you would like a RN to review with you?  no  **If yes, escalate to CC RN responsible for patient's clinic or [facility] RN  Send an inbasket Epic message if RN is unavailable after call.     \"When you see the provider, I would recommend that you bring your medications with you.\"    Call Summary    \"What questions or concerns do you have about your recent visit and your follow-up care?\"None right now. I use [facility] if I need to " "communicate with my doctor.             Can be addressed if scheduled with RN or SW either Care Coordination or if declining to triage for further questions.         \"If you have questions or things don't continue to improve, we encourage you contact us through the main clinic number at [phone].  Even if the clinic is not open, triage nurses are available 24/7 to help you.       The clinic Community Health Worker talked with the patient today at the request of the PCP to discuss possible Clinic Care Coordination enrollment.  The service was described to the patient and immediate needs were discussed.  The patient declined enrollement at this time.  The PCP is encouraged to refer in the future if the patient's needs change.                           "

## 2021-06-09 NOTE — ANESTHESIA CARE TRANSFER NOTE
Last vitals:   Vitals:    07/14/20 1250   BP: 140/74   Pulse: 80   Resp: 14   Temp: 36.9  C (98.4  F)   SpO2: 100%     Patient's level of consciousness is drowsy  Spontaneous respirations: yes  Maintains airway independently: yes  Dentition unchanged: yes  Oropharynx: oropharynx clear of all foreign objects    QCDR Measures:  ASA# 20 - Surgical Safety Checklist: WHO surgical safety checklist completed prior to induction    PQRS# 430 - Adult PONV Prevention: 4558F - Pt received => 2 anti-emetic agents (different classes) preop & intraop  ASA# 8 - Peds PONV Prevention: NA - Not pediatric patient, not GA or 2 or more risk factors NOT present  PQRS# 424 - Edna-op Temp Management: 4559F - At least one body temp DOCUMENTED => 35.5C or 95.9F within required timeframe  PQRS# 426 - PACU Transfer Protocol: - Transfer of care checklist used  ASA# 14 - Acute Post-op Pain: ASA14B - Patient did NOT experience pain >= 7 out of 10

## 2021-06-10 NOTE — TELEPHONE ENCOUNTER
Called and relayed message to patient. Patient stated that she did send a mychart message to PCP. She only takes tylenol at night when she cant sleep due to pain and every once in awhile during the day.     Lab appointment is scheduled

## 2021-06-10 NOTE — PATIENT INSTRUCTIONS - HE
Please continue with physical therapy.    I ordered cyclobenzaprine that you can take up to 3 times a day as needed.    If you do end up taking prednisone please stop naproxen while you are taking that.    Please continue gabapentin.    ~Please call Nurse Navigation line [phone] with any questions or concerns about your treatment plan, if symptoms worsen and you would like to be seen urgently, or if you have problems controlling bladder and bowel function.

## 2021-06-10 NOTE — PATIENT INSTRUCTIONS - HE
I have ordered injections for you.  You will need a  for these injections.  Please wear a mask.    ~Please call Nurse Navigation line [phone] with any questions or concerns about your treatment plan, if symptoms worsen and you would like to be seen urgently, or if you have problems controlling bladder and bowel function.

## 2021-06-10 NOTE — PROGRESS NOTES
"Lashawn Ortega is a 75 y.o. female who is being evaluated via a billable telephone visit.      The patient has been notified of following:     \"This telephone visit will be conducted via a call between you and your physician/provider. We have found that certain health care needs can be provided without the need for a physical exam.  This service lets us provide the care you need with a short phone conversation.  If a prescription is necessary we can send it directly to your pharmacy.  If lab work is needed we can place an order for that and you can then stop by our lab to have the test done at a later time.    Telephone visits are billed at different rates depending on your insurance coverage. During this emergency period, for some insurers they may be billed the same as an in-person visit.  Please reach out to your insurance provider with any questions.    If during the course of the call the physician/provider feels a telephone visit is not appropriate, you will not be charged for this service.\"    Patient has given verbal consent to a Telephone visit? Yes    What phone number would you like to be contacted at? [phone]    Patient would like to receive their AVS by AVS Preference: [place].      Phone call duration: 11 minutes    Kindra Johnson CMA      "

## 2021-06-10 NOTE — TELEPHONE ENCOUNTER
Prior Authorization Request  Who s requesting:  Pharmacy  Pharmacy Name and Location: [facility] [place] [address]  Medication Name: cyclobenzaprine 5mg tabs  Insurance Plan: Medicare A & B  Insurance Member ID Number:  [id number]   CoverMyMeds Key:  Informed patient that prior authorizations can take up to 10 business days for response:    Okay to leave a detailed message:

## 2021-06-10 NOTE — TELEPHONE ENCOUNTER
Please call patient -    ______________________________________________________________________     Home phone:  [phone] (home)     Cell phone:   Telephone Information:   Mobile [phone]       Other contacts:  Name Home Phone Work Phone Mobile Phone Relationship Lgl Gryumiko   DEYSI [phone] Niece Rosita   CAMPBELL BERRIOS [phone]   Niece No     ______________________________________________________________________     Please help the patient to schedule a lab only appointment.  This is purely for liver function tests.  This should be done in the next 2 to 3 weeks and can be done at the patient's preference.  3 weeks is really okay.    Renzo Newell MD  [facility]  8/6/2020, 8:32 AM

## 2021-06-10 NOTE — PATIENT INSTRUCTIONS - HE
Follow-up visit with Dr. Worthington in 2 weeks to discuss injection outcome and determine care plan going forward.       DISCHARGE INSTRUCTIONS    During office hours (8:00 a.m.- 4:00 p.m.) questions or concerns may be answered  by calling [facility] Center Navigation Nurses at  [phone].  Messages received after hours will be returned the following business day.      In the case of an emergency, please dial 911 or seek assistance at the nearest Emergency Room/Urgent Care facility.     All Patients:    ? You may experience an increase in your symptoms for the first 2 days (It may take anywhere between 2 days- 2 weeks for the steroid to have maximum effect).    ? You may use ice on the injection site, as frequently as 20 minutes each hour if needed.    ? You may take your pain medicine.    ? You may continue taking your regular medication after your injection. If you have had a Medial Branch Block you may resume pain medication once your pain diary is completed.    ? You may shower. No swimming, tub bath or hot tub for 48 hours.  You may remove your bandaid/bandage as soon as you are home.    ? You may resume light activities, as tolerated.    ? Resume your usual diet as tolerated.    ? It is strongly advised that you do not drive for 1-3 hours post injection.    ? If you have had oral sedation:  Do not drive for 8 hours post injection.      ? If you have had IV sedation:  Do not drive for 24 hours post injection.  Do not operate hazardous machinery or make important personal/business decisions for 24 hours.      POSSIBLE STEROID SIDE EFFECTS (If steroid/cortisone was used for your procedure)    -If you experience these symptoms, it should only last for a short period      Swelling of the legs                Skin redness (flushing)       Mouth (oral) irritation     Blood sugar (glucose) levels              Sweats                      Mood changes    Headache    Sleeplessness         POSSIBLE PROCEDURE SIDE  EFFECTS  -Call the Spine Center if you are concerned    Increased Pain             Increased numbness/tingling        Nausea/Vomiting            Bruising/bleeding at site        Redness or swelling                                                Difficulty walking        Weakness             Fever greater than 100.5    *In the event of a severe headache after an epidural steroid injection that is relieved by lying down, please call the [facility] to speak with a clinical staff member*

## 2021-06-10 NOTE — PATIENT INSTRUCTIONS - HE

## 2021-06-10 NOTE — TELEPHONE ENCOUNTER
PT home care requesting orders to add manual therapy to care plan for pain management.     Please reply to this message for verbal order      Thank you,  Samia Cason, PT

## 2021-06-10 NOTE — TELEPHONE ENCOUNTER
Please call patient -    ______________________________________________________________________     Home phone:  [phone] (home)     Cell phone:   Telephone Information:   Mobile [phone]       Other contacts:  Name Home Phone Work Phone Mobile Phone Relationship Lgl Grd   DEYSI [phone] Niece No   CAMPBELL BERRIOS [phone]   Niece No     ______________________________________________________________________     I left her a message on [employer] related to her blood work yesterday and faxed this to oncology.    Follow up with Dr. Christina in relationship to this.    Stop the atorvastatin (Lipitor) and do not take acetaminophen (Tylenol) for her pain if she is taking it.  If she is taking acetaminophen (Tylenol), please make sure of the daily dose and let me know.    She needs to stay off of Keytruda at this time.    She needs to have blood work checked again for me in 1 week.    Thank you,    Renzo Newell MD  [facility]  7/29/2020, 12:01 AM      Lab Results   Component Value Date     (H) 07/28/2020     (H) 07/28/2020    ALKPHOS 733 (H) 07/28/2020    BILITOT 2.0 (H) 07/28/2020

## 2021-06-10 NOTE — PROGRESS NOTES
General Surgery Clinic Note    S: Pt is doing well s/p lap isabela. Pt states she feels very well overall. Tolerating diet, ambulating. States incisions are healing well.    O:   Exam deferred    Pathology-GALLBLADDER, CHOLECYSTECTOMY:      - CHOLECYSTITIS AND CHOLELITHIASIS CONSISTENT WITH CLINICAL HISTORY     A/P: 75y F with h/o cholecystitis and choledocholithiasis s/p ERCP 6/19, lap isabela 7/14 and repeat ERCP 7/15.     -Doing well overall  -Her recent LFTs were still elevated but I don't think this is due to residual choledocholithiasis since she already had a negative ERCP on 7/15. Her labs are being followed by her PCP Dr. Newell and he has made some medication changes. Oncology has also stopped her Keytruda infusions  -Wound care and activity discussed  -RTC prn    Lynn Christina MD  General Surgery  [facility]   [phone]

## 2021-06-10 NOTE — PROGRESS NOTES
Assessment:     Diagnoses and all orders for this visit:    Lumbar disc herniation  -     cyclobenzaprine (FLEXERIL) 5 MG tablet; Take 1 tablet (5 mg total) by mouth 3 (three) times a day as needed for muscle spasms.  Dispense: 90 tablet; Refill: 1    Spinal stenosis of lumbar region with neurogenic claudication  -     cyclobenzaprine (FLEXERIL) 5 MG tablet; Take 1 tablet (5 mg total) by mouth 3 (three) times a day as needed for muscle spasms.  Dispense: 90 tablet; Refill: 1    Lumbar radiculitis    Myofascial pain    Spondylosis of lumbar region without myelopathy or radiculopathy       Lashawn Oretga is a 75 y.o. y.o. female with past medical history significant for reflux, migraine headache, low back pain, peripheral neuropathy, melanoma of right upper arm , stroke, elevated liver enzymes who presents today for follow-up regarding left lower extremity pain:    -Patient reports that she received 50% relief with her bilateral L4-5 transforaminal epidural steroid injections.  Her pain is likely secondary to lumbar central canal spinal stenosis.     Plan:     A shared decision making plan was used. The patient's values and choices were respected. Prior medical records from our last visit on 7/29/2020 were reviewed today. The following represents what was discussed and decided upon by the provider and the patient.        -DIAGNOSTIC TESTS: Images were personally reviewed and interpreted.   --MRI of the lumbar spine dated 7/11/2019 is personally reviewed and images interpreted and shown to patient.  There is multilevel degenerative changes in lumbar spine.  There is severe spinal canal stenosis at L3-4.  There is moderate spinal canal stenosis at L4-5.  There is mild to moderate spinal canal stenosis at L2-3.  There is no severe foraminal stenosis.  --CT of abdomen and pelvis July 2020.  I did not see any compression fractures.  She had a fall about a month ago.    -INTERVENTIONS: No interventions at this  time.    -MEDICATIONS: We will have her continue with naproxen as well as gabapentin.  I have prescribed cyclobenzaprine 5 mg that she can take up to 3 times a day as needed.  -  Discussed side effects of medications and proper use. Patient verbalized understanding.    -PHYSICAL THERAPY: She is had 3 sessions of physical therapy and will have her fourth 1 today.  I recommended she continue now with physical therapy.  Discussed the importance of core strengthening, ROM, stretching exercises with the patient and how each of these entities is important in decreasing pain.  Explained to the patient that the purpose of physical therapy is to teach the patient a home exercise program.  These exercises need to be performed every day in order to decrease pain and prevent future occurrences of pain.        -PATIENT EDUCATION: We discussed pain management in a multimodal fashion including physical therapy, medication management, possible future injections.    -FOLLOW UP: Patient will follow-up in 8 weeks.  Advised to contact clinic if symptoms worsen or change.    Subjective:     Lashawn Ortega is a 75 y.o. female who presents today for follow-up regarding bilateral L4-5 transforaminal epidural steroid injections.  Patient reports that she received 50% relief with this injection.  She notes that she continues to have pain specially in her left lower extremity.  Her pain is worse when she is walking too far.  She notes that if she overdoes physical therapy that this will increase her pain as well.  Physical therapy exercises do help as well as sitting.  Her pain today is 4/10 is worse is 8/10 is best is 3/10.  She notes that she is having a little bit more pain in her back and buttock area.  Naproxen and gabapentin have been helpful somewhat and she continues these.  Her pain today is 4/10 is worse is 8/10 is best is 3/10.  She notes that she has been able to sleep through the night better since the injection.  She also notes  that physical therapy has been easier for her to do since the injection.  She denies any bowel or bladder changes, fevers, chills, unintentional weight loss.    -Treatment to Date: MRI of lumbar spine dated 7/11/2019.  Right L5-S1 transforaminal epidural steroid injection done on 8/12/2019.  Several sessions of in-home physical therapy.  Bilateral L4-5 transforaminal epidural steroid injections done on 8/11/2020.    Patient Active Problem List   Diagnosis     GERD (gastroesophageal reflux disease)     Migraine headache     LBP (low back pain)     Nonsmoker     Menopausal hot flushes     Full code status     NORMAL COLONOSCOPY - 2009 - Average risk     Peripheral neuropathy     Melanoma of right upper arm (H) - On Keytruda - stage IIIA     Ischemic cerebrovascular accident (CVA) of frontal lobe (H) - 2/2020     Aphasia     Lumbar spinal stenosis     Essential tremor     On antineoplastic chemotherapy - PEMBROLIZUMAB - checkpoint inhibitor     Choledocholithiasis       Current Outpatient Medications on File Prior to Encounter   Medication Sig Dispense Refill     aloe vera Gel Apply 1 application topically 2 (two) times a day as needed.       aspirin 81 MG EC tablet Take 1 tablet (81 mg total) by mouth daily. 30 tablet 0     dextromethorphan-guaiFENesin (ROBAFEN DM)  mg/5 mL liquid Take 5 mL by mouth every 12 (twelve) hours as needed.       fluticasone propionate (FLONASE) 50 mcg/actuation nasal spray Apply 1-2 sprays into each nostril daily as needed for rhinitis. 16 g 1     gabapentin (NEURONTIN) 300 MG capsule Take 3 capsules three (3) times a day. 270 capsule 3     guaiFENesin-dextromethorphan (MUCINEX DM) 600-30 mg Tb12 Take 1 tablet by mouth 2 (two) times a day as needed.       menthol (BIOFREEZE, MENTHOL,) 4 % Gel Apply 1 application topically daily as needed. Apply topically for pain       naproxen (NAPROSYN) 500 MG tablet Take 1 tablet (500 mg total) by mouth 2 (two) times a day as needed. Please keep  this Rx on file for the next RF. 180 tablet 3     neomycin-bacitracin-polymyxin (NEOSPORIN) ointment Apply 1 application topically 2 (two) times a day as needed.       pantoprazole (PROTONIX) 40 MG tablet Take 1 tablet (40 mg total) by mouth daily. 90 tablet 3     sodium chloride 0.9% SolP 100 mL with pembrolizumab 25 mg/mL Soln 200 mg Infuse 200 mg into a venous catheter every 21 days. Indications: malignant melanoma, a type of skin cancer       venlafaxine (EFFEXOR XR) 37.5 MG 24 hr capsule Take 1 capsule (37.5 mg total) by mouth daily. 90 capsule 3     [DISCONTINUED] acetaminophen (TYLENOL) 500 MG tablet Take 1,000 mg by mouth 3 (three) times a day. Indications: pain       [DISCONTINUED] diphenhydrAMINE-acetaminophen (PERCOGESIC) 12.5-325 mg Tab Take 1-2 tablets by mouth every 8 (eight) hours as needed.        No current facility-administered medications on file prior to encounter.        Allergies   Allergen Reactions     Erythromycin Base Rash     PARALYSIS, EYE SWELLING, HEADACHE     Codeine Dizziness     Other: extreme weakness in legspt stopped taking medication and sxs were gone within a few hours       Hydrocodone Nausea Only and Headache     Tramadol Diarrhea, Dizziness, Headache and Nausea And Vomiting     Other Environmental Allergy Rash     mildew     Penicillins Swelling and Rash     Retinol Rash     HYDROGEL RETINOL CAPSULE     Shellfish Containing Products Swelling and Rash     shrimp     Sulfa (Sulfonamide Antibiotics) Rash     Burning body rash     Vitamin D3 Complete [Mv-Mn-Iron-Fa-Herbal Cmplx#190] Rash     Higher dosage makes her break out       Past Medical History:   Diagnosis Date     Aphasia      Asthma      GERD (gastroesophageal reflux disease)      History of transfusion      Lumbar spinal stenosis 2/28/2020    EXAM: MR LUMBAR SPINE W WO CONTRAST LOCATION: [facility] DATE/TIME: 7/11/2019 4:49 PM   INDICATION: Back pain going down the right leg.  Going on for 3 months.  Not  "improving. See above. History of melanoma. COMPARISON: None. CONTRAST: Gadavist 9. TECHNIQUE: Without and with IV contrast   FINDINGS:  Nomenclature is based on 5 lumbar type vertebral bodies. There is no concerning marrow rep     Malignant melanoma of right upper arm (H)      Migraine headache      NORMAL COLONOSCOPY - 2009 - Average risk      On antineoplastic chemotherapy     pembrolizumab     PN (peripheral neuropathy)      PONV (postoperative nausea and vomiting)     \"cold sweats\"     Stroke (H) 02/2020        Review of Systems  ROS:  Specifically negative for bowel/bladder dysfunction, balance changes, headache, dizziness, foot drop, fevers, chills, appetite changes, nausea/vomiting, unexplained weight loss. Otherwise 13 systems reviewed are negative. Please see the patient's intake questionnaire from today for details.    Reviewed Social, Family, Past Medical and Past Surgical history with patient, no significant changes noted since prior visit.     Objective:     /62 (Patient Site: Right Arm, Patient Position: Sitting, Cuff Size: Adult Large)   Pulse 98   Temp 98.3  F (36.8  C) (Oral)   LMP 09/17/1988   SpO2 97%     PHYSICAL EXAMINATION:    --CONSTITUTIONAL: Well developed, well nourished, healthy appearing individual.  --PSYCHIATRIC: Appropriate mood and affect. No difficulty interacting due to temper, social withdrawal, or memory issues.  --SKIN: Lumbar region is dry and intact.   --RESPIRATORY: Normal rhythm and effort. No abnormal accessory muscle breathing patterns noted.   --MUSCULOSKELETAL:  Normal lumbar lordosis noted, no lateral shift.  --GROSS MOTOR: Easily arises from a seated position. Gait is non-antalgic  --LUMBAR SPINE:  Inspection reveals no evidence of deformity. Range of motion is mildly limited in lumbar flexion, extension, lateral rotation.  She has tenderness palpation of her low lumbar paraspinal muscles bilaterally. Straight leg raising in the seated position is negative to " radicular pain.  --LOWER EXTREMITY MOTOR TESTING:  Plantar flexion left 5/5, right 5/5   Dorsiflexion left 5/5, right 5/5   Great toe MTP extension left 5/5, right 5/5  Knee flexion left 5/5, right 5/5  Knee extension left 5/5, right 5/5   Hip flexion left 5/5, right 5/5  Hip abduction left 5/5, right 5/5  Hip adduction left 5/5, right 5/5   --HIPS: Full range of motion bilaterally.   --NEUROLOGIC: Sensation to light touch is intact in the bilateral L4, L5, and S1 dermatomes.    RESULTS:   Imaging: Lumbar spine imaging was reviewed today. The images were shown to the patient and the findings were explained using a spine model.

## 2021-06-10 NOTE — TELEPHONE ENCOUNTER
"Phone call to review. States \"After I sent this message I realized there have been a few areas that I have been lacking in....water and fruits/veggies.\" Explained her injection was over 2 weeks ago now. If she were to have had side effects it would have been closer to time of injection. Discussed increasing her fluid intake especially with the increased temperatures right now. Also eat fresh fruits and vegetables. States she has also tried a bit of mineral oil with results.   "

## 2021-06-10 NOTE — TELEPHONE ENCOUNTER
Hello,    Recently an order was placed with [facility] to perform in-home physical therapy with this patient.     I spoke with patient today who stated that she would like to wait to start home care until the end of next month (August). She said that she is getting coritzone injections on the 11th and would like to see how that goes for her legs first and then follow up if necessary.    I explained that if and when she is ready to start homecare to call her PCP and have them send a new referral.    If you have any questions or concerns, please call our office at [phone].    Thank you,    Angella Webb  Patient   [facility]

## 2021-06-10 NOTE — TELEPHONE ENCOUNTER
Request for Orders    Who s Requesting: Home Care Physical Therapist    Orders being requested: 2x/week x 4 weeks for strengthening, gait, balance    Where to send Orders:  Reply to message.  Thank you!

## 2021-06-10 NOTE — TELEPHONE ENCOUNTER
Okay to proceed.    Renzo Newell MD  General Internal Medicine  [facility]  8/21/2020, 3:39 PM

## 2021-06-10 NOTE — TELEPHONE ENCOUNTER
Central PA team  [phone]  Pool: HE PA MED ([id number])          PA has been initiated.       PA form completed and faxed insurance via Cover My Meds     Key:  [id number]     Medication:  Cyclobenzaprine HCl 5MG tablets    Insurance:  [employer]        Response will be received via fax and may take up to 5-10 business days depending on plan

## 2021-06-11 ENCOUNTER — HOSPITAL ENCOUNTER (OUTPATIENT)
Dept: PHYSICAL MEDICINE AND REHAB | Facility: CLINIC | Age: 76
Discharge: HOME | End: 2021-06-11
Attending: PAIN MEDICINE

## 2021-06-11 DIAGNOSIS — M70.61 GREATER TROCHANTERIC BURSITIS OF RIGHT HIP: ICD-10-CM

## 2021-06-11 NOTE — PROGRESS NOTES
Your patient was discharged from [facility] on 9/11/2020 because goals met, Pt back to baseline for functional mobility.  Thank you for allowing us to provide care to this patient!  A Discharge summary is available upon requestâ  [phone]

## 2021-06-11 NOTE — PROGRESS NOTES
The patient was counseled and encouraged to consider modifying their diet and eating habits. She was provided with information on recommended healthy diet options.  The patient was provided with written information regarding signs of hearing loss.  Information on urinary incontinence and treatment options given to patient.  She is at risk for falling and has been provided with information to reduce the risk of falling at home.  Patient's advanced directive was discussed and I am comfortable with the patient's wishes.

## 2021-06-12 NOTE — PROGRESS NOTES
Assessment:     Diagnoses and all orders for this visit:    Lumbar disc herniation    Spinal stenosis of lumbar region with neurogenic claudication    Lumbar radiculitis    Myofascial pain    Spondylosis of lumbar region without myelopathy or radiculopathy       Lashawn Ortega is a 75 y.o. y.o. female with past medical history significant for reflux, migraine headache, low back pain, peripheral neuropathy, melanoma of right upper arm , stroke, elevated liver enzymes who presents today for follow-up regarding low back and left lower extremity pain:    -Overall patient's physical exam is reassuring that she has normal strength in her lower extremities.  Her pain is likely secondary to lumbar radiculopathy which has improved some.     Plan:     A shared decision making plan was used. The patient's values and choices were respected. Prior medical records from our last visit on 8/25/2020 were reviewed today. The following represents what was discussed and decided upon by the provider and the patient.        -DIAGNOSTIC TESTS: Images were personally reviewed and interpreted.   --MRI of the lumbar spine dated 7/11/2019 is personally reviewed and images interpreted and shown to patient.  There is multilevel degenerative changes in lumbar spine.  There is severe spinal canal stenosis at L3-4.  There is moderate spinal canal stenosis at L4-5.  There is mild to moderate spinal canal stenosis at L2-3.  There is no severe foraminal stenosis.  --CT of abdomen and pelvis July 2020.  I did not see any compression fractures.  She had a fall about a month ago    -INTERVENTIONS: No interventions at this time.  Could consider repeat L4-5 transforaminal epidural steroid injections bilaterally should pain worsen.    -MEDICATIONS: Recommend that she continue with gabapentin 900 mg 3 times a day as well as naproxen and cyclobenzaprine as needed.  -  Discussed side effects of medications and proper use. Patient verbalized  understanding.    -PHYSICAL THERAPY: The patient had 3 sessions of physical therapy.  I recommend that she continue with home exercises.  Discussed the importance of core strengthening, ROM, stretching exercises with the patient and how each of these entities is important in decreasing pain.  Explained to the patient that the purpose of physical therapy is to teach the patient a home exercise program.  These exercises need to be performed every day in order to decrease pain and prevent future occurrences of pain.        -PATIENT EDUCATION: We discussed pain management in a multimodal fashion including physical therapy, medication management, possible future injections.      -FOLLOW UP: Patient will follow-up as needed.  Advised to contact clinic if symptoms worsen or change.    Subjective:     Lashawn Ortega is a 75 y.o. female who presents today for follow-up regarding low back and left greater than right lower extremity pain.  Patient reports that her pain continues to be improved.  She does have pain, however it is still much better than it used to be.  She is able to touch her left leg without having discomfort now she does have continued numbness but this is much better than it was.  She notes that she has been able to garden and is doing many projects.  She will have pain, however it is better than it was when I saw her last.  Her pain is worse with prolonged standing as well as bending forward to often.  She also notes that she feels very stiff at night when she gets up to go the bathroom.  Exercise and rest have been helpful.  Gabapentin is also helpful.  She notes that if she misses the midday dose she will notice worsening of pain.  She also takes cyclobenzaprine and naproxen occasionally at bedtime which does help.  She denies any bowel or bladder changes, fevers, chills, unintentional weight loss.  Her pain today is 3-4/10 at its worst is 6-7/10 as best as 2-3/10.  When I first saw her her pain was 9/10  consistently.  She is happy that she is doing better.  She denies any bowel or bladder changes, fevers, chills, unintentional weight loss.    -Treatment to Date: MRI of lumbar spine dated 7/11/2019.  Right L5-S1 transforaminal epidural steroid injection done on 8/12/2019.  Several sessions of in-home physical therapy.  Bilateral L4-5 transforaminal epidural steroid injections done on 8/11/2020.       Patient Active Problem List   Diagnosis     GERD (gastroesophageal reflux disease)     Migraine headache     LBP (low back pain)     Nonsmoker     Menopausal hot flushes     Full code status     NORMAL COLONOSCOPY - 2009 - Average risk     Peripheral neuropathy     Melanoma of right upper arm (H) - On Keytruda - stage IIIA     Ischemic cerebrovascular accident (CVA) of frontal lobe (H) - 2/2020     Aphasia     Lumbar spinal stenosis     Essential tremor     On antineoplastic chemotherapy - PEMBROLIZUMAB - checkpoint inhibitor       Current Outpatient Medications on File Prior to Encounter   Medication Sig Dispense Refill     aloe vera Gel Apply 1 application topically 2 (two) times a day as needed.       aspirin 81 MG EC tablet Take 1 tablet (81 mg total) by mouth daily. 30 tablet 0     cyclobenzaprine (FLEXERIL) 5 MG tablet Take 1 tablet (5 mg total) by mouth 3 (three) times a day as needed for muscle spasms. 90 tablet 1     dextromethorphan-guaiFENesin (ROBAFEN DM)  mg/5 mL liquid Take 5 mL by mouth every 12 (twelve) hours as needed.       fluticasone propionate (FLONASE) 50 mcg/actuation nasal spray Apply 1-2 sprays into each nostril daily as needed for rhinitis. 16 g 1     gabapentin (NEURONTIN) 300 MG capsule Take 3 capsules three (3) times a day. 270 capsule 3     guaiFENesin-dextromethorphan (MUCINEX DM) 600-30 mg Tb12 Take 1 tablet by mouth 2 (two) times a day as needed.       menthol (BIOFREEZE, MENTHOL,) 4 % Gel Apply 1 application topically daily as needed. Apply topically for pain       naproxen  (NAPROSYN) 500 MG tablet TAKE ONE TABLET BY MOUTH TWICE A DAY AS NEEDED 180 tablet 3     neomycin-bacitracin-polymyxin (NEOSPORIN) ointment Apply 1 application topically 2 (two) times a day as needed.       pantoprazole (PROTONIX) 40 MG tablet Take 1 tablet (40 mg total) by mouth daily. 90 tablet 3     rosuvastatin (CRESTOR) 5 MG tablet Take 1 tablet (5 mg total) by mouth daily. 90 tablet 3     venlafaxine (EFFEXOR-XR) 37.5 MG 24 hr capsule TAKE ONE CAPSULE BY MOUTH EVERY DAY 90 capsule 3     No current facility-administered medications on file prior to encounter.        Allergies   Allergen Reactions     Erythromycin Base Rash     PARALYSIS, EYE SWELLING, HEADACHE     Codeine Dizziness     Other: extreme weakness in legspt stopped taking medication and sxs were gone within a few hours       Hydrocodone Nausea Only and Headache     Keytruda [Pembrolizumab] Other (See Comments)     Elevated liver function tests (lfts)      Tramadol Diarrhea, Dizziness, Headache and Nausea And Vomiting     Other Environmental Allergy Rash     mildew     Penicillins Swelling and Rash     Retinol Rash     HYDROGEL RETINOL CAPSULE     Shellfish Containing Products Swelling and Rash     shrimp     Sulfa (Sulfonamide Antibiotics) Rash     Burning body rash     Vitamin D3 Complete [Mv-Mn-Iron-Fa-Herbal Cmplx#190] Rash     Higher dosage makes her break out       Past Medical History:   Diagnosis Date     Aphasia      Asthma      GERD (gastroesophageal reflux disease)      History of transfusion      Lumbar spinal stenosis 2/28/2020    EXAM: MR LUMBAR SPINE W WO CONTRAST LOCATION: [facility] DATE/TIME: 7/11/2019 4:49 PM   INDICATION: Back pain going down the right leg.  Going on for 3 months.  Not improving. See above. History of melanoma. COMPARISON: None. CONTRAST: Gadavist 9. TECHNIQUE: Without and with IV contrast   FINDINGS:  Nomenclature is based on 5 lumbar type vertebral bodies. There is no concerning marrow rep     Malignant  "melanoma of right upper arm (H)      Migraine headache      NORMAL COLONOSCOPY - 2009 - Average risk      On antineoplastic chemotherapy     pembrolizumab     PN (peripheral neuropathy)      PONV (postoperative nausea and vomiting)     \"cold sweats\"     Stroke (H) 02/2020        Review of Systems  ROS:  Specifically negative for bowel/bladder dysfunction, balance changes, headache, dizziness, foot drop, fevers, chills, appetite changes, nausea/vomiting, unexplained weight loss. Otherwise 13 systems reviewed are negative. Please see the patient's intake questionnaire from today for details.    Reviewed Social, Family, Past Medical and Past Surgical history with patient, no significant changes noted since prior visit.     Objective:     /78   Pulse 81   LMP 09/17/1988     PHYSICAL EXAMINATION:    --CONSTITUTIONAL: Well developed, well nourished, healthy appearing individual.  --PSYCHIATRIC: Appropriate mood and affect. No difficulty interacting due to temper, social withdrawal, or memory issues.  --SKIN: Lumbar region is dry and intact.   --RESPIRATORY: Normal rhythm and effort. No abnormal accessory muscle breathing patterns noted.   --MUSCULOSKELETAL:  Normal lumbar lordosis noted, no lateral shift.  --GROSS MOTOR: Easily arises from a seated position. Gait is non-antalgic  --LUMBAR SPINE:  Inspection reveals no evidence of deformity. Range of motion is mildly limited in lumbar flexion, extension, lateral rotation.  Tenderness palpation in the low back. Straight leg raising in the seated position is negative to radicular pain. Sciatic notch non-tender.   --SACROILIAC JOINT: . One Finger point test negative.  --LOWER EXTREMITY MOTOR TESTING:  Plantar flexion left 5/5, right 5/5   Dorsiflexion left 5/5, right 5/5   Great toe MTP extension left 5/5, right 5/5  Knee flexion left 5/5, right 5/5  Knee extension left 5/5, right 5/5   Hip flexion left 5/5, right 5/5  Hip abduction left 5/5, right 5/5  Hip adduction " left 5/5, right 5/5   --HIPS: Full range of motion bilaterally.  --NEUROLOGIC:  Sensation to light touch is intact in the bilateral L4, L5, and S1 dermatomes.    RESULTS:   Imaging: Lumbar spine imaging was reviewed today. The images were shown to the patient and the findings were explained using a spine model.    Ct Chest Abdomen Pelvis With Oral With Iv Cont    Result Date: 10/7/2020  EXAM: CT CHEST ABDOMEN PELVIS W ORAL W IV CONTRAST LOCATION: [facility] DATE/TIME: 10/7/2020 12:13 PM INDICATION: Malignant melanoma of skin, unspecified. COMPARISON: 07/23/2020. TECHNIQUE: CT scan of the chest, abdomen, and pelvis was performed before and after injection of IV contrast. Multiplanar reformats were obtained. Dose reduction techniques were used. CONTRAST: Iohexol (Omni) 100 mL. FINDINGS: LUNGS AND PLEURA: Scattered diminutive pulmonary nodules are unchanged, measuring 3 mm and smaller. No new lung nodules. MEDIASTINUM/AXILLAE: No lymphadenopathy. HEPATOBILIARY: Stable benign hepatic cysts. Cholecystectomy. PANCREAS: Normal. SPLEEN: Normal. ADRENAL GLANDS: Normal. KIDNEYS/BLADDER: Stable benign right renal cyst. BOWEL: Normal. LYMPH NODES: Normal. VASCULATURE: Unremarkable. PELVIC ORGANS: Hysterectomy. MUSCULOSKELETAL: Normal.     1.  No evidence of metastatic disease in the chest, abdomen, or pelvis. No change from previous.    Mammo Screening Bilateral    Result Date: 10/14/2020  BILATERAL FULL FIELD DIGITAL SCREENING MAMMOGRAM WITH TOMOSYNTHESIS Performed on: 10/14/20. Compared to: 09/17/2019 Mammo Screening Bilateral, 09/13/2018 Mammo Screening Bilateral, 07/13/2017 Mammo Screening Bilateral, 07/11/2016 Mammo Screening Bilateral, 06/30/2015 Mammo Screening Bilateral, 06/25/2014 Mammo Screening Bilateral, and 06/12/2013 Mammo Screening Bilateral Findings: The breasts have scattered areas of fibroglandular densities. There is no radiographic evidence of malignancy. This study was evaluated  with the assistance of Computer-Aided Detection. Breast Tomosynthesis was used in interpretation. Routine screening mammogram in one year is recommended. ACR BI-RADS Category 1: Negative

## 2021-06-14 NOTE — TELEPHONE ENCOUNTER
Please schedule this patient for an appointment with me on:    ______________________________________________________________________     Thursday, January 21st  Time of appointment:  1:50 pm    Reason for appointment:  Constipation.    ______________________________________________________________________     Patient already notified.  No need to notify the patient.    Thank you.    Renzo Newell MD  [facility]  1/18/2021, 9:19 PM

## 2021-06-14 NOTE — PATIENT INSTRUCTIONS - HE
To schedule your colonoscopy with the gastroenterologist, please contact [facility] at [phone].    There are multiple sites across the [facility] area.  The sites on the [place] side of [facility] include:    [facility]  [address] [place] [address]    [facility]  [address]   [place], [place] [address]     [place] [facility]  [address], [address]  [place] [address]    [facility]  [address], [address]  [place] [address]    Please follow up if you have any further issues.    You may contact me by phone or MyChart if you are worsening or if things are not improving.    ______________________________________________________________________     Please remember that you can call [phone] to schedule an appointment.     You can schedule appointments 24 hours a day, 7 days a week.  Sometimes the best time to schedule an appointment is after clinic hours when less people are calling in.  Weekends are another option for calling in to schedule appointments.        ______________________________________________________________________      If you want to follow the current status of the coronavirus vaccine, I would recommend that you follow online at [email or web address]    The news related to [place] will likely be updated on the [facility] of Health website - [email or web address]/diseases/coronavirus/vaccine.html    Your county website also likely has information on other areas of vaccination in your [facility].    ______________________________________________________________________

## 2021-06-15 NOTE — PATIENT INSTRUCTIONS - HE
Please follow up if you have any further issues.    You may contact me by phone or MyChart if you are worsening or if things are not improving.    ______________________________________________________________________     Please remember that you can call [phone] to schedule an appointment.     You can schedule appointments 24 hours a day, 7 days a week.  Sometimes the best time to schedule an appointment is after clinic hours when less people are calling in.  Weekends are another option for calling in to schedule appointments.        ______________________________________________________________________      Future Appointments   Date Time Provider Department Center   3/17/2021 11:45 AM [facility] COVID VACCINE 21 DAY PFIZER [facility]   4/5/2021 12:45 PM JN PET CTC JN PET JN   4/5/2021  1:45 PM JN PET CT 1 JN PET JN

## 2021-06-15 NOTE — PATIENT INSTRUCTIONS - HE
Purpose:  The purpose of the GERD diet is to relieve many of the symptoms of GERD including heartburn, coughing, difficulty swallowing, chest pain, excessive clearing of the throat, the feeling that food is stuck in your throat and a sour taste or burning sensation in the mouth. Avoiding digestive stress can often alleviate some of the more uncomfortable or painfulaspects of GERD.    Factors That May Contribute to GERD:    Pregnancy    Being Overweight    Alcohol Use    Smoking    Medications that delay stomach emptying or increase the backup of acid into the esophagus    Tips for Avoiding GERD Pain:    Avoid using tobacco products as they can further weaken the esophagus    Avoid alcohol    Avoid overeating and eating too quickly    Avoid lying down or going to bed immediately after eating - wait 3 hours before lying down    Elevate the head of the bed 6  to 8  by placing blocks under the bedposts or putting a foam wedge under the top part of the mattress to avoid reflux while sleeping    Eat a low fat diet    Maintain a healthy weight    Drink liquids between meals instead of with meals    Chew gum after meals to help neutralize stomach acid    Eat in a calm, relaxed place, sit down while you eat    Exercise at least 3-4 times each week    Wear loose fitting clothing    GERD & Diet:  Reactions to foods vary greatly between individuals. Removing some foods from the diet may improved GERD and what the on person can tolerate may not be the same as what other can tolerate. Try eliminating all of the following foods to see if symptoms improve. Then try adding them back in one by one to see if some or all are contributing to GERD.      Fatty or fried foods - oil, butter, mayonnaise, cream sauces, high fat meals, whole milk dairy products, salad dressing,  cream soups    Mint flavorings, peppermint, spearmint, peppermint oil, spearmint oil    Spicy foods such as chili and kumar    Chocolate    Citrus fruits and juices  (grapefruit, orange, lemon, lime)    Caffeinated beverages such as tea, coffee, soda    Desserts    Raw onion and garlic    Tomato-based foods such as spaghetti sauce, pizza, chili   ______________________________________________________________________

## 2021-06-15 NOTE — TELEPHONE ENCOUNTER
Call pharmacy       [facility] - [place], [place] - [address]  [address] [address]  Phone: [phone] Fax: [phone]      Please cancel prednisone prescription we sent on this patient.    Thank you,    Renzo Newell MD  General Internal Medicine  [facility]  3/9/2021, 2:16 PM

## 2021-06-15 NOTE — TELEPHONE ENCOUNTER
Most Recent EKG     Units 05/11/20  1548   VENTRATE BPM 92   ATRIALRATE BPM 92   QRSDURATION ms 74   QTINTERVAL ms 336   QTCCALC ms 415   P Rosalia degrees 37   RAXIS degrees 6   TAXIS degrees 107   MUSEDX  Normal sinus rhythm  Inferior infarct (cited on or before 21-FEB-2020)  Abnormal ECG  When compared with ECG of 21-FEB-2020 08:38,  No significant change was found  Confirmed by SEE ED PROVIDER NOTE FOR, ECG INTERPRETATION ([id number]),  RATNA MAGAÑA ([id number]) on 5/12/2020 7:07:02 AM

## 2021-06-15 NOTE — PATIENT INSTRUCTIONS - HE
Here is the information on the COVID vaccine:    We are working hard to begin vaccinating more people against COVID-19. Currently, we are only vaccinating individuals age 75 and older. Vaccine availability is very limited.    If you are 75 or older, please log in to [facility] using this link to schedule an appointment.  If there are no appointments left, you will be unable to schedule and need to check back later.    Vaccine appointments are being added as they become available. Please check your [facility] account frequently for availability.    You can also call [phone] to schedule a COVID vaccination.    You can learn more about the state's phased approach to administering the vaccine, with details on each phase, [email or web address]/[email or web address]/vaccine/plan.html.      As vaccine supply increases and we are able to open appointments to more groups, we will share that information on our website [email or web address]. Check this website to stay up to date on COVID-19 vaccination information.  ______________________________________________________________________

## 2021-06-15 NOTE — PROGRESS NOTES
Injection tendon or ligament    Date/Time: 2/5/2021 4:00 PM  Performed by: Renzo Newell MD  Authorized by: Renzo Newell MD   Comments:     Procedure:  Right trochanteric bursa injection.    Diagnosis:  Trochanteric bursitis.    Description of procedure:  Patient was given informed consent prior to proceeding with injection.  Patient agreed to proceed knowing the risks and benefits.  Patient was placed in a lateral decubitus position with affected hip exposed.  The superior, anterior and posterior aspects of the upper femur were marked.  About 1.5 inches below the superior aspect and midway between the anterior and posterior aspects, an injection spot was found.  Palpation reproduced the tenderness.  Using a 25G needle, a combination of 80 mg of Kenalog and 1 cc of lidocaine with epinephrine was injected slightly away from the bone.  Aftercare instructions were given and there were no immediate complications.

## 2021-06-16 PROBLEM — Z78.9 FULL CODE STATUS: Chronic | Status: ACTIVE | Noted: 2017-07-23

## 2021-06-16 PROBLEM — G25.0 ESSENTIAL TREMOR: Status: ACTIVE | Noted: 2020-02-28

## 2021-06-16 PROBLEM — C43.61 MELANOMA OF RIGHT UPPER ARM (H): Status: ACTIVE | Noted: 2019-05-28

## 2021-06-16 PROBLEM — Z79.899 ON ANTINEOPLASTIC CHEMOTHERAPY: Status: ACTIVE | Noted: 2020-05-17

## 2021-06-16 PROBLEM — N95.1 MENOPAUSAL HOT FLUSHES: Status: ACTIVE | Noted: 2017-07-23

## 2021-06-16 PROBLEM — R47.01 APHASIA: Status: ACTIVE | Noted: 2020-02-21

## 2021-06-16 PROBLEM — Z79.69 ON ANTINEOPLASTIC CHEMOTHERAPY: Status: ACTIVE | Noted: 2020-05-17

## 2021-06-16 NOTE — TELEPHONE ENCOUNTER
Telephone Encounter by Shoshana Smith CMA at 6/20/2019  9:51 AM     Author: Shoshana Smith CMA Service: -- Author Type: Certified Medical Assistant    Filed: 6/20/2019  9:53 AM Encounter Date: 6/19/2019 Status: Signed    : Shoshana Smith CMA (Certified Medical Assistant)       Called and LVM for pt to call back    First available was scheduled:              Please let pt know of appt -if pt unable to make this time please transfer call to clinic for scheduling

## 2021-06-16 NOTE — TELEPHONE ENCOUNTER
Telephone Encounter by Kris Smith CMA at 3/24/2020 12:16 PM     Author: Kris Smith CMA Service: -- Author Type: Certified Medical Assistant    Filed: 3/24/2020 12:21 PM Encounter Date: 3/24/2020 Status: Signed    : Kris Smith CMA (Certified Medical Assistant)       Pharmacy requesting refill of Fluticasone and naproxen.    Pt last seen 2/27/20  Pt due to be seen around 4/9/20.    Plan:      1. Continue aspirin and atorvastatin.  2. Given a brace for her wrist.  Consider corticosteroid injection if worsening or specialty referral.  3. Event recorder scheduling.  4. Follow-up with neurology as she has scheduled.  She might not need long-term follow-up related to this.  5. Follow-up with oncology.  6. Follow-up with spine center.  7. Speech therapy and other therapies at home.  8. No treatment for essential tremor at this time.        Post Discharge Medication Reconciliation Status: discharge medications reconciled, continue medications without change          Renzo Newell MD  General Internal Medicine  [facility]    Personal office fax - [phone]   Voice mail - [phone]  E-mail - [email or web address]        Return in about 6 weeks (around 4/9/2020), or if symptoms worsen or fail to improve.

## 2021-06-16 NOTE — PATIENT INSTRUCTIONS - HE
I have ordered an injection for you.  You will need a  for this injection.    Please continue to do your physical therapy exercises at home.    ~Please call Nurse Navigation line [phone] with any questions or concerns about your treatment plan, if symptoms worsen and you would like to be seen urgently, or if you have problems controlling bladder and bowel function.

## 2021-06-16 NOTE — PROGRESS NOTES
Assessment:     Diagnoses and all orders for this visit:    Lumbar radiculitis  -     OPS TFESI Lumbar Sacral Unilateral; Future; Expected date: 04/27/2021    Lumbar disc herniation  -     OPS TFESI Lumbar Sacral Unilateral; Future; Expected date: 04/27/2021    Spinal stenosis of lumbar region with neurogenic claudication  -     OPS TFESI Lumbar Sacral Unilateral; Future; Expected date: 04/27/2021    Spondylosis of lumbar region without myelopathy or radiculopathy    Myofascial pain       Lashawn Ortega is a 76 y.o. y.o. female with past medical history significant for reflux, migraine headache, low back pain, peripheral neuropathy, melanoma of right upper arm, stroke, elevated liver enzymes  who presents today for follow-up regarding low back and left greater than right lower extremity pain:    -Overall patient's physical exam continues to be reassuring that she has normal strength in her lower extremities.  She is now having right greater than left lower extremity pain.  She had very good relief with bilateral L4-5 transforaminal epidural steroid injections back in August 2020.  She had greater than 50% relief until we gain of April 2021.       Plan:     A shared decision making plan was used. The patient's values and choices were respected. Prior medical records from our last visit on 10/20/2020 were reviewed today. The following represents what was discussed and decided upon by the provider and the patient.        -DIAGNOSTIC TESTS: Images were personally reviewed and interpreted.   --MRI of the lumbar spine dated 7/11/2019 is personally reviewed and images interpreted and shown to patient.  There is multilevel degenerative changes in lumbar spine.  There is severe spinal canal stenosis at L3-4.  There is moderate spinal canal stenosis at L4-5.  There is mild to moderate spinal canal stenosis at L2-3.  There is no severe foraminal stenosis.  --CT of abdomen and pelvis July 2020.  I did not see any compression  fractures.  She had a fall about a month ago    -INTERVENTIONS: Ordered a right L4-5 transforaminal epidural steroid injection for the patient.    -MEDICATIONS: She continues to take gabapentin 900 mg 3 times a day as well as cyclobenzaprine as needed.  -  Discussed side effects of medications and proper use. Patient verbalized understanding.    -PHYSICAL THERAPY: She continues to do physical therapy exercises at home on a daily basis.  I recommended she continue with this.       -PATIENT EDUCATION: Discussed pain management in a multimodal fashion including physical therapy, medication management, possible future injections..    -FOLLOW UP: Patient will follow up 2 weeks after injections in person.  Advised to contact clinic if symptoms worsen or change.    Subjective:     Lashawn Ortega is a 76 y.o. female who presents today for follow-up regarding low back and left greater than right lower extremity pain.  Patient reports that she did very well with the epidural steroid injections in August 2020 and had at least 50% relief until April 2021.  She notes that since then pain has been worsening especially on the right side now where he goes down the right buttock posterior thigh and wraps around to the anterior shin on the right side.  She has occasional pain in her left leg, however this continues to be found on right.  She currently takes gabapentin 900 mg 3 times a day as well as taking naproxen as needed and cyclobenzaprine as needed.  She has been doing her physical therapy exercises at home on a daily basis and finds them to be helpful, however continues to have pain despite these.  Pain today is 6/10 is worst is 7/10 is best is 5/10.  She denies any bowel or bladder changes, fevers, chills, unintentional weight loss.    -Treatment to Date: MRI of lumbar spine dated 7/11/2019.  Right L5-S1 transforaminal epidural steroid injection done on 8/12/2019.  Several sessions of in-home physical therapy.  Bilateral  L4-5 transforaminal epidural steroid injections done on 8/11/2020.    Patient Active Problem List   Diagnosis     GERD (gastroesophageal reflux disease)     Migraine headache     LBP (low back pain)     Nonsmoker     Menopausal hot flushes     Full code status     NORMAL COLONOSCOPY - 2009 - Average risk     Peripheral neuropathy     Melanoma of right upper arm (H) - On Keytruda in the past - stage IIIA     Ischemic cerebrovascular accident (CVA) of frontal lobe (H) - 2/2020     Aphasia     Lumbar spinal stenosis     Essential tremor     On antineoplastic chemotherapy - PEMBROLIZUMAB - checkpoint inhibitor - in the past for melanoma       Current Outpatient Medications on File Prior to Encounter   Medication Sig Dispense Refill     aloe vera Gel Apply 1 application topically 2 (two) times a day as needed.       aspirin 81 MG EC tablet Take 1 tablet (81 mg total) by mouth daily. 30 tablet 0     cyclobenzaprine (FLEXERIL) 5 MG tablet Take 1 tablet (5 mg total) by mouth 3 (three) times a day as needed for muscle spasms. 90 tablet 1     fluticasone propionate (FLONASE) 50 mcg/actuation nasal spray Apply 1-2 sprays into each nostril daily as needed for rhinitis. 16 g 1     gabapentin (NEURONTIN) 300 MG capsule TAKE 3 CAPSULES 3 TIMES DAILY 270 capsule 3     guaiFENesin-dextromethorphan (MUCINEX DM) 600-30 mg Tb12 Take 1 tablet by mouth 2 (two) times a day as needed.       hydrOXYchloroQUINE (PLAQUENIL) 200 mg tablet Take 1 tablet (200 mg total) by mouth 2 (two) times a day. 180 tablet 3     menthol (BIOFREEZE, MENTHOL,) 4 % Gel Apply 1 application topically daily as needed. Apply topically for pain       naproxen (NAPROSYN) 500 MG tablet TAKE ONE TABLET BY MOUTH TWICE A DAY AS NEEDED 180 tablet 3     neomycin-bacitracin-polymyxin (NEOSPORIN) ointment Apply 1 application topically 2 (two) times a day as needed.       PROTONIX 40 mg tablet TAKE 1 TABLET DAILY 90 tablet 3     rosuvastatin (CRESTOR) 5 MG tablet Take 1 tablet  "(5 mg total) by mouth daily. 90 tablet 3     UNABLE TO FIND Med Name: MagniLife Back & Leg Pain Cream       No current facility-administered medications on file prior to encounter.        Allergies   Allergen Reactions     Erythromycin Base Rash     PARALYSIS, EYE SWELLING, HEADACHE     Codeine Dizziness     Other: extreme weakness in legspt stopped taking medication and sxs were gone within a few hours       Hydrocodone Nausea Only and Headache     Keytruda [Pembrolizumab] Other (See Comments)     Elevated liver function tests (lfts)      Tramadol Diarrhea, Dizziness, Headache and Nausea And Vomiting     Other Environmental Allergy Rash     mildew     Penicillins Swelling and Rash     Retinol Rash     HYDROGEL RETINOL CAPSULE     Shellfish Containing Products Swelling and Rash     shrimp     Sulfa (Sulfonamide Antibiotics) Rash     Burning body rash     Vitamin D3 Complete [Mv-Mn-Iron-Fa-Herbal Cmplx#190] Rash     Higher dosage makes her break out       Past Medical History:   Diagnosis Date     Aphasia      Asthma      GERD (gastroesophageal reflux disease)      History of transfusion      Lumbar spinal stenosis 2/28/2020    EXAM: MR LUMBAR SPINE W WO CONTRAST LOCATION: [facility] DATE/TIME: 7/11/2019 4:49 PM   INDICATION: Back pain going down the right leg.  Going on for 3 months.  Not improving. See above. History of melanoma. COMPARISON: None. CONTRAST: Gadavist 9. TECHNIQUE: Without and with IV contrast   FINDINGS:  Nomenclature is based on 5 lumbar type vertebral bodies. There is no concerning marrow rep     Malignant melanoma of right upper arm (H)      Migraine headache      NORMAL COLONOSCOPY - 2009 - Average risk      On antineoplastic chemotherapy     pembrolizumab     PN (peripheral neuropathy)      PONV (postoperative nausea and vomiting)     \"cold sweats\"     Stroke (H) 02/2020        Review of Systems  ROS:  Specifically negative for bowel/bladder dysfunction, balance changes, headache, " dizziness, foot drop, fevers, chills, appetite changes, nausea/vomiting, unexplained weight loss. Otherwise 13 systems reviewed are negative. Please see the patient's intake questionnaire from today for details.    Reviewed Social, Family, Past Medical and Past Surgical history with patient, no significant changes noted since prior visit.     Objective:     /62 (Patient Site: Right Arm, Patient Position: Sitting, Cuff Size: Adult Large)   Pulse 86   Temp 98  F (36.7  C) (Oral)   LMP 09/17/1988   SpO2 98%     PHYSICAL EXAMINATION:    --CONSTITUTIONAL: Well developed, well nourished, healthy appearing individual.  --PSYCHIATRIC: Appropriate mood and affect. No difficulty interacting due to temper, social withdrawal, or memory issues.  --SKIN: Lumbar region is dry and intact.   --RESPIRATORY: Normal rhythm and effort. No abnormal accessory muscle breathing patterns noted.   --MUSCULOSKELETAL:  Normal lumbar lordosis noted, no lateral shift.  --GROSS MOTOR: Easily arises from a seated position. Gait is non-antalgic  --LUMBAR SPINE:  Inspection reveals no evidence of deformity. Range of motion is mildly limited in lumbar flexion, extension, lateral rotation.  Tenderness to palpation over the right low back. Straight leg raising in the seated position is negative to radicular pain.   --SACROILIAC JOINT:One Finger point test negative.  --LOWER EXTREMITY MOTOR TESTING:  Plantar flexion left 5/5, right 5/5   Dorsiflexion left 5/5, right 5/5   Great toe MTP extension left 5/5, right 5/5  Knee flexion left 5/5, right 5/5  Knee extension left 5/5, right 5/5   Hip flexion left 5/5, right 5/5  Hip abduction left 5/5, right 5/5  Hip adduction left 5/5, right 5/5    --NEUROLOGIC:  Sensation to light touch is intact in the bilateral L4, L5, and S1 dermatomes.    RESULTS:   Imaging: Lumbar spine imaging was reviewed today.     Nm Pet Ct Whole Body    Result Date: 4/6/2021  EXAM: NM PET CT WHOLE BODY LOCATION: [facility] DATE/TIME: 4/5/2021 2:57 PM INDICATION: Subsequent treatment planning and restaging for malignant melanoma of right upper limb, including shoulder. Index lesion in the right upper extremity status post wide local excision and systemic immunotherapy. Monitor treatment response. COMPARISON: FDG PET/CT dated 06/09/2020 and CT of the chest abdomen pelvis dated 10/07/2020. TECHNIQUE: Serum glucose level 108 mg/dL. One hour post intravenous administration of 8.2 mCi F-18 FDG, PET imaging was performed from the skull vertex to feet utilizing attenuation correction with concurrent axial CT and PET/CT image fusion. Dose reduction techniques were used. FINDINGS: Physiologic FDG uptake from the skull vertex to feet. Moderate senescent intracranial changes. Right hepatic lobe cyst. Right renal cyst. Hysterectomy. Pelvic phleboliths. Multilevel degenerative changes of the spine.     No metabolic evidence of active neoplasm.

## 2021-06-17 NOTE — PROGRESS NOTES
Assessment:     Diagnoses and all orders for this visit:    Greater trochanteric bursitis of right hip  -     OPS US Large Joint Injection Unilateral; Future; Expected date: 05/28/2021    Lumbar radiculitis    Lumbar disc herniation    Spinal stenosis of lumbar region with neurogenic claudication    Spondylosis of lumbar region without myelopathy or radiculopathy    Myofascial pain       Lashawn Ortega is a 76 y.o. y.o. female with past medical history significant for  reflux, migraine headache, low back pain, peripheral neuropathy, melanoma of right upper arm, stroke, elevated liver enzymes who presents today for follow-up regarding low back and right lower extremity pain:    -Unfortunately the patient only received temporary relief with her right L4-5 transforaminal epidural steroid injection.  She does have point tenderness over the greater trochanter bursa on the right side likely secondary to greater trochanter bursitis.     Plan:     A shared decision making plan was used. The patient's values and choices were respected. Prior medical records from our last visit on 4/20/2021 were reviewed today. The following represents what was discussed and decided upon by the provider and the patient.        -DIAGNOSTIC TESTS: Images were personally reviewed and interpreted.   --MRI of the lumbar spine dated 7/11/2019 is personally reviewed and images interpreted and shown to patient.  There is multilevel degenerative changes in lumbar spine.  There is severe spinal canal stenosis at L3-4.  There is moderate spinal canal stenosis at L4-5.  There is mild to moderate spinal canal stenosis at L2-3.  There is no severe foraminal stenosis.  --CT of abdomen and pelvis July 2020.  I did not see any compression fractures.  She had a fall about a month ago    -INTERVENTIONS: I ordered a right ultrasound-guided greater trochanter bursa injection under ultrasound guidance.    -MEDICATIONS: No changes to medications.  -  Discussed  side effects of medications and proper use. Patient verbalized understanding.    -PHYSICAL THERAPY: Recommended she do her physical therapy exercises at home on a daily basis.  Discussed the importance of core strengthening, ROM, stretching exercises with the patient and how each of these entities is important in decreasing pain.  Explained to the patient that the purpose of physical therapy is to teach the patient a home exercise program.  These exercises need to be performed every day in order to decrease pain and prevent future occurrences of pain.        -PATIENT EDUCATION: We discussed pain management in a multimodal fashion including physical therapy, medication management, possible future injections.    -FOLLOW UP: Patient will follow up 2 weeks after injections  In person  Advised to contact clinic if symptoms worsen or change.    Subjective:     Lashawn Ortega is a 76 y.o. female who presents today for follow-up regarding right low back and buttock pain as well as lower extremity pain.  Patient reports that she received good relief while the numbing medication was active after her right L4-5 epidural, however thereafter pain returned to baseline and has not improved since then.  She notes that she has pain over the right lateral thigh area and is painful to lay on.  She also has pain with standing, prolonged sitting and improved somewhat with walking.  She also notes that she has been having cramps into the anterior medial thighs and calves at night.  She has been more active and wonders if this is the reason for that.  Her pain today is 6/10 is worst is 8/10 is best is 4/10.  She is currently taking naproxen, cyclobenzaprine, gabapentin and finds that these are helpful.  She denies any bowel or bladder changes, fevers, chills, unintentional weight loss.    Treatment to Date: MRI of lumbar spine dated 7/11/2019.  Right L5-S1 transforaminal epidural steroid injection done on 8/12/2019.  Several sessions of  in-home physical therapy.  Bilateral L4-5 transforaminal epidural steroid injections done on 8/11/2020.  Right L4-5 transforaminal epidural steroid injection done on 5/7/2021.    Patient Active Problem List   Diagnosis     GERD (gastroesophageal reflux disease)     Migraine headache     LBP (low back pain)     Nonsmoker     Menopausal hot flushes     Full code status     NORMAL COLONOSCOPY - 2009 - Average risk     Peripheral neuropathy     Melanoma of right upper arm (H) - On Keytruda in the past - stage IIIA     Ischemic cerebrovascular accident (CVA) of frontal lobe (H) - 2/2020     Aphasia     Lumbar spinal stenosis     Essential tremor     On antineoplastic chemotherapy - PEMBROLIZUMAB - checkpoint inhibitor - in the past for melanoma       Current Outpatient Medications on File Prior to Encounter   Medication Sig Dispense Refill     aloe vera Gel Apply 1 application topically 2 (two) times a day as needed.       aspirin 81 MG EC tablet Take 1 tablet (81 mg total) by mouth daily. 30 tablet 0     cyclobenzaprine (FLEXERIL) 5 MG tablet Take 1 tablet (5 mg total) by mouth 3 (three) times a day as needed for muscle spasms. 90 tablet 1     fluticasone propionate (FLONASE) 50 mcg/actuation nasal spray Apply 1-2 sprays into each nostril daily as needed for rhinitis. 16 g 1     gabapentin (NEURONTIN) 300 MG capsule TAKE 3 CAPSULES 3 TIMES DAILY 270 capsule 3     guaiFENesin-dextromethorphan (MUCINEX DM) 600-30 mg Tb12 Take 1 tablet by mouth 2 (two) times a day as needed.       hydrOXYchloroQUINE (PLAQUENIL) 200 mg tablet Take 1 tablet (200 mg total) by mouth 2 (two) times a day. 180 tablet 3     menthol (BIOFREEZE, MENTHOL,) 4 % Gel Apply 1 application topically daily as needed. Apply topically for pain       naproxen (NAPROSYN) 500 MG tablet TAKE ONE TABLET BY MOUTH TWICE A DAY AS NEEDED 180 tablet 3     neomycin-bacitracin-polymyxin (NEOSPORIN) ointment Apply 1 application topically 2 (two) times a day as needed.    "    PROTONIX 40 mg tablet TAKE 1 TABLET DAILY 90 tablet 3     rosuvastatin (CRESTOR) 5 MG tablet Take 1 tablet (5 mg total) by mouth daily. 90 tablet 3     UNABLE TO FIND Med Name: [employer] Back & Leg Pain Cream       No current facility-administered medications on file prior to encounter.        Allergies   Allergen Reactions     Erythromycin Base Rash     PARALYSIS, EYE SWELLING, HEADACHE     Codeine Dizziness     Other: extreme weakness in legspt stopped taking medication and sxs were gone within a few hours       Hydrocodone Nausea Only and Headache     Keytruda [Pembrolizumab] Other (See Comments)     Elevated liver function tests (lfts)      Tramadol Diarrhea, Dizziness, Headache and Nausea And Vomiting     Other Environmental Allergy Rash     mildew     Penicillins Swelling and Rash     Retinol Rash     HYDROGEL RETINOL CAPSULE     Shellfish Containing Products Swelling and Rash     shrimp     Sulfa (Sulfonamide Antibiotics) Rash     Burning body rash     Vitamin D3 Complete [Mv-Mn-Iron-Fa-Herbal Cmplx#190] Rash     Higher dosage makes her break out       Past Medical History:   Diagnosis Date     Aphasia      Asthma      GERD (gastroesophageal reflux disease)      History of transfusion      Lumbar spinal stenosis 2/28/2020    EXAM: MR LUMBAR SPINE W WO CONTRAST LOCATION: [facility] DATE/TIME: 7/11/2019 4:49 PM   INDICATION: Back pain going down the right leg.  Going on for 3 months.  Not improving. See above. History of melanoma. COMPARISON: None. CONTRAST: Gadavist 9. TECHNIQUE: Without and with IV contrast   FINDINGS:  Nomenclature is based on 5 lumbar type vertebral bodies. There is no concerning marrow rep     Malignant melanoma of right upper arm (H)      Migraine headache      NORMAL COLONOSCOPY - 2009 - Average risk      On antineoplastic chemotherapy     pembrolizumab     PN (peripheral neuropathy)      PONV (postoperative nausea and vomiting)     \"cold sweats\"     Stroke (H) 02/2020    "     Review of Systems  ROS:  Specifically negative for bowel/bladder dysfunction, balance changes, headache, dizziness, foot drop, fevers, chills, appetite changes, nausea/vomiting, unexplained weight loss. Otherwise 13 systems reviewed are negative. Please see the patient's intake questionnaire from today for details.    Reviewed Social, Family, Past Medical and Past Surgical history with patient, no significant changes noted since prior visit.     Objective:     /83   Pulse 91   LMP 09/17/1988   SpO2 97%     PHYSICAL EXAMINATION:    --CONSTITUTIONAL: Well developed, well nourished, healthy appearing individual.  --PSYCHIATRIC: Appropriate mood and affect. No difficulty interacting due to temper, social withdrawal, or memory issues.  --SKIN: Lumbar region is dry and intact.   --RESPIRATORY: Normal rhythm and effort. No abnormal accessory muscle breathing patterns noted.   --MUSCULOSKELETAL:  Normal lumbar lordosis noted, no lateral shift.  --GROSS MOTOR: Easily arises from a seated position. Gait is non-antalgic  --LUMBAR SPINE:  Inspection reveals no evidence of deformity. Range of motion is not limited in lumbar flexion, extension, lateral rotation.  Tenderness palpation over the right greater trochanter bursa area and over the PSIS area. Straight leg raising in the supine position is negative to radicular pain.   --SACROILIAC JOINT: Positive on the right distraction.  Positive on the right Diogenes's with reproduction of pain to affected extremity. One Finger point test negative.  --LOWER EXTREMITY MOTOR TESTING:  Plantar flexion left 5/5, right 5/5   Dorsiflexion left 5/5, right 5/5   Great toe MTP extension left 5/5, right 5/5  Knee flexion left 5/5, right 5/5  Knee extension left 5/5, right 5/5   Hip flexion left 5/5, right 5/5  Hip abduction left 5/5, right 5/5  Hip adduction left 5/5, right 5/5   --HIPS: Full range of motion bilaterally.  Stinchfield test is positive for buttock pain on the  right.  --NEUROLOGIC:  Sensation to light touch is intact in the bilateral L4, L5, and S1 dermatomes.    RESULTS:   Imaging: Lumbar spine imaging was reviewed today. The images were shown to the patient and the findings were explained using a spine model.    Ops Tfesi Lumbar Sacral Unilateral    Result Date: 5/7/2021  LUMBAR EPIDURAL STEROID INJECTION TRANSFORAMINAL APPROACH WITH FLUOROSCOPIC GUIDANCE Performed on: 5/7/21 Pre Procedure Diagnosis:  LBP, Lumbar radiculitis Post Procedure Diagnosis:  Same Procedure Performed:  Lumbar Transforaminal Epidural Steroid Injection with Fluoroscopic Guidance Clinical Scenario:  As per office notes Anesthesia/Fluids:  As per intra-procedure documentation Vital Signs:  As per intra-procedure documentation Level Injected:  L4-L5 Side Injected: Right CC: Lashawn Ortega is a 76 year-old female who presents today for a right L4-5 transforaminal epidural steroid injection as ordered by Dr. Worthington.  The patient complains of right lower extremity pain.   Lumbar MRI was reviewed today and shows foraminal narrowing at L4-5.  The patient wanted to proceed with the injection today.  The patient denies feeling ill today or having an active infection (denies taking antibiotics).  The patient does not take any prescription blood thinning medications.  The patient denies any allergies to contrast.    The procedure of epidural steroid injection was discussed in detail along with the attendant risks, including but not limited to: infection, bleeding, nerve injury, paralysis.   The patient's questions were answered and they understood the information given.   The patient elected to proceed and signed informed consent.  . The patient was placed in a prone position on the fluoroscopy table. A procedural pause was conducted to verify: correct patient identity, procedure to be performed and as applicable, correct side and site, correct patient position, and availability of implants, special  "equipment and special requirements.  The lower back was prepped and draped in usual fashion.  After anesthetizing the skin with 1% lidocaine, a 5\", 22 gauge needle was introduced at the Right L4 pedicle under fluoroscopic guidance.  After aspiration was negative, 1 mL of  Omnipaque 300 was injected under continuous fluoroscopy.  Epidural flow without vascular uptake was seen.  1 mL of 1% lidocaine was injected as a test dose. After an appropriate period of observation, a directed neurological exam was performed which revealed no new neurologic deficits.    Subsequently, 10 mgs of Dexamethasone was slowly injected. Following the injection the needle was withdrawn slightly and flushed with local anesthetic as it was fully extracted.  The patient tolerated the procedure well and there were no apparent complications.  The patient did not develop any new neurologic deficits.  After appropriate observation, the patient was dismissed in good condition under their own power. PRE-PROCEDURE PAIN SCORE:  5/10 POST-PROCEDURE PAIN SCORE:  3/10 The patient tolerated the procedure well.  The patient was instructed to call the [facility] if any questions or concerns arise after the procedure. After a short period of observation the patient was discharged.  Patient will follow up with Dr. Worthington in 2 to 4 weeks.                           "

## 2021-06-17 NOTE — PATIENT INSTRUCTIONS - HE
I ordered a right greater trochanter bursa injection under ultrasound guidance.    ~Please call Nurse Navigation line [phone] with any questions or concerns about your treatment plan, if symptoms worsen and you would like to be seen urgently, or if you have problems controlling bladder and bowel function.

## 2021-06-17 NOTE — PATIENT INSTRUCTIONS - HE
DISCHARGE INSTRUCTIONS    During office hours (8:00 a.m.- 4:00 p.m.) questions or concerns may be answered  by calling [facility] Navigation Nurses at  [phone].  Messages received after hours will be returned the following business day.      In the case of an emergency, please dial 911 or seek assistance at the nearest Emergency Room/Urgent Care facility.     All Patients:    ? You may experience an increase in your symptoms for the first 2 days (It may take anywhere between 2 days- 2 weeks for the steroid to have maximum effect).    ? You may use ice on the injection site, as frequently as 20 minutes each hour if needed.    ? You may take your pain medicine.    ? You may continue taking your regular medication after your injection. If you have had a Medial Branch Block you may resume pain medication once your pain diary is completed.    ? You may shower. No swimming, tub bath or hot tub for 48 hours.  You may remove your bandaid/bandage as soon as you are home.    ? You may resume light activities, as tolerated.    ? Resume your usual diet as tolerated.    ? It is strongly advised that you do not drive for 1-3 hours post injection.    ? If you have had oral sedation:  Do not drive for 8 hours post injection.      ? If you have had IV sedation:  Do not drive for 24 hours post injection.  Do not operate hazardous machinery or make important personal/business decisions for 24 hours.      POSSIBLE STEROID SIDE EFFECTS (If steroid/cortisone was used for your procedure)    -If you experience these symptoms, it should only last for a short period      Swelling of the legs                Skin redness (flushing)       Mouth (oral) irritation     Blood sugar (glucose) levels              Sweats                      Mood changes    Headache    Sleeplessness    Weakened immune system for up to 14 days, which could increase the risk of dimas the COVID-19 virus and/or experiencing more severe symptoms of the  disease, if exposed.    Decreased effectiveness of the flu vaccine if given within 2 weeks of the steroid.         POSSIBLE PROCEDURE SIDE EFFECTS  -Call the Sp[facility] Center if you are concerned    Increased Pain             Increased numbness/tingling        Nausea/Vomiting            Bruising/bleeding at site        Redness or swelling                                                Difficulty walking        Weakness             Fever greater than 100.5    *In the event of a severe headache after an epidural steroid injection that is relieved by lying down, please call the [facility] to speak with a clinical staff member*

## 2021-06-17 NOTE — TELEPHONE ENCOUNTER
Telephone Encounter by Kindra Ny at 8/31/2020  4:03 PM     Author: Kindra Ny Service: -- Author Type: --    Filed: 8/31/2020  4:04 PM Encounter Date: 8/26/2020 Status: Signed    : Kindra Ny APPROVED:    Approval start date: 08/01/2020  Approval end date:  08/31/2020    Pharmacy has been notified of approval and will contact patient when medication is ready for pickup.

## 2021-06-18 NOTE — PATIENT INSTRUCTIONS - HE
Patient Instructions by Renzo Newell MD at 4/6/2021  3:05 PM     Author: Renzo Newell MD Service: -- Author Type: Physician    Filed: 4/9/2021  3:03 PM Encounter Date: 4/6/2021 Status: Addendum    : Renzo Newell MD (Physician)    Related Notes: Original Note by Renzo Newell MD (Physician) filed at 4/9/2021  3:02 PM             Patient Education     Treating Incontinence in Women: Nonsurgical Methods    The best treatment for you will depend on the type of incontinence you have. Your symptoms, age, and any underlying problems that are found also affect your treatment. While some types of incontinence may eventually require surgery, nonsurgical treatments may be effective in many cases. Nonsurgical treatments include lifestyle changes, muscle-strengthening exercises, and medicines.  Nonsurgical Treatments  Treatment for stress urinary incontinence includes:    Bladder training    Lifestyle changes such as weight loss and increased activity if incontinence is due to being overweight    Medicines, if bladder training has not helped    Pelvic floor muscle exercises  Lifestyle changes    Losing weight. Excess weight puts extra pressure on the pelvic floor muscles. Exercising and eating right can help you lose weight. This helps other treatments work better.    Making certain diet changes. Some foods may make you need to urinate more, so it may be good to avoid them. These include caffeinated drinks and alcohol. Ask your healthcare provider whether these or other diet changes might be helpful.    Quitting smoking. Smoking can lead to a chronic cough that strains pelvic floor muscles. Smoking may also damage the bladder and urethra.  Pelvic floor muscle exercises  There are exercises you can do to help strengthen your pelvic floor muscles. The pelvic floor muscles act as a sling to help hold the bladder and urethra in place. These muscles also help keep the urethra closed. Weak pelvic floor muscles  may allow urine to leak. To strengthen the pelvic floor muscles, do the exercises daily. In a few months, the muscles will be stronger and tighter. This can help prevent urine leakage.  Date Last Reviewed: 1/1/2017 2000-2019 [employer]. [address] [place] [address], [place] [address]. All rights reserved. This information is not intended as a substitute for professional medical care. Always follow your healthcare professional's instructions.

## 2021-06-18 NOTE — PATIENT INSTRUCTIONS - HE
Patient Instructions by Renzo Newell MD at 9/18/2020  9:40 AM     Author: Renzo Newell MD Service: -- Author Type: Physician    Filed: 9/20/2020 10:57 PM Encounter Date: 9/18/2020 Status: Signed    : Renzo Newell MD (Physician)         Patient Education   Understanding USDA MyPlate  The USDA ([place] Department of Agriculture) has guidelines to help you make healthy food choices. These are called MyPlate. MyPlate shows the food groups that make up healthy meals using the image of a place setting. Before you eat, think about the healthiest choices for what to put onto your plate or into your cup or bowl. To learn more about building a healthy plate, visit www.choosemyplate.gov.       The Food Groups    Fruits: Any fruit or 100% fruit juice counts as part of the Fruit Group. Fruits may be fresh, canned, frozen, or dried, and may be whole, cut-up, or pureed. Make half your plate fruits and vegetables.    Vegetables: Any vegetable or 100% vegetable juice counts as a member of the Vegetable Group. Vegetables may be fresh, frozen, canned, or dried. They can be served raw or cooked and may be whole, cut-up, or mashed. Make half your plate fruits and vegetables.     Grains: All foods made from grains are part of the Grains Group. These include wheat, rice, oats, cornmeal, and barley such as bread, pasta, oatmeal, cereal, tortillas, and grits. Grains should be no more than a quarter of your plate. At least half of your grains should be whole grains.    Protein: This group includes meat, poultry, seafood, beans and peas, eggs, processed soy products (like tofu), nuts (including nut butters), and seeds. Make protein choices no more than a quarter of your plate. Meat and poultry choices should be lean or low fat.    Dairy: All fluid milk products and foods made from milk that contain calcium, like yogurt and cheese are part of the Dairy Group. (Foods that have little calcium, such as cream, butter, and cream  cheese, are not part of the group.) Most dairy choices should be low-fat or fat-free.    Oils: These are fats that are liquid at room temperature. They include canola, corn, olive, soybean, and sunflower oil. Foods that are mainly oil include mayonnaise, certain salad dressings, and soft margarines. You should have only 5 to 7 teaspoons of oils a day. You probably already get this much from the food you eat.  Use [employer] to Help Build Your Meals  The [employer] can help you plan and track your meals and activity. You can look up individual foods to see or compare their nutritional value. You can get guidelines for what and how much you should eat. You can compare your food choices. And you can assess personal physical activities and see ways you can improve. Go to www.[employer].gov/Weatheristacker/.    8769-1298 [employer]. [address] [place] [address]. All rights reserved. This information is not intended as a substitute for professional medical care. Always follow your healthcare professional's instructions.           Patient Education   Signs of Hearing Loss  Hearing loss is a problem shared by many people. In fact, it is one of the most common health conditions, particularly as people age. Most people over age 65 have some hearing loss, and by age 80, almost everyone does. Because hearing loss usually occurs slowly over the years, you may not realize your hearing ability has gotten worse.       Have your hearing checked  Contact your [facility] care provider if you:    Have to strain to hear normal conversation.    Have to watch other peoples faces very carefully to follow what theyre saying.    Need to ask people to repeat what theyve said.    Often misunderstand what people are saying.    Turn the volume of the television or radio up so high that others complain.    Feel that people are mumbling when theyre talking to you.    Find that the effort to hear leaves you feeling tired  and irritated.    Notice, when using the phone, that you hear better with 1 ear than the other.    9283-5192 [employer]. [address] [place] [address], [place] [address]. All rights reserved. This information is not intended as a substitute for professional medical care. Always follow your healthcare professional's instructions.         Patient Education   Urinary Incontinence, Female (Adult)  Urinary incontinence means loss of control of the bladder. This problem affects many women, especially as they get older. If you have incontinence, you may be embarrassed to ask for help. But know that this problem can be treated.  Types of Incontinence  There are different types of incontinence. Two of the main types are described here. You can have more than one type.    Stress incontinence. With this type, urine leaks when pressure (stress) is put on the bladder. This may happen when you cough, sneeze, or laugh. Stress incontinence most often occurs because the pelvic floor muscles that support the bladder and urethra are weak. This can happen after pregnancy and vaginal childbirth or a hysterectomy. It can also be due to excess body weight or hormone changes.    Urge incontinence (also called overactive bladder). With this type, a sudden urge to urinate is felt often. This may happen even though there may not be much urine in the bladder. The need to urinate often during the night is common. Urge incontinence most often occurs because of bladder spasms. This may be due to bladder irritation or infection. Damage to bladder nerves or pelvic muscles, constipation, and certain medicines can also lead to urge incontinence.  Treatment of urinary incontinence depends on the cause. Further evaluation is needed to find the type you have. This will likely include an exam and certain tests. Based on the results, you and your healthcare provider can then plan treatment. Until a diagnosis is made, the home care tips below can  help relieve symptoms.  Home care    Do pelvic floor muscle exercises, if they are prescribed. The pelvic floor muscles help support the bladder and urethra. Many women find that their symptoms improve when doing special exercises that strengthen these muscles. To do the exercises contract the muscles you would use to stop your stream of urine, but do this when youre not urinating. Hold for 10 seconds, then relax. Repeat 10 to 20 times in a row, at least 3 times a day. Your provider may give you other instructions for how to do the exercises and how often.    Keep a bladder diary. This helps track how often and how much you urinate over a set period of time. Bring this diary with you to your next visit with the provider. The information can help your provider learn more about your bladder problem.    Lose weight, if advised to by your provider. Excess weight puts pressure on the bladder. Your provider can help you create a weight-loss plan thats right for you. This may include exercising more and making certain diet changes.    Don't consume foods and drinks that may irritate the bladder. These can include alcohol and caffeinated drinks.    Quit smoking. Smoking and other tobacco use can lead to chronic cough that strains the pelvic floor muscles. Smoking may also damage the bladder and urethra. Talk with your provider about treatments or methods you can use to quit smoking.    If drinking large amounts of fluid causes you to have symptoms, you may be advised to limit your fluid intake. You may also be advised to drink most of your fluids during the day and to limit fluids at night.    If youre worried about urine leakage or accidents, you may wear absorbent pads to catch urine. Change the pads often. This helps reduce discomfort. It may also reduce the risk of skin or bladder infections.  Follow-up care  Follow up with your healthcare provider, or as directed. It may take some to find the right treatment for your  problem. Your treatment plan may include special therapies or medicines. Certain procedures or surgery may also be options. Be sure to discuss any questions you have with your provider.  When to seek medical advice  Call the healthcare provider right away if any of these occur:    Fever of 100.4 F (38 C) or higher, or as directed by your provider    Bladder pain or fullness    Abdominal swelling    Nausea or vomiting    Back pain    Weakness, dizziness or fainting  Date Last Reviewed: 10/1/2017    9415-7621 [employer]. [address] 1654167. All rights reserved. This information is not intended as a substitute for professional medical care. Always follow your healthcare professional's instructions.     Patient Education   Preventing Falls in the Home  As you get older, falls are more likely. Thats because your reaction time slows. Your muscles and joints may also get stiffer, making them less flexible. Illness, medications, and vision changes can also affect your balance. A fall could leave you unable to live on your own. To make your home safer, follow these tips:    Floors    Put nonskid pads under area rugs.    Remove throw rugs.    Replace worn floor coverings.    Tack carpets firmly to each step on carpeted stairs. Put nonskid strips on the edges of uncarpeted stairs.    Keep floors and stairs free of clutter and cords.    Arrange furniture so there are clear pathways.    Clean up any spills right away.    Bathrooms    Install grab bars in the tub or shower.    Apply nonskid strips or put a nonskid rubber mat in the tub or shower.    Sit on a bath chair to bathe.    Use bathmats with nonskid backing.    Lighting    Keep a flashlight in each room.    Put a nightlight along the pathway between the bedroom and the bathroom.    8886-1319 [employer]. [address] [place] [address]. All rights reserved. This information is not intended as a substitute for  professional medical care. Always follow your healthcare professional's instructions.           Advance Directive  Patients advance directive was discussed and I am comfortable with the patients wishes.  Patient Education   Personalized Prevention Plan  You are due for the preventive services outlined below.  Your care team is available to assist you in scheduling these services.  If you have already completed any of these items, please share that information with your care team to update in your medical record.  Health Maintenance   Topic Date Due   ? ZOSTER VACCINES (2 of 3) 03/29/2014   ? COLORECTAL CANCER SCREENING  08/21/2019   ? MEDICARE ANNUAL WELLNESS VISIT  09/17/2020   ? FALL RISK ASSESSMENT  09/18/2021   ? ADVANCE CARE PLANNING  04/15/2025   ? TD 18+ HE  05/19/2025   ? LIPID  09/18/2025   ? DEPRESSION ACTION PLAN  Completed   ? HEPATITIS C SCREENING  Completed   ? PNEUMOCOCCAL IMMUNIZATION 65+ LOW/MEDIUM RISK  Completed   ? DXA SCAN  Completed   ? INFLUENZA VACCINE RULE BASED  Completed   ? HEPATITIS B VACCINES  Aged Out

## 2021-06-20 NOTE — LETTER
Letter by Antoni Cortes PharmD at      Author: Antoni Cortes, Seda Service: -- Author Type: --    Filed:  Encounter Date: 2020 Status: (Other)             2020      Lashawn Ortega  [address] [place] [address]            Dear Lashawn Shannon     Thank you for talking with me on 2020 about your health and medications. [facility] MTM (Medication Therapy Management) program helps you understand your medications and use them safely.    Along with this letter are an action plan (Medication Action Plan) and a medication list (Personal Medication List). The action plan has steps you should take to help you get the best results from your medications. The medication list will help you keep track of your medications and how to use them the right way.       Have your action plan and medication list with you when you talk with your doctors, pharmacists, and other health care providers.    Ask your doctors, pharmacists, and other healthcare providers to update them at every visit.     Take your medication list with you if you go to the hospital or emergency room.     Give a copy of the action plan and medication list to your family or caregivers.     If you want to talk about this letter or any of the papers with it, please call [phone]. We look forward to working with you, your doctors, and other healthcare providers to help you stay healthy.    Sincerely,     Antoni Cortes    _  Medication Action Plan For Lashawn Ortega, : 1945     This action plan will help you get the best results from your medications if you:     1. Read What we talked about.   2. Take the steps listed in the What I need to do boxes.   3. Fill in What I did and when I did it.   4. Fill in My follow-up plan and Questions I want to ask.     Have this action plan with you when you talk with your doctors, pharmacists, and other healthcare providers in your care team. Share this with your family or caregivers too.     Date Prepared: 2/28/2020      What we talked about:  Naproxen can be hard on the stomach and kidneys and worsen blood pressure control. Your kidney function and blood pressure look great and the Naproxen doesn't seem to be worsening your reflux symptoms. It is likely okay to continue using this as needed. If you are concerned about these side effects, you could talk to your doctor about an alternative called Diclofenac. This is in the same family of medicines as Naproxen but it's applied directly to the skin so we have lower risk of side effects.      What I need to do:  Okay to use Naproxen two times a day as needed. Consider talking to your doctor about topical Diclofenac 1% gel used four times a day as needed.    What I did and when I did it:          What we talked about:  Diphenhydramine (generic for Benadryl) which is one of the ingredients in Percogesic is typically not recommend for adults over the age of 65 as it can cause side effects such as dry mouth, constipation, drowsiness, dizziness, confusion, and increased risk of falls. It's also not the most helpful option for pain control. The acetaminophen portion is likely safe to continue, but may work better if used on a schedule instead of using as needed.        What I need to do:  I recommend trying plain acetaminophen 325 mg 1 tab three times a day. If you need to take a dose of Percogesic for migraines, that will likely be okay. Avoid taking more than 1000 mg of Acetaminophen in a single dose and no more than 4000 mg per day.       What I did and when I did it:            My follow-up plan (add notes about next steps):            Questions I want to ask (include topics about medications or therapy):          If you have any questions about your action plan, call Antoni Cortes at [phone], Monday-Friday 8:00-4:30pm  _  This medication list was made for you after we talked. We also used information from your doctors chart.      Use blank rows to add  new medications. Then fill in the dates you started using them.     Cross out medications when you no longer use them. Then write the date and why you stopped using them.     Ask your doctors, pharmacists, and other healthcare providers to update this list at every visit.  Keep this list up-to-date with:  ? prescription medications  ? over the counter drugs  ? herbals  ? vitamins  ? minerals          If you go to the hospital or emergency room, take this list with you. Share this with your family or caregivers too.    Date Prepared: 2/28/2020      Allergies or side effects: codeine; erythromycin base; hydrocodone; tramadol; other environmental allergy; penicillins; retinol; shellfish containing products; sulfa (sulfonamide antibiotics); vitamin d3 complete [mv-mn-iron-fa-herbal cmplx#[id number]]      Medication: aloe vera Gel      How I use it: Apply 1 application topically 2 (two) times a day as needed.      Why I use it: Itching    Prescriber: PROVIDER, HISTORICAL      Date I started using it:     Date I stopped using it:       Why I stopped using it:          Medication: aspirin 81 MG EC tablet      How I use it: Take 1 tablet (81 mg total) by mouth daily.      Why I use it: Ischemic cerebrovascular accident (CVA) of frontal lobe (H)    Prescriber: Ana Rosenthal, DO      Date I started using it:     Date I stopped using it:       Why I stopped using it:          Medication: atorvastatin (LIPITOR) 40 MG tablet      How I use it: Take 1 tablet (40 mg total) by mouth at bedtime.      Why I use it: Ischemic cerebrovascular accident (CVA) of frontal lobe (H)    Prescriber: Ana Rosenthal,       Date I started using it:     Date I stopped using it:       Why I stopped using it:          Medication: dextromethorphan-guaiFENesin (ROBAFEN DM)  mg/5 mL liquid      How I use it: Take 5 mL by mouth every 12 (twelve) hours as needed.      Why I use it: Cough     Prescriber: PROVIDER, HISTORICAL      Date I started  using it:     Date I stopped using it:       Why I stopped using it:          Medication: diphenhydrAMINE-acetaminophen (PERCOGESIC) 12.5-325 mg Tab      How I use it: Take 1-2 tablets by mouth every 8 (eight) hours as needed.       Why I use it: Pain     Prescriber: PROVIDER, HISTORICAL      Date I started using it:     Date I stopped using it:       Why I stopped using it:          Medication: fluticasone propionate (FLONASE) 50 mcg/actuation nasal spray      How I use it: Apply 1-2 sprays into each nostril daily as needed for rhinitis.      Why I use it: Allergies     Prescriber: PROVIDER, HISTORICAL      Date I started using it:     Date I stopped using it:       Why I stopped using it:          Medication: gabapentin (NEURONTIN) 300 MG capsule      How I use it: Take 3 capsules three (3) times a day.      Why I use it: Spinal stenosis of lumbar region with neurogenic claudication; Lumbar disc herniation; Lumbar radiculitis    Prescriber: Carlos Alberto Worthington, DO      Date I started using it:     Date I stopped using it:       Why I stopped using it:          Medication: guaiFENesin-dextromethorphan (MUCINEX DM) 600-30 mg Tb12      How I use it: Take 1 tablet by mouth 2 (two) times a day as needed.      Why I use it: Cough     Prescriber: PROVIDER, HISTORICAL      Date I started using it:     Date I stopped using it:       Why I stopped using it:          Medication: lidocaine HCL (ASPERCREME, LIDOCAINE,) 4 % Crea      How I use it: Apply 1 application topically 4 (four) times a day as needed.      Why I use it: Pain     Prescriber: PROVIDER, HISTORICAL      Date I started using it:     Date I stopped using it:       Why I stopped using it:          Medication: naproxen (NAPROSYN) 500 MG tablet      How I use it: Take 1 tablet (500 mg total) by mouth 2 (two) times a day as needed. Please keep this Rx on file for the next RF.      Why I use it: Chronic midline low back pain with right-sided sciatica     Prescriber: Renzo Newell MD      Date I started using it:     Date I stopped using it:       Why I stopped using it:          Medication: neomycin-bacitracin-polymyxin (NEOSPORIN) ointment      How I use it: Apply 1 application topically 2 (two) times a day as needed.      Why I use it: Infection     Prescriber: PROVIDER, HISTORICAL      Date I started using it:     Date I stopped using it:       Why I stopped using it:          Medication: pantoprazole (PROTONIX) 40 MG tablet      How I use it: Take 1 tablet (40 mg total) by mouth daily.      Why I use it: Gastroesophageal reflux disease, esophagitis presence not specified    Prescriber: Renzo Newell MD      Date I started using it:     Date I stopped using it:       Why I stopped using it:          Medication: sodium chloride 0.9% SolP 100 mL with pembrolizumab 25 mg/mL Soln 200 mg      How I use it: Infuse 200 mg into a venous catheter every 21 days. Indications: malignant melanoma, a type of skin cancer      Why I use it: Skin Cancer     Prescriber: PROVIDER, HISTORICAL      Date I started using it:     Date I stopped using it:       Why I stopped using it:          Medication: tiZANidine (ZANAFLEX) 2 MG tablet      How I use it: Take 2 mg by mouth every 8 (eight) hours as needed.      Why I use it: Muscle Pain     Prescriber: PROVIDER, HISTORICAL      Date I started using it:     Date I stopped using it:       Why I stopped using it:          Medication: venlafaxine (EFFEXOR XR) 37.5 MG 24 hr capsule      How I use it: Take 1 capsule (37.5 mg total) by mouth daily.      Why I use it: Menopausal Hot Flushes    Prescriber: Renzo Newell MD      Date I started using it:     Date I stopped using it:       Why I stopped using it:          Medication:       How I use it:       Why I use it:     Prescriber:       Date I started using it:     Date I stopped using it:       Why I stopped using it:          Medication:       How I use it:       Why I use it:      Prescriber:       Date I started using it:     Date I stopped using it:       Why I stopped using it:          Medication:       How I use it:       Why I use it:     Prescriber:       Date I started using it:     Date I stopped using it:       Why I stopped using it:          Other Information:      If you have any questions about your medication list, call [phone]    According to the Paperwork Reduction Act of 1995, no persons are required to respond to a collection of information unless it displays a valid OMB control number. The valid OMB number for this information collection is [id number]. The time required to complete this information collection is estimated to average 37.76 minutes per response, including the time to review instructions, searching existing data resources, gather the data needed, and complete and review the information collection. If you have any comments concerning the accuracy of the time estimate(s) or suggestions for improving this form, please write to: CMS, Attn: MARITA Reports Clearance Officer, [address] [place] [phone].

## 2021-06-24 ENCOUNTER — COMMUNICATION - HEALTHEAST (OUTPATIENT)
Dept: PHYSICAL MEDICINE AND REHAB | Facility: CLINIC | Age: 76
End: 2021-06-24
Payer: COMMERCIAL

## 2021-06-25 ENCOUNTER — HOSPITAL ENCOUNTER (OUTPATIENT)
Dept: PHYSICAL MEDICINE AND REHAB | Facility: CLINIC | Age: 76
Discharge: HOME | End: 2021-06-25
Attending: PAIN MEDICINE
Payer: COMMERCIAL

## 2021-06-25 NOTE — PROGRESS NOTES
Progress Notes by Renzo Newell MD at 7/13/2017  1:15 PM     Author: Renzo Newell MD Service: -- Author Type: Physician    Filed: 7/23/2017  7:45 PM Encounter Date: 7/13/2017 Status: Signed    : Renzo Newell MD (Physician)              [facility]    Comprehensive and complex medical care - Chronic disease management - Shared decision making - Care coordination - Compassionate care    Patient advocacy - Rational deprescribing - Minimally disruptive medicine - Ethical focus - Customized care    Date of Service: 7/13/2017  Primary Provider: Renzo Newell MD    Patient Care Team:  Renzo Newell MD as PCP - General (Internal Medicine)     ______________________________________________________________________     Patient's Pharmacy:    [facility] [place] [facility] - [address] [facility] [address]  [address] [facility] [address]  [place] [facility] [place] [address]  Phone: [phone] Fax: [phone]     Patient's Insurance:    Payor: [employer] / Plan: [employer] FREEDOM / Product Type: COST PLAN /     ______________________________________________________________________      Routine physical - female, age 65 and older.    Lashawn Ortega is a 72 y.o. female coming in today for a routine physical.  She does have other issues outside the physical to discuss as well.    Past Medical History:   Diagnosis Date   ? GERD (gastroesophageal reflux disease)    ? LBP (low back pain)    ? Migraine headache    ? Nonsmoker      Social History     Social History   ? Marital status:      Spouse name: Darin   ? Number of children: 0   ? Years of education: N/A     Occupational History   ? Accounting Retired     Social History Main Topics   ? Smoking status: Never Smoker   ? Smokeless tobacco: Never Used   ? Alcohol use 0.0 - 0.6 oz/week     0 - 1 Glasses of wine per week   ? Drug use: None   ? Sexual activity: Not Asked     Other Topics Concern   ? None      Social History Narrative    , no children.        Patient of Dr. Newell since 2016.      Family History   Problem Relation Age of Onset   ? Pancreatic cancer Mother 82   ? Melanoma Father 75     Metastatic to colon   ? Breast cancer Neg Hx      Family history is otherwise noncontributory.    Current Outpatient Prescriptions   Medication Sig   ? diphenhydrAMINE (BENADRYL) 25 mg capsule Take 25 mg by mouth every 6 (six) hours as needed for itching.   ? estrogens, conjugated, (PREMARIN) 0.3 MG tablet TAKE ONE TABLET BY MOUTH EVERY DAY   ? fluticasone (FLONASE) 50 mcg/actuation nasal spray PLACE TWO SPRAYS INTO EACH NOSTRIL ONCE DAILY.  Please keep this Rx on file for the next RF.   ? multivitamin with minerals (THERA-M) 9 mg iron-400 mcg Tab tablet Take 1 tablet by mouth daily.   ? naproxen (NAPROSYN) 500 MG tablet Take 1 tablet (500 mg total) by mouth 2 (two) times a day as needed. Please keep this Rx on file for the next RF.   ? pantoprazole (PROTONIX) 40 MG tablet Take 1 tablet (40 mg total) by mouth daily.     Immunization History   Administered Date(s) Administered   ? DT (pediatric) 03/27/1998   ? Hep A, historic 05/14/2009, 05/19/2010   ? Pneumo Conj 13-V (2010&after) 05/19/2015   ? Pneumo Polysac 23-V 05/19/2010   ? Td, historic 05/14/2009   ? Tdap 05/19/2015   ? ZOSTER 02/01/2014     ______________________________________________________________________    History of present illness:    The patient comes in today for a number of issues.    She has a lesion on her right arm which is growing and becomes more irritated for her.  She had this a year ago.  She would like it removed if possible.    We reviewed her reflux disease and this is doing okay on the pantoprazole.    We reviewed her hot flushes and she would like to work off of the Premarin.  We reviewed on how this would go for her.  She is going to try to taper it at this point and see how she does.    We reviewed CODE STATUS with her and she  wishes to be full code.  She does have a healthcare directive on file.    We reviewed her knee pain.  This is a new problem for her.  It is in both knees.  She noticed it getting worse when she fell in December.  She fell in the house and fell directly on her knees.  Her left knee is worse in her right.  She has some pain over the quadricep as well especially on the right.  She has difficulty with kneeling, stairs, and first getting up out of a chair.  She's been using some naproxen for this 1-2 a day at times which seems to help.  Changes in humidity also seemed to make it worse for her.    Reviewed her migraine headaches and her migraines are doing okay at this point.    She does have a history of scoliosis and has had low back pain.  She's recently had some shooting left leg pain associated with this.  She is not having this too frequently.  It is not affecting her functioning.  It is not affecting her bowel or bladder function.  Her strength seems okay and she rather not do anything more with it yet at this time.     The 10-system review of systems and health history form for [facility] was completed by patient, reviewed by me, and pertinent problems are reviewed above.  She does get some numbness in her feet at times.  ______________________________________________________________________     Exam:  Wt Readings from Last 3 Encounters:   07/13/17 185 lb 12.8 oz (84.3 kg)   07/11/16 187 lb 9.6 oz (85.1 kg)   08/11/15 180 lb (81.6 kg)     BP Readings from Last 3 Encounters:   07/13/17 122/60   07/11/16 122/80   08/11/15 126/80     /60  Pulse 90  Temp 97.2  F (36.2  C) (Oral)   Wt 185 lb 12.8 oz (84.3 kg)  SpO2 98%  BMI 29.54 kg/m2    The patient is in no acute distress.  Mood good.  Insight good .  No skin lesions or nodules of concern.  She does have an irritated seborrheic keratosis which is fairly large on her right arm.  Ears are clear.  Nose is clear.  Throat is clear.  Neck is supple  and there is  no thyromegaly. No cervical, epitrochlear or axillary adenopathy.  Heart regular rate and rhythm.  There is no murmur present.  Lungs clear to auscultation bilaterally.  Respiratory effort is good.  Breast exam shows no nodules and no skin changes.  Abdomen is soft, nontender.  No hepatosplenomegaly.  No hernia appreciated.  Edema - none. Distal pulses strong.  Neuro exam shows normal strength in all muscle groups and normal reflexes.   Her knees show no signs of effusion but the pain is localized over the kneecap bilaterally.    ______________________________________________________________________    Diagnostics:    Results for orders placed or performed in visit on 07/11/16   HM2(CBC w/o Differential)   Result Value Ref Range    WBC 5.9 4.0 - 11.0 thou/uL    RBC 4.52 3.80 - 5.40 mill/uL    Hemoglobin 14.0 12.0 - 16.0 g/dL    Hematocrit 41.9 35.0 - 47.0 %    MCV 93 80 - 100 fL    MCH 31.0 27.0 - 34.0 pg    MCHC 33.5 32.0 - 36.0 g/dL    RDW 11.9 11.0 - 14.5 %    Platelets 237 140 - 440 thou/uL    MPV 6.9 (L) 7.0 - 10.0 fL   Sedimentation Rate   Result Value Ref Range    Sed Rate 3 0 - 20 mm/hr   H. pylori Antibody, IgG   Result Value Ref Range    H Pylori IgG 0.60 <1.00 TV   Glycosylated Hemoglobin A1c   Result Value Ref Range    Hemoglobin A1c 4.7 3.5 - 6.0 %   Thyroid Stimulating Hormone (TSH)   Result Value Ref Range    TSH 1.25 0.30 - 5.00 uIU/mL   Basic Metabolic Panel   Result Value Ref Range    Sodium 143 136 - 145 mmol/L    Potassium 3.5 3.5 - 5.0 mmol/L    Chloride 108 (H) 98 - 107 mmol/L    CO2 25 22 - 31 mmol/L    Anion Gap, Calculation 10 5 - 18 mmol/L    Glucose 99 70 - 125 mg/dL    Calcium 9.3 8.5 - 10.5 mg/dL    BUN 9 8 - 28 mg/dL    Creatinine 0.83 0.60 - 1.10 mg/dL    GFR MDRD Af Amer >60 >60 mL/min/1.73m2    GFR MDRD Non Af Amer >60 >60 mL/min/1.73m2   Lipid Cascade   Result Value Ref Range    Cholesterol 187 <=199 mg/dL    Triglycerides 160 (H) <=149 mg/dL    HDL Cholesterol 57 >=50 mg/dL    LDL  Calculated 98 <=129 mg/dL    Patient Fasting > 8hrs? Unknown    Vitamin B12   Result Value Ref Range    Vitamin B-12 407 213 - 816 pg/mL      ______________________________________________________________________     Assessment:    1. Routine physical examination    2. Menopausal hot flushes    3. GERD (gastroesophageal reflux disease)    4. SK (seborrheic keratosis)    5. Full code status    6. Bilateral knee pain    7. Low back pain radiating to left leg    8. Migraine headache    9. Low back pain         ______________________________________________________________________     ______________________________________________________________________    QUALITY REVIEW      There are no preventive care reminders to display for this patient.    History   Smoking Status   ? Never Smoker   Smokeless Tobacco   ? Never Used        PHQ-2 Total Score: 0 (7/23/2017  7:00 PM)  No Data Recorded     ______________________________________________________________________       Plan:      Full CODE STATUS.    Seborrheic keratosis treated with liquid nitrogen in a freeze thaw freeze technique.    Consider x-rays of the knees in the future if needed.    Refilled current medications.    She will try to taper off of the Premarin.    Use Naprosyn as needed for knee pain.    Consider further imaging of the back if needed in the future.    Renzo Newell MD  General Internal Medicine  [facility]    Return in about 1 year (around 7/13/2018), or if symptoms worsen or fail to improve.

## 2021-06-26 NOTE — PATIENT INSTRUCTIONS - HE
DISCHARGE INSTRUCTIONS    During office hours (8:00 a.m.- 4:00 p.m.) questions or concerns may be answered  by calling [facility] Nurses at  [phone].  Messages received after hours will be returned the following business day.      In the case of an emergency, please dial 911 or seek assistance at the nearest Emergency Room/Urgent Care facility.     All Patients:    ? You may experience an increase in your symptoms for the first 2 days (It may take anywhere between 2 days- 2 weeks for the steroid to have maximum effect).    ? You may use ice on the injection site, as frequently as 20 minutes each hour if needed.    ? You may take your pain medicine.    ? You may continue taking your regular medication after your injection.    ? You may shower. No swimming, tub bath or hot tub for 48 hours.  You may remove your bandaid/bandage as soon as you are home.    ? You may resume light activities, as tolerated.    ? Resume your usual diet as tolerated.        POSSIBLE STEROID SIDE EFFECTS (If steroid/cortisone was used for your procedure)    -If you experience these symptoms, it should only last for a short period      Swelling of the legs                Skin redness (flushing)       Mouth (oral) irritation     Blood sugar (glucose) levels              Sweats                      Mood changes    Headache    Sleeplessness    Weakened immune system for up to 14 days, which could increase the risk of dimas the COVID-19 virus and/or experiencing more severe symptoms of the disease, if exposed.    Decreased effectiveness of the flu vaccine if given within 2 weeks of the steroid.         POSSIBLE PROCEDURE SIDE EFFECTS  -Call the [facility] if you are concerned    Increased Pain             Increased numbness/tingling        Nausea/Vomiting            Bruising/bleeding at site        Redness or swelling                                                Difficulty walking        Weakness             Fever  greater than 100.5

## 2021-06-26 NOTE — PROGRESS NOTES
Progress Notes by Renzo Newell MD at 9/6/2018  1:40 PM     Author: Renzo Newell MD Service: -- Author Type: Physician    Filed: 9/7/2018  7:43 AM Encounter Date: 9/6/2018 Status: Signed    : Renzo Newell MD (Physician)              [facility]    Comprehensive and complex medical care - Chronic disease management - Shared decision making - Care coordination - Compassionate care    Patient advocacy - Rational deprescribing - Minimally disruptive medicine - Ethical focus - Customized care    ______________________________________________________________________     Date of Service: 9/6/2018  Primary Provider: Renzo Newell MD    Patient Care Team:  Renzo Newell MD as PCP - General (Internal Medicine)     ______________________________________________________________________     Patient's Pharmacy:    [facility] - [place], [place] - [address] [facility] [address]  [address] [facility] [address]  [facility] [place] [address]  Phone: [phone] Fax: [phone]     Patient's Contacts:  Name Home Phone Work Phone Mobile Phone Relationship Lgmichael HUMPHREY,[facility] [phone]   Spouse      Patient's Insurance:    Payor: [employer] / Plan: [employer] / Product Type: COST PLAN /     ______________________________________________________________________      Routine physical - female, age 65 and older    Lashawn Humphrey is a 73 y.o. female coming in today for a routine physical.  She does have other issues outside the physical to discuss as well.    Active Problem List:  Problem List as of 9/6/2018  Reviewed: 7/11/2016  9:42 AM by Renzo Newell MD       High    Full code status    Nonsmoker       Low    GERD (gastroesophageal reflux disease)    LBP (low back pain)    Menopausal hot flushes    Migraine headache           Past Medical History:   Diagnosis Date   ? GERD (gastroesophageal reflux disease)    ? LBP (low back pain)    ?  Migraine headache    ? Nonsmoker    ? NORMAL COLONOSCOPY - 2009 - Average risk      Past Surgical History:   Procedure Laterality Date   ? TOTAL ABDOMINAL HYSTERECTOMY W/ BILATERAL SALPINGOOPHORECTOMY  1989     Social History     Social History   ? Marital status:      Spouse name: Darin   ? Number of children: 0   ? Years of education: N/A     Occupational History   ? Accounting Retired     Social History Main Topics   ? Smoking status: Never Smoker   ? Smokeless tobacco: Never Used   ? Alcohol use 0.0 - 0.6 oz/week     0 - 1 Glasses of wine per week   ? Drug use: None   ? Sexual activity: Not Asked     Other Topics Concern   ? None     Social History Narrative    , no children.        Patient of Dr. Newell since 2016.       Family History   Problem Relation Age of Onset   ? Pancreatic cancer Mother 82   ? Melanoma Father 75     Metastatic to colon   ? Breast cancer Neg Hx      Family history is otherwise noncontributory.    Current Outpatient Prescriptions   Medication Sig   ? diphenhydrAMINE (BENADRYL) 25 mg capsule Take 25 mg by mouth every 6 (six) hours as needed for itching.   ? estrogens, conjugated, (PREMARIN) 0.3 MG tablet TAKE ONE TABLET BY MOUTH EVERY DAY   ? fluticasone (FLONASE) 50 mcg/actuation nasal spray PLACE TWO SPRAYS INTO EACH NOSTRIL ONCE DAILY.  Please keep this Rx on file for the next RF.   ? multivitamin with minerals (THERA-M) 9 mg iron-400 mcg Tab tablet Take 1 tablet by mouth daily.   ? pantoprazole (PROTONIX) 40 MG tablet TAKE ONE TABLET BY MOUTH EVERY DAY   ? naproxen (NAPROSYN) 500 MG tablet Take 1 tablet (500 mg total) by mouth 2 (two) times a day as needed. Please keep this Rx on file for the next RF.   ? venlafaxine (EFFEXOR-XR) 37.5 MG 24 hr capsule Take 1 capsule (37.5 mg total) by mouth daily.     Allergies: Codeine; Erythromycin base; Other environmental allergy; Other food allergy; Penicillins; Retinol; Shellfish containing products; Sulfa (sulfonamide  antibiotics); and Vitamin d3 complete [mv-mn-iron-fa-herbal cmplx#[id number]]     Immunization History   Administered Date(s) Administered   ? DT (pediatric) 03/27/1998   ? Hep A, Adult IM (19yr & older) 05/14/2009, 05/19/2010   ? Pneumo Conj 13-V (2010&after) 05/19/2015   ? Pneumo Polysac 23-V 05/19/2010   ? Td, Adult, Absorbed 05/14/2009   ? Tdap 05/19/2015   ? ZOSTER, LIVE 02/01/2014      ______________________________________________________________________    History of present illness:    Patient comes in today for routine physical and other issues.  She is accompanied by her  who has dementia.    We reviewed her hot flushes.  These have been worse.  She is off of estrogen at this time.  She would rather remain off of estrogen at this time.  She understands that there are risk with breast cancer and otherwise.  However, she is having issues with this and she has had a lot of problems with mood swings.  This has somewhat to do with the care of her .  She depends on him to drive as she has never learned to drive.  She does take care of a lot of the other issues around the house like finances and otherwise.  They are eventually looking at possible assisted living at [facility] in [facility].  She would like to do something to help with this.    She wonders about [facility] or [facility].    She is been having increasing anxiety.  She has become more irritable.  She says that this is due to her  situation.    We reviewed her joints and she has a lot of problems with joint pains still.  She notes that her neck bothers her, her low back, her shoulders, her knees.  Nothing is particularly bad that we need to do something more with this.  She is generally taking one Naprosyn a day which seems to help with this.    We reviewed her reflux disease and this is doing well at this time.  She is not troubled much with migraines at this point.    She has a lesion on her right arm and would like to have  "this looked at.  There is a family history of melanoma so she would like to see dermatology for this.     The 10-system review of systems and health history form for [facility] was completed by patient, reviewed by me, and pertinent problems are reviewed above.  ______________________________________________________________________     Exam:  Wt Readings from Last 3 Encounters:   09/06/18 195 lb 3.2 oz (88.5 kg)   07/13/17 185 lb 12.8 oz (84.3 kg)   07/11/16 187 lb 9.6 oz (85.1 kg)     BP Readings from Last 3 Encounters:   09/06/18 126/82   07/13/17 122/60   07/11/16 122/80     /82  Pulse 91  Ht 5' 6.25\" (1.683 m)  Wt 195 lb 3.2 oz (88.5 kg)  SpO2 97%  BMI 31.27 kg/m2    Patient declines an undressed exam for today's physical.   The patient is in no acute distress.  Mood good to slightly anxious.  Insight good .  There is an irregular mostly flesh-colored lesion of her right upper arm.  Ears are clear.  Nose is clear.  Throat is clear.  Neck is supple  and there is no thyromegaly. No cervical, epitrochlear or axillary adenopathy.  Heart regular rate and rhythm.  There is no murmur present.  Lungs clear to auscultation bilaterally.  Respiratory effort is good.  Breast exam deferred by patient.  Abdomen is soft, nontender.  No hepatosplenomegaly.  No hernia appreciated.  Edema - none.   Neuro exam shows normal strength in all muscle groups and normal reflexes.      MINI COG assessment    Assessment Results 9/6/2018   Activities of Daily Living No help needed   Instrumental Activities of Daily Living No help needed   Mini Cog Total Score 5   Some recent data might be hidden     A Mini-Cog score of 0-2 suggests the possibility of dementia, score of 3-5 suggests no dementia     ______________________________________________________________________    Diagnostics:    Results for orders placed or performed in visit on 09/06/18   HM2(CBC w/o Differential)   Result Value Ref Range    WBC 6.2 4.0 - 11.0 thou/uL    " RBC 4.64 3.80 - 5.40 mill/uL    Hemoglobin 13.9 12.0 - 16.0 g/dL    Hematocrit 41.6 35.0 - 47.0 %    MCV 90 80 - 100 fL    MCH 30.0 27.0 - 34.0 pg    MCHC 33.5 32.0 - 36.0 g/dL    RDW 12.1 11.0 - 14.5 %    Platelets 288 140 - 440 thou/uL    MPV 7.6 7.0 - 10.0 fL   Basic Metabolic Panel   Result Value Ref Range    Sodium 144 136 - 145 mmol/L    Potassium 4.0 3.5 - 5.0 mmol/L    Chloride 108 (H) 98 - 107 mmol/L    CO2 27 22 - 31 mmol/L    Anion Gap, Calculation 9 5 - 18 mmol/L    Glucose 86 70 - 125 mg/dL    Calcium 10.4 8.5 - 10.5 mg/dL    BUN 18 8 - 28 mg/dL    Creatinine 0.80 0.60 - 1.10 mg/dL    GFR MDRD Af Amer >60 >60 mL/min/1.73m2    GFR MDRD Non Af Amer >60 >60 mL/min/1.73m2   Sedimentation Rate   Result Value Ref Range    Sed Rate 9 0 - 20 mm/hr   C-Reactive Protein   Result Value Ref Range    CRP 0.6 0.0 - 0.8 mg/dL   Thyroid Stimulating Hormone (TSH)   Result Value Ref Range    TSH 0.91 0.30 - 5.00 uIU/mL      ______________________________________________________________________     Assessment:    1. Routine physical examination    2. Menopausal hot flushes    3. Fatigue    4. Multiple joint pain    5. Anxiety    6. NORMAL COLONOSCOPY - 2009 - Average risk    7. Skin lesion    8. Colonoscopy refused at this moment or other colon cancer screening      ______________________________________________________________________      ______________________________________________________________________    QUALITY REVIEW      Health Maintenance Due   Topic Date Due   ? INFLUENZA VACCINE RULE BASED (1) 08/01/2018       History   Smoking Status   ? Never Smoker   Smokeless Tobacco   ? Never Used        PHQ-2 Total Score: 0 (9/6/2018  1:00 PM)  No Data Recorded    Body mass index is 31.27 kg/(m^2).       ______________________________________________________________________     ______________________________________________________________________    Plan:    1. Check blood work today.  See relevant orders and  diagnosis associations at the bottom of this note.  2. Trial of low-dose Effexor for hot flushes and for mood swings.  Could increase in the future.  3. Encouraged to continue to look into other living options that would work with her 's dementia.  4. Referral to dermatology for skin check.  5. Could consider estrogen in the future if needed.  6. She will be scheduled for mammogram.  7. We talked about colon cancer options going into next year.  We talked about doing a cologuard ahead a time.  At this moment, she does not want to do anything and will discuss at next years physical.  8. Refilled Naprosyn.  Discussed GI and kidney side effects with her.    Renzo Newell MD  General Internal Medicine  [facility]     No Follow-up on file.     Future Appointments  Date Time Provider Department Center   9/13/2018 12:30 PM [facility]         ______________________________________________________________________     Relevant ICD-10 codes and order associations:      ICD-10-CM    1. Routine physical examination Z00.00    2. Menopausal hot flushes N95.1 HM2(CBC w/o Differential)     Thyroid Stimulating Hormone (TSH)   3. Fatigue R53.83 HM2(CBC w/o Differential)   4. Multiple joint pain M25.50 Basic Metabolic Panel     Sedimentation Rate     C-Reactive Protein   5. Anxiety F41.9    6. NORMAL COLONOSCOPY - 2009 - Average risk Z98.890    7. Skin lesion L98.9 Ambulatory referral to Dermatology   8. Colonoscopy refused at this moment or other colon cancer screening Z53.20

## 2021-06-27 NOTE — PROGRESS NOTES
Progress Notes by Renzo Newell MD at 4/19/2019  1:00 PM     Author: Renzo Newell MD Service: -- Author Type: Physician    Filed: 4/19/2019 10:00 PM Encounter Date: 4/19/2019 Status: Signed    : Renzo Newell MD (Physician)              [facility]    Comprehensive and complex medical care - Chronic disease management - Shared decision making - Care coordination - Compassionate care    Patient advocacy - Rational deprescribing - Minimally disruptive medicine - Ethical focus - Customized care    ______________________________________________________________________     Date of Service: 4/19/2019  Primary Provider: Renzo Newell MD    Patient Care Team:  Renzo Newell MD as PCP - General (Internal Medicine)     ______________________________________________________________________     Patient's Pharmacy:    [facility] [place] [facility] [place] [facility] [address] [place] [address]  [address] [place] [address]  [facility] [address]  Phone: [phone] Fax: [phone]    [facility] - [place], [place] - [facility] [facility]  [address]  [address]  [facility] [address]  Phone: [phone] Fax: [phone]     Patient's Contacts:  Name Home Phone Work Phone Mobile Phone Relationship Lgl Jany JACKSONON,DEYSI [phone] CAMPBELL Shields [phone]   Grecia      Patient's Insurance:    Payor: [employer] / Plan: [employer] MEDICARE ADVANTAGE / Product Type: MEDICARE ADVANTAGE /     ______________________________________________________________________     Lashawn Ortega is 74 y.o. female who comes in today for:    Chief Complaint   Patient presents with   ? Nevus     on right arm getting bigger and more red   ? Follow-up     back/leg pain is the same       Active Problem List:  Problem List as of 4/19/2019 Reviewed: 4/19/2019  9:52 PM by Renzo Newell MD High    Full code status     Nonsmoker       Low    GERD (gastroesophageal reflux disease)    LBP (low back pain)    Menopausal hot flushes    Migraine headache       Unprioritized    NORMAL COLONOSCOPY - 2009 - Average risk           Current Outpatient Medications   Medication Sig   ? cyclobenzaprine (FLEXERIL) 5 MG tablet Take 1 tablet (5 mg total) by mouth at bedtime.   ? diphenhydrAMINE (BENADRYL) 25 mg capsule Take 25 mg by mouth every 6 (six) hours as needed for itching.   ? fluticasone (FLONASE) 50 mcg/actuation nasal spray PLACE TWO SPRAYS INTO EACH NOSTRIL ONCE DAILY.  Please keep this Rx on file for the next RF.   ? multivitamin with minerals (THERA-M) 9 mg iron-400 mcg Tab tablet Take 1 tablet by mouth daily.   ? naproxen (NAPROSYN) 500 MG tablet Take 1 tablet (500 mg total) by mouth 2 (two) times a day as needed. Please keep this Rx on file for the next RF.   ? pantoprazole (PROTONIX) 40 MG tablet TAKE ONE TABLET BY MOUTH EVERY DAY   ? predniSONE (DELTASONE) 20 MG tablet Take 40 mg by mouth daily.   ? venlafaxine (EFFEXOR-XR) 37.5 MG 24 hr capsule Take 1 capsule (37.5 mg total) by mouth daily.   ? traMADol (ULTRAM) 50 mg tablet Take 1 tablet (50 mg total) by mouth 3 (three) times a day as needed for pain.     Social History     Social History Narrative    , no children.        Patient of Dr. Newell since 2016.      ______________________________________________________________________     History of present illness:    Patient comes in today . . .     For back pain which radiates down her right leg.    She has had history of back pain going down her left leg in 2014 and had a x-ray at that time which showed some degenerative changes but no other findings and she did not need a MRI scan of her lumbosacral spine.    She has had some back pain issues off and on in the past.    She was lifting something in February and felt a pop in her right back.  She had some pain at that time but nothing like this.    This pain has been going  on for about a week and half to 2 weeks.  It is in her right low back and radiates down her posterior lateral leg to the lateral leg in the lower leg.  She denies any numbness with it.  The pain is shooting.  It is worse with walking.  She was seen by Ce Castañeda prednisone and cyclobenzaprine.  Cyclobenzaprine does not seem to help.  Prednisone she feels helps her somewhat.  She denies any loss of gait on the right side and denies any bowel or bladder changes.  She does have chronic numbness over her left lateral leg and on meralgia paresthetica type pattern.  She denies any fevers, chills, night sweats.    She also has a right arm skin lesion which she would like dermatology to see but she has not gotten in yet.  I have encouraged her to have this looked at.    Reviewed colon cancer screening with her.  She does not want to go through further colon cancer screening.  She declines this in any form at this visit.    We gave her information related to Shingrix vaccination    She is spending a lot of time caring for her  even though he is in memory care.  He does have significant behavioral disturbance and can be violent at times.    We reviewed her other issues noted in the assessment but not specifically addressed in the HPI above.     On review of systems, the patient denies any chest pain or shortness of breath.    ______________________________________________________________________    Exam:    Wt Readings from Last 3 Encounters:   04/19/19 186 lb (84.4 kg)   09/06/18 195 lb 3.2 oz (88.5 kg)   07/13/17 185 lb 12.8 oz (84.3 kg)     BP Readings from Last 3 Encounters:   04/19/19 134/82   04/16/19 122/66   09/06/18 126/82     /82   Pulse 88   Wt 186 lb (84.4 kg)   SpO2 99%   BMI 29.80 kg/m     The patient is comfortable, no acute distress.  Mood good.  Insight good.  Eyes are nonicteric.  Neck is supple without mass.  No cervical adenopathy.  No thyromegaly. Heart regular rate and rhythm.  Lungs  clear to auscultation bilaterally.  Respiratory effort is good.  Extremities no edema.  Her right low back shows some muscle spasm.  Her straight leg raise on the right is slightly positive.  Her gait is good.  Her reflexes are equal.  Her strength is full in the lower extremities bilaterally.  The right arm shows a macular lesion which is best consistent with a seborrheic keratosis but she should make sure that there is nothing else going on here.    ______________________________________________________________________    Diagnostics:    Results for orders placed or performed in visit on 09/06/18   HM2(CBC w/o Differential)   Result Value Ref Range    WBC 6.2 4.0 - 11.0 thou/uL    RBC 4.64 3.80 - 5.40 mill/uL    Hemoglobin 13.9 12.0 - 16.0 g/dL    Hematocrit 41.6 35.0 - 47.0 %    MCV 90 80 - 100 fL    MCH 30.0 27.0 - 34.0 pg    MCHC 33.5 32.0 - 36.0 g/dL    RDW 12.1 11.0 - 14.5 %    Platelets 288 140 - 440 thou/uL    MPV 7.6 7.0 - 10.0 fL   Basic Metabolic Panel   Result Value Ref Range    Sodium 144 136 - 145 mmol/L    Potassium 4.0 3.5 - 5.0 mmol/L    Chloride 108 (H) 98 - 107 mmol/L    CO2 27 22 - 31 mmol/L    Anion Gap, Calculation 9 5 - 18 mmol/L    Glucose 86 70 - 125 mg/dL    Calcium 10.4 8.5 - 10.5 mg/dL    BUN 18 8 - 28 mg/dL    Creatinine 0.80 0.60 - 1.10 mg/dL    GFR MDRD Af Amer >60 >60 mL/min/1.73m2    GFR MDRD Non Af Amer >60 >60 mL/min/1.73m2   Sedimentation Rate   Result Value Ref Range    Sed Rate 9 0 - 20 mm/hr   C-Reactive Protein   Result Value Ref Range    CRP 0.6 0.0 - 0.8 mg/dL   Thyroid Stimulating Hormone (TSH)   Result Value Ref Range    TSH 0.91 0.30 - 5.00 uIU/mL     ______________________________________________________________________    Assessment:    1. Acute right-sided low back pain with right-sided sciatica    2. Colonoscopy refused    3. Skin lesion      ______________________________________________________________________     PHQ-2 Total Score: 0 (4/19/2019  1:00 PM)    No  data recorded  ______________________________________________________________________    Plan:    1. We talked about imaging, but the patient wants to put this off at this time.  She has no red flags at this point.  2. We will go ahead and order an MRI without even seeing her if she worsens.  She is scheduled for follow-up in 2 weeks to make sure she is making progress.  3. She was given tramadol for pain to see if this helps.  She should finish out the course of prednisone and she can stop the cyclobenzaprine if she wishes.  4. She declines colonoscopy.  She declines Cologuard or other stool based forms of testing.  5. Her symptoms best fit with an L5 radiculopathy.    Renzo Newell MD  General Internal Medicine  [facility]     Return in about 2 weeks (around 5/3/2019) for follow up visit.     Future Appointments   Date Time Provider Department Center   5/3/2019  1:25 PM Renzo Newell MD [facility]         ______________________________________________________________________     Relevant ICD-10 codes and order associations:      ICD-10-CM    1. Acute right-sided low back pain with right-sided sciatica M54.41 traMADol (ULTRAM) 50 mg tablet   2. Colonoscopy refused Z53.20    3. Skin lesion L98.9

## 2021-06-27 NOTE — PROGRESS NOTES
Progress Notes by Renzo Newell MD at 7/30/2019  9:40 AM     Author: Renzo Newell MD Service: -- Author Type: Physician    Filed: 7/31/2019  9:14 AM Encounter Date: 7/30/2019 Status: Signed    : Renzo Newell MD (Physician)              [facility]    Comprehensive and complex medical care - Chronic disease management - Shared decision making - Care coordination - Compassionate care    Patient advocacy - Rational deprescribing - Minimally disruptive medicine - Ethical focus - Customized care    ______________________________________________________________________     Date of Service: 7/30/2019  Primary Provider: Renzo Newell MD    Patient Care Team:  Renzo Newell MD as PCP - General (Internal Medicine)  Perez Padilla MD as Physician (Plastic Surgery)  Shoaib Gloria MD as Physician (Hematology and Oncology)  Bj Orantes MD as Physician (Dermatology)  Sadi Castano MD as Physician (General Surgery)     ______________________________________________________________________     Patient's Pharmacy:    [facility] - [place], [place] - [address] [facility] [address]  [address]  [facility] [address]  Phone: [phone] Fax: [phone]    [facility] - [place] - [address] [facility] [address]  [address] [facility] [address]  [facility] [facility] [place] [address]  Phone: [phone] Fax: [phone]     Patient's Contacts:  Name Home Phone Work Phone Mobile Phone Relationship Lgl DEYSI Saavedra [phone] Nisaranya BERRIOSCAMPBELL [phone]   Niece      Patient's Insurance:    Payor: MEDICARE / Plan: MEDICARE A AND B / Product Type: Medicare /   ______________________________________________________________________   ______________________________________________________________________     Lashawn LOU Shannon is a 74 y.o. female who comes in today for:    Chief Complaint    Patient presents with   ? Follow-up     biopsy, and back and ankle pain/swelling       Active Problem List:  Problem List as of 7/30/2019 Reviewed: 7/30/2019 10:51 PM by Renzo Newell MD       High    Full code status    Nonsmoker       Low    GERD (gastroesophageal reflux disease)    LBP (low back pain)    Menopausal hot flushes    Migraine headache    PN (peripheral neuropathy)       Unprioritized    Melanoma of right upper arm (H)    NORMAL COLONOSCOPY - 2009 - Average risk           Current Outpatient Medications   Medication Sig   ? fluticasone propionate (FLONASE) 50 mcg/actuation nasal spray PLACE TWO SPRAYS INTO EACH NOSTRIL ONCE DAILY.   ? naproxen (NAPROSYN) 500 MG tablet Take 1 tablet (500 mg total) by mouth 2 (two) times a day as needed. Please keep this Rx on file for the next RF.   ? ondansetron (ZOFRAN ODT) 4 MG disintegrating tablet Take 1 tablet (4 mg total) by mouth every 8 (eight) hours as needed for nausea.   ? pantoprazole (PROTONIX) 40 MG tablet Take 1 tablet (40 mg total) by mouth daily.   ? venlafaxine (EFFEXOR-XR) 37.5 MG 24 hr capsule Take 1 capsule (37.5 mg total) by mouth daily.     Social History     Social History Narrative    , no children.        Patient of Dr. Newell since 2016.          has severe dementia and current resides in memory care at the [facility].     ______________________________________________________________________     History of present illness:    Patient comes in today for multiple issues.    We mainly reviewed her back pain radiating into the right leg.  This has been still bad for her and worsens with ambulation.  She denies any numbness at this time in relationship to this.  No loss of strength.  Uses acetaminophen (Tylenol) to help manage the pain.  She is wondering what to do next and we reviewed less invasive to more invasive strategies.  She was thinking about seeing a chiropractor for this.  In the end, she would like to  try a epidural spinal injection (DORON) at [facility] to see if this helps her.    Aspercreme does help with her symptoms.    She is using her old physical therapy exercises from the past to try and manage this.  Little help.    Reviewed her melanoma and she will be starting chemotherapy soon.  Her recent femur biopsy results are reviewed.  We showed her the pictures of the femur lesion on her MRI scan.  We reviewed her spinal stenosis on the MRI scan as well.    Reviewed reflux and there are no issues of concern.     We reviewed her other issues noted in the assessment but not specifically addressed in the HPI above.     On review of systems, the patient denies any fever or chills or increasing weakness.    ______________________________________________________________________    Exam:    Wt Readings from Last 3 Encounters:   07/30/19 186 lb (84.4 kg)   07/01/19 186 lb (84.4 kg)   05/23/19 182 lb 3.2 oz (82.6 kg)     BP Readings from Last 3 Encounters:   07/30/19 128/74   07/01/19 134/72   05/23/19 132/74     /74   Pulse 91   Wt 186 lb (84.4 kg)   SpO2 97%   BMI 30.48 kg/m      The patient is comfortable, no acute distress.  Mood good.  Insight good.  Eyes are nonicteric.  Neck is supple without mass.  No cervical adenopathy.  No thyromegaly. Heart regular rate and rhythm.  Lungs clear to auscultation bilaterally.  Respiratory effort is good.  Extremities no edema.  Strength is equal in the lower extremities.  Gait is good at this time.    ______________________________________________________________________    Diagnostics:    Results for orders placed or performed during the hospital encounter of 07/11/19   POCT CREATININE   Result Value Ref Range    iSTAT Creatinine 0.8 0.6 - 1.1 mg/dL    iSTAT GFR MDRD Af Amer >60 >60 mL/min/1.73m2    iSTAT GFR MDRD Non Af Amer >60 >60 mL/min/1.73m2     ______________________________________________________________________    Assessment:    1. Lumbosacral  radiculopathy at L5    2. Gastroesophageal reflux disease, esophagitis presence not specified    3. Spinal stenosis, lumbar region, with neurogenic claudication    4. Melanoma of right upper arm (H)       ______________________________________________________________________     PHQ-2 Total Score: 0 (5/23/2019  9:00 AM)    No data recorded  ______________________________________________________________________     BMI Readings from Last 1 Encounters:   07/30/19 30.48 kg/m      ______________________________________________________________________       Plan:    1. Patient will be scheduled for epidural spinal injection (DORON) at L5 to see if this helps.  Clinical symptoms originate from this area.  2. Could do chiropractor if she wishes, but also reviewed physical therapy and orthopedics consult in relationship to this if needed.  3. Follow up oncology as scheduled.  We reviewed her pathology - they will make the final decision in relationship to treatment here.         Renzo Newell MD  General Internal Medicine  [facility]     Personal office fax - [phone]   Voice mail - [phone]  E-mail - [email or web address]      Return in about 2 months (around 9/30/2019) for follow up visit.     No future appointments.     ______________________________________________________________________    Relevant ICD-10 codes and order associations:      ICD-10-CM    1. Lumbosacral radiculopathy at L5 M54.17 IR Lumbar Epidural Steroid Injection   2. Gastroesophageal reflux disease, esophagitis presence not specified K21.9 pantoprazole (PROTONIX) 40 MG tablet   3. Spinal stenosis, lumbar region, with neurogenic claudication M48.062 IR Lumbar Epidural Steroid Injection   4. Melanoma of right upper arm (H) C43.61

## 2021-06-27 NOTE — PROGRESS NOTES
Progress Notes by Renzo Newell MD at 7/1/2019 10:05 AM     Author: Renzo Newell MD Service: -- Author Type: Physician    Filed: 7/1/2019  9:38 PM Encounter Date: 7/1/2019 Status: Signed    : Renzo Newell MD (Physician)              [facility]    Comprehensive and complex medical care - Chronic disease management - Shared decision making - Care coordination - Compassionate care    Patient advocacy - Rational deprescribing - Minimally disruptive medicine - Ethical focus - Customized care    ______________________________________________________________________     Date of Service: 7/1/2019  Primary Provider: Renzo Newell MD    Patient Care Team:  Renzo Newell MD as PCP - General (Internal Medicine)  Perez Padilla MD as Physician (Plastic Surgery)  Shoaib Gloria MD as Physician (Hematology and Oncology)  Bj Orantes MD as Physician (Dermatology)     ______________________________________________________________________     Patient's Pharmacy:    [facility] - [place], [place] - [facility] [facility]  [address]  [facility] [address]  Phone: [phone] Fax: [phone]    [facility] - [place], [place] - [address] [facility] [address]  [address] [facility] [address]  [facility] [address]  Phone: [phone] Fax: [phone]     Patient's Contacts:  Name Home Phone Work Phone Mobile Phone Relationship Lgl DEYSI Watts [phone] CAMPBELL Shields [phone]   Nisaranya      Patient's Insurance:    Payor: MEDICARE / Plan: MEDICARE A AND B / Product Type: Medicare /   ______________________________________________________________________   ______________________________________________________________________     Lashawn LOU Shannon is a 74 y.o. female who comes in today for:    Chief Complaint   Patient presents with   ? Back Pain   ? Leg Pain     left  pain going from ankle up leg       Active Problem List:  Problem List as of 7/1/2019 Reviewed: 7/1/2019  3:40 PM by Renzo Newell MD       High    Full code status    Nonsmoker       Low    GERD (gastroesophageal reflux disease)    LBP (low back pain)    Menopausal hot flushes    Migraine headache    PN (peripheral neuropathy)       Unprioritized    Melanoma of right upper arm (H)    NORMAL COLONOSCOPY - 2009 - Average risk           Current Outpatient Medications   Medication Sig   ? fluticasone propionate (FLONASE) 50 mcg/actuation nasal spray PLACE TWO SPRAYS INTO EACH NOSTRIL ONCE DAILY.   ? ondansetron (ZOFRAN ODT) 4 MG disintegrating tablet Take 1 tablet (4 mg total) by mouth every 8 (eight) hours as needed for nausea.   ? pantoprazole (PROTONIX) 40 MG tablet TAKE ONE TABLET BY MOUTH EVERY DAY   ? venlafaxine (EFFEXOR-XR) 37.5 MG 24 hr capsule Take 1 capsule (37.5 mg total) by mouth daily.   ? naproxen (NAPROSYN) 500 MG tablet Take 1 tablet (500 mg total) by mouth 2 (two) times a day as needed. Please keep this Rx on file for the next RF.     Social History     Social History Narrative    , no children.        Patient of Dr. Newell since 2016.      ______________________________________________________________________     History of present illness:    Follow up multiple issues.    First in for follow up of her back pain radiating to the right leg.  This has continued to bother her for 3 months.  Wants to look at it further.  Goes down the lateral leg on the right.  No new weakness.  Gait is okay.  No new numbness.  Has had some chronic left lateral thigh numbness and she would like to look into this as well.    Reviewed her recent diagnosis of melanoma.  She has had resection and sentinel lymph node did show a micrometastasis.  Will be undergoing biologic chemotherapy through [facility].  She understands the PLAN OF CARE well.    Did have a PET scan with her work up and had a abnormal  finding on the distal femur.  Will have a MRI scan of this area as well.  No symptoms related to this.    We reviewed her other issues noted in the assessment but not specifically addressed in the HPI above.     On review of systems, the patient denies any chest pain or shortness of breath.    ______________________________________________________________________    Exam:    Wt Readings from Last 3 Encounters:   07/01/19 186 lb (84.4 kg)   05/23/19 182 lb 3.2 oz (82.6 kg)   05/03/19 184 lb 12.8 oz (83.8 kg)     BP Readings from Last 3 Encounters:   07/01/19 134/72   05/23/19 132/74   05/03/19 124/78     /72   Pulse 89   Wt 186 lb (84.4 kg)   SpO2 98%   BMI 30.48 kg/m      The patient is comfortable, no acute distress.  Mood good.  Insight good.  Eyes are nonicteric.  Neck is supple without mass.  No cervical adenopathy.  No thyromegaly. Heart regular rate and rhythm.  Lungs clear to auscultation bilaterally.  Respiratory effort is good.  Abdomen soft and nontender.  No hepatosplenomegaly.  Extremities no edema.  Right arm is healing well.  Straight leg raise is positive on the right.  Strength in bilateral lower extremities is strong and gait is not affected.    ______________________________________________________________________    Diagnostics:    Results for orders placed or performed in visit on 05/23/19   HM2(CBC w/o Differential)   Result Value Ref Range    WBC 5.2 4.0 - 11.0 thou/uL    RBC 4.60 3.80 - 5.40 mill/uL    Hemoglobin 14.0 12.0 - 16.0 g/dL    Hematocrit 41.6 35.0 - 47.0 %    MCV 90 80 - 100 fL    MCH 30.4 27.0 - 34.0 pg    MCHC 33.6 32.0 - 36.0 g/dL    RDW 12.5 11.0 - 14.5 %    Platelets 313 140 - 440 thou/uL    MPV 7.0 7.0 - 10.0 fL   Basic Metabolic Panel   Result Value Ref Range    Sodium 143 136 - 145 mmol/L    Potassium 4.2 3.5 - 5.0 mmol/L    Chloride 107 98 - 107 mmol/L    CO2 27 22 - 31 mmol/L    Anion Gap, Calculation 9 5 - 18 mmol/L    Glucose 96 70 - 125 mg/dL    Calcium 10.3  8.5 - 10.5 mg/dL    BUN 14 8 - 28 mg/dL    Creatinine 0.81 0.60 - 1.10 mg/dL    GFR MDRD Af Amer >60 >60 mL/min/1.73m2    GFR MDRD Non Af Amer >60 >60 mL/min/1.73m2   Electrocardiogram Perform and Read   Result Value Ref Range    SYSTOLIC BLOOD PRESSURE  mmHg    DIASTOLIC BLOOD PRESSURE  mmHg    VENTRICULAR RATE 83 BPM    ATRIAL RATE 83 BPM    P-R INTERVAL 154 ms    QRS DURATION 76 ms    Q-T INTERVAL 378 ms    QTC CALCULATION (BEZET) 444 ms    P Axis 50 degrees    R AXIS 16 degrees    T AXIS 88 degrees    MUSE DIAGNOSIS       Normal sinus rhythm  Low voltage QRS  T wave abnormality, consider anterior ischemia  Abnormal ECG  When compared with ECG of 31-JUL-2006 16:11,  Nonspecific T wave abnormality no longer evident in Inferior leads  Confirmed by JANET JOINER MD LOC:[facility] ([id number]) on 5/23/2019 2:46:10 PM       ______________________________________________________________________    Assessment:    1. Chronic midline low back pain with right-sided sciatica    2. Left leg numbness    3. Melanoma of right upper arm (H)    4. Abnormal positron emission tomography (PET) scan       ______________________________________________________________________     PHQ-2 Total Score: 0 (5/23/2019  9:00 AM)    No data recorded  ______________________________________________________________________     BMI Readings from Last 1 Encounters:   07/01/19 30.48 kg/m      ______________________________________________________________________       Plan:    1. MRI scan lumbosacral spine.  2. MRI scan left femur.  3. Further treatment from there.  4. Request formal PET scan result as we never received.  5. Follow up oncology as scheduled.         Renzo Newell MD  General Internal Medicine  [facility] [[email or web address]]     Personal office fax - [phone]   Voice mail - [phone]  E-mail - [email or web address]      Return in about 6 months (around 1/1/2020), or if symptoms worsen or fail to improve.     Future Appointments   Date  Time Provider Department [place]   7/11/2019  3:00 PM JN MR 1 JN MRI JN   7/11/2019  4:00 PM JN MR 1 JN MRI JN        ______________________________________________________________________    Relevant ICD-10 codes and order associations:      ICD-10-CM    1. Chronic midline low back pain with right-sided sciatica M54.41 naproxen (NAPROSYN) 500 MG tablet    G89.29 MR Lumbar Spine Without Contrast   2. Left leg numbness R20.0    3. Melanoma of right upper arm (H) C43.61    4. Abnormal positron emission tomography (PET) scan R94.8

## 2021-06-27 NOTE — PROGRESS NOTES
Progress Notes by Renzo Newell MD at 5/23/2019 10:00 AM     Author: Renzo Newell MD Service: -- Author Type: Physician    Filed: 5/23/2019 11:04 AM Encounter Date: 5/23/2019 Status: Signed    : Renzo Newell MD (Physician)              [facility]    Comprehensive and complex medical care - Chronic disease management - Shared decision making - Care coordination - Compassionate care    Patient advocacy - Rational deprescribing - Minimally disruptive medicine - Ethical focus - Customized care    ______________________________________________________________________     Date of Service: 5/23/2019  Primary Provider: Renzo Newell MD    Patient Care Team:  Renzo Newell MD as PCP - General (Internal Medicine)     ______________________________________________________________________     Patient's Pharmacy:    [address] [address]  Phone: [phone] Fax: [phone]     Patient's Contacts:  Name Home Phone Work Phone Mobile Phone Relationship Lg Gryumiko JACKSONONDEYSI [phone] CAMPBELL Shields [phone]   Grecia      Patient's Insurance:    Payor: [employer] / Plan: [employer] / Product Type: PPO/POS/FFS /   ______________________________________________________________________   ______________________________________________________________________     Preoperative examination    Lashawn Ortega is a 74 y.o. female (1945) who I am asked to see by her surgeon regarding her fitness for upcoming surgery.  LMP:  No LMP recorded. Patient has had a hysterectomy.    Chief Complaint   Patient presents with   ? Pre-op Exam     5/28/19, melanomal removal, Dr. Padilla, froy     ______________________________________________________________________    History of present illness:    First reviewed her new diagnosis of  Telephone Encounter by Jennifer Tse RN at 18 04:51 PM     Author:  Jennifer Tse RN Service:  (none) Author Type:  Registered Nurse-Ancillary     Filed:  18 04:51 PM Encounter Date:  2018 Status:  Signed     :  Jennifer Tse RN (Registered Nurse-Ancillary)            [YR1.1T] Ailyn[YR1.1M], [facility] is updating order as current order[YR1.1T] will  soon[YR1.1M].  Please confirm the order below and [facility] staff will enter updated order.  Thanks in advance and please route your response to [facility] CMA pool.    Anticoag Order Information 2017   ANTICOAG CLI -   ACC REFERRAL INDICATION FOR ANTICOAGULATION PROSTHETIC VALVE   Comment -   ACC REFERRAL DATE OF EVENT 2011   ACC REFERRAL INTENDED INR -   Comment -   ACC REFERRAL INTENDED DURATION TX LONG TERM-TO BE RENEWED ANNUALLY   Comment Per Dr. Robertson     Anticoagulation Episode Summary     Current INR goal 2.0-3.0[YR1.1T]              Revision History        User Key Date/Time User Provider Type Action    > YR1.1 18 04:51 PM Jennifer Tse, RN Registered Nurse-Ancillary Sign    M - Manual, T - Template             melanoma.  Dermatology notes are still pending at this time.  Will be having definitive resection at [facility].    Reviewed her right sided radiculopathy.  This continues to bother her but is improved.  No weakness associated with this and no new numbness.  Needs a refill of her hydrocodone today.  She uses 1-2 a day for the pain.  She is not taking the naproxen (Aleve) at this time and I have advised her not to take before surgery.    Her 's dementia has worsened lately.    Reviewed peripheral neuropathy and there are no major pain issues. Goes bilaterally from the toes to the balls of both feet.  Likely has some toenail deformity due to this problem.    Reviewed reflux and there are no issues of concern.     Reviewed her hot flushes and these are doing okay.  We discussed current medication for this and possible cessation.     We reviewed her other issues noted in the assessment but not specifically addressed in the HPI above.   ______________________________________________________________________     Patient Active Problem List   Diagnosis   ? GERD (gastroesophageal reflux disease)   ? Migraine headache   ? LBP (low back pain)   ? Nonsmoker   ? Menopausal hot flushes   ? Full code status   ? NORMAL COLONOSCOPY - 2009 - Average risk   ? PN (peripheral neuropathy)     Past Surgical History:   Procedure Laterality Date   ? TOTAL ABDOMINAL HYSTERECTOMY W/ BILATERAL SALPINGOOPHORECTOMY  1989      Social History     Socioeconomic History   ? Marital status:      Spouse name: Darin   ? Number of children: 0   ? Years of education: None   ? Highest education level: None   Occupational History   ? Occupation: Accounting     Employer: RETIRED   Social Needs   ? Financial resource strain: None   ? Food insecurity:     Worry: None     Inability: None   ? Transportation needs:     Medical: None     Non-medical: None   Tobacco Use   ? Smoking status: Never Smoker   ? Smokeless tobacco: Never  Used   Substance and Sexual Activity   ? Alcohol use: Yes     Alcohol/week: 0.0 - 0.6 oz   ? Drug use: None   ? Sexual activity: None   Lifestyle   ? Physical activity:     Days per week: None     Minutes per session: None   ? Stress: None   Relationships   ? Social connections:     Talks on phone: None     Gets together: None     Attends Rastafari service: None     Active member of club or organization: None     Attends meetings of clubs or organizations: None     Relationship status: None   ? Intimate partner violence:     Fear of current or ex partner: None     Emotionally abused: None     Physically abused: None     Forced sexual activity: None   Other Topics Concern   ? None   Social History Narrative    , no children.        Patient of Dr. Newell since 2016.       Family History   Problem Relation Age of Onset   ? Pancreatic cancer Mother 82   ? Melanoma Father 75        Metastatic to colon   ? Breast cancer Neg Hx       Family history is noncontributory otherwise.    Allergies: Codeine; Erythromycin base; Other environmental allergy; Other food allergy; Penicillins; Retinol; Shellfish containing products; Sulfa (sulfonamide antibiotics); and Vitamin d3 complete [mv-mn-iron-fa-herbal cmplx#[id number]]     Current medications:  Current Outpatient Medications   Medication Sig   ? fluticasone propionate (FLONASE) 50 mcg/actuation nasal spray PLACE TWO SPRAYS INTO EACH NOSTRIL ONCE DAILY.   ? HYDROcodone-acetaminophen 5-325 mg per tablet Take 1 tablet by mouth 3 (three) times a day as needed for pain.   ? naproxen (NAPROSYN) 500 MG tablet Take 1 tablet (500 mg total) by mouth 2 (two) times a day as needed. Please keep this Rx on file for the next RF.   ? ondansetron (ZOFRAN ODT) 4 MG disintegrating tablet Take 1 tablet (4 mg total) by mouth every 8 (eight) hours as needed for nausea.   ? pantoprazole (PROTONIX) 40 MG tablet TAKE ONE TABLET BY MOUTH EVERY DAY   ? venlafaxine (EFFEXOR-XR) 37.5 MG 24 hr capsule Take  "1 capsule (37.5 mg total) by mouth daily.       Surgery specific questions:    Anesthesia/family history of complications: No Except for severe nausea or vomiting with anesthesia with some vertigo symptoms.  With hysterectomy and with colonoscopies.  History of abnormal bleeding: No  Can patient walk a flight of stairs without stopping: Yes  Any chance the patient could be pregnant: No    Review of systems:    On ROS, the patient denies any chest pain or pressure.  No shortness of breath.   ______________________________________________________________________    Exam:    Wt Readings from Last 3 Encounters:   05/23/19 182 lb 3.2 oz (82.6 kg)   05/03/19 184 lb 12.8 oz (83.8 kg)   04/19/19 186 lb (84.4 kg)     BP Readings from Last 3 Encounters:   05/23/19 132/74   05/03/19 124/78   04/19/19 134/82      /74   Pulse 92   Temp 98.1  F (36.7  C) (Oral)   Ht 5' 5.5\" (1.664 m)   Wt 182 lb 3.2 oz (82.6 kg)   SpO2 96%   BMI 29.86 kg/m       Patient is in no acute distress.  Mood good.  Insight good.  Eyes nonicteric.  Pupils equal.  Ears clear.  Nose clear.  Throat clear.  Neck is supple.  No cervical adenopathy.  No thyromegaly.  Heart is regular rate and rhythm.  Lungs clear to auscultation bilaterally.  Respiratory effort is good.  Abdomen is soft, nontender, no hepatosplenomegaly.  Extremities no edema.  Her right ar wound was checked today and has routine healing.    ______________________________________________________________________    Diagnostics:    Results for orders placed or performed in visit on 05/23/19   HM2(CBC w/o Differential)   Result Value Ref Range    WBC 5.2 4.0 - 11.0 thou/uL    RBC 4.60 3.80 - 5.40 mill/uL    Hemoglobin 14.0 12.0 - 16.0 g/dL    Hematocrit 41.6 35.0 - 47.0 %    MCV 90 80 - 100 fL    MCH 30.4 27.0 - 34.0 pg    MCHC 33.6 32.0 - 36.0 g/dL    RDW 12.5 11.0 - 14.5 %    Platelets 313 140 - 440 thou/uL    MPV 7.0 7.0 - 10.0 fL   Electrocardiogram Perform and Read   Result Value " Ref Range    SYSTOLIC BLOOD PRESSURE  mmHg    DIASTOLIC BLOOD PRESSURE  mmHg    VENTRICULAR RATE 83 BPM    ATRIAL RATE 83 BPM    P-R INTERVAL 154 ms    QRS DURATION 76 ms    Q-T INTERVAL 378 ms    QTC CALCULATION (BEZET) 444 ms    P Axis 50 degrees    R AXIS 16 degrees    T AXIS 88 degrees    MUSE DIAGNOSIS       Normal sinus rhythm  T wave abnormality, consider anterior ischemia  Abnormal ECG  When compared with ECG of 31-JUL-2006 16:11,  Nonspecific T wave abnormality no longer evident in Inferior leads       Chem 8 results will be back later today.    Anterior t wave changes were present in 2006.  ______________________________________________________________________     Impression:    1. Preoperative cardiovascular examination    2. Melanoma of upper arm, right (H)    3. Chronic rhinitis    4. Nonsmoker    5. Full code status    6. Gastroesophageal reflux disease, esophagitis presence not specified    7. Right-sided low back pain with right-sided sciatica, unspecified chronicity    8. PONV (postoperative nausea and vomiting)    9. Idiopathic peripheral neuropathy      ______________________________________________________________________     PHQ-2 Total Score: 0 (5/23/2019  9:00 AM)    No data recorded    ______________________________________________________________________     Recommendations:    1. Patient is okay to proceed with surgery as planned.  2. Check blood work today.  See relevant orders and diagnosis associations at the bottom of this note.   3. Given refill of hydrocodone for the back and right leg pain.  4. May take medications on the am of surgery with a sip of water.  5. Given ondansetron (Zofran) for postop nausea and vomiting.         Renzo Newell MD  General Internal Medicine  [facility]     Personal office fax - [phone]   Voice mail - [phone]  E-mail - [email or web address]      Return in about 6 months (around 11/23/2019), or if symptoms worsen or fail to  improve.     No future appointments.     ______________________________________________________________________     Relevant ICD-10 codes and order associations:      ICD-10-CM    1. Preoperative cardiovascular examination Z01.810 Electrocardiogram Perform and Read     HM2(CBC w/o Differential)     Basic Metabolic Panel   2. Melanoma of upper arm, right (H) C43.61 HM2(CBC w/o Differential)     Basic Metabolic Panel   3. Chronic rhinitis J31.0 fluticasone propionate (FLONASE) 50 mcg/actuation nasal spray   4. Nonsmoker Z78.9    5. Full code status Z78.9    6. Gastroesophageal reflux disease, esophagitis presence not specified K21.9    7. Right-sided low back pain with right-sided sciatica, unspecified chronicity M54.41 HYDROcodone-acetaminophen 5-325 mg per tablet   8. PONV (postoperative nausea and vomiting) R11.2 ondansetron (ZOFRAN ODT) 4 MG disintegrating tablet    Z98.890    9. Idiopathic peripheral neuropathy G60.9

## 2021-06-27 NOTE — PROGRESS NOTES
Progress Notes by Renzo Newell MD at 5/3/2019  1:25 PM     Author: Renzo Newell MD Service: -- Author Type: Physician    Filed: 5/4/2019  9:58 PM Encounter Date: 5/3/2019 Status: Signed    : Renzo Newell MD (Physician)              [facility]    Comprehensive and complex medical care - Chronic disease management - Shared decision making - Care coordination - Compassionate care    Patient advocacy - Rational deprescribing - Minimally disruptive medicine - Ethical focus - Customized care    ______________________________________________________________________     Date of Service: 5/3/2019  Primary Provider: Renzo Newell MD    Patient Care Team:  Renzo Newell MD as PCP - General (Internal Medicine)     ______________________________________________________________________     Patient's Pharmacy:    [facility] - [facility] [address] - [address] [place] [address]  [address] [place] [address]  [facility] [address]  Phone: [phone] Fax: [phone]    [facility] - [place], [place] - [facility]  [address]  [address]  [facility] [address]  Phone: [phone] Fax: [phone]     Patient's Contacts:  Name Home Phone Work Phone Mobile Phone Relationship Lgl Jany JACKSONMARIA TERESA MONTOYAE [phone] CAMPBELL Shields [phone]   Grecia      Patient's Insurance:    Payor: [employer] / Plan: [employer] MEDICARE ADVANTAGE / Product Type: MEDICARE ADVANTAGE /     ______________________________________________________________________     Lashawn Ortega is 74 y.o. female who comes in today for:    Chief Complaint   Patient presents with   ? Follow-up     back pain right lower back radiates down posterior lateral leg       Active Problem List:  Problem List as of 5/3/2019 Reviewed: 5/3/2019  2:24 PM by Renzo Newell MD       High    Full code status    Nonsmoker       Low     GERD (gastroesophageal reflux disease)    LBP (low back pain)    Menopausal hot flushes    Migraine headache       Unprioritized    NORMAL COLONOSCOPY - 2009 - Average risk           Current Outpatient Medications   Medication Sig   ? diphenhydrAMINE (BENADRYL) 25 mg capsule Take 25 mg by mouth every 6 (six) hours as needed for itching.   ? fluticasone (FLONASE) 50 mcg/actuation nasal spray PLACE TWO SPRAYS INTO EACH NOSTRIL ONCE DAILY.  Please keep this Rx on file for the next RF.   ? multivitamin with minerals (THERA-M) 9 mg iron-400 mcg Tab tablet Take 1 tablet by mouth daily.   ? naproxen (NAPROSYN) 500 MG tablet Take 1 tablet (500 mg total) by mouth 2 (two) times a day as needed. Please keep this Rx on file for the next RF.   ? pantoprazole (PROTONIX) 40 MG tablet TAKE ONE TABLET BY MOUTH EVERY DAY   ? venlafaxine (EFFEXOR-XR) 37.5 MG 24 hr capsule Take 1 capsule (37.5 mg total) by mouth daily.   ? HYDROcodone-acetaminophen 5-325 mg per tablet Take 1 tablet by mouth 3 (three) times a day as needed for pain.     Social History     Social History Narrative    , no children.        Patient of Dr. Newell since 2016.      ______________________________________________________________________     History of present illness:    Mainly in today to review her back pain radiating to her right leg.  It continues to bother her but she is not sure she still wants to do more with it at this time.  No fever or chills or night sweats or bowel or bladder incontinece.  Mainly goes down the back of the right leg at this time.  She would say it is about the same as it was 2 weeks ago.  Using acetaminophen (Tylenol) for the pain.  Did take tramadol (Ultram) but this made the patient have nausea or vomiting.  Has numbness on the right side.  No weakness in the leg.  Did have some left leg pain for a short while going down the back of the leg to the knee but nothing more at this time.      Would like to try another pain  medication other than naproxen (Aleve) or acetaminophen (Tylenol) especially at night for sleep.    We reviewed her other issues noted in the assessment but not specifically addressed in the HPI above.     On review of systems, the patient denies any neck pain or arm pain.  No fever or chills.    ______________________________________________________________________    Exam:    Wt Readings from Last 3 Encounters:   05/03/19 184 lb 12.8 oz (83.8 kg)   04/19/19 186 lb (84.4 kg)   09/06/18 195 lb 3.2 oz (88.5 kg)     BP Readings from Last 3 Encounters:   05/03/19 124/78   04/19/19 134/82   04/16/19 122/66     /78   Pulse 89   Wt 184 lb 12.8 oz (83.8 kg)   SpO2 97%   BMI 29.60 kg/m     The patient is comfortable, no acute distress.  Mood good.  Insight good.  Eyes are nonicteric.  Neck is supple without mass.  No cervical adenopathy.  No thyromegaly. Heart regular rate and rhythm.  Lungs clear to auscultation bilaterally.  Respiratory effort is good.  Straight leg raise on both sides is negative.  Strength and reflexes are good on both legs.  Gait is slightly slow but able to walk well.   ______________________________________________________________________    Diagnostics:    Results for orders placed or performed in visit on 09/06/18   HM2(CBC w/o Differential)   Result Value Ref Range    WBC 6.2 4.0 - 11.0 thou/uL    RBC 4.64 3.80 - 5.40 mill/uL    Hemoglobin 13.9 12.0 - 16.0 g/dL    Hematocrit 41.6 35.0 - 47.0 %    MCV 90 80 - 100 fL    MCH 30.0 27.0 - 34.0 pg    MCHC 33.5 32.0 - 36.0 g/dL    RDW 12.1 11.0 - 14.5 %    Platelets 288 140 - 440 thou/uL    MPV 7.6 7.0 - 10.0 fL   Basic Metabolic Panel   Result Value Ref Range    Sodium 144 136 - 145 mmol/L    Potassium 4.0 3.5 - 5.0 mmol/L    Chloride 108 (H) 98 - 107 mmol/L    CO2 27 22 - 31 mmol/L    Anion Gap, Calculation 9 5 - 18 mmol/L    Glucose 86 70 - 125 mg/dL    Calcium 10.4 8.5 - 10.5 mg/dL    BUN 18 8 - 28 mg/dL    Creatinine 0.80 0.60 - 1.10 mg/dL     GFR MDRD Af Amer >60 >60 mL/min/1.73m2    GFR MDRD Non Af Amer >60 >60 mL/min/1.73m2   Sedimentation Rate   Result Value Ref Range    Sed Rate 9 0 - 20 mm/hr   C-Reactive Protein   Result Value Ref Range    CRP 0.6 0.0 - 0.8 mg/dL   Thyroid Stimulating Hormone (TSH)   Result Value Ref Range    TSH 0.91 0.30 - 5.00 uIU/mL     ______________________________________________________________________    Assessment:    1. Right-sided low back pain with right-sided sciatica, unspecified chronicity    2. Nausea      ______________________________________________________________________     PHQ-2 Total Score: 0 (4/19/2019  1:00 PM)    No data recorded  ______________________________________________________________________    Plan:    1. Patient declined MRI scan at this point but will consider if worsens.  2. Hydrocodone for pain along with her other medications.  3. Consider physical therapy if needed.  Will do home exercises.  4. Update me by MyChart.    Renzo Newell MD  General Internal Medicine  [facility]     Return in about 1 month (around 5/31/2019).     No future appointments.      ______________________________________________________________________     Relevant ICD-10 codes and order associations:      ICD-10-CM    1. Right-sided low back pain with right-sided sciatica, unspecified chronicity M54.41 HYDROcodone-acetaminophen 5-325 mg per tablet   2. Nausea R11.0       25 minutes or greater were spent with the patient today with greater than 50% of the times spent in counseling and coordination of care.

## 2021-06-28 ENCOUNTER — COMMUNICATION - HEALTHEAST (OUTPATIENT)
Dept: INTERNAL MEDICINE | Facility: CLINIC | Age: 76
End: 2021-06-28

## 2021-06-28 DIAGNOSIS — K21.9 GASTROESOPHAGEAL REFLUX DISEASE: ICD-10-CM

## 2021-06-28 RX ORDER — PANTOPRAZOLE SODIUM 40 MG/1
80 TABLET, DELAYED RELEASE ORAL DAILY
Qty: 180 TABLET | Refills: 3 | Status: SHIPPED | OUTPATIENT
Start: 2021-06-28 | End: 2022-07-06

## 2021-06-28 NOTE — PROGRESS NOTES
Progress Notes by Renzo Newell MD at 2/27/2020  1:40 PM     Author: Renzo Newell MD Service: -- Author Type: Physician    Filed: 2/28/2020  7:44 AM Encounter Date: 2/27/2020 Status: Signed    : Renzo Newell MD (Physician)              [facility]    Comprehensive and complex medical care - Chronic disease management - Shared decision making - Care coordination - Compassionate care    Patient advocacy - Rational deprescribing - Minimally disruptive medicine - Ethical focus - Customized care          Date of Service: 2/27/2020  Primary Provider: Renzo Newell MD    Patient Care Team:  Renzo Newell MD as PCP - General (Internal Medicine)  Perez Padilla MD as Physician (Plastic Surgery)  Shoaib Gloria MD as Physician (Hematology and Oncology)  Bj Orantes MD as Physician (Dermatology)  Sadi Castano MD as Physician (General Surgery)  Renzo Newell MD as Assigned PCP     ______________________________________________________________________     Patient's Pharmacy:    [id number] [address] AT [facility] [address]  [address]  [facility] [address]  Phone: [phone] Fax: [phone]     Patient's Contacts:  Name Home Phone Work Phone Mobile Phone Relationship Lgl Gryumiko   DEYSI [phone] Niece No   CAMPBELL BERRIOS [phone]   Niece No     Patient's Insurance:    Payor: [employer]ERS / Plan: [employer] / Product Type: PPO/POS/FFS /          Lashawn LOU Shannon is 74 y.o. female who comes in today for:     Chief Complaint   Patient presents with   ? Hospital Visit Follow Up     JNS, 2/21-2/22, stroke        Patient Active Problem List   Diagnosis   ? GERD (gastroesophageal reflux disease)   ? Migraine headache   ? LBP (low back pain)   ? Nonsmoker   ? Menopausal hot flushes   ? Full code status   ? NORMAL COLONOSCOPY - 2009 - Average  risk   ? Peripheral neuropathy   ? Melanoma of right upper arm (H) - On Keytruda   ? Ischemic cerebrovascular accident (CVA) of frontal lobe (H) - 2/2020   ? Aphasia   ? Lumbar spinal stenosis   ? Essential tremor     Past Medical History:   Diagnosis Date   ? Asthma    ? GERD (gastroesophageal reflux disease)    ? LBP (low back pain)    ? Lumbar spinal stenosis 2/28/2020    EXAM: MR LUMBAR SPINE W WO CONTRAST LOCATION: [facility] DATE/TIME: 7/11/2019 4:49 PM   INDICATION: Back pain going down the right leg.  Going on for 3 months.  Not improving. See above. History of melanoma. COMPARISON: None. CONTRAST: Gadavist 9. TECHNIQUE: Without and with IV contrast   FINDINGS:  Nomenclature is based on 5 lumbar type vertebral bodies. There is no concerning marrow rep   ? Malignant melanoma of right upper arm (H)    ? Migraine headache    ? Nonsmoker    ? NORMAL COLONOSCOPY - 2009 - Average risk    ? PN (peripheral neuropathy)       Current Outpatient Medications   Medication Sig Note   ? aloe vera Gel Apply 1 application topically 2 (two) times a day as needed.    ? aspirin 81 MG EC tablet Take 1 tablet (81 mg total) by mouth daily.    ? atorvastatin (LIPITOR) 40 MG tablet Take 1 tablet (40 mg total) by mouth at bedtime.    ? dextromethorphan-guaiFENesin (ROBAFEN DM)  mg/5 mL liquid Take 5 mL by mouth every 12 (twelve) hours as needed.    ? diphenhydrAMINE-acetaminophen (PERCOGESIC) 12.5-325 mg Tab Take 1-2 tablets by mouth every 8 (eight) hours as needed.     ? fluticasone propionate (FLONASE) 50 mcg/actuation nasal spray Apply 1-2 sprays into each nostril daily as needed for rhinitis.    ? gabapentin (NEURONTIN) 300 MG capsule Take 3 capsules three (3) times a day.    ? guaiFENesin-dextromethorphan (MUCINEX DM) 600-30 mg Tb12 Take 1 tablet by mouth 2 (two) times a day as needed.    ? lidocaine HCL (ASPERCREME, LIDOCAINE,) 4 % Crea Apply 1 application topically 4 (four) times a day as needed.    ? naproxen  (NAPROSYN) 500 MG tablet Take 1 tablet (500 mg total) by mouth 2 (two) times a day as needed. Please keep this Rx on file for the next RF.    ? neomycin-bacitracin-polymyxin (NEOSPORIN) ointment Apply 1 application topically 2 (two) times a day as needed.    ? pantoprazole (PROTONIX) 40 MG tablet Take 1 tablet (40 mg total) by mouth daily.    ? sodium chloride 0.9% SolP 100 mL with pembrolizumab 25 mg/mL Soln 200 mg Infuse 200 mg into a venous catheter every 21 days. Indications: malignant melanoma, a type of skin cancer    ? tiZANidine (ZANAFLEX) 2 MG tablet Take 2 mg by mouth every 8 (eight) hours as needed. 2/21/2020: Using old RX supply   ? venlafaxine (EFFEXOR XR) 37.5 MG 24 hr capsule Take 1 capsule (37.5 mg total) by mouth daily.      Allergies   Allergen Reactions   ? Codeine Dizziness     Other: extreme weakness in legspt stopped taking medication and sxs were gone within a few hours     ? Erythromycin Base Unknown   ? Hydrocodone Nausea Only and Headache   ? Tramadol Diarrhea, Dizziness, Headache and Nausea And Vomiting   ? Other Environmental Allergy Rash     mildew   ? Penicillins Swelling and Rash   ? Retinol Rash     HYDROGEL RETINOL CAPSULE   ? Shellfish Containing Products Swelling and Rash     shrimp   ? Sulfa (Sulfonamide Antibiotics) Rash     Burning body rash   ? Vitamin D3 Complete [Mv-Mn-Iron-Fa-Herbal Cmplx#190] Rash     Higher dosage makes her break out     Social History     Occupational History   ? Occupation: Accounting     Employer: RETIRED   Tobacco Use   ? Smoking status: Former Smoker     Packs/day: 0.00   ? Smokeless tobacco: Never Used   Substance and Sexual Activity   ? Alcohol use: Yes     Alcohol/week: 0.0 - 1.0 standard drinks   ? Drug use: Never   ? Sexual activity: Not on file     Social History     Social History Narrative    , no children.  Has a niece involved in her care.        Patient of Dr. Newell since 2016.          had severe dementia and passed away in  2019.  Milt.      Family History   Problem Relation Age of Onset   ? Pancreatic cancer Mother 82   ? Melanoma Father 75        Metastatic to colon      Family history is otherwise noncontributory.      Subjective:     Patient comes in today for a hospital follow up visit.    We reviewed her hospital stay and the records from her visit were reviewed today.  I personally reviewed the lab tests, radiology and medical tests pertinent to that stay.    Patient comes in today for follow-up of a number of issues from her hospital.  She comes in with her niece.    We reviewed her stroke.  This was a left frontal stroke.  She woke up with this.  She had stuttering migraine symptoms about 7 days before this.  She had no other deficits other than the speech production.  She also had difficulty texting.  She was able to text her niece who brought her into the hospital.  Her MRI did show a left frontal stroke.  There was no abnormalities on echocardiogram.  There was no vascular occlusion on CTA.  She was followed by neurology and discharged when she was felt to be good.  She is on aspirin and atorvastatin at this time.  Her speech is slowly coming around.  She has no swallowing issues.  She denies any other new symptoms.  She is able to speak well today, but occasionally stutters or misses a word.  She is working with speech therapy at home through home care.  She is optimistic about her recovery.    She was recommended by neurology to have a 30-day event recorder to evaluate for possible arrhythmia.    She does have a tremor in her left hand which she has had for a long time.  She just wanted this noted.  It is usually when she is holding something that she notes it.    She does have tenderness over her left wrist.  It is with lifting.  It is with reaching behind her back.  It is over the radial wrist.  There is swelling with this.  It seems to have come on with IV infusions of her Keytruda is over this vein.  There is swelling  "associated with it.    We reviewed her melanoma and she continues on chemotherapy at this time.  She is roughly senior care through her treatment.  She is not had any significant side effects.    We reviewed her spinal stenosis and she has followed up with the spine clinic in relationship to this.  She would like a refill of her gabapentin in the future.  She continues to be limited with this.  She uses a walker for long distances.    We reviewed her other issues noted in the assessment but not specifically addressed in the HPI above.     Comprehensive review of systems performed today with no major problems noted except as above.     Objective:     Wt Readings from Last 3 Encounters:   02/27/20 194 lb (88 kg)   02/21/20 192 lb 11.2 oz (87.4 kg)   11/12/19 186 lb 4.8 oz (84.5 kg)     BP Readings from Last 3 Encounters:   02/27/20 122/60   02/26/20 128/71   02/25/20 142/90      /60   Pulse 84   Ht 5' 6\" (1.676 m)   Wt 194 lb (88 kg)   LMP 09/17/1988   BMI 31.31 kg/m     The patient is comfortable, no acute distress.  Mood good.  Insight is good.  Speech is slightly slowed and deliberate but very understandable and very fluent for the most part.  No skin lesions or nodules of concern.  Ears clear.  Eyes are nonicteric.  Pupils equal and reactive.  Throat is clear.  Neck is supple without mass, no thyromegaly.  Carotids are clear.  No cervical or epitrochlear adenopathy.  Heart regular rate and rhythm.  Lungs clear to auscultation bilaterally.  Respiratory effort good.  Abdomen soft and nontender.  No hepatosplenomegaly.  Extremities show no edema.  There is no arm drift and no obvious focal abnormalities on exam today.  She moves stiffly due to her back.  Her left wrist has swelling and positive Finkelstein's maneuver consistent with de Quervain's tenosynovitis.    Diagnostics:     Results for orders placed or performed during the hospital encounter of 02/21/20   INR   Result Value Ref Range    INR 0.96 0.90 - " 1.10   APTT(PTT)   Result Value Ref Range    PTT 30 24 - 37 seconds   HM2(CBC w/o Differential)   Result Value Ref Range    WBC 5.2 4.0 - 11.0 thou/uL    RBC 4.38 3.80 - 5.40 mill/uL    Hemoglobin 13.0 12.0 - 16.0 g/dL    Hematocrit 39.8 35.0 - 47.0 %    MCV 91 80 - 100 fL    MCH 29.7 27.0 - 34.0 pg    MCHC 32.7 32.0 - 36.0 g/dL    RDW 12.4 11.0 - 14.5 %    Platelets 310 140 - 440 thou/uL    MPV 9.1 8.5 - 12.5 fL   Troponin I   Result Value Ref Range    Troponin I <0.01 0.00 - 0.29 ng/mL   Basic Metabolic Panel   Result Value Ref Range    Sodium 142 136 - 145 mmol/L    Potassium 3.9 3.5 - 5.0 mmol/L    Chloride 107 98 - 107 mmol/L    CO2 28 22 - 31 mmol/L    Anion Gap, Calculation 7 5 - 18 mmol/L    Glucose 97 70 - 125 mg/dL    Calcium 9.6 8.5 - 10.5 mg/dL    BUN 14 8 - 28 mg/dL    Creatinine 0.79 0.60 - 1.10 mg/dL    GFR MDRD Af Amer >60 >60 mL/min/1.73m2    GFR MDRD Non Af Amer >60 >60 mL/min/1.73m2   C-Reactive Protein (CRP)   Result Value Ref Range    CRP 0.4 0.0 - 0.8 mg/dL   Sedimentation Rate   Result Value Ref Range    Sed Rate 15 0 - 20 mm/hr   Vitamin B12   Result Value Ref Range    Vitamin B-12 603 213 - 816 pg/mL   Lipid Profile   Result Value Ref Range    Triglycerides 104 <=149 mg/dL    Cholesterol 174 <=199 mg/dL    LDL Calculated 108 <=129 mg/dL    HDL Cholesterol 45 (L) >=50 mg/dL    Patient Fasting > 8hrs? Unknown    TSH   Result Value Ref Range    TSH 3.68 0.30 - 5.00 uIU/mL   POCT Glucose   Result Value Ref Range    Glucose 86 70 - 139 mg/dL   POCT CREATININE   Result Value Ref Range    iSTAT Creatinine 0.9 0.6 - 1.1 mg/dL    iSTAT GFR MDRD Af Amer >60 >60 mL/min/1.73m2    iSTAT GFR MDRD Non Af Amer >60 >60 mL/min/1.73m2   POCT Glucose - when taking PO 4x daily AC and QHS   Result Value Ref Range    Glucose 81 70 - 139 mg/dL   POCT Glucose - when taking PO 4x daily AC and QHS   Result Value Ref Range    Glucose 95 70 - 139 mg/dL   POCT Glucose - when taking PO 4x daily AC and QHS   Result Value  Ref Range    Glucose 96 70 - 139 mg/dL   POCT Glucose - when taking PO 4x daily AC and QHS   Result Value Ref Range    Glucose 97 70 - 139 mg/dL   ECG 12 lead nursing unit performed   Result Value Ref Range    SYSTOLIC BLOOD PRESSURE      DIASTOLIC BLOOD PRESSURE      VENTRICULAR RATE 82 BPM    ATRIAL RATE 82 BPM    P-R INTERVAL 170 ms    QRS DURATION 84 ms    Q-T INTERVAL 376 ms    QTC CALCULATION (BEZET) 439 ms    P Axis 24 degrees    R AXIS -4 degrees    T AXIS 132 degrees    MUSE DIAGNOSIS       Normal sinus rhythm  Low voltage QRS  Possible Inferior infarct , age undetermined  Abnormal ECG  When compared with ECG of 23-MAY-2019 10:03,  Borderline criteria for Inferior infarct are now Present  Confirmed by SEE ED PROVIDER NOTE FOR, ECG INTERPRETATION ([id number]),  RATNA MAGAÑA ([id number]) on 2/21/2020 10:01:05 AM     Echo With Bubble Study   Result Value Ref Range    LV volume diastolic 85 46 - 106 cm3    LV volume systolic 25.7 14 - 42 cm3    HR 75 bpm    IVSd 1.02 (!) 0.6 - 0.9 cm    LVIDd 4.48 3.8 - 5.2 cm    LVIDs 3.14 2.2 - 3.5 cm    LVOT diam 2 cm    LVOT mean gradient 1 mmHg    LVOT peak VTI 13.6 cm    LVOT mean vik 52.9 cm/s    LVOT peak vik 73.1 cm/s    LVOT peak gradient 2 mmHg    LV PWd 0.903 (!) 0.6 - 0.9 cm    MV E' lat vik 6.48 cm/s    MV E' med vik 7.35 cm/s    AO root 3.1 cm    LA size 3.5 cm    LA length 3.5 cm    LA/AO root ratio 1.13 no units    MV decel time 190 ms    MV peak A vik 70.2 cm/s    MV peak E vik 53 cm/s    LA area 2 8.6 cm2    LA area 1 11.1 cm2    BSA 2.02 m2    Hieght 66 in    Weight 3,083.2 lbs    /72 mmHg    IVS/PW ratio 1.1     LV FS 29.9 28 - 44 %    Echo LVEF calculated 70 55 - 75 %    LA volume 23.2 mL    LV mass 144.2 g    MV E/A Ratio 0.8     LVOT area 3.14 cm2    LVOT SV 42.7 cm3    LV systolic volume index 12.7 8 - 24 cm3/m2    LV diastolic volume index 42.1 29 - 61 cm3/m2    LA volume index 11.5 mL/m2    LV mass index 71.4 g/m2    LV SVi 21.1 ml/m2    TAPSE 2.3  cm    MV med E/e' ratio 7.2     MV lat E/e' ratio 8.2     LV CO 3.2 l/min    LV Ci 1.6 l/min/m2    Height 66.0 in    Weight 193 lbs    MV Avg E/e' Ratio 7.7 cm/s     ______________________________________________________________________    Pertinent radiology for this visit includes the following:    Echo With Bubble Study    Normal left ventricular size with mild hypertrophy.    Left ventricle ejection fraction is normal. The calculated left   ventricular ejection fraction is 70%.    Normal right ventricular size and systolic function.    When compared to the previous study dated 8/11/2006, no significant   change.    No hemodynamically significant valvular heart abnormalities.     MR Brain With Without Contrast  Narrative: EXAM: MR BRAIN W WO CONTRAST  LOCATION: [facility]  DATE/TIME: 2/21/2020 10:15 AM    INDICATION: Dysarthria.  COMPARISON: CT done earlier in the day.  CONTRAST: Gadavist 8.6  TECHNIQUE: Routine multiplanar multisequence head MRI without and with intravenous contrast.    FINDINGS:  INTRACRANIAL CONTENTS: Acute 4 x 2 cm left frontal lobe infarct. No mass, acute hemorrhage, or extra-axial fluid collections.     Moderately extensive white matter abnormal signal. Normal ventricles and sulci. Normal position of the cerebellar tonsils. No pathologic contrast enhancement.    SELLA: No abnormality accounting for technique.    OSSEOUS STRUCTURES/SOFT TISSUES: Normal marrow signal. The major intracranial vascular flow voids are maintained.     ORBITS: No abnormality accounting for technique.     SINUSES/MASTOIDS: No paranasal sinus mucosal disease. No middle ear or mastoid effusion.   Impression: 1.  Acute left frontal lobe infarct.    2.  Moderately extensive changes of small vessel vascular disease and old lacunar-type infarcts.  CTA Head and Neck  Narrative: EXAM: CTA HEAD AND NECK  LOCATION: [facility]  DATE/TIME: 2/21/2020 8:16 AM    INDICATION: Difficulty speaking/slurred speech.  Concern for stroke.  COMPARISON: None.  CONTRAST: 100 mL IV Omnipaque 350.  TECHNIQUE: Head and neck CT angiogram with IV contrast. Noncontrast head CT followed by axial helical CT images of the head and neck vessels obtained during the arterial phase of intravenous contrast administration. Axial 2D reconstructed images and   multiplanar 3D MIP reconstructed images of the head and neck vessels were performed by the technologist. Dose reduction techniques were used. All stenosis measurements made according to NASCET criteria unless otherwise specified.    FINDINGS:   NONCONTRAST HEAD CT:   INTRACRANIAL CONTENTS: No intracranial hemorrhage, extraaxial collection, or mass effect.  No CT evidence of acute infarct. Severe presumed chronic small vessel ischemic changes. Mild generalized volume loss. No hydrocephalus. Calcification along the   superior falx.    VISUALIZED ORBITS/SINUSES/MASTOIDS: No intraorbital abnormality. Mild mucosal thickening in the ethmoid air cells. No middle ear or mastoid effusion.    BONES/SOFT TISSUES: No acute abnormality.    HEAD CTA:  Bilateral distal internal carotid arteries are patent. Bilateral MCA and bilateral FINESSE are patent. Distal vertebral arteries, basilar artery, and bilateral PCA are patent. No aneurysm. No high flow vascular malformation.    NECK CTA:  Three-vessel aortic arch. Bilateral common, internal, and proximal external carotid arteries are patent. Vertebral arteries are balanced and without significant stenosis. Multilevel degenerative change in the cervical spine. Calcification incidentally   noted along the stylohyoid ligament. Remainder negative.  Impression: HEAD CT:  1.  Age-related changes, as above, with no acute intracranial abnormality.    HEAD CTA:   1.  No high-grade intracranial stenosis. No findings to suggest intraluminal thrombus. No aneurysm and no high flow vascular malformation.    NECK CTA:  1.  No high-grade stenosis of the neck vessels by NASCET  criteria.    Head CT discussed with Dr. Walsh by phone at 0808 hours; head and neck CTA discussed with Dr. Walsh by phone at 0835 hours on 02/21/2020.      ______________________________________________________________________      Results for orders placed during the hospital encounter of 02/21/20   Echo With Bubble Study [ECH09] 02/21/2020    Narrative   Normal left ventricular size with mild hypertrophy.    Left ventricle ejection fraction is normal. The calculated left   ventricular ejection fraction is 70%.    Normal right ventricular size and systolic function.    When compared to the previous study dated 8/11/2006, no significant   change.    No hemodynamically significant valvular heart abnormalities.          Results for orders placed in visit on 08/11/06   NM Pharmacologic Stress Test    Narrative See Historical Hospital Medical Record for documentation           Assessment:     1. Ischemic cerebrovascular accident (CVA) of frontal lobe (H)    2. Tenosynovitis, de Quervain    3. Melanoma of right upper arm (H)    4. Hospital discharge follow-up    5. Spinal stenosis of lumbar region with neurogenic claudication    6. Menopausal hot flushes    7. Aphasia    8. Migraine without status migrainosus, not intractable, unspecified migraine type    9. Essential tremor      ______________________________________________________________________     PHQ-2 Total Score: 2 (9/17/2019  8:00 AM)    No data recorded  ______________________________________________________________________     BMI Readings from Last 1 Encounters:   02/27/20 31.31 kg/m        Plan:     1. Continue aspirin and atorvastatin.  2. Given a brace for her wrist.  Consider corticosteroid injection if worsening or specialty referral.  3. Event recorder scheduling.  4. Follow-up with neurology as she has scheduled.  She might not need long-term follow-up related to this.  5. Follow-up with oncology.  6. Follow-up with spine center.  7. Speech therapy  and other therapies at home.  8. No treatment for essential tremor at this time.      Post Discharge Medication Reconciliation Status: discharge medications reconciled, continue medications without change         Renzo Newell MD  General Internal Medicine  [facility]    Personal office fax - [phone]   Voice mail - [phone]  E-mail - [email or web address]      Return in about 6 weeks (around 4/9/2020), or if symptoms worsen or fail to improve.     Future Appointments   Date Time Provider Department Center   2/28/2020  2:30 PM Antoni Cortes, PharmD [facility]         ______________________________________________________________________     Relevant ICD-10 codes and order associations:      ICD-10-CM    1. Ischemic cerebrovascular accident (CVA) of frontal lobe (H) I63.9 JANIS Monitor Hook-Up   2. Tenosynovitis, de Quervain M65.4 Wrist/Arm DME:   3. Melanoma of right upper arm (H) C43.61    4. Hospital discharge follow-up Z09    5. Spinal stenosis of lumbar region with neurogenic claudication M48.062    6. Menopausal hot flushes N95.1    7. Aphasia R47.01    8. Migraine without status migrainosus, not intractable, unspecified migraine type G43.909    9. Essential tremor G25.0

## 2021-06-28 NOTE — PROGRESS NOTES
Progress Notes by Renzo Newell MD at 9/17/2019  8:50 AM     Author: Renzo Newell MD Service: -- Author Type: Physician    Filed: 9/17/2019  9:29 PM Encounter Date: 9/17/2019 Status: Signed    : Renzo Newell MD (Physician)             [facility]    Comprehensive and complex medical care - Chronic disease management - Shared decision making - Care coordination - Compassionate care    Patient advocacy - Rational deprescribing - Minimally disruptive medicine - Ethical focus - Customized care    ______________________________________________________________________     Date of Service: 9/17/2019  Primary Provider: Renzo Newell MD    Patient Care Team:  Renzo Newell MD as PCP - General (Internal Medicine)  Perez Padilla MD as Physician (Plastic Surgery)  Shoaib Gloria MD as Physician (Hematology and Oncology)  Bj Orantes MD as Physician (Dermatology)  Sadi Castano MD as Physician (General Surgery)  Renzo Newell MD as Assigned PCP   ______________________________________________________________________     Patient's Pharmacy:    [facility] - [place], [place] - [address]  [address]  [facility] [address]  Phone: [phone] Fax: [phone]    [facility] - [place], [place] - [address]  [address]  [place] [facility] [place] [address]  Phone: [phone] Fax: [phone]     Patient's Contacts:  Name Home Phone Work Phone Mobile Phone Relationship Lgl Grd   DEYSI [phone] RenettaCAMPBELL Tate [phone]   Nisaranya      Patient's Insurance:    Payor: MEDICARE / Plan: MEDICARE A AND B / Product Type: Medicare /     Subjective:     History of present illness:    Lashawn Ortega is an 74 y.o. female here for an annual wellness visit.    In for annual wellness visit and other issues.     still on  hospice for 6 months related to his dementia.    She is undergoing chemotherapy with [place] [facility] related to her high risk melanoma.  Is on Keytruda chemotherapy and has had some itching with this, but no major side effects.    Back pain is continuing to rule her life at this time.  Recent L5 injection did seem to help with the severe radicular pain, but still having back issues and pain radiating down the left leg as well.  We reviewed the SPINE CLINIC and PDR and we are going to have her see the SPINE CLINIC at this time.  Hard to do too invasive of treatment at this time related to her chemotherapy.    She has follow up with specialists scheduled.    Migraine headaches are doing well without major problems.     Reviewed peripheral neuropathy and there are no major pain issues.     Reviewed reflux and there are no issues of concern.     We reviewed her other issues noted in the assessment but not specifically addressed in the HPI above.     Review of Systems:  The 10-system review of systems and health history form for [facility] was completed by patient, reviewed by me, and pertinent problems are reviewed above.     ______________________________________________________________________     Active Problem List:  Problem List as of 9/17/2019 Reviewed: 9/17/2019  9:19 PM by Renzo Newell MD       High    Full code status    Nonsmoker    Melanoma of right upper arm (H) - On Keytruda       Low    GERD (gastroesophageal reflux disease)    LBP (low back pain)    Menopausal hot flushes    Migraine headache    PN (peripheral neuropathy)       Unprioritized    NORMAL COLONOSCOPY - 2009 - Average risk           Past Medical History:   Diagnosis Date   ? GERD (gastroesophageal reflux disease)    ? LBP (low back pain)    ? Migraine headache    ? Nonsmoker    ? NORMAL COLONOSCOPY - 2009 - Average risk    ? PN (peripheral neuropathy)       Past Surgical History:   Procedure Laterality Date   ? HYSTERECTOMY  1988   ?  IR LUMBAR EPIDURAL STEROID INJECTION  8/12/2019   ? TOTAL ABDOMINAL HYSTERECTOMY W/ BILATERAL SALPINGOOPHORECTOMY  1988      Family History   Problem Relation Age of Onset   ? Pancreatic cancer Mother 82   ? Melanoma Father 75        Metastatic to colon      Family history is otherwise noncontributory.    Social History     Occupational History   ? Occupation: Accounting     Employer: RETIRED   Tobacco Use   ? Smoking status: Never Smoker   ? Smokeless tobacco: Never Used   Substance and Sexual Activity   ? Alcohol use: Yes     Alcohol/week: 0.0 - 0.6 oz   ? Drug use: Not on file   ? Sexual activity: Not on file      Social History     Social History Narrative    , no children.        Patient of Dr. Newell since 2016.          has severe dementia and current resides in memory care at the [facility].      Current Outpatient Medications   Medication Sig   ? fluticasone propionate (FLONASE) 50 mcg/actuation nasal spray PLACE TWO SPRAYS INTO EACH NOSTRIL ONCE DAILY.   ? naproxen (NAPROSYN) 500 MG tablet Take 1 tablet (500 mg total) by mouth 2 (two) times a day as needed. Please keep this Rx on file for the next RF.   ? ondansetron (ZOFRAN ODT) 4 MG disintegrating tablet Take 1 tablet (4 mg total) by mouth every 8 (eight) hours as needed for nausea.   ? pantoprazole (PROTONIX) 40 MG tablet Take 1 tablet (40 mg total) by mouth daily.   ? venlafaxine (EFFEXOR XR) 37.5 MG 24 hr capsule Take 1 capsule (37.5 mg total) by mouth daily.      Allergies: Codeine; Erythromycin base; Hydrocodone-acetaminophen; Other environmental allergy; Other food allergy; Penicillins; Retinol; Shellfish containing products; Sulfa (sulfonamide antibiotics); Tramadol; and Vitamin d3 complete [mv-mn-iron-fa-herbal cmplx#190]     Immunization History   Administered Date(s) Administered   ? DT (pediatric) 03/27/1998   ? Hep A, Adult IM (19yr & older) 05/14/2009, 05/19/2010   ? Pneumo Conj 13-V (2010&after) 05/19/2015   ?  "Pneumo Polysac 23-V 05/19/2010   ? Td, Adult, Absorbed 05/14/2009   ? Tdap 05/19/2015   ? ZOSTER, LIVE 02/01/2014      Objective:     Vital Signs:   Visit Vitals  /80   Pulse 87   Ht 5' 6.25\" (1.683 m)   Wt 187 lb (84.8 kg)   LMP 09/17/1988   SpO2 97%   BMI 29.96 kg/m       Wt Readings from Last 3 Encounters:   09/17/19 187 lb (84.8 kg)   08/12/19 186 lb (84.4 kg)   07/30/19 186 lb (84.4 kg)     BP Readings from Last 3 Encounters:   09/17/19 126/80   08/12/19 102/68   07/30/19 128/74     Vision Screening:  No exam data present     PHYSICAL EXAM  Patient declines an undressed exam.   The patient is comfortable, no acute distress.  Mood good.  Insight is good.  No skin lesions or nodules of concern.  Ears clear.  Eyes are nonicteric.  Pupils equal and reactive.  Throat is clear.  Neck is supple without mass, no thyromegaly. No cervical or epitrochlear adenopathy.  Heart regular rate and rhythm.  Lungs clear to auscultation bilaterally.  Respiratory effort good.  Abdomen soft and nontender.  No hepatosplenomegaly.  Extremities show no edema. Moves stiffly related to her back.     Assessment Results 9/17/2019   Activities of Daily Living No help needed   Instrumental Activities of Daily Living No help needed   Get Up and Go Score Less than 12 seconds   Mini Cog Total Score 5   Some recent data might be hidden     A Mini-Cog score of 0-2 suggests the possibility of dementia, score of 3-5 suggests no dementia    Diagnostics:     Lab Results   Component Value Date    WBC 5.2 05/23/2019    HGB 14.0 05/23/2019    HCT 41.6 05/23/2019     05/23/2019    CHOL 187 07/11/2016    TRIG 160 (H) 07/11/2016    HDL 57 07/11/2016    ALT 13 06/25/2014    AST 14 06/25/2014     05/23/2019    K 4.2 05/23/2019     05/23/2019    CREATININE 0.81 05/23/2019    BUN 14 05/23/2019    CO2 27 05/23/2019    TSH 0.91 09/06/2018    HGBA1C 4.7 07/11/2016    "   ______________________________________________________________________    Pertinent radiology for this visit includes the following:    Mammo Screening Bilateral  BILATERAL FULL FIELD DIGITAL SCREENING MAMMOGRAM WITH TOMOSYNTHESIS    Performed on: 9/17/19.    Compared to: 09/13/2018 Mammo Screening Bilateral, 07/13/2017 Mammo   Screening Bilateral, and 07/11/2016 Mammo Screening Bilateral    Findings: The breasts have scattered areas of fibroglandular densities.   There is no radiographic evidence of malignancy. This study was evaluated   with the assistance of Computer-Aided Detection. Breast Tomosynthesis was   used in interpretation. Repeat routine screening mammogram in one year is   recommended.    ACR BI-RADS Category 1: Negative      ______________________________________________________________________      ______________________________________________________________________     PHQ-2 Total Score: 2 (9/17/2019  8:00 AM)    No data recorded  ______________________________________________________________________     BMI Readings from Last 1 Encounters:   09/17/19 29.96 kg/m      ______________________________________________________________________    Assessment:     1. Medicare annual wellness visit, subsequent    2. Gastroesophageal reflux disease, esophagitis presence not specified    3. Lumbosacral radiculopathy at L5    4. Spinal stenosis, lumbar region, with neurogenic claudication    5. Melanoma of right upper arm (H)    6. Migraine without status migrainosus, not intractable, unspecified migraine type    7. Idiopathic peripheral neuropathy    8. Chronic midline low back pain with right-sided sciatica    9. Menopausal hot flushes         Plan:     1. Recent blood work reviewed,  2. Declines influenza vaccination today.  3. Follow up oncology.  4. Referral to SPINE CLINIC for second opinion.  Is undergoing chemotherapy with a checkpoint inhibitor.  5. Continue current medications   6. Reviewed  SHINGRIX with patient today.   7. Declined hep C testing.   8. Follow up dermatology.  9. Does not want to do colonoscopy at this time.         Renzo Newell MD  General Internal Medicine  [facility]     Personal office fax - [phone]   Voice mail - [phone]  E-mail - [email or web address]     Return in about 6 months (around 3/17/2020) for follow up visit.     No future appointments.      ______________________________________________________________________     Relevant ICD-10 codes and order associations:      ICD-10-CM    1. Medicare annual wellness visit, subsequent Z00.00    2. Gastroesophageal reflux disease, esophagitis presence not specified K21.9    3. Lumbosacral radiculopathy at L5 M54.17    4. Spinal stenosis, lumbar region, with neurogenic claudication M48.062 Ambulatory referral to Spine Care   5. Melanoma of right upper arm (H) C43.61    6. Migraine without status migrainosus, not intractable, unspecified migraine type G43.909    7. Idiopathic peripheral neuropathy G60.9    8. Chronic midline low back pain with right-sided sciatica M54.41 naproxen (NAPROSYN) 500 MG tablet    G89.29    9. Menopausal hot flushes N95.1 venlafaxine (EFFEXOR XR) 37.5 MG 24 hr capsule        Identified Health Risks:     A personalized health plan based on the identified health risks was provided to the patient on the AVS.    ______________________________________________________________________     The following health maintenance schedule was reviewed with the patient and provided in printed form in the after visit summary:     Health Maintenance   Topic Date Due   ? ZOSTER VACCINES (2 of 3) 03/29/2014   ? INFLUENZA VACCINE RULE BASED (1) 08/01/2019   ? MEDICARE ANNUAL WELLNESS VISIT  09/06/2019   ? COLONOSCOPY  04/19/2020 (Originally 8/21/2019)   ? FALL RISK ASSESSMENT  09/17/2020   ? MAMMOGRAM  09/17/2021   ? ADVANCE CARE PLANNING  07/23/2022   ? TD 18+ HE  05/19/2025   ? DXA SCAN   Completed   ? PNEUMOCOCCAL POLYSACCHARIDE VACCINE AGE 65 AND OVER  Completed   ? PNEUMOCOCCAL CONJUGATE VACCINE FOR ADULTS (PCV13 OR PREVNAR)  Completed   ? HEPATITIS C SCREENING  Discontinued        ______________________________________________________________________

## 2021-06-29 NOTE — PROGRESS NOTES
Progress Notes by Renzo Newell MD at 6/26/2020 10:55 AM     Author: Renzo Newell MD Service: -- Author Type: Physician    Filed: 6/27/2020  9:24 AM Encounter Date: 6/26/2020 Status: Signed    : Renzo Newell MD (Physician)          [facility]    Care coordination - Customized care -  Comprehensive and complex medical care            Date of Service: 6/26/2020  Primary Provider: Renzo Newell MD    Patient Care Team:  Renzo Newell MD as PCP - General (Internal Medicine)  Perez Padilla MD as Physician (Plastic Surgery)  Shoaib Gloria MD as Physician (Hematology and Oncology)  Bj Orantes MD as Physician (Dermatology)  Sadi Castano MD as Physician (General Surgery)  Renzo Newell MD as Assigned PCP  Carlos Alberto Worthington DO as Physician (Pain Medicine)  Rhona Moura MD (Neurology)  Antoni Cortes, PharmD as Pharmacist (Pharmacist)     ______________________________________________________________________     Patient's Pharmacy:      [id number] - [place], [place] - [address] AT [facility] [address]  [address]  [facility] [address]  Phone: [phone] Fax: [phone]    [facility] - [place], [place] - [address] [facility]  [address] [facility] [address]  Phone: [phone] Fax: [phone]     Patient's Contacts:  Name Home Phone Work Phone Mobile Phone Relationship Lgl Gryumiko   DEYSI [phone] Niece No   CAMPBELL BERRIOS [phone]   Niece No       Patient's Insurance:    Payor: MEDICARE / Plan: MEDICARE A AND B / Product Type: Medicare /           Preoperative examination    Lashawn Ortega is a 75 y.o. female (1945) who I am asked to see by her surgeon regarding his fitness for upcoming surgery.      Chief Complaint   Patient presents with   ? Pre-op Exam     Dr. Christina, Gallbladder       Subjective:      Patient comes in today for follow-up and preop for planned cholecystectomy.  This to be done by Dr. Christina.    She has had symptomatic cholelithiasis with elevated LFTs and choledocholithiasis.  She recently had ERCP by Dr. garrett and this was quite successful.  She does feel better since having it done.  She thinks she had some subtle symptoms in the hands.  She has no evidence of cholecystitis in the meantime she has not developed any fevers or chills.    Reviewed her melanoma and she is still going through chemotherapy related to this every 3 weeks.  She is happy with me outcome of everything so far.  She is getting worn down by the multitude of problems she has had in the last 6 months.    We reviewed her cholesterol and she would like to restart the atorvastatin if possible.  This should be able to be restarted here in the near future if her liver function tests remain good.  We are going to check them today.    We reviewed her back pain and she will follow-up with Dr. Worthington in the future if needed.    She is feeling a lot better from her last fall.    She still takes intermittent Naprosyn for her joint pains and back pain.  She has been on Celebrex in the past.  She has had some upper GI upset still requiring Tums.  She is also on baby aspirin.  She already takes pantoprazole.  We talked about possibly going back to something like Celebrex, but will see how the surgery does for her first.    ______________________________________________________________________     PREOP QUESTIONNAIRE:    Have you had prior anesthesia?: Yes  Have you or any family members had a previous anesthesia reaction:  No  Do you or any family members have a history of a clotting or bleeding disorder?: No  Functional Status: Independent  Patient plans to recover at home with family.  Do you have difficulty breathing or chest pain after walking up a flight of stairs: No  History of obstructive sleep apnea: No  Steroid use in the last 6  months: No  Frequent Aspirin/NSAID use: Yes: Prophylactic aspirin after stroke.  Occasional use of Naprosyn.  ______________________________________________________________________      We reviewed her other issues noted in the assessment but not specifically addressed in the HPI above.   ______________________________________________________________________     Patient Active Problem List   Diagnosis   ? GERD (gastroesophageal reflux disease)   ? Migraine headache   ? LBP (low back pain)   ? Nonsmoker   ? Menopausal hot flushes   ? Full code status   ? NORMAL COLONOSCOPY - 2009 - Average risk   ? Peripheral neuropathy   ? Melanoma of right upper arm (H) - On Keytruda   ? Ischemic cerebrovascular accident (CVA) of frontal lobe (H) - 2/2020   ? Aphasia   ? Lumbar spinal stenosis   ? Essential tremor   ? On antineoplastic chemotherapy - PEMBROLIZUMAB - checkpoint inhibitor       Past Surgical History:   Procedure Laterality Date   ? HYSTERECTOMY  1988   ? IR LUMBAR EPIDURAL STEROID INJECTION  8/12/2019   ? AL ERCP DX COLLECTION SPECIMEN BRUSHING/WASHING N/A 6/19/2020    Procedure: ENDOSCOPIC RETROGRADE CHOLANGIO-PANCREATOGRAPHY WITH SPHINCTEROTOMY AND STONE EXTRACTION;  Surgeon: Daniel Das MD;  Location: [facility];  Service: Gastroenterology   ? XR ERCP BILIARY ONLY  6/19/2020      Social History     Occupational History   ? Occupation: Accounting     Employer: RETIRED   Tobacco Use   ? Smoking status: Former Smoker     Packs/day: 0.00   ? Smokeless tobacco: Never Used   ? Tobacco comment: 70's   Substance and Sexual Activity   ? Alcohol use: Yes     Alcohol/week: 0.0 - 1.0 standard drinks     Comment: occ   ? Drug use: Never   ? Sexual activity: Not on file      Social History     Social History Narrative    , no children.  Has a niece involved in her care.        Patient of Dr. Newell since 2016.          had severe dementia and passed away in 2019.  Milt.      Family History   Problem  Relation Age of Onset   ? Pancreatic cancer Mother 82   ? Melanoma Father 75        Metastatic to colon      Family history is noncontributory otherwise.    Allergies: Codeine; Erythromycin base; Hydrocodone; Tramadol; Other environmental allergy; Penicillins; Retinol; Shellfish containing products; Sulfa (sulfonamide antibiotics); and Vitamin d3 complete [mv-mn-iron-fa-herbal cmplx#[id number]]     Current medications:  Current Outpatient Medications   Medication Sig   ? acetaminophen (TYLENOL) 500 MG tablet Take 1,000 mg by mouth 3 (three) times a day. Indications: pain   ? aloe vera Gel Apply 1 application topically 2 (two) times a day as needed.   ? aspirin 81 MG EC tablet Take 1 tablet (81 mg total) by mouth daily.   ? dextromethorphan-guaiFENesin (ROBAFEN DM)  mg/5 mL liquid Take 5 mL by mouth every 12 (twelve) hours as needed.   ? diphenhydrAMINE-acetaminophen (PERCOGESIC) 12.5-325 mg Tab Take 1-2 tablets by mouth every 8 (eight) hours as needed.    ? fluticasone propionate (FLONASE) 50 mcg/actuation nasal spray Apply 1-2 sprays into each nostril daily as needed for rhinitis.   ? gabapentin (NEURONTIN) 300 MG capsule Take 3 capsules three (3) times a day.   ? guaiFENesin-dextromethorphan (MUCINEX DM) 600-30 mg Tb12 Take 1 tablet by mouth 2 (two) times a day as needed.   ? menthol (BIOFREEZE, MENTHOL,) 4 % Gel Apply topically for pain  Indications: pain associated with arthritis   ? naproxen (NAPROSYN) 500 MG tablet Take 1 tablet (500 mg total) by mouth 2 (two) times a day as needed. Please keep this Rx on file for the next RF.   ? neomycin-bacitracin-polymyxin (NEOSPORIN) ointment Apply 1 application topically 2 (two) times a day as needed.   ? pantoprazole (PROTONIX) 40 MG tablet Take 1 tablet (40 mg total) by mouth daily.   ? sodium chloride 0.9% SolP 100 mL with pembrolizumab 25 mg/mL Soln 200 mg Infuse 200 mg into a venous catheter every 21 days. Indications: malignant melanoma, a type of skin cancer   ?  "venlafaxine (EFFEXOR XR) 37.5 MG 24 hr capsule Take 1 capsule (37.5 mg total) by mouth daily.       Review of systems:    On ROS, the patient denies any chest pain or pressure.  No shortness of breath.     Objective:     Wt Readings from Last 3 Encounters:   06/26/20 193 lb (87.5 kg)   06/18/20 193 lb (87.5 kg)   06/16/20 193 lb (87.5 kg)       BP Readings from Last 3 Encounters:   06/26/20 122/74   06/19/20 135/70   06/16/20 122/60       /74   Pulse 83   Temp 98.7  F (37.1  C) (Temporal)   Ht 5' 6.5\" (1.689 m)   Wt 193 lb (87.5 kg)   LMP 09/17/1988   SpO2 98%   BMI 30.68 kg/m     Patient is in no acute distress.  Mood good.  Insight good.  Eyes nonicteric.  Pupils equal.  Ears clear.  Nose clear.  Throat clear.  Neck is supple.  No cervical adenopathy.  No thyromegaly.  Heart is regular rate and rhythm.  Lungs clear to auscultation bilaterally.  Respiratory effort is good.  Abdomen is soft, nontender, no hepatosplenomegaly.  Extremities no edema.     Diagnostics:     Results for orders placed or performed in visit on 06/26/20   Hepatic Profile   Result Value Ref Range    Bilirubin, Total 0.4 0.0 - 1.0 mg/dL    Bilirubin, Direct 0.2 <=0.5 mg/dL    Protein, Total 6.2 6.0 - 8.0 g/dL    Albumin 3.8 3.5 - 5.0 g/dL    Alkaline Phosphatase 100 45 - 120 U/L    AST 17 0 - 40 U/L    ALT 19 0 - 45 U/L   Basic Metabolic Panel   Result Value Ref Range    Sodium 141 136 - 145 mmol/L    Potassium 4.2 3.5 - 5.0 mmol/L    Chloride 107 98 - 107 mmol/L    CO2 25 22 - 31 mmol/L    Anion Gap, Calculation 9 5 - 18 mmol/L    Glucose 94 70 - 125 mg/dL    Calcium 9.3 8.5 - 10.5 mg/dL    BUN 13 8 - 28 mg/dL    Creatinine 0.81 0.60 - 1.10 mg/dL    GFR MDRD Af Amer >60 >60 mL/min/1.73m2    GFR MDRD Non Af Amer >60 >60 mL/min/1.73m2   HM2(CBC w/o Differential)   Result Value Ref Range    WBC 6.1 4.0 - 11.0 thou/uL    RBC 4.46 3.80 - 5.40 mill/uL    Hemoglobin 13.3 12.0 - 16.0 g/dL    Hematocrit 39.0 35.0 - 47.0 %    MCV 87 80 - " 100 fL    MCH 29.8 27.0 - 34.0 pg    MCHC 34.1 32.0 - 36.0 g/dL    RDW 12.0 11.0 - 14.5 %    Platelets 274 140 - 440 thou/uL    MPV 7.0 7.0 - 10.0 fL   Lipase   Result Value Ref Range    Lipase 36 0 - 52 U/L     Most Recent EKG     Units 05/11/20  1548   VENTRATE BPM 92   ATRIALRATE BPM 92   QRSDURATION ms 74   QTINTERVAL ms 336   QTCCALC ms 415   P [place] degrees 37   RAXIS degrees 6   TAXIS degrees 107   MUSEDX  Normal sinus rhythm  Inferior infarct (cited on or before 21-FEB-2020)  Abnormal ECG  When compared with ECG of 21-FEB-2020 08:38,  No significant change was found  Confirmed by SEE ED PROVIDER NOTE FOR, ECG INTERPRETATION ([id number]),  RATNA MAGAÑA ([id number]) on 5/12/2020 7:07:02 AM          Impression:     1. Preoperative cardiovascular examination    2. Symptomatic cholelithiasis    3. Choledocholithiasis    4. Elevated LFTs    5. Calculus of gallbladder and bile duct with obstruction without cholecystitis    6. Melanoma of right upper arm (H) - On Keytruda    7. Ischemic cerebrovascular accident (CVA) of frontal lobe (H) - 2/2020    8. Spinal stenosis of lumbar region with neurogenic claudication    9. Gastroesophageal reflux disease, esophagitis presence not specified        Quality review:     PHQ-2 Total Score: 0 (5/29/2020 12:00 PM)      PHQ-9 Total Score: 2 (5/29/2020 12:00 PM)    ______________________________________________________________________     BMI Readings from Last 1 Encounters:   06/26/20 30.68 kg/m        Plan:     1. Okay to proceed with surgery as planned.  2. Stop aspirin prior to surgery as per the surgeon's recommendations.  3. Blood work done today and looks good.  Patient will be notified.  4. Follow-up with oncology as scheduled.  5. She should follow-up with me in the future as problems going on.  We will likely want to see her sometime within the next 3 months if she is doing well otherwise.  6. Continue pantoprazole.  7. Consider Celebrex for the pain if needed in the  future.       Renzo Newell MD  General Internal Medicine  [facility]    Personal office fax - [phone]   Voice mail - [phone]  E-mail - [email or web address]      Return in about 3 months (around 9/26/2020) for follow up visit.     No future appointments.      ______________________________________________________________________     Relevant ICD-10 codes and order associations:      ICD-10-CM    1. Preoperative cardiovascular examination  Z01.810    2. Symptomatic cholelithiasis  K80.20    3. Choledocholithiasis  K80.50 Hepatic Profile     Basic Metabolic Panel     HM2(CBC w/o Differential)     Lipase   4. Elevated LFTs  R79.89    5. Calculus of gallbladder and bile duct with obstruction without cholecystitis  K80.71    6. Melanoma of right upper arm (H) - On Keytruda  C43.61    7. Ischemic cerebrovascular accident (CVA) of frontal lobe (H) - 2/2020  I63.9    8. Spinal stenosis of lumbar region with neurogenic claudication  M48.062    9. Gastroesophageal reflux disease, esophagitis presence not specified  K21.9

## 2021-06-29 NOTE — PROGRESS NOTES
"Progress Notes by Renzo Newell MD at 5/12/2020 11:00 AM     Author: Renzo Newell MD Service: -- Author Type: Physician    Filed: 5/17/2020 10:38 PM Encounter Date: 5/12/2020 Status: Signed    : Renzo Newell MD (Physician)       Patient has given verbal consent to a Video visit? Yes    Patient would like to receive their AVS by AVS Preference: MyChart.    Patient would like the video invitation sent by: Send to e-mail at: [email or web address]         [facility]     VIDEO VISIT     Comprehensive and complex medical care - Chronic disease management - Shared decision making - Care coordination - Compassionate care    Patient advocacy - Rational deprescribing - Minimally disruptive medicine - Ethical focus - Customized care         Lahsawn Ortega is a 75 y.o. female who is being evaluated via a billable video visit.      The patient has been notified of following:     \"This video visit will be conducted via a call between you and your physician/provider. We have found that certain health care needs can be provided without the need for an in-person physical exam.  This service lets us provide the care you need with a video conversation.  If a prescription is necessary we can send it directly to your pharmacy.  If lab work is needed we can place an order for that and you can then stop by our lab to have the test done at a later time.    Video visits are billed at different rates depending on your insurance coverage. Please reach out to your insurance provider with any questions.    If during the course of the call the physician/provider feels a video visit is not appropriate, you will not be charged for this service.\"         Active Problem List:  Problem List as of 5/12/2020 Reviewed: 2/27/2020 10:55 PM by Renzo Newell MD       High    Full code status    Ischemic cerebrovascular accident (CVA) of frontal lobe (H) - 2/2020    Nonsmoker    Melanoma of " right upper arm (H) - On Keytruda       Medium    Lumbar spinal stenosis       Low    GERD (gastroesophageal reflux disease)    LBP (low back pain)    Menopausal hot flushes    Migraine headache    Peripheral neuropathy       Unprioritized    Aphasia    Essential tremor    NORMAL COLONOSCOPY - 2009 - Average risk           Current Outpatient Medications   Medication Sig Note   ? acetaminophen (TYLENOL) 500 MG tablet Take 1,000 mg by mouth 3 (three) times a day. Indications: pain    ? aloe vera Gel Apply 1 application topically 2 (two) times a day as needed.    ? aspirin 81 MG EC tablet Take 1 tablet (81 mg total) by mouth daily.    ? atorvastatin (LIPITOR) 40 MG tablet Take 1 tablet (40 mg total) by mouth at bedtime.    ? dextromethorphan-guaiFENesin (ROBAFEN DM)  mg/5 mL liquid Take 5 mL by mouth every 12 (twelve) hours as needed.    ? diphenhydrAMINE-acetaminophen (PERCOGESIC) 12.5-325 mg Tab Take 1-2 tablets by mouth every 8 (eight) hours as needed.     ? fluticasone propionate (FLONASE) 50 mcg/actuation nasal spray Apply 1-2 sprays into each nostril daily as needed for rhinitis.    ? gabapentin (NEURONTIN) 300 MG capsule Take 3 capsules three (3) times a day.    ? guaiFENesin-dextromethorphan (MUCINEX DM) 600-30 mg Tb12 Take 1 tablet by mouth 2 (two) times a day as needed.    ? menthol (BIOFREEZE, MENTHOL,) 4 % Gel Apply topically for pain  Indications: pain associated with arthritis    ? naproxen (NAPROSYN) 500 MG tablet Take 1 tablet (500 mg total) by mouth 2 (two) times a day as needed. Please keep this Rx on file for the next RF.    ? neomycin-bacitracin-polymyxin (NEOSPORIN) ointment Apply 1 application topically 2 (two) times a day as needed.    ? pantoprazole (PROTONIX) 40 MG tablet Take 1 tablet (40 mg total) by mouth daily.    ? sodium chloride 0.9% SolP 100 mL with pembrolizumab 25 mg/mL Soln 200 mg Infuse 200 mg into a venous catheter every 21 days. Indications: malignant melanoma, a type of  skin cancer    ? tiZANidine (ZANAFLEX) 2 MG tablet Take 2 mg by mouth every 8 (eight) hours as needed. 2/21/2020: Using old RX supply   ? UNABLE TO FIND Hurt Blocker Pro - apply to affected areas for pain  Indications: Pain    ? venlafaxine (EFFEXOR XR) 37.5 MG 24 hr capsule Take 1 capsule (37.5 mg total) by mouth daily.    ? ciprofloxacin HCl (CIPRO) 250 MG tablet Take 1 tablet (250 mg total) by mouth 2 (two) times a day for 5 days.        Subjective:     Lashawn Ortega presents with:      Chief Complaint   Patient presents with   ? Follow-up     in ED yesterday for palpations       The patient has a video visit today which is done in light of the current coronavirus outbreak.    Patient comes in today for a number of issues.    She was in the emergency room yesterday.  Blood work is as noted below:    She mainly went in because of not feeling well.  Over the weekend, she was changing the sheets on her bed and she felt pounding in her head.  She might of noticed a little tenderness along her breastbone with pressure.  She also noted some nausea and some acid reflux subsequently.  This also has been on her heart pounding.  She had a headache as well.  She felt tired off and on all day Sunday.  She denies any fevers or chills.  She denies any respiratory symptoms.  She called in and went into the emergency room yesterday.  The blood work that they did found elevated LFTs and cholelithiasis.  She had normal LFTs in oncology in middle of next month.  She denies any abdominal symptoms generally with eating.  The acid reflux symptoms seem to get better with Tums.    She does not feel as bad at this time, but we talked about the different things that could be going on and reasons to follow-up.  We are going to schedule her for follow-up after this.    She is also had problems with her right hand hurting and her hand walking especially about the thumb.  She shows me on the video and it is over the palmar thumb.    She is  also had problems with urination.  She has a hard time getting it started over the last 2 weeks.  Been particularly bad for her.    We reviewed her chronic back pain and things are stable here.  He might be doing a little bit better.    She has her next chemotherapy treatment on May 28.    We reviewed her other issues noted in the assessment but not specifically addressed in the HPI above.     On ROS, the patient denies any chest pain or pressure.  No shortness of breath.     Objective:     Exam reveals:  Patient in no distress.  Mood is good.  Insight is good.  She looks comfortable and is doing well over the video.  She has no icterus of her eyes.  Her face is symmetric.  Her respiratory effort is good.    Diagnostics:     Results for orders placed or performed during the hospital encounter of 05/11/20   Magnesium   Result Value Ref Range    Magnesium 2.2 1.8 - 2.6 mg/dL   Troponin I   Result Value Ref Range    Troponin I <0.01 0.00 - 0.29 ng/mL   Hepatic Profile   Result Value Ref Range    Bilirubin, Total 0.6 0.0 - 1.0 mg/dL    Bilirubin, Direct 0.3 <=0.5 mg/dL    Protein, Total 7.1 6.0 - 8.0 g/dL    Albumin 3.6 3.5 - 5.0 g/dL    Alkaline Phosphatase 228 (H) 45 - 120 U/L     (H) 0 - 40 U/L     (H) 0 - 45 U/L   Basic Metabolic Panel   Result Value Ref Range    Sodium 142 136 - 145 mmol/L    Potassium 3.8 3.5 - 5.0 mmol/L    Chloride 105 98 - 107 mmol/L    CO2 27 22 - 31 mmol/L    Anion Gap, Calculation 10 5 - 18 mmol/L    Glucose 103 70 - 125 mg/dL    Calcium 9.5 8.5 - 10.5 mg/dL    BUN 12 8 - 28 mg/dL    Creatinine 0.78 0.60 - 1.10 mg/dL    GFR MDRD Af Amer >60 >60 mL/min/1.73m2    GFR MDRD Non Af Amer >60 >60 mL/min/1.73m2   Thyroid Cascade   Result Value Ref Range    TSH 0.51 0.30 - 5.00 uIU/mL   HM1 (CBC with Diff)   Result Value Ref Range    WBC 7.3 4.0 - 11.0 thou/uL    RBC 4.30 3.80 - 5.40 mill/uL    Hemoglobin 12.8 12.0 - 16.0 g/dL    Hematocrit 39.5 35.0 - 47.0 %    MCV 92 80 - 100 fL     MCH 29.8 27.0 - 34.0 pg    MCHC 32.4 32.0 - 36.0 g/dL    RDW 13.8 11.0 - 14.5 %    Platelets 243 140 - 440 thou/uL    MPV 9.0 8.5 - 12.5 fL    Neutrophils % 60 50 - 70 %    Lymphocytes % 23 20 - 40 %    Monocytes % 13 (H) 2 - 10 %    Eosinophils % 3 0 - 6 %    Basophils % 1 0 - 2 %    Neutrophils Absolute 4.4 2.0 - 7.7 thou/uL    Lymphocytes Absolute 1.7 0.8 - 4.4 thou/uL    Monocytes Absolute 0.9 0.0 - 0.9 thou/uL    Eosinophils Absolute 0.2 0.0 - 0.4 thou/uL    Basophils Absolute 0.1 0.0 - 0.2 thou/uL   Lipase   Result Value Ref Range    Lipase 71 (H) 0 - 52 U/L   Hepatitis Acute Evaluation   Result Value Ref Range    Hepatitis A Antibody, IgM Negative Negative    Hepatitis B Core Maria Victoria, IgM Negative Negative    Hepatitis B Surface Ag Negative Negative    Hepatitis C Ab Negative Negative   ECG 12 lead nursing unit performed   Result Value Ref Range    SYSTOLIC BLOOD PRESSURE      DIASTOLIC BLOOD PRESSURE      VENTRICULAR RATE 92 BPM    ATRIAL RATE 92 BPM    P-R INTERVAL 156 ms    QRS DURATION 74 ms    Q-T INTERVAL 336 ms    QTC CALCULATION (BEZET) 415 ms    P Axis 37 degrees    R AXIS 6 degrees    T AXIS 107 degrees    MUSE DIAGNOSIS       Normal sinus rhythm  Inferior infarct (cited on or before 21-FEB-2020)  Abnormal ECG  When compared with ECG of 21-FEB-2020 08:38,  No significant change was found  Confirmed by SEE ED PROVIDER NOTE FOR, ECG INTERPRETATION ([id number]),  RATNA MAGAÑA ([id number]) on 5/12/2020 7:07:02 AM         Quality review:     PHQ-2 Total Score: 2 (9/17/2019  8:00 AM)      No data recorded  ______________________________________________________________________     BMI Readings from Last 1 Encounters:   05/11/20 29.82 kg/m        Assessment:     1. Acute cystitis without hematuria    2. Dysuria    3. Trigger thumb, right thumb    4. Palpitations    5. Elevated LFTs    6. On antineoplastic chemotherapy - PEMBROLIZUMAB - checkpoint inhibitor    7. Gastroesophageal reflux disease, esophagitis  presence not specified    8. Nausea    9. Nonintractable headache, unspecified chronicity pattern, unspecified headache type    10. Calculus of gallbladder without cholecystitis without obstruction        Plan:     1. Her urinalysis came back for positive urine infection with E. coli.  She was treated with antibiotics.  2. She will follow-up with me next Wednesday with follow-up blood work which would include LFTs.  3. We reviewed her recent Holter monitor which was good overall.  4. I doubt her current symptoms are due to side effect of her current chemotherapy but keep in mind.  5. May need further testing depending on how she is doing next week.  She is scheduled for visit.  She should seek more urgent attention if she is worsening.    Video visit duration:  23 minutes    Return in about 1 week (around 5/19/2020) for visit and blood work.     Future Appointments   Date Time Provider Department Center   5/21/2020 10:40 AM Renzo Newell MD [facility]   6/9/2020 10:30 AM JN PET CTC JN PET JN   6/9/2020 11:30 AM LEVI PET CT 1 JN PET JN            Renzo Newell MD  General Internal Medicine  [facility]    Personal office fax - [phone]   Voice mail - [phone]  E-mail - [email or web address]     HCC issues resolved at this visit.  ______________________________________________________________________     Video visit details    Video invitation sent by: Send to e-mail at: [email or web address]    Patient location:  Home    Provider location: [facility]    Mode of Communication:  DEJA

## 2021-06-29 NOTE — PROGRESS NOTES
Progress Notes by Renzo Newell MD at 2020 10:40 AM     Author: Renzo Newell MD Service: -- Author Type: Physician    Filed: 2020 11:21 PM Encounter Date: 2020 Status: Signed    : Renzo Newell MD (Physician)              [facility]    Comprehensive and complex medical care - Chronic disease management - Shared decision making - Care coordination - Compassionate care    Patient advocacy - Rational deprescribing - Minimally disruptive medicine - Ethical focus - Customized care          Date of Service: 2020  Primary Provider: Renzo Newell MD    Patient Care Team:  Renzo Newell MD as PCP - General (Internal Medicine)  Perez Padilla MD as Physician (Plastic Surgery)  Shoaib Gloria MD as Physician (Hematology and Oncology)  Bj Orantes MD as Physician (Dermatology)  Sadi Castano MD as Physician (General Surgery)  Renzo Newell MD as Assigned PCP  Carlos Alberto Worthington DO as Physician (Pain Medicine)  Rhona Moura MD (Neurology)  Antoni Cortes, PharmD as Pharmacist (Pharmacist)     ______________________________________________________________________     Patient's Pharmacy:      [id number] - [place], [place] - [address]  [address]  [facility] [address]  Phone: [phone] Fax: [phone]    [facility] - [place], [place] - [address] [facility]  [address] [facility] [address]  Phone: [phone] Fax: [phone]     Patient's Contacts:  Name Home Phone Work Phone Mobile Phone Relationship Lgl Grd   DEYSI [phone] CAMPBELL Adler [phone]   Niece No       Patient's Insurance:    Payor/Plan Subscr  Sex Relation Sub. Ins. ID Effective Group Num   1. HEALTHPARTNER* DAVIDA HUMPHREY 1945 Female  [id number] Not Eff [id number]                                    [address]   2. MEDICARE - ME* LASHAWN HUMPHREY 1945 Female  [id number] 4/1/10                                    [place], [address]           Lashawn Humphrey is a 75 y.o. female who comes in today for:    Chief Complaint   Patient presents with   ? Follow-up     palpitations has not been bad but could feel it a little yesterday, chronic back pain   ? Tremors     getting worse   ? Follow-up     left hand       Active Problem List:  Problem List as of 5/21/2020 Reviewed: 5/17/2020 10:32 PM by Renzo Newell MD       High    Full code status    Ischemic cerebrovascular accident (CVA) of frontal lobe (H) - 2/2020    Nonsmoker    Melanoma of right upper arm (H) - On Keytruda       Medium    Lumbar spinal stenosis       Low    GERD (gastroesophageal reflux disease)    LBP (low back pain)    Menopausal hot flushes    Migraine headache    Peripheral neuropathy       Unprioritized    Aphasia    Essential tremor    NORMAL COLONOSCOPY - 2009 - Average risk    On antineoplastic chemotherapy - PEMBROLIZUMAB - checkpoint inhibitor           Current Outpatient Medications   Medication Sig   ? acetaminophen (TYLENOL) 500 MG tablet Take 1,000 mg by mouth 3 (three) times a day. Indications: pain   ? aloe vera Gel Apply 1 application topically 2 (two) times a day as needed.   ? aspirin 81 MG EC tablet Take 1 tablet (81 mg total) by mouth daily.   ? atorvastatin (LIPITOR) 40 MG tablet Take 1 tablet (40 mg total) by mouth at bedtime.   ? dextromethorphan-guaiFENesin (ROBAFEN DM)  mg/5 mL liquid Take 5 mL by mouth every 12 (twelve) hours as needed.   ? diphenhydrAMINE-acetaminophen (PERCOGESIC) 12.5-325 mg Tab Take 1-2 tablets by mouth every 8 (eight) hours as needed.    ? fluticasone propionate (FLONASE) 50 mcg/actuation nasal spray Apply 1-2 sprays into each nostril daily as needed for rhinitis.   ? gabapentin (NEURONTIN) 300 MG capsule Take 3 capsules three (3) times a day.   ? guaiFENesin-dextromethorphan (MUCINEX  DM) 600-30 mg Tb12 Take 1 tablet by mouth 2 (two) times a day as needed.   ? menthol (BIOFREEZE, MENTHOL,) 4 % Gel Apply topically for pain  Indications: pain associated with arthritis   ? naproxen (NAPROSYN) 500 MG tablet Take 1 tablet (500 mg total) by mouth 2 (two) times a day as needed. Please keep this Rx on file for the next RF.   ? neomycin-bacitracin-polymyxin (NEOSPORIN) ointment Apply 1 application topically 2 (two) times a day as needed.   ? pantoprazole (PROTONIX) 40 MG tablet Take 1 tablet (40 mg total) by mouth daily.   ? sodium chloride 0.9% SolP 100 mL with pembrolizumab 25 mg/mL Soln 200 mg Infuse 200 mg into a venous catheter every 21 days. Indications: malignant melanoma, a type of skin cancer   ? UNABLE TO FIND Hurt Blocker Pro - apply to affected areas for pain  Indications: Pain   ? venlafaxine (EFFEXOR XR) 37.5 MG 24 hr capsule Take 1 capsule (37.5 mg total) by mouth daily.       Social History     Social History Narrative    , no children.  Has a niece involved in her care.        Patient of Dr. Newell since 2016.          had severe dementia and passed away in 2019.  [place]ilt.         Subjective:     Patient comes in today for a number of issues.    We first reviewed her elevated LFTs and cholelithiasis.  She has no abdominal pain and no postprandial type symptoms.  She has not had any fevers or chills.  We are going to recheck her LFTs today.  Her chemotherapy can cause elevated LFTs we reviewed other things that could affect this as well.    We reviewed her recent urinary tract infection.  She did have a positive urinalysis and urine culture.  She did finish the course of ciprofloxacin without any major issues.  She feels her symptoms are doing much better.    We reviewed her melanoma and the treatment for it.    We reviewed her palpitations they continue off and on at this time.  She denies any current increased shortness of breath.  She denies any presyncope.    We reviewed a  cough she has been having.  That she has a faint cough.  It is not bothering her much.    She has had some pain in the left side of her neck off and on.  This has been more recently.  Her back pain is about the same as always.    She has noticed that her tremors in her left hand in particular have been a little bit more prominent in the last 2 to 3 months.    We reviewed the stroke she had earlier this year.    Her trigger thumb is doing better at this time.    We reviewed her other issues noted in the assessment but not specifically addressed in the HPI above.     Past medical, family and social history reviewed today.     Comprehensive review of systems was performed today with no major problems noted except as above.     Objective:     Wt Readings from Last 3 Encounters:   05/21/20 192 lb (87.1 kg)   05/11/20 189 lb (85.7 kg)   02/27/20 194 lb (88 kg)       BP Readings from Last 3 Encounters:   05/21/20 126/64   05/11/20 126/80   04/30/20 122/80       /64   Pulse 80   Wt 192 lb (87.1 kg)   LMP 09/17/1988   SpO2 96%   BMI 30.30 kg/m     The patient is comfortable, no acute distress.  Mood good.  Insight good.  Eyes are nonicteric.  Neck is supple without mass.  No cervical adenopathy.  No thyromegaly. Heart regular rate and rhythm.  Lungs clear to auscultation bilaterally.  Respiratory effort is good.  Abdomen soft and nontender.  No hepatosplenomegaly.  Extremities no edema.  Her thumb does not appear to be triggering at this time.  She has no palmar erythema or telangiectasias.  There is no asterixis.  Neck range of motion at this time is pretty good.    Diagnostics:     Results for orders placed or performed in visit on 05/21/20   Comprehensive Metabolic Panel   Result Value Ref Range    Sodium 142 136 - 145 mmol/L    Potassium 4.1 3.5 - 5.0 mmol/L    Chloride 108 (H) 98 - 107 mmol/L    CO2 24 22 - 31 mmol/L    Anion Gap, Calculation 10 5 - 18 mmol/L    Glucose 104 70 - 125 mg/dL    BUN 13 8 - 28  mg/dL    Creatinine 0.75 0.60 - 1.10 mg/dL    GFR MDRD Af Amer >60 >60 mL/min/1.73m2    GFR MDRD Non Af Amer >60 >60 mL/min/1.73m2    Bilirubin, Total 0.5 0.0 - 1.0 mg/dL    Calcium 9.5 8.5 - 10.5 mg/dL    Protein, Total 6.5 6.0 - 8.0 g/dL    Albumin 3.7 3.5 - 5.0 g/dL    Alkaline Phosphatase 436 (H) 45 - 120 U/L     (H) 0 - 40 U/L     (H) 0 - 45 U/L   Lipase   Result Value Ref Range    Lipase 44 0 - 52 U/L   C-Reactive Protein(CRP)   Result Value Ref Range    CRP 1.0 (H) 0.0 - 0.8 mg/dL   HM2(CBC w/o Differential)   Result Value Ref Range    WBC 5.8 4.0 - 11.0 thou/uL    RBC 4.20 3.80 - 5.40 mill/uL    Hemoglobin 12.7 12.0 - 16.0 g/dL    Hematocrit 38.1 35.0 - 47.0 %    MCV 91 80 - 100 fL    MCH 30.2 27.0 - 34.0 pg    MCHC 33.3 32.0 - 36.0 g/dL    RDW 12.5 11.0 - 14.5 %    Platelets 306 140 - 440 thou/uL    MPV 7.0 7.0 - 10.0 fL       Assessment:     1. Elevated LFTs    2. Melanoma of right upper arm (H) - On Keytruda    3. Acute cystitis without hematuria    4. Trigger thumb, right thumb    5. Palpitations    6. On antineoplastic chemotherapy - PEMBROLIZUMAB - checkpoint inhibitor    7. Calculus of gallbladder without cholecystitis without obstruction    8. Neck pain    9. Cough    10. Essential tremor        Quality review:     PHQ-2 Total Score: 2 (9/17/2019  8:00 AM)      No data recorded  ______________________________________________________________________     BMI Readings from Last 1 Encounters:   05/21/20 30.30 kg/m          Plan:     1. Check blood work today.  See relevant orders and diagnosis associations at the bottom of this note.  2. LFTs elevated more so after this visit.  She should inform her oncology department and I will send a note for this as well.  3. She will likely need to hold her chemotherapy at this time and has the Keytruda could be at cause for this.  4. We will do an MRCP to make sure of the gallstones are not a part of this problem.  5. She will follow-up again with me  in 1 week.  6. She should not use the following: Atorvastatin, acetaminophen, and alcohol.  7. She is to follow-up sooner if issues.  8. Follow-up blood work at next visit as well.  9. No signs of cystitis left over at this time.         Renzo Newell MD  General Internal Medicine  [facility]    Personal office fax - [phone]   Voice mail - [phone]  E-mail - [email or web address]     Return in about 1 week (around 5/28/2020) for visit and blood work.     Future Appointments   Date Time Provider Department Center   5/29/2020 11:05 AM Ren[facility]ell MD [facility]   6/9/2020 10:30 AM JN PET CTC JN PET JN   6/9/2020 11:30 AM JN PET CT 1 JN PET JN         ______________________________________________________________________     Relevant ICD-10 codes and order associations:      ICD-10-CM    1. Elevated LFTs  R94.5 Comprehensive Metabolic Panel     Lipase     C-Reactive Protein(CRP)     HM2(CBC w/o Differential)     MR MRCP With Without Contrast   2. Melanoma of right upper arm (H) - On Keytruda  C43.61 MR MRCP With Without Contrast   3. Acute cystitis without hematuria  N30.00    4. Trigger thumb, right thumb  M65.311    5. Palpitations  R00.2    6. On antineoplastic chemotherapy - PEMBROLIZUMAB - checkpoint inhibitor  Z79.899    7. Calculus of gallbladder without cholecystitis without obstruction  K80.20 MR MRCP With Without Contrast   8. Neck pain  M54.2    9. Cough  R05    10. Essential tremor  G25.0

## 2021-06-29 NOTE — PROGRESS NOTES
Progress Notes by Renzo Newell MD at 6/16/2020  3:15 PM     Author: Renzo Newell MD Service: -- Author Type: Physician    Filed: 6/17/2020 11:28 PM Encounter Date: 6/16/2020 Status: Signed    : Renzo Newell MD (Physician)          [facility]    Care coordination - Customized care -  Comprehensive and complex medical care            Date of Service: 6/16/2020  Primary Provider: Renzo Newell MD    Patient Care Team:  Renzo Newell MD as PCP - General (Internal Medicine)  Perez Padilla MD as Physician (Plastic Surgery)  Shoaib Gloria MD as Physician (Hematology and Oncology)  Bj Orantes MD as Physician (Dermatology)  Sadi Castano MD as Physician (General Surgery)  Renzo Newell MD as Assigned PCP  Carlos Alberto Worthington DO as Physician (Pain Medicine)  Rhona Moura MD (Neurology)  Antoni Cortes, PharmD as Pharmacist (Pharmacist)     ______________________________________________________________________     Patient's Pharmacy:      [id number] - [place], [place] - [address] AT [facility] [address]  [address]  [facility] [address]  Phone: [phone] Fax: [phone]    [facility] - [place], [place] - [address] [facility]  [address] [facility] [address]  Phone: [phone] Fax: [phone]     Patient's Contacts:  Name Home Phone Work Phone Mobile Phone Relationship Lgl Gryumiko   DEYSI [phone] Niece No   CAMPBELL BERRIOS [phone]   Niece No       Patient's Insurance:    Payor: MEDICARE / Plan: MEDICARE A AND B / Product Type: Medicare /           Preoperative examination    Lashawn Ortega is a 75 y.o. female (1945) who I am asked to see by her surgeon regarding his fitness for upcoming surgery.      Chief Complaint   Patient presents with   ? Follow-up       Subjective:     Patient comes in today  for pre-procedural evaluation prior to a planned ERCP this Friday.    She has had progressive elevation in her LFTs.  She has had a recent finding of choledocholithiasis and Carmella lithiasis without cholecystitis and with out ascending cholangitis.  She has had no abdominal pain, significant nausea, nor fevers or chills.  She is eating okay.  She had seen Dr. Das related to this and the ERCP is planned as noted.  She has an appointment with Dr. Christina in surgery next week.    We reviewed her melanoma.  She is continuing to chemotherapy with Keytruda.  She had blood work done last week through [facility], but these results are pending at this time and I have not received them yet.    She had a fall since I have last seen her.  Occurred 1 week ago Saturday.  She tripped in the garage and went forward and splayed out.  She scraped her knees and her elbows.  She was able to get back up.  She has had exacerbation of her chronic spine disease during this time and she is having increasing pain going down her right leg.  She has seen Dr. Worthington in the spine clinic and we reviewed this with her.  She may need to see him again if there is further problems and she will keep this in mind.  She is currently doing her therapy at this time.    We reviewed her reflux disease and there is been no issues with this at this point.  ______________________________________________________________________     Surgery specific questions:    Anesthesia/family history of complications: No  History of abnormal bleeding: No  ______________________________________________________________________     We reviewed her other issues noted in the assessment but not specifically addressed in the HPI above.   ______________________________________________________________________     Patient Active Problem List   Diagnosis   ? GERD (gastroesophageal reflux disease)   ? Migraine headache   ? LBP (low back pain)   ? Nonsmoker   ? Menopausal hot  flushes   ? Full code status   ? NORMAL COLONOSCOPY - 2009 - Average risk   ? Peripheral neuropathy   ? Melanoma of right upper arm (H) - On Keytruda   ? Ischemic cerebrovascular accident (CVA) of frontal lobe (H) - 2/2020   ? Aphasia   ? Lumbar spinal stenosis   ? Essential tremor   ? On antineoplastic chemotherapy - PEMBROLIZUMAB - checkpoint inhibitor       Past Surgical History:   Procedure Laterality Date   ? HYSTERECTOMY  1988   ? IR LUMBAR EPIDURAL STEROID INJECTION  8/12/2019   ? TOTAL ABDOMINAL HYSTERECTOMY W/ BILATERAL SALPINGOOPHORECTOMY  1988      Social History     Occupational History   ? Occupation: Accounting     Employer: RETIRED   Tobacco Use   ? Smoking status: Former Smoker     Packs/day: 0.00   ? Smokeless tobacco: Never Used   Substance and Sexual Activity   ? Alcohol use: Yes     Alcohol/week: 0.0 - 1.0 standard drinks   ? Drug use: Never   ? Sexual activity: Not on file      Social History     Social History Narrative    , no children.  Has a niece involved in her care.        Patient of Dr. Newell since 2016.          had severe dementia and passed away in 2019.  Milt.      Family History   Problem Relation Age of Onset   ? Pancreatic cancer Mother 82   ? Melanoma Father 75        Metastatic to colon      Family history is noncontributory otherwise.    Allergies: Codeine; Erythromycin base; Hydrocodone; Tramadol; Other environmental allergy; Penicillins; Retinol; Shellfish containing products; Sulfa (sulfonamide antibiotics); and Vitamin d3 complete [mv-mn-iron-fa-herbal cmplx#[id number]]     Current medications:  Current Outpatient Medications   Medication Sig   ? acetaminophen (TYLENOL) 500 MG tablet Take 1,000 mg by mouth 3 (three) times a day. Indications: pain   ? aloe vera Gel Apply 1 application topically 2 (two) times a day as needed.   ? aspirin 81 MG EC tablet Take 1 tablet (81 mg total) by mouth daily.   ? atorvastatin (LIPITOR) 40 MG tablet Take 1 tablet (40 mg total) by  mouth at bedtime.   ? dextromethorphan-guaiFENesin (ROBAFEN DM)  mg/5 mL liquid Take 5 mL by mouth every 12 (twelve) hours as needed.   ? diphenhydrAMINE-acetaminophen (PERCOGESIC) 12.5-325 mg Tab Take 1-2 tablets by mouth every 8 (eight) hours as needed.    ? fluticasone propionate (FLONASE) 50 mcg/actuation nasal spray Apply 1-2 sprays into each nostril daily as needed for rhinitis.   ? gabapentin (NEURONTIN) 300 MG capsule Take 3 capsules three (3) times a day.   ? guaiFENesin-dextromethorphan (MUCINEX DM) 600-30 mg Tb12 Take 1 tablet by mouth 2 (two) times a day as needed.   ? menthol (BIOFREEZE, MENTHOL,) 4 % Gel Apply topically for pain  Indications: pain associated with arthritis   ? naproxen (NAPROSYN) 500 MG tablet Take 1 tablet (500 mg total) by mouth 2 (two) times a day as needed. Please keep this Rx on file for the next RF.   ? neomycin-bacitracin-polymyxin (NEOSPORIN) ointment Apply 1 application topically 2 (two) times a day as needed.   ? sodium chloride 0.9% SolP 100 mL with pembrolizumab 25 mg/mL Soln 200 mg Infuse 200 mg into a venous catheter every 21 days. Indications: malignant melanoma, a type of skin cancer   ? venlafaxine (EFFEXOR XR) 37.5 MG 24 hr capsule Take 1 capsule (37.5 mg total) by mouth daily.   ? pantoprazole (PROTONIX) 40 MG tablet Take 1 tablet (40 mg total) by mouth daily.       Review of systems:    Comprehensive review of systems was performed today with no major problems noted except as above.     Objective:     Wt Readings from Last 3 Encounters:   06/16/20 193 lb (87.5 kg)   06/09/20 188 lb (85.3 kg)   05/29/20 192 lb (87.1 kg)       BP Readings from Last 3 Encounters:   06/16/20 122/60   05/29/20 128/64   05/21/20 126/64       /60   Pulse 70   Wt 193 lb (87.5 kg)   LMP 09/17/1988   BMI 30.23 kg/m     Patient is in no acute distress.  Mood good.  Insight good.  Eyes nonicteric.  Pupils equal.  Ears clear.  Nose clear.  Throat clear.  Neck is supple.  No  cervical adenopathy.  No thyromegaly.  Heart is regular rate and rhythm.  Lungs clear to auscultation bilaterally.  Respiratory effort is good.  Abdomen is soft, nontender, no hepatosplenomegaly.  Extremities no edema.  Her gait is overall good.  She has no major signs of acute fracture of her back, pelvis, hips, knees.  She does have scrapes of her skin over her knees and her elbows.  Her mentation is stable.  She moves slowly but this seems to be related to her regular back issues.    Diagnostics:     Results for orders placed or performed during the hospital encounter of 06/09/20   POCT Glucose    Specimen: Capillary; Blood   Result Value Ref Range    Glucose 101 70 - 139 mg/dL     Lab Results   Component Value Date    WBC 5.8 05/21/2020    HGB 12.7 05/21/2020    HCT 38.1 05/21/2020    MCV 91 05/21/2020     05/21/2020         Chemistry        Component Value Date/Time     05/21/2020 1138    K 4.1 05/21/2020 1138     (H) 05/21/2020 1138    CO2 24 05/21/2020 1138    BUN 13 05/21/2020 1138    CREATININE 0.87 05/26/2020     05/21/2020 1138        Component Value Date/Time    CALCIUM 9.5 05/21/2020 1138    ALKPHOS 436 (H) 05/21/2020 1138     (!) 05/26/2020     (!) 05/26/2020    BILITOT 0.5 05/21/2020 1138         Most Recent EKG     Units 05/11/20  1548   VENTRATE BPM 92   ATRIALRATE BPM 92   QRSDURATION ms 74   QTINTERVAL ms 336   QTCCALC ms 415   P [place] degrees 37   RAXIS degrees 6   TAXIS degrees 107   MUSEDX  Normal sinus rhythm  Inferior infarct (cited on or before 21-FEB-2020)  Abnormal ECG  When compared with ECG of 21-FEB-2020 08:38,  No significant change was found  Confirmed by SEE ED PROVIDER NOTE FOR, ECG INTERPRETATION ([id number]),  RATNA MAGAÑA ([id number]) on 5/12/2020 7:07:02 AM          Impression:     1. Preoperative cardiovascular examination    2. Choledocholithiasis    3. Elevated LFTs    4. Calculus of gallbladder and bile duct with obstruction without  cholecystitis    5. Gastroesophageal reflux disease, esophagitis presence not specified    6. Spinal stenosis of lumbar region with neurogenic claudication    7. Lumbar disc herniation    8. Lumbar radiculitis    9. Lumbosacral radiculopathy at L5    10. Gait instability    11. Melanoma of right upper arm (H) - On Keytruda    12. Fall, initial encounter        Quality review:     PHQ-2 Total Score: 0 (5/29/2020 12:00 PM)      PHQ-9 Total Score: 2 (5/29/2020 12:00 PM)    ______________________________________________________________________     BMI Readings from Last 1 Encounters:   06/16/20 30.23 kg/m        Plan:     1. Okay to proceed with surgery as planned.  2. She will follow-up with oncology as needed for her melanoma.  3. She has an appointment scheduled with surgery for consider of cholecystectomy after ERCP.  4. We will try to schedule her a follow-up appointment with me once I know the plan with Dr. Christina in surgery.  5. We will likely do follow-up blood work in the future, but this is currently being done by oncology and hopefully we can get these records.  6. Her medications were all refilled.  7. She should consider follow-up with Dr. Worthington in the spine center given her current back issues.  She can follow-up with me sooner if needed.  8. She has a lot of balls in the air and this does make her management more complicated.           Renzo Newell MD  General Internal Medicine  [facility]    Personal office fax - [phone]   Voice mail - [phone]  E-mail - [email or web address]      Return in about 1 month (around 7/16/2020) for follow up visit.     Future Appointments   Date Time Provider Department Center   6/23/2020 12:45 PM Lynn Christina MD [facility]         ______________________________________________________________________     Relevant ICD-10 codes and order associations:      ICD-10-CM    1. Preoperative cardiovascular examination   Z01.810    2. Choledocholithiasis  K80.50    3. Elevated LFTs  R79.89    4. Calculus of gallbladder and bile duct with obstruction without cholecystitis  K80.71    5. Gastroesophageal reflux disease, esophagitis presence not specified  K21.9 pantoprazole (PROTONIX) 40 MG tablet     DISCONTINUED: pantoprazole (PROTONIX) 40 MG tablet   6. Spinal stenosis of lumbar region with neurogenic claudication  M48.062 gabapentin (NEURONTIN) 300 MG capsule   7. Lumbar disc herniation  M51.26 gabapentin (NEURONTIN) 300 MG capsule   8. Lumbar radiculitis  M54.16 gabapentin (NEURONTIN) 300 MG capsule   9. Lumbosacral radiculopathy at L5  M54.17    10. Gait instability  R26.81    11. Melanoma of right upper arm (H) - On Keytruda  C43.61    12. Fall, initial encounter  W19.XXXA

## 2021-06-29 NOTE — PROGRESS NOTES
Progress Notes by Renzo Newell MD at 2020  9:40 AM     Author: Renzo Newell MD Service: -- Author Type: Physician    Filed: 2020 11:03 PM Encounter Date: 2020 Status: Signed    : Renzo Newell MD (Physician)              [facility]    Comprehensive and complex medical care - Chronic disease management - Shared decision making - Care coordination - Compassionate care    Patient advocacy - Rational deprescribing - Minimally disruptive medicine - Ethical focus - Customized care          Date of Service: 2020  Primary Provider: Renzo Newell MD    Patient Care Team:  Renzo Newell MD as PCP - General (Internal Medicine)  Perez Padilla MD as Physician (Plastic Surgery)  Shoaib Gloria MD as Physician (Hematology and Oncology)  Bj Orantes MD as Physician (Dermatology)  Sadi Castano MD as Physician (General Surgery)  Renzo Newell MD as Assigned PCP  Carlos Alberto Worthington DO as Physician (Pain Medicine)  Rhona Moura MD (Neurology)  Antoni Cortes, PharmD as Pharmacist (Pharmacist)  Lynn Christina MD as Physician (General Surgery)  Daniel Das MD as Physician (Gastroenterology)     ______________________________________________________________________     Patient's Pharmacy:      [id number] - [place], [place] - [address] AT [facility] [address]  [address]  [facility] [address]  Phone: [phone] Fax: [phone]    [facility] - [place], [place] - [address]  [address] [facility] [address]  Phone: [phone] Fax: [phone]     Patient's Contacts:  Name Home Phone Work Phone Mobile Phone Relationship Lgl Grd   JOY, DEYSI [phone] Niece No   AGUSTINA CAMPBELL [phone]   Niece No       Patient's Insurance:    Payor/Plan Subscr  Sex Relation Sub. Ins. ID Effective  Group Num   1. HEALTHPARTNER* LASHAWN HUMPHREY 1945 Female  [id number] Not Eff [id number]                                   [address]   2. MEDICARE - ME* LASHAWN HUMPHREY 1945 Female  [id number] 4/1/10                                    [place], [address]       Subjective:     History of present illness:    Lashawn Humphrey is an 75 y.o. female here for an annual wellness visit.    The issues she would like to address at today's visit include the following:    Chief Complaint   Patient presents with   ? Annual Wellness Visit        Patient comes in today for follow-up of a number of issues.    She is also in for annual wellness visit.    We reviewed her back pain.  She continues to have problems with it but it is better than before.  She had injection by Dr. Worthington.  She feels injection was helpful for her.  She is doing therapy.  She is looking into getting a new bed.  She also has pain in her hips.    We reviewed her melanoma.  She has follow-up with oncology in the future.  She is off of chemotherapy at this time.    Reviewed her elevated LFTs.  This was likely also due to Keytruda.  She is going to have them rechecked today.    We had her off of atorvastatin due to the elevated LFTs.  We talked about trying rosuvastatin for this given the low chance of recurrence.  We will have her recheck her blood work 4 to 6 weeks to make sure this is not being affected.    She has had a essential tremor for a while.  It is particularly when holding things.  The left is worse than the right.  She does not really get it in her neck or head too much.    We reviewed some ringing in her ears.  This is more of a hissing sensation.    She also gets pain in her neck and shoulder blade area.  She denies any radiation down her arms.    She had a stroke in February but has not had any major issues lately related to this.    We reviewed her other issues noted in the assessment but not specifically addressed in the HPI above.  "    Review of Systems:  The 10-system review of systems and health history form for [facility] was completed by patient, reviewed by me, and pertinent problems are reviewed above.     ______________________________________________________________________     Active Problem List:  Problem List as of 9/18/2020 Reviewed: 7/29/2020 11:15 PM by Renzo Newell MD       High    Full code status    Ischemic cerebrovascular accident (CVA) of frontal lobe (H) - 2/2020    Nonsmoker    Melanoma of right upper arm (H) - On Keytruda - stage IIIA       Medium    Lumbar spinal stenosis       Low    GERD (gastroesophageal reflux disease)    LBP (low back pain)    Menopausal hot flushes    Migraine headache    Peripheral neuropathy       Unprioritized    Aphasia    Choledocholithiasis    Essential tremor    NORMAL COLONOSCOPY - 2009 - Average risk    On antineoplastic chemotherapy - PEMBROLIZUMAB - checkpoint inhibitor           Past Medical History:   Diagnosis Date   ? Aphasia    ? Asthma    ? GERD (gastroesophageal reflux disease)    ? History of transfusion    ? Lumbar spinal stenosis 2/28/2020    EXAM: MR LUMBAR SPINE W WO CONTRAST LOCATION: [facility] DATE/TIME: 7/11/2019 4:49 PM   INDICATION: Back pain going down the right leg.  Going on for 3 months.  Not improving. See above. History of melanoma. COMPARISON: None. CONTRAST: Gadavist 9. TECHNIQUE: Without and with IV contrast   FINDINGS:  Nomenclature is based on 5 lumbar type vertebral bodies. There is no concerning marrow rep   ? Malignant melanoma of right upper arm (H)    ? Migraine headache    ? NORMAL COLONOSCOPY - 2009 - Average risk    ? On antineoplastic chemotherapy     pembrolizumab   ? PN (peripheral neuropathy)    ? PONV (postoperative nausea and vomiting)     \"cold sweats\"   ? Stroke (H) 02/2020      Past Surgical History:   Procedure Laterality Date   ? HYSTERECTOMY  1988   ? IR LUMBAR EPIDURAL STEROID INJECTION  8/12/2019   ? LAPAROSCOPIC " CHOLECYSTECTOMY N/A 7/14/2020    Procedure: CHOLECYSTECTOMY, LAPAROSCOPIC;  Surgeon: Lynn Christina MD;  Location: [facility];  Service: General   ? WY ERCP DX COLLECTION SPECIMEN BRUSHING/WASHING N/A 6/19/2020    Procedure: ENDOSCOPIC RETROGRADE CHOLANGIO-PANCREATOGRAPHY WITH SPHINCTEROTOMY AND STONE EXTRACTION;  Surgeon: aDniel Das MD;  Location: [facility] OR;  Service: Gastroenterology   ? WY ERCP DX COLLECTION SPECIMEN BRUSHING/WASHING N/A 7/15/2020    Procedure: ENDOSCOPIC RETROGRADE CHOLANGIOPANCREATOGRAPHY;  Surgeon: Daniel Das MD;  Location: [facility];  Service: Gastroenterology   ? XR ERCP BILIARY ONLY  6/19/2020   ? XR ERCP BILIARY ONLY  7/15/2020      Family History   Problem Relation Age of Onset   ? Pancreatic cancer Mother 82   ? Melanoma Father 75        Metastatic to colon      Family history is otherwise noncontributory.    Social History     Occupational History   ? Occupation: Accounting     Employer: RETIRED   Tobacco Use   ? Smoking status: Former Smoker     Packs/day: 0.00   ? Smokeless tobacco: Never Used   ? Tobacco comment: 70's   Substance and Sexual Activity   ? Alcohol use: Yes     Alcohol/week: 0.0 - 1.0 standard drinks     Comment: occ   ? Drug use: Never   ? Sexual activity: Not on file      Social History     Social History Narrative    , no children.  Has a niece involved in her care.        Patient of Dr. eNwell since 2016.          had severe dementia and passed away in 2019.  Milt.      Current Outpatient Medications   Medication Sig   ? aloe vera Gel Apply 1 application topically 2 (two) times a day as needed.   ? aspirin 81 MG EC tablet Take 1 tablet (81 mg total) by mouth daily.   ? cyclobenzaprine (FLEXERIL) 5 MG tablet Take 1 tablet (5 mg total) by mouth 3 (three) times a day as needed for muscle spasms.   ? dextromethorphan-guaiFENesin (ROBAFEN DM)  mg/5 mL liquid Take 5 mL by mouth every 12 (twelve) hours as needed.  "  ? fluticasone propionate (FLONASE) 50 mcg/actuation nasal spray Apply 1-2 sprays into each nostril daily as needed for rhinitis.   ? gabapentin (NEURONTIN) 300 MG capsule Take 3 capsules three (3) times a day.   ? guaiFENesin-dextromethorphan (MUCINEX DM) 600-30 mg Tb12 Take 1 tablet by mouth 2 (two) times a day as needed.   ? menthol (BIOFREEZE, MENTHOL,) 4 % Gel Apply 1 application topically daily as needed. Apply topically for pain   ? naproxen (NAPROSYN) 500 MG tablet TAKE ONE TABLET BY MOUTH TWICE A DAY AS NEEDED   ? neomycin-bacitracin-polymyxin (NEOSPORIN) ointment Apply 1 application topically 2 (two) times a day as needed.   ? pantoprazole (PROTONIX) 40 MG tablet Take 1 tablet (40 mg total) by mouth daily.   ? venlafaxine (EFFEXOR-XR) 37.5 MG 24 hr capsule TAKE ONE CAPSULE BY MOUTH EVERY DAY   ? rosuvastatin (CRESTOR) 5 MG tablet Take 1 tablet (5 mg total) by mouth daily.      Allergies: Erythromycin base; Codeine; Hydrocodone; Keytruda [pembrolizumab]; Tramadol; Other environmental allergy; Penicillins; Retinol; Shellfish containing products; Sulfa (sulfonamide antibiotics); and Vitamin d3 complete [mv-mn-iron-fa-herbal cmplx#190]     Immunization History   Administered Date(s) Administered   ? DT (pediatric) 03/27/1998   ? Hep A, Adult IM (19yr & older) 05/14/2009, 05/19/2010   ? Influenza,quad,high Dose,PF, 65yr + 09/18/2020   ? Pneumo Conj 13-V (2010&after) 05/19/2015   ? Pneumo Polysac 23-V 05/19/2010   ? Td, Adult, Absorbed 05/14/2009   ? Tdap 05/19/2015   ? ZOSTER, LIVE 02/01/2014      Objective:     Visit Vitals  /62   Pulse 82   Ht 5' 6.5\" (1.689 m)   Wt 193 lb 4.8 oz (87.7 kg)   LMP 09/17/1988   SpO2 98%   BMI 30.73 kg/m       Wt Readings from Last 3 Encounters:   09/18/20 193 lb 4.8 oz (87.7 kg)   07/28/20 187 lb (84.8 kg)   07/15/20 194 lb (88 kg)     BP Readings from Last 3 Encounters:   09/18/20 126/62   09/11/20 125/70   08/25/20 112/62     Vision Screening:  No exam data present "     PHYSICAL EXAM  The patient is comfortable, no acute distress.  Mood good.  Insight is good.  No skin lesions or nodules of concern.  Ears clear.  Eyes are nonicteric.  Pupils equal and reactive.  Throat is clear.  Neck is supple without mass, no thyromegaly. No cervical or epitrochlear adenopathy.  No axillary lymph nodes. Heart regular rate and rhythm.  Lungs clear to auscultation bilaterally.  Respiratory effort good.  Abdomen soft and nontender.  No hepatosplenomegaly.  Extremities show no edema.  She still move stiffly and uses a walker mostly.    Assessment Results 9/18/2020   Activities of Daily Living No help needed   Instrumental Activities of Daily Living No help needed   Get Up and Go Score Less than 12 seconds   Mini Cog Total Score 5   Some recent data might be hidden     A Mini-Cog score of 0-2 suggests the possibility of dementia, score of 3-5 suggests no dementia    Diagnostics:     Recent Results (from the past 240 hour(s))   Comprehensive Metabolic Panel   Result Value Ref Range    Sodium 143 136 - 145 mmol/L    Potassium 4.9 3.5 - 5.0 mmol/L    Chloride 109 (H) 98 - 107 mmol/L    CO2 26 22 - 31 mmol/L    Anion Gap, Calculation 8 5 - 18 mmol/L    Glucose 96 70 - 125 mg/dL    BUN 18 8 - 28 mg/dL    Creatinine 0.80 0.60 - 1.10 mg/dL    GFR MDRD Af Amer >60 >60 mL/min/1.73m2    GFR MDRD Non Af Amer >60 >60 mL/min/1.73m2    Bilirubin, Total 0.5 0.0 - 1.0 mg/dL    Calcium 9.5 8.5 - 10.5 mg/dL    Protein, Total 6.0 6.0 - 8.0 g/dL    Albumin 3.7 3.5 - 5.0 g/dL    Alkaline Phosphatase 83 45 - 120 U/L    AST 21 0 - 40 U/L    ALT 17 0 - 45 U/L   Lipid Cascade   Result Value Ref Range    Cholesterol 157 <=199 mg/dL    Triglycerides 95 <=149 mg/dL    HDL Cholesterol 49 (L) >=50 mg/dL    LDL Calculated 89 <=129 mg/dL    Patient Fasting > 8hrs? Unknown    Sedimentation Rate   Result Value Ref Range    Sed Rate 8 0 - 20 mm/hr   C-Reactive Protein(CRP)   Result Value Ref Range    CRP 0.3 0.0 - 0.8 mg/dL    HM2(CBC w/o Differential)   Result Value Ref Range    WBC 5.8 4.0 - 11.0 thou/uL    RBC 4.46 3.80 - 5.40 mill/uL    Hemoglobin 12.6 12.0 - 16.0 g/dL    Hematocrit 38.3 35.0 - 47.0 %    MCV 86 80 - 100 fL    MCH 28.2 27.0 - 34.0 pg    MCHC 32.9 32.0 - 36.0 g/dL    RDW 13.3 11.0 - 14.5 %    Platelets 240 140 - 440 thou/uL    MPV 7.0 7.0 - 10.0 fL        Quality review:     PHQ-2 Total Score: 0 (9/18/2020  9:00 AM)      PHQ-9 Total Score: 3 (9/18/2020  9:00 AM)    ______________________________________________________________________     BMI Readings from Last 1 Encounters:   09/18/20 30.73 kg/m        Assessment:     1. Medicare annual wellness visit, subsequent    2. Immunization due    3. Choledocholithiasis    4. Ischemic cerebrovascular accident (CVA) of frontal lobe (H) - 2/2020    5. Multiple joint pain    6. Colonoscopy refused    7. Melanoma of right upper arm (H) - On Keytruda - stage IIIA    8. Spinal stenosis of lumbar region with neurogenic claudication    9. Gastroesophageal reflux disease, esophagitis presence not specified    10. Menopausal hot flushes    11. Migraine without status migrainosus, not intractable, unspecified migraine type    12. Idiopathic peripheral neuropathy    13. Essential tremor    14. Neck pain    15. Tinnitus of both ears         Plan:     1. Check blood work today.  See relevant orders and diagnosis associations at the bottom of this note.   2. She declines colonoscopy or other forms of colon cancer screening at this time.  3. Flu shot done today.  4. No signs of recurrence of choledocholithiasis.  5. Start rosuvastatin 5 mg for her cholesterol given her previous stroke.  6. Follow-up with oncology and spine center as scheduled.  7. Refilled medications.  8. No additional treatment for tremor.  Venlafaxine might be adding to it.  She opts not for further treatment at this point.  9. Follow-up in 6 months, sooner if issues.         Renzo Newell MD  General Internal  [facility]    Personal office fax - [phone]   Voice mail - [phone]  E-mail - [email or web address]     Return in about 6 months (around 3/18/2021) for visit and blood work.     Future Appointments   Date Time Provider Department Center   10/7/2020 12:00 PM JN CT 2 JN CT JN   10/14/2020 11:45 AM [facility] JIM 3 OPS [facility]   10/20/2020 10:20 AM Carlos Alberto Worthington, DO OPS SPINE SPN SPINE         ______________________________________________________________________     Relevant ICD-10 codes and order associations:      ICD-10-CM    1. Medicare annual wellness visit, subsequent  Z00.00    2. Immunization due  Z23 Influenza,Quad,High Dose,PF 65 YR+   3. Choledocholithiasis  K80.50 Comprehensive Metabolic Panel     HM2(CBC w/o Differential)   4. Ischemic cerebrovascular accident (CVA) of frontal lobe (H) - 2/2020  I63.9 Lipid Cascade   5. Multiple joint pain  M25.50 Sedimentation Rate     C-Reactive Protein(CRP)     HM2(CBC w/o Differential)   6. Colonoscopy refused  Z53.20    7. Melanoma of right upper arm (H) - On Keytruda - stage IIIA  C43.61    8. Spinal stenosis of lumbar region with neurogenic claudication  M48.062    9. Gastroesophageal reflux disease, esophagitis presence not specified  K21.9    10. Menopausal hot flushes  N95.1    11. Migraine without status migrainosus, not intractable, unspecified migraine type  G43.909    12. Idiopathic peripheral neuropathy  G60.9    13. Essential tremor  G25.0    14. Neck pain  M54.2    15. Tinnitus of both ears  H93.13         Identified Health Risks:     A personalized health plan based on the identified health risks was provided to the patient on the AVS.    ______________________________________________________________________     The following health maintenance schedule was reviewed with the patient and provided in printed form in the after visit summary:     Health Maintenance   Topic Date Due   ? ZOSTER  VACCINES (2 of 3) 03/29/2014   ? COLORECTAL CANCER SCREENING  08/21/2019   ? MEDICARE ANNUAL WELLNESS VISIT  09/17/2020   ? FALL RISK ASSESSMENT  09/18/2021   ? ADVANCE CARE PLANNING  04/15/2025   ? TD 18+ HE  05/19/2025   ? LIPID  09/18/2025   ? DEPRESSION ACTION PLAN  Completed   ? HEPATITIS C SCREENING  Completed   ? PNEUMOCOCCAL IMMUNIZATION 65+ LOW/MEDIUM RISK  Completed   ? DXA SCAN  Completed   ? INFLUENZA VACCINE RULE BASED  Completed   ? HEPATITIS B VACCINES  Aged Out        ______________________________________________________________________

## 2021-06-29 NOTE — PROGRESS NOTES
Progress Notes by Renzo Newell MD at 2020  1:50 PM     Author: Renzo Newell MD Service: -- Author Type: Physician    Filed: 2020 11:25 PM Encounter Date: 2020 Status: Signed    : Renzo Newell MD (Physician)              [facility]    Comprehensive and complex medical care - Chronic disease management - Shared decision making - Care coordination - Compassionate care    Patient advocacy - Rational deprescribing - Minimally disruptive medicine - Ethical focus - Customized care          Date of Service: 2020  Primary Provider: Renzo Newell MD    Patient Care Team:  Renzo Newell MD as PCP - General (Internal Medicine)  Perez Padilla MD as Physician (Plastic Surgery)  Shoaib Gloria MD as Physician (Hematology and Oncology)  Bj Orantes MD as Physician (Dermatology)  Sadi Castano MD as Physician (General Surgery)  Renzo Newell MD as Assigned PCP  Carlos Alberto Worthington DO as Physician (Pain Medicine)  Rhona Moura MD (Neurology)  Antoni Cortes, PharmD as Pharmacist (Pharmacist)  Lynn Christina MD as Physician (General Surgery)  Daniel Das MD as Physician (Gastroenterology)     ______________________________________________________________________     Patient's Pharmacy:      [id number] - [place], [place] - [address] AT [facility] [address]  [address]  [facility] [address]  Phone: [phone] Fax: [phone]    [facility] - [place], [place] - [address]  [address] [facility] [address]  Phone: [phone] Fax: [phone]     Patient's Contacts:  Name Home Phone Work Phone Mobile Phone Relationship Lgl Grd   JOY, DEYSI [phone] Niece No   AGUSTINA CAMPBELL [phone]   Niece No       Patient's Insurance:    Payor/Plan Subscr  Sex Relation Sub. Ins. ID Effective  Group Num   1. HEALTHPARTNER* LASHAWN HUMPHREY 1945 Female  [id number] Not Eff [id number]                                   [address]   2. MEDICARE - ME* LASHAWN HUMPHREY 1945 Female  [id number] 4/1/10                                    [place], [address]           Lahsawn Humphrey is a 75 y.o. female who comes in today for:    Chief Complaint   Patient presents with   ? Hospital Visit Follow Up     ENDOSCOPIC RETROGRADE CHOLANGIOPANCREATIOGRAPHY   ? Labs Only     HIGH LIVER COUNT   ? Referral     PT       Active Problem List:  Problem List as of 7/28/2020 Reviewed: 7/11/2020  9:02 AM by Her Imelda Jiménez CMA       High    Full code status    Ischemic cerebrovascular accident (CVA) of frontal lobe (H) - 2/2020    Nonsmoker    Melanoma of right upper arm (H) - On Keytruda       Medium    Lumbar spinal stenosis       Low    GERD (gastroesophageal reflux disease)    LBP (low back pain)    Menopausal hot flushes    Migraine headache    Peripheral neuropathy       Unprioritized    Aphasia    Choledocholithiasis    Essential tremor    NORMAL COLONOSCOPY - 2009 - Average risk    On antineoplastic chemotherapy - PEMBROLIZUMAB - checkpoint inhibitor           Current Outpatient Medications   Medication Sig   ? acetaminophen (TYLENOL) 500 MG tablet Take 1,000 mg by mouth 3 (three) times a day. Indications: pain   ? aloe vera Gel Apply 1 application topically 2 (two) times a day as needed.   ? aspirin 81 MG EC tablet Take 1 tablet (81 mg total) by mouth daily.   ? dextromethorphan-guaiFENesin (ROBAFEN DM)  mg/5 mL liquid Take 5 mL by mouth every 12 (twelve) hours as needed.   ? diphenhydrAMINE-acetaminophen (PERCOGESIC) 12.5-325 mg Tab Take 1-2 tablets by mouth every 8 (eight) hours as needed.    ? fluticasone propionate (FLONASE) 50 mcg/actuation nasal spray Apply 1-2 sprays into each nostril daily as needed for rhinitis.   ? gabapentin (NEURONTIN) 300 MG capsule Take 3 capsules three (3) times a day.   ?  guaiFENesin-dextromethorphan (MUCINEX DM) 600-30 mg Tb12 Take 1 tablet by mouth 2 (two) times a day as needed.   ? menthol (BIOFREEZE, MENTHOL,) 4 % Gel Apply 1 application topically daily as needed. Apply topically for pain   ? naproxen (NAPROSYN) 500 MG tablet Take 1 tablet (500 mg total) by mouth 2 (two) times a day as needed. Please keep this Rx on file for the next RF.   ? neomycin-bacitracin-polymyxin (NEOSPORIN) ointment Apply 1 application topically 2 (two) times a day as needed.   ? pantoprazole (PROTONIX) 40 MG tablet Take 1 tablet (40 mg total) by mouth daily.   ? sodium chloride 0.9% SolP 100 mL with pembrolizumab 25 mg/mL Soln 200 mg Infuse 200 mg into a venous catheter every 21 days. Indications: malignant melanoma, a type of skin cancer   ? venlafaxine (EFFEXOR XR) 37.5 MG 24 hr capsule Take 1 capsule (37.5 mg total) by mouth daily.       Social History     Social History Narrative    , no children.  Has a niece involved in her care.        Patient of Dr. Newell since 2016.          had severe dementia and passed away in 2019.  [place].         Subjective:     Comes in for follow up after cholecystectomy and ERCP (ENDOSCOPIC RETROGRADE CHOLANGIO-PANCREATOGRAPHY).    She feels generally okay but does note some reflux symptoms.  She is taking pantoprazole (Protonix) but occasionally is taking tums as well.  She denies any dysphagia associated with this.    Reviewed her melanoma and she still is on KEYTRUDA.  She has 3 treatments left.  Her oncologist would like a copy of her blood work today.    Reviewed her acetaminophen (Tylenol) use and uses only at night for sleep.    Her cholesterol and stroke were reviewed in relationship to this.  Her liver function tests (lfts) were better (normal) after the 1st ERCP AND WITH HOLDING THE atorvastatin (Lipitor).    No fever or chills.     Back issues continue and will be following up with the spine clinic in relationship to this.  Gait is worse.  Would  like physical therapy for this in the home if able.    Does not drive (never learned).  Issues at times with getting to appointments.  Discussed [employer].    We reviewed her other issues noted in the assessment but not specifically addressed in the HPI above.     On review of systems, the patient denies any chest pain or shortness of breath.    Objective:     Wt Readings from Last 3 Encounters:   07/28/20 187 lb (84.8 kg)   07/15/20 194 lb (88 kg)   07/09/20 188 lb (85.3 kg)       BP Readings from Last 3 Encounters:   07/29/20 138/82   07/28/20 124/76   07/16/20 111/60       /76   Pulse 90   Wt 187 lb (84.8 kg)   LMP 09/17/1988   SpO2 97%   BMI 24.34 kg/m     The patient is comfortable, no acute distress.  Mood good.  Insight good.  Eyes are nonicteric.  Neck is supple without mass.  No cervical adenopathy.  No thyromegaly. Heart regular rate and rhythm.  Lungs clear to auscultation bilaterally.  Respiratory effort is good.  Abdomen soft and nontender.  No hepatosplenomegaly.  Extremities no edema.  Gait is slow and stiff.      Diagnostics:     Results for orders placed or performed in visit on 07/28/20   Hepatic Profile   Result Value Ref Range    Bilirubin, Total 2.0 (H) 0.0 - 1.0 mg/dL    Bilirubin, Direct 1.3 (H) <=0.5 mg/dL    Protein, Total 6.9 6.0 - 8.0 g/dL    Albumin 3.6 3.5 - 5.0 g/dL    Alkaline Phosphatase 733 (H) 45 - 120 U/L     (H) 0 - 40 U/L     (H) 0 - 45 U/L       Assessment:     1. S/P cholecystectomy    2. Choledocholithiasis    3. Elevated LFTs    4. Melanoma of right upper arm (H) - On Keytruda - stage IIIA    5. Driving safety issue    6. Spinal stenosis of lumbar region with neurogenic claudication    7. Gait instability        Quality review:     PHQ-2 Total Score: 0 (5/29/2020 12:00 PM)      PHQ-9 Total Score: 2 (5/29/2020 12:00 PM)    ______________________________________________________________________     BMI Readings from Last 1 Encounters:   07/28/20  24.34 kg/m          Plan:     1. Check blood work today.  See relevant orders and diagnosis associations at the bottom of this note.   2. Elevated liver function tests (lfts) not apparently due to residual CBD stone disease on recent ERCP (ENDOSCOPIC RETROGRADE CHOLANGIO-PANCREATOGRAPHY).  3. Stop atorvastatin (Lipitor) as liver function tests (lfts) were better off of this and with previous initial ERCP.  4. Follow up Dr. Gloria.  Hold Keytruda in case this is causing the issue.  5. Home care for weakness and spinal stenosis.  6. Follow up liver function tests (lfts) 1 week.  7. Do paperwork for [employer].         Renzo Newell MD  General Internal Medicine  [facility]    Personal office fax - [phone]   Voice mail - [phone]  E-mail - [email or web address]     Return in about 1 week (around 8/4/2020), or if symptoms worsen or fail to improve, for follow up LFTs.     Future Appointments   Date Time Provider Department Center   7/31/2020 10:00 AM Lynn Christina MD [facility] GEN SURG   8/5/2020 10:20 AM [facility]         ______________________________________________________________________     Relevant ICD-10 codes and order associations:      ICD-10-CM    1. S/P cholecystectomy  Z90.49    2. Choledocholithiasis  K80.50 Hepatic Profile   3. Elevated LFTs  R79.89 Hepatic Profile   4. Melanoma of right upper arm (H) - On Keytruda - stage IIIA  C43.61    5. Driving safety issue  Z91.89    6. Spinal stenosis of lumbar region with neurogenic claudication  M48.062 Ambulatory referral to Home Health   7. Gait instability  R26.81 Ambulatory referral to Home Health

## 2021-06-29 NOTE — PROGRESS NOTES
Progress Notes by Renzo Newell MD at 2020 11:05 AM     Author: Renzo Newell MD Service: -- Author Type: Physician    Filed: 2020  9:58 AM Encounter Date: 2020 Status: Signed    : Renzo Newell MD (Physician)              [facility]    Comprehensive and complex medical care - Chronic disease management - Shared decision making - Care coordination - Compassionate care    Patient advocacy - Rational deprescribing - Minimally disruptive medicine - Ethical focus - Customized care          Date of Service: 2020  Primary Provider: Renzo Newell MD    Patient Care Team:  Renzo Newell MD as PCP - General (Internal Medicine)  Perez Padilla MD as Physician (Plastic Surgery)  Shoaib Gloria MD as Physician (Hematology and Oncology)  Bj Orantes MD as Physician (Dermatology)  Sadi Castano MD as Physician (General Surgery)  Renzo Newell MD as Assigned PCP  Carlos Alberto Worthington DO as Physician (Pain Medicine)  Rhona Moura MD (Neurology)  Antoni Cortes, PharmD as Pharmacist (Pharmacist)     ______________________________________________________________________     Patient's Pharmacy:      [id number] - [place], [place] - [address]  [address]  [facility] [address]  Phone: [phone] Fax: [phone]    [facility] - [place], [place] - [address]  [address] [facility] [address]  Phone: [phone] Fax: [phone]     Patient's Contacts:  Name Home Phone Work Phone Mobile Phone Relationship Lgl Grd   DEYSI [phone] CAMPBELL Adler [phone]   Niece No       Patient's Insurance:    Payor/Plan Subscr  Sex Relation Sub. Ins. ID Effective Group Num   1. HEALTHPARTNER* DAVIDA HUMPHREY 1945 Female  [id number] Not Eff [id number]                                    [address]   2. MEDICARE - ME* LASHAWN HUMPHREY 1945 Female  [id number] 4/1/10                                    [place], [address]           Lashawn Humphrey is a 75 y.o. female who comes in today for:    Chief Complaint   Patient presents with   ? Follow-up     PALPITATIONS IS GOOD, PAIN ON LEFT SIDE OF NECK IS NOT BAD   ? Lab Result Follow Up     AST   ? Medication Questions     WHAT WOULD STEROIDS CAUSE IF SHE TOOK THEM   ? Follow-up     URINATION ISSUES WITH GALL STONES, BM       Active Problem List:  Problem List as of 5/29/2020 Reviewed: 5/23/2020 11:13 PM by Renzo Newell MD       High    Full code status    Ischemic cerebrovascular accident (CVA) of frontal lobe (H) - 2/2020    Nonsmoker    Melanoma of right upper arm (H) - On Keytruda       Medium    Lumbar spinal stenosis       Low    GERD (gastroesophageal reflux disease)    LBP (low back pain)    Menopausal hot flushes    Migraine headache    Peripheral neuropathy       Unprioritized    Aphasia    Essential tremor    NORMAL COLONOSCOPY - 2009 - Average risk    On antineoplastic chemotherapy - PEMBROLIZUMAB - checkpoint inhibitor           Current Outpatient Medications   Medication Sig   ? acetaminophen (TYLENOL) 500 MG tablet Take 1,000 mg by mouth 3 (three) times a day. Indications: pain   ? aloe vera Gel Apply 1 application topically 2 (two) times a day as needed.   ? aspirin 81 MG EC tablet Take 1 tablet (81 mg total) by mouth daily.   ? atorvastatin (LIPITOR) 40 MG tablet Take 1 tablet (40 mg total) by mouth at bedtime.   ? dextromethorphan-guaiFENesin (ROBAFEN DM)  mg/5 mL liquid Take 5 mL by mouth every 12 (twelve) hours as needed.   ? diphenhydrAMINE-acetaminophen (PERCOGESIC) 12.5-325 mg Tab Take 1-2 tablets by mouth every 8 (eight) hours as needed.    ? fluticasone propionate (FLONASE) 50 mcg/actuation nasal spray Apply 1-2 sprays into each nostril daily as needed for rhinitis.   ? gabapentin (NEURONTIN) 300 MG capsule Take  3 capsules three (3) times a day.   ? guaiFENesin-dextromethorphan (MUCINEX DM) 600-30 mg Tb12 Take 1 tablet by mouth 2 (two) times a day as needed.   ? menthol (BIOFREEZE, MENTHOL,) 4 % Gel Apply topically for pain  Indications: pain associated with arthritis   ? naproxen (NAPROSYN) 500 MG tablet Take 1 tablet (500 mg total) by mouth 2 (two) times a day as needed. Please keep this Rx on file for the next RF.   ? neomycin-bacitracin-polymyxin (NEOSPORIN) ointment Apply 1 application topically 2 (two) times a day as needed.   ? pantoprazole (PROTONIX) 40 MG tablet Take 1 tablet (40 mg total) by mouth daily.   ? sodium chloride 0.9% SolP 100 mL with pembrolizumab 25 mg/mL Soln 200 mg Infuse 200 mg into a venous catheter every 21 days. Indications: malignant melanoma, a type of skin cancer   ? UNABLE TO FIND Hurt Blocker Pro - apply to affected areas for pain  Indications: Pain   ? venlafaxine (EFFEXOR XR) 37.5 MG 24 hr capsule Take 1 capsule (37.5 mg total) by mouth daily.     Social History     Social History Narrative    , no children.  Has a niece involved in her care.        Patient of Dr. Newell since 2016.          had severe dementia and passed away in 2019.  [place].       Subjective:     She comes in today for follow-up of multiple issues.    We first reviewed her elevated LFTs and cholelithiasis.  We did proceed with MRCP.  She does have evidence of a common duct stone.  The stone is 7 mm in size, but the common duct is estimated at 5 mm.  She has a known 2 cm stone in the gallbladder without cholecystitis.  There are other stones noted in the scan as well.  She has decreased appetite which she has had for a long time-4 months, and she does not associated with this with the current issues.  Her LFTs have just recently been abnormal.  Her last set in April was normal.  I reviewed her recent LFTs from [place] [facility] and these are stable to slightly improved.  She denies any fevers, chills, night  sweats, right upper quadrant pain or epigastric pain.  We talked about symptomatology related to ascending cholangitis and she is aware that she would need to look into more urgent management of this if needed through the hospital.  We talked about GI consultation and we are going to expedite this.  We are also going to get a general surgery consult for eventual cholecystectomy.  She understands this and is ready to proceed with this.    We reviewed her melanoma.  She is currently on Keytruda for this.  This is being held at this time due to elevated LFTs.  We are also holding her cholesterol medication at this time.  Any surgery would likely need to be coordinated through oncology.  I have encouraged her to reach out to her oncologist to discuss best management.  If possible, it would be good to get this done before further events occur.    We discussed and reviewed what an ERCP would be.    Cough and sinus symptoms have improved from last visit.    We reviewed her chronic back pain in relationship to these issues as well.  We reviewed her other issues noted in the assessment but not specifically addressed in the HPI above.     Past medical, family and social history reviewed today.     Comprehensive review of systems was performed today with no major problems noted except as above.     Objective:     Wt Readings from Last 3 Encounters:   05/29/20 192 lb (87.1 kg)   05/21/20 192 lb (87.1 kg)   05/11/20 189 lb (85.7 kg)     BP Readings from Last 3 Encounters:   05/29/20 128/64   05/21/20 126/64   05/11/20 126/80     /64   Pulse 80   Wt 192 lb (87.1 kg)   LMP 09/17/1988   SpO2 96%   BMI 30.30 kg/m     The patient is comfortable, no acute distress.  Mood good.  Insight good.  Eyes are nonicteric.  Neck is supple without mass.  No cervical adenopathy.  No thyromegaly. Heart regular rate and rhythm.  Lungs clear to auscultation bilaterally.  Respiratory effort is good.  Abdomen soft and nontender.  No  hepatosplenomegaly.  Extremities no edema.        Diagnostics:     Results for orders placed or performed in visit on 05/21/20   Comprehensive Metabolic Panel   Result Value Ref Range    Sodium 142 136 - 145 mmol/L    Potassium 4.1 3.5 - 5.0 mmol/L    Chloride 108 (H) 98 - 107 mmol/L    CO2 24 22 - 31 mmol/L    Anion Gap, Calculation 10 5 - 18 mmol/L    Glucose 104 70 - 125 mg/dL    BUN 13 8 - 28 mg/dL    Creatinine 0.75 0.60 - 1.10 mg/dL    GFR MDRD Af Amer >60 >60 mL/min/1.73m2    GFR MDRD Non Af Amer >60 >60 mL/min/1.73m2    Bilirubin, Total 0.5 0.0 - 1.0 mg/dL    Calcium 9.5 8.5 - 10.5 mg/dL    Protein, Total 6.5 6.0 - 8.0 g/dL    Albumin 3.7 3.5 - 5.0 g/dL    Alkaline Phosphatase 436 (H) 45 - 120 U/L     (H) 0 - 40 U/L     (H) 0 - 45 U/L   Lipase   Result Value Ref Range    Lipase 44 0 - 52 U/L   C-Reactive Protein(CRP)   Result Value Ref Range    CRP 1.0 (H) 0.0 - 0.8 mg/dL   HM2(CBC w/o Differential)   Result Value Ref Range    WBC 5.8 4.0 - 11.0 thou/uL    RBC 4.20 3.80 - 5.40 mill/uL    Hemoglobin 12.7 12.0 - 16.0 g/dL    Hematocrit 38.1 35.0 - 47.0 %    MCV 91 80 - 100 fL    MCH 30.2 27.0 - 34.0 pg    MCHC 33.3 32.0 - 36.0 g/dL    RDW 12.5 11.0 - 14.5 %    Platelets 306 140 - 440 thou/uL    MPV 7.0 7.0 - 10.0 fL     ______________________________________________________________________    Pertinent radiology for this visit includes the following:    Mr Mrcp With Without Contrast    Result Date: 5/27/2020  EXAM: MR MRCP W WO CONTRAST LOCATION: [facility] DATE/TIME: 5/27/2020 6:54 PM INDICATION: Cholelithiasis Biliary obstruction suspected ELEVATED LFTS.  WORSENING.  MELANOMA.  CHOLELITHIASIS, COMPARISON: CT 12/05/2019. Ultrasound 05/11/2020 TECHNIQUE: Routine MR liver/pancreas protocol including axial and coronal MRCP sequences. 2D and 3D reconstruction performed by MR technologist including MIP reconstruction and slab cholangiograms. If performed with contrast, additional dynamic T1  post IV contrast images. CONTRAST: Gadavist 9 FINDINGS: MRCP: 7 mm stone in the common bile duct. No bile duct dilatation. Common bile duct measures 5 mm. Multiple gallstones an otherwise normal gallbladder. Normal pancreatic duct. LIVER: Few benign cysts including a 7 cm cyst in the dome of the liver unchanged since 12/05/2019 CT. PANCREAS: Negative with no masses or inflammatory change. ADDITIONAL FINDINGS: None     1.  Multiple gallstones filling the gallbladder. 2.  Choledocholithiasis with 7 mm stone in the common bile duct but no bile duct dilatation. NOTE: ABNORMAL REPORT THE DICTATION ABOVE DESCRIBES AN ABNORMALITY FOR WHICH FOLLOW-UP IS NEEDED.     Us Abdomen Limited    Result Date: 5/11/2020  EXAM: US ABDOMEN LIMITED LOCATION: [facility] DATE/TIME: 5/11/2020 7:24 PM INDICATION: Transaminitis. COMPARISON: None. TECHNIQUE: Limited abdominal ultrasound. FINDINGS: GALLBLADDER: There is a 2 cm stone in the gallbladder fundus. The gallbladder otherwise appears normal. No sonographic Nelson's sign. BILE DUCTS: No biliary dilatation. The common duct measures 5 mm. LIVER: Normal liver texture. Two hepatic cysts measuring up to 6 cm. RIGHT KIDNEY: No hydronephrosis. 5 cm right renal cyst. PANCREAS: The visualized portions are normal. No ascites.     1.  Cholelithiasis. There is no biliary dilatation or evidence of cholecystitis.      ______________________________________________________________________      Assessment:     1. Choledocholithiasis    2. Elevated LFTs    3. Abnormal magnetic resonance cholangiopancreatography (MRCP)    4. Calculus of gallbladder and bile duct with obstruction without cholecystitis    5. On antineoplastic chemotherapy - PEMBROLIZUMAB - checkpoint inhibitor    6. Melanoma of right upper arm (H) - On Keytruda    7. Ischemic cerebrovascular accident (CVA) of frontal lobe (H) - 2/2020    8. Spinal stenosis of lumbar region with neurogenic claudication        Quality review:      PHQ-2 Total Score: 0 (5/29/2020 12:00 PM)      PHQ-9 Total Score: 2 (5/29/2020 12:00 PM)    ______________________________________________________________________     BMI Readings from Last 1 Encounters:   05/29/20 30.30 kg/m        Plan:     1. Referral to gastroenterology for likely ERCP.  2. No signs that this is a reaction to her chemotherapy.  3. Referral to general surgery for eventual cholecystectomy.  4. Patient is to help coordinate care through oncology.  I will do copy of this note.  5. The patient should have repeat LFTs and blood work within the next week to 10 days.  I anticipate that this will be done through GI, otherwise we can coordinate.  The patient will keep up with me through my chart.  6. Chemotherapy currently on hold until the gallbladder issue and bile duct issue is solidified/managed  7. She is to go into the hospital if she develops symptoms of ascending cholangitis.       Renzo Newell MD  General Internal Medicine  [facility]    Personal office fax - [phone]   Voice mail - [phone]  E-mail - [email or web address]     Return in about 3 weeks (around 6/19/2020) for visit and blood work.     Future Appointments   Date Time Provider Department Center   6/9/2020 10:30 AM JN PET CTC JN PET JN   6/9/2020 11:30 AM JN PET CT 1 JN PET JN   6/16/2020  3:15 PM Renzo Newell MD [facility]         ______________________________________________________________________     Relevant ICD-10 codes and order associations:      ICD-10-CM    1. Choledocholithiasis  K80.50 Ambulatory referral to Gastroenterology     Ambulatory referral to General Surgery     CANCELED: Ambulatory referral to Gastroenterology   2. Elevated LFTs  R94.5 Ambulatory referral to Gastroenterology     Ambulatory referral to General Surgery     CANCELED: Ambulatory referral to Gastroenterology   3. Abnormal magnetic resonance cholangiopancreatography (MRCP)  R93.3 Ambulatory  referral to Gastroenterology     Ambulatory referral to General Surgery     CANCELED: Ambulatory referral to Gastroenterology   4. Calculus of gallbladder and bile duct with obstruction without cholecystitis  K80.71 Ambulatory referral to General Surgery   5. On antineoplastic chemotherapy - PEMBROLIZUMAB - checkpoint inhibitor  Z79.899    6. Melanoma of right upper arm (H) - On Keytruda  C43.61    7. Ischemic cerebrovascular accident (CVA) of frontal lobe (H) - 2/2020  I63.9    8. Spinal stenosis of lumbar region with neurogenic claudication  M48.062

## 2021-06-30 NOTE — PROGRESS NOTES
Progress Notes by Renzo Newell MD at 2021  3:05 PM     Author: Renzo Newell MD Service: -- Author Type: Physician    Filed: 2021  3:03 PM Encounter Date: 2021 Status: Signed    : Renzo Newell MD (Physician)              [facility]    Comprehensive and complex medical care - Chronic disease management - Shared decision making - Care coordination - Compassionate care    Patient advocacy - Rational deprescribing - Minimally disruptive medicine - Ethical focus - Customized care          Date of Service: 2021  Primary Provider: Renzo Newell MD    Patient Care Team:  Renzo Newell MD as PCP - General (Internal Medicine)  Shoaib Gloria MD as Physician (Hematology and Oncology)  Bj Orantes MD as Physician (Dermatology)  Sadi Castano MD as Physician (General Surgery)  Renzo Newell MD as Assigned PCP  Carlos Alberto Worthington DO as Physician (Pain Medicine)  Rhona Moura MD (Neurology)  Lynn Christina MD as Assigned Surgical Provider     ______________________________________________________________________     Patient's Pharmacy:    [facility] - [address]  [address] [facility] [address]  Phone: [phone] Fax: [phone]     Patient's Contacts:  Name Home Phone Work Phone Mobile Phone Relationship Lgl Grd   DEYSI [phone] Niece No   AGUSTINAJARONNE [phone]   Niece No     Patient's Insurance:    Payor/Plan Subscr  Sex Relation Sub. Ins. ID Effective Group Num   1. HEALTHPARTNER* LASHAWN HUMPHREY 1945 Female  [id number] Not Eff [id number]                                   [address]   2. MEDICARE - ME* LASHAWN HUMPHREY 1945 Female Self [id number] 4/1/10                                    [facility], [address]           Lashawn DASHA Shannon is a 76 y.o. female who comes in today for:    Chief Complaint   Patient  presents with   ? Follow-up     URI   ? Medication Management     STOPPED TAKING THE hydrOXYchloroQUINE FOR AWHILE STARTED TAKING YESTERDAY MAKES FEEL TIGHT IN THE THROAT   ? Referral     UROLOGY       Active Problem List:  Problem List as of 4/6/2021 Reviewed: 3/11/2021 10:02 AM by Renzo Newell MD       High    Full code status    Ischemic cerebrovascular accident (CVA) of frontal lobe (H) - 2/2020    Nonsmoker    Melanoma of right upper arm (H) - On Keytruda in the past - stage IIIA       Medium    Lumbar spinal stenosis       Low    GERD (gastroesophageal reflux disease)    LBP (low back pain)    Menopausal hot flushes    Migraine headache    Peripheral neuropathy       Unprioritized    Aphasia    Essential tremor    NORMAL COLONOSCOPY - 2009 - Average risk    On antineoplastic chemotherapy - PEMBROLIZUMAB - checkpoint inhibitor - in the past for melanoma           Current Outpatient Medications   Medication Sig   ? aloe vera Gel Apply 1 application topically 2 (two) times a day as needed.   ? aspirin 81 MG EC tablet Take 1 tablet (81 mg total) by mouth daily.   ? cyclobenzaprine (FLEXERIL) 5 MG tablet Take 1 tablet (5 mg total) by mouth 3 (three) times a day as needed for muscle spasms.   ? fluticasone propionate (FLONASE) 50 mcg/actuation nasal spray Apply 1-2 sprays into each nostril daily as needed for rhinitis.   ? gabapentin (NEURONTIN) 300 MG capsule TAKE 3 CAPSULES 3 TIMES DAILY   ? guaiFENesin-dextromethorphan (MUCINEX DM) 600-30 mg Tb12 Take 1 tablet by mouth 2 (two) times a day as needed.   ? hydrOXYchloroQUINE (PLAQUENIL) 200 mg tablet Take 1 tablet (200 mg total) by mouth 2 (two) times a day.   ? menthol (BIOFREEZE, MENTHOL,) 4 % Gel Apply 1 application topically daily as needed. Apply topically for pain   ? naproxen (NAPROSYN) 500 MG tablet TAKE ONE TABLET BY MOUTH TWICE A DAY AS NEEDED   ? neomycin-bacitracin-polymyxin (NEOSPORIN) ointment Apply 1 application topically 2 (two) times a day as  needed.   ? pantoprazole (PROTONIX) 40 MG tablet Take 1 tablet (40 mg total) by mouth daily.   ? rosuvastatin (CRESTOR) 5 MG tablet Take 1 tablet (5 mg total) by mouth daily.   ? UNABLE TO FIND Med Name: [employer] Back & Leg Pain Cream   ? cefuroxime (CEFTIN) 500 MG tablet Take 1 tablet (500 mg total) by mouth 2 (two) times a day for 10 days.     Social History     Social History Narrative    , no children.  Has a niece involved in her care.        Patient of Dr. Newell since 2016.          had severe dementia and passed away in 2019.  Milt.       Subjective:     Patient comes in today for follow-up of a number of issues.    She is mainly in for cough and nasal congestion.  She has been through 2 rounds of antibiotics without enough help.  It might have helped somewhat.  She still coughs quite a bit especially in evening.  She has tightness and pressure in her sinus region.  She has not been treated with this for quite a while.  However, she does say its not unusual for her to need 2 or 3 courses of antibiotics to clear it out.    Reviewed her recent PET scan which was done yesterday and there is no signs of active lung disease.    With the coughing, she has had urinary incontinence.  She has mostly issues with stress.  We talked about different options with this but she is not so sure she wants to see urology yet at this point.  We reviewed the role of her back and her bladder control.    As it relates to the melanoma, her recent PET scan was negative for metastasis.  We went over her actual scan in detail today.    We reviewed her other issues noted in the assessment but not specifically addressed in the HPI above.     On review of systems, the patient denies any chest pain or shortness of breath.    Objective:     Wt Readings from Last 3 Encounters:   04/06/21 200 lb 4.8 oz (90.9 kg)   03/09/21 194 lb 3.2 oz (88.1 kg)   02/05/21 200 lb (90.7 kg)     BP Readings from Last 3 Encounters:   04/06/21  124/68   03/09/21 132/70   02/05/21 130/70     /68   Pulse 85   Temp 98  F (36.7  C) (Oral)   Wt 200 lb 4.8 oz (90.9 kg)   LMP 09/17/1988   SpO2 98%   BMI 31.84 kg/m     Patient is comfortable, no apparent distress.  Mood good.  Insight good.  Ears -normal.  Nose exam shows moderate rhinitis.  Throat -clear.  Neck is supple, there is no cervical adenopathy.  No thyromegaly.  Heart regular rate and rhythm.  Lungs are clear to auscultation bilaterally.  Respiratory effort is good.  Trace lower extremity edema.    Diagnostics:     Results for orders placed or performed in visit on 04/06/21   Urinalysis-UC if Indicated   Result Value Ref Range    Color, UA Yellow Colorless, Yellow, Straw, Light Yellow    Clarity, UA Clear Clear    Glucose, UA Negative Negative    Protein, UA Negative Negative    Bilirubin, UA Negative Negative    Urobilinogen, UA 1.0 E.U./dL 0.2 E.U./dL, 1.0 E.U./dL    pH, UA 7.0 5.0 - 8.0    Blood, UA Negative Negative    Ketones, UA Negative Negative    Nitrite, UA Negative Negative    Leukocytes, UA Negative Negative    Specific Gravity, UA 1.020 1.005 - 1.030       Assessment:     1. Rhinosinusitis    2. Persistent cough    3. Urinary incontinence, unspecified type    4. Melanoma of right upper arm (H) - On Keytruda in the past - stage IIIA    5. Hand arthritis        Quality review:     PHQ-2 Total Score: 0 (2/16/2021  2:00 PM)  Depression Follow-up Plan: patient follow-up to return when and if necessary (9/18/2020  9:00 AM)    PHQ-9 Total Score: 3 (2/16/2021  2:00 PM)    ______________________________________________________________________     BMI Readings from Last 1 Encounters:   04/06/21 31.84 kg/m        Plan:     1. We will try Ceftin as a next step for her.  2. Reassured by the results of recent PET scan.  3. Consider further work-up for intrinsic lung disease if not improving.  Consider PFTs.  4. Follow-up with oncology as scheduled.  5. Hold off of Plaquenil at this time for  her hand arthritis as it seems to make her wheeze and this is a described side effect.  6. Consider urology consultation in the future.  Consider Kegel's as well related to this.      Renzo Newell MD  General Internal Medicine  [facility]      Return in about 4 months (around 8/6/2021), or if symptoms worsen or fail to improve, for follow up visit.     No future appointments.      ______________________________________________________________________     Relevant ICD-10 codes and order associations:      ICD-10-CM    1. Rhinosinusitis  J31.0 cefuroxime (CEFTIN) 500 MG tablet    J32.9    2. Persistent cough  R05    3. Urinary incontinence, unspecified type  R32 Urinalysis-UC if Indicated   4. Melanoma of right upper arm (H) - On Keytruda in the past - stage IIIA  C43.61    5. Hand arthritis  M19.049

## 2021-06-30 NOTE — PROGRESS NOTES
Progress Notes by Renzo Newell MD at 2/16/2021  1:50 PM     Author: Renzo Newell MD Service: -- Author Type: Physician    Filed: 2/16/2021  8:31 PM Encounter Date: 2/16/2021 Status: Signed    : Renzo Newell MD (Physician)            [facility]     TELEPHONE VISIT     Comprehensive and complex medical care - Chronic disease management - Shared decision making - Care coordination - Compassionate care    Patient advocacy - Rational deprescribing - Minimally disruptive medicine - Ethical focus - Customized care         Lashawn Ortega is a 75 y.o. female who is being evaluated via a billable telephone visit.           Active Problem List:  Problem List as of 2/16/2021 Reviewed: 2/16/2021  8:25 PM by Renzo Newell MD       High    Full code status    Ischemic cerebrovascular accident (CVA) of frontal lobe (H) - 2/2020    Nonsmoker    Melanoma of right upper arm (H) - On Keytruda in the past - stage IIIA       Medium    Lumbar spinal stenosis       Low    GERD (gastroesophageal reflux disease)    LBP (low back pain)    Menopausal hot flushes    Migraine headache    Peripheral neuropathy       Unprioritized    Aphasia    Essential tremor    NORMAL COLONOSCOPY - 2009 - Average risk    On antineoplastic chemotherapy - PEMBROLIZUMAB - checkpoint inhibitor           Current Outpatient Medications   Medication Sig   ? aloe vera Gel Apply 1 application topically 2 (two) times a day as needed.   ? aspirin 81 MG EC tablet Take 1 tablet (81 mg total) by mouth daily.   ? cyclobenzaprine (FLEXERIL) 5 MG tablet Take 1 tablet (5 mg total) by mouth 3 (three) times a day as needed for muscle spasms.   ? fluticasone propionate (FLONASE) 50 mcg/actuation nasal spray Apply 1-2 sprays into each nostril daily as needed for rhinitis.   ? gabapentin (NEURONTIN) 300 MG capsule TAKE 3 CAPSULES 3 TIMES DAILY   ? guaiFENesin-dextromethorphan (MUCINEX DM) 600-30 mg Tb12 Take 1  tablet by mouth 2 (two) times a day as needed.   ? menthol (BIOFREEZE, MENTHOL,) 4 % Gel Apply 1 application topically daily as needed. Apply topically for pain   ? naproxen (NAPROSYN) 500 MG tablet TAKE ONE TABLET BY MOUTH TWICE A DAY AS NEEDED   ? neomycin-bacitracin-polymyxin (NEOSPORIN) ointment Apply 1 application topically 2 (two) times a day as needed.   ? pantoprazole (PROTONIX) 40 MG tablet Take 1 tablet (40 mg total) by mouth daily.   ? predniSONE (DELTASONE) 5 MG tablet Take 15 mg by mouth daily for 5 days, THEN 10 mg daily for 5 days, THEN 5 mg daily for 5 days.   ? rosuvastatin (CRESTOR) 5 MG tablet Take 1 tablet (5 mg total) by mouth daily.   ? UNABLE TO FIND Med Name: [employer] Back & Leg Pain Cream   ? venlafaxine (EFFEXOR-XR) 37.5 MG 24 hr capsule TAKE ONE CAPSULE BY MOUTH EVERY DAY       Subjective:     Lashawn DASHA SutherlandShannon presents with:      Chief Complaint   Patient presents with   ? Follow-up     hip is doing better after injection, hands a little puffy   ? Dizziness     when having a BM   ? Tremors       The patient has a phone visit today which is done in light of the current coronavirus outbreak.    Patient is available over the phone to review her health.    First reviewed her trochanteric bursitis.  Pain is much better after corticosteroid injection.  Did bend over and reinjure it some but not severe at this time.    Hands pain especially over the metacarpophalangeal (MCP) joints is doing better with the prednisone taper.  Less discomfort and swelling.  Discussed this in relationship to possible treatments.  Less stiffness.    Gastroesophageal reflux disease (GERD) has still been an issue.  On two times a day pantoprazole (Protonix) and taking much less TUMS.  No blood in the stool.  We discussed possible upper endoscopy (EGD).  No dysphagia.    Constipation is doing better off of the TUMS.  Has colonoscopy scheduled for March 19th.    Essential tremor reviewed as well and we reviewed the role  of the venlafaxine (Effexor) in relationship to this.    We reviewed her other issues noted in the assessment but not specifically addressed in the HPI above.     Objective:     Limited phone exam reveals:  Patient in no distress.  Mood is good.  Insight is good.  No breathing issue.    Diagnostics:     Results for orders placed or performed in visit on 01/21/21   HM2(CBC w/o Differential)   Result Value Ref Range    WBC 6.2 4.0 - 11.0 thou/uL    RBC 4.73 3.80 - 5.40 mill/uL    Hemoglobin 13.4 12.0 - 16.0 g/dL    Hematocrit 40.5 35.0 - 47.0 %    MCV 86 80 - 100 fL    MCH 28.2 27.0 - 34.0 pg    MCHC 33.0 32.0 - 36.0 g/dL    RDW 12.8 11.0 - 14.5 %    Platelets 248 140 - 440 thou/uL    MPV 8.1 7.0 - 10.0 fL   C-Reactive Protein(CRP)   Result Value Ref Range    CRP 0.2 0.0 - 0.8 mg/dL   Sedimentation Rate   Result Value Ref Range    Sed Rate 8 0 - 20 mm/hr   Comprehensive Metabolic Panel   Result Value Ref Range    Sodium 142 136 - 145 mmol/L    Potassium 4.3 3.5 - 5.0 mmol/L    Chloride 107 98 - 107 mmol/L    CO2 26 22 - 31 mmol/L    Anion Gap, Calculation 9 5 - 18 mmol/L    Glucose 104 70 - 125 mg/dL    BUN 16 8 - 28 mg/dL    Creatinine 0.84 0.60 - 1.10 mg/dL    GFR MDRD Af Amer >60 >60 mL/min/1.73m2    GFR MDRD Non Af Amer >60 >60 mL/min/1.73m2    Bilirubin, Total 0.4 0.0 - 1.0 mg/dL    Calcium 9.4 8.5 - 10.5 mg/dL    Protein, Total 6.6 6.0 - 8.0 g/dL    Albumin 4.1 3.5 - 5.0 g/dL    Alkaline Phosphatase 64 45 - 120 U/L    AST 20 0 - 40 U/L    ALT 16 0 - 45 U/L   Rheumatoid Factor Quant   Result Value Ref Range    Rheumatoid Factor Quantitative <15.0 0 - 30 IU/mL   CCP Antibodies   Result Value Ref Range    CCP IgG Antibodies 0.6 <=4.9 U/mL       Quality review:     PHQ-2 Total Score: 0 (2/16/2021  2:00 PM)  Depression Follow-up Plan: patient follow-up to return when and if necessary (9/18/2020  9:00 AM)    PHQ-9 Total Score: 3 (2/16/2021  2:00  PM)    ______________________________________________________________________     BMI Readings from Last 1 Encounters:   02/05/21 31.80 kg/m        Assessment:     1. Hand arthritis    2. Trochanteric bursitis of right hip    3. Constipation, unspecified constipation type    4. Gastroesophageal reflux disease, unspecified whether esophagitis present    5. Menopausal hot flushes    6. Essential tremor        Plan:     1. I suspect the patient has a low grade SNRA.  2. Consider hydroxychloroquine (Plaquenil) and low dose prednisone initially to treat if needed.  3. No intervention at this time in relationship to the hip.  4. Colonoscopy as scheduled.  5. Consider upper endoscopy (EGD) at the time of the colonoscopy especially if not improving.  6. Taper venlafaxine (Effexor) to see if helps with tremor.  Take 1/2 capsule for 2-4 weeks, then stop.      Phone call duration:  24 minutes       Renzo Newell MD  General Internal Medicine  [facility]    Return in about 1 month (around 3/16/2021), or if symptoms worsen or fail to improve, for follow up visit.     No future appointments.

## 2021-06-30 NOTE — PROGRESS NOTES
Progress Notes by Renzo Newell MD at 1/21/2021  1:50 PM     Author: Renzo Newell MD Service: -- Author Type: Physician    Filed: 1/21/2021 10:14 PM Encounter Date: 1/21/2021 Status: Signed    : Renzo Newell MD (Physician)              [facility]    Comprehensive and complex medical care - Chronic disease management - Shared decision making - Care coordination - Compassionate care    Patient advocacy - Rational deprescribing - Minimally disruptive medicine - Ethical focus - Customized care          Date of Service: 1/21/2021  Primary Provider: Renzo Newell MD    Patient Care Team:  Renzo Newell MD as PCP - General (Internal Medicine)  Perez Padilla MD as Physician (Plastic Surgery)  Shoaib Gloria MD as Physician (Hematology and Oncology)  Bj Orantes MD as Physician (Dermatology)  Sadi Castano MD as Physician (General Surgery)  Renzo Newell MD as Assigned PCP  Carlos Alberto Worthington DO as Physician (Pain Medicine)  Rhnoa Moura MD (Neurology)  Antoni Cortes, PharmD as Pharmacist (Pharmacist)  Lynn Christina MD as Physician (General Surgery)  Daniel Das MD as Physician (Gastroenterology)  Lynn Christina MD as Assigned Surgical Provider     ______________________________________________________________________     Patient's Pharmacy:    [id number] - [place], [place] - [address] AT [facility] [address]  [address]  [facility] [address]  Phone: [phone] Fax: [phone]    [facility] - [place], [place] - [address]  [address] [facility] [address]  Phone: [phone] Fax: [phone]     Patient's Contacts:  Name Home Phone Work Phone Mobile Phone Relationship Lgl Grd   DEYSI -[phone] Niece No   CAMPBELL BERRIOS [phone]   Niece No     Patient's Insurance:    Payor/Plan  Subscr  Sex Relation Sub. Ins. ID Effective Group Num   1. HEALTHPARTNER* LASHAWN HUMPHREY 1945 Female  [id number] Not Eff [id number]                                   [address]   2. MEDICARE - ME* LASHAWN HUMPHREY 1945 Female Self [id number] 4/1/10                                    [place], [address]           Lashawn Humphrey is a 75 y.o. female who comes in today for:    Chief Complaint   Patient presents with   ? Constipation   ? FINGER ISSUES     FINGER WILL GET STUCK   ? HAND SWELLING     IN HANDS AND SOMETIMES IN THE ANKLES WHEN WAKES UP        Active Problem List:  Problem List as of 2021 Reviewed: 2020 10:57 PM by Renzo Newell MD       High    Full code status    Ischemic cerebrovascular accident (CVA) of frontal lobe (H) - 2020    Nonsmoker    Melanoma of right upper arm (H) - On Keytruda in the past - stage IIIA       Medium    Lumbar spinal stenosis       Low    GERD (gastroesophageal reflux disease)    LBP (low back pain)    Menopausal hot flushes    Migraine headache    Peripheral neuropathy       Unprioritized    Aphasia    Essential tremor    NORMAL COLONOSCOPY -  - Average risk    On antineoplastic chemotherapy - PEMBROLIZUMAB - checkpoint inhibitor           Current Outpatient Medications   Medication Sig   ? aloe vera Gel Apply 1 application topically 2 (two) times a day as needed.   ? aspirin 81 MG EC tablet Take 1 tablet (81 mg total) by mouth daily.   ? cyclobenzaprine (FLEXERIL) 5 MG tablet Take 1 tablet (5 mg total) by mouth 3 (three) times a day as needed for muscle spasms.   ? dextromethorphan-guaiFENesin (ROBAFEN DM)  mg/5 mL liquid Take 5 mL by mouth every 12 (twelve) hours as needed.   ? fluticasone propionate (FLONASE) 50 mcg/actuation nasal spray Apply 1-2 sprays into each nostril daily as needed for rhinitis.   ? gabapentin (NEURONTIN) 300 MG capsule TAKE 3 CAPSULES 3 TIMES DAILY   ? guaiFENesin-dextromethorphan (MUCINEX DM) 600-30 mg Tb12 Take 1 tablet  by mouth 2 (two) times a day as needed.   ? menthol (BIOFREEZE, MENTHOL,) 4 % Gel Apply 1 application topically daily as needed. Apply topically for pain   ? naproxen (NAPROSYN) 500 MG tablet TAKE ONE TABLET BY MOUTH TWICE A DAY AS NEEDED   ? neomycin-bacitracin-polymyxin (NEOSPORIN) ointment Apply 1 application topically 2 (two) times a day as needed.   ? pantoprazole (PROTONIX) 40 MG tablet Take 1 tablet (40 mg total) by mouth daily.   ? rosuvastatin (CRESTOR) 5 MG tablet Take 1 tablet (5 mg total) by mouth daily.   ? UNABLE TO FIND Med Name: MagniLife Back & Leg Pain Cream   ? venlafaxine (EFFEXOR-XR) 37.5 MG 24 hr capsule TAKE ONE CAPSULE BY MOUTH EVERY DAY       Social History     Social History Narrative    , no children.  Has a niece involved in her care.        Patient of Dr. Newell since 2016.          had severe dementia and passed away in 2019.  Milt.       Subjective:     Patient comes in today for change of bowel function.  She been having sudden increased constipation.    Her bowels have become more of a trouble in the last month.  She has had a lot of difficulty with passing her stool and has not had stools very regularly.  She has not noticed any blood in her stools.  She denies any diarrhea.  She has had no recent change in intake and has not been taking calcium.  She does take cyclobenzaprine 2 to 3 pills a day for her chronic back pain.  She has recently noticed her stools are occur.  Denies black change denies any other color change.  She denies any nausea or vomiting.    We reviewed her chronic back pain and she is trying to manage with this as best she can.    She has had some recent problems with right hip pain since October.  This is lateral.  Is also a little bit towards the tendons.  She might occasionally get some right SI joint pain well.  It is with laying on right side.  It is when she rates gets up a chair.  Yet about possible traction here.  She would like to pursue this  with Dr. Worthington if possible.    We reviewed her swelling in her hands.  She has noticed it mostly over her MCPs.  This has been particularly case in the last 3 weeks.  Is more swollen in the morning.  She does get triggering in one of her joints.  It is worse in her right hand and left but it does not both.  She denies any focal joint pains at this point.    She has follow-up with her surgeon, oncology, and dermatology related to her melanoma.    We reviewed her other issues noted in the assessment but not specifically addressed in the HPI above.     Objective:     Wt Readings from Last 3 Encounters:   01/21/21 198 lb 6.4 oz (90 kg)   09/18/20 193 lb 4.8 oz (87.7 kg)   07/28/20 187 lb (84.8 kg)     BP Readings from Last 3 Encounters:   01/21/21 132/80   10/20/20 154/78   09/18/20 126/62     /80   Pulse 95   Wt 198 lb 6.4 oz (90 kg)   LMP 09/17/1988   SpO2 98%   BMI 31.54 kg/m     The patient is comfortable, no acute distress.  Mood good.  Insight good.  Eyes are nonicteric.  Neck is supple without mass.  No cervical adenopathy.  No thyromegaly. Heart regular rate and rhythm.  Lungs clear to auscultation bilaterally.  Respiratory effort is good.  Abdomen soft and nontender.  No hepatosplenomegaly.  Extremities no edema.  She has some mild swelling of the MCP joints of both hands.  There is not a lot of synovitis.  There is no synovitis of the wrists.  She does have tenderness over the right greater trochanteric bursa.  Range of motion the hip is good overall.      Diagnostics:     Results for orders placed or performed in visit on 01/21/21   HM2(CBC w/o Differential)   Result Value Ref Range    WBC 6.2 4.0 - 11.0 thou/uL    RBC 4.73 3.80 - 5.40 mill/uL    Hemoglobin 13.4 12.0 - 16.0 g/dL    Hematocrit 40.5 35.0 - 47.0 %    MCV 86 80 - 100 fL    MCH 28.2 27.0 - 34.0 pg    MCHC 33.0 32.0 - 36.0 g/dL    RDW 12.8 11.0 - 14.5 %    Platelets 248 140 - 440 thou/uL    MPV 8.1 7.0 - 10.0 fL   C-Reactive  Protein(CRP)   Result Value Ref Range    CRP 0.2 0.0 - 0.8 mg/dL   Sedimentation Rate   Result Value Ref Range    Sed Rate 8 0 - 20 mm/hr   Comprehensive Metabolic Panel   Result Value Ref Range    Sodium 142 136 - 145 mmol/L    Potassium 4.3 3.5 - 5.0 mmol/L    Chloride 107 98 - 107 mmol/L    CO2 26 22 - 31 mmol/L    Anion Gap, Calculation 9 5 - 18 mmol/L    Glucose 104 70 - 125 mg/dL    BUN 16 8 - 28 mg/dL    Creatinine 0.84 0.60 - 1.10 mg/dL    GFR MDRD Af Amer >60 >60 mL/min/1.73m2    GFR MDRD Non Af Amer >60 >60 mL/min/1.73m2    Bilirubin, Total 0.4 0.0 - 1.0 mg/dL    Calcium 9.4 8.5 - 10.5 mg/dL    Protein, Total 6.6 6.0 - 8.0 g/dL    Albumin 4.1 3.5 - 5.0 g/dL    Alkaline Phosphatase 64 45 - 120 U/L    AST 20 0 - 40 U/L    ALT 16 0 - 45 U/L   Rheumatoid Factor Quant   Result Value Ref Range    Rheumatoid Factor Quantitative <15.0 0 - 30 IU/mL       Quality review:     PHQ-2 Total Score: 0 (9/18/2020  9:00 AM)  Depression Follow-up Plan: patient follow-up to return when and if necessary (9/18/2020  9:00 AM)    PHQ-9 Total Score: 3 (9/18/2020  9:00 AM)    ______________________________________________________________________     BMI Readings from Last 1 Encounters:   01/21/21 31.54 kg/m        Assessment and Plan:     1. Change in bowel function  This is a rather rapid occurrence.  It could be due to the cyclobenzaprine.  She should work on fluid intake and use MiraLAX at this time.    - Ambulatory referral for Colonoscopy    2. Constipation, unspecified constipation type  Blood work also done below as noted.  We will be checking a colonoscopy after discussing with the patient.  Possibly also due to spinal stenosis.    - HM2(CBC w/o Differential)  - Comprehensive Metabolic Panel  - Ambulatory referral for Colonoscopy    3. Bilateral hand pain  This might represent pseudogout.  Consider colchicine especially in light of the constipation.    - C-Reactive Protein(CRP)  - Sedimentation Rate  - Rheumatoid Factor  Quant  - CCP Antibodies  - XR Hands Bilateral PA VW; Future  - XR Hands Bilateral PA VW    4. Melanoma of right upper arm (H)  No signs of recurrence at this time.  Follow-up with oncology.  Follow-up with dermatology.    5. Trochanteric bursitis of right hip  She is going to follow-up with Dr. Worthington related to this.    6. Spinal stenosis of lumbar region with neurogenic claudication    7. Essential tremor  We can discuss this in the future.    8. Dark stools    - Ambulatory referral for Colonoscopy     40 minutes or greater was spent today on the patient's care on the day of service.      This includes time for chart preparation, reviewing medical tests done before or during the visit, talking with the patient, review of quality indicators, required documentation, and other elements of care.      Renzo Newell MD  General Internal Medicine  [facility]      Return in about 6 weeks (around 3/4/2021), or if symptoms worsen or fail to improve, for telephone visit with Dr. Newell.     No future appointments.      HCC issues resolved at this visit.

## 2021-06-30 NOTE — PROGRESS NOTES
Progress Notes by Renzo Newell MD at 3/9/2021  1:50 PM     Author: Renzo Newell MD Service: -- Author Type: Physician    Filed: 3/11/2021 10:06 AM Encounter Date: 3/9/2021 Status: Signed    : Renzo Newell MD (Physician)              [facility]    Comprehensive and complex medical care - Chronic disease management - Shared decision making - Care coordination - Compassionate care    Patient advocacy - Rational deprescribing - Minimally disruptive medicine - Ethical focus - Customized care          Date of Service: 3/9/2021  Primary Provider: Renzo Newell MD    Patient Care Team:  Renzo Newell MD as PCP - General (Internal Medicine)  Shoaib Gloria MD as Physician (Hematology and Oncology)  Bj Orantes MD as Physician (Dermatology)  Sadi Castano MD as Physician (General Surgery)  Renzo Newell MD as Assigned PCP  Carlos Alberto Worthington DO as Physician (Pain Medicine)  Rhona Moura MD (Neurology)  Lynn Christina MD as Assigned Surgical Provider     ______________________________________________________________________     Patient's Pharmacy:    [facility] - [address]  [address] [facility] [address]  Phone: [phone] Fax: [phone]     Patient's Contacts:  Name Home Phone Work Phone Mobile Phone Relationship Lgl Grd   DEYSI -[phone] Niece No   AGUSTINAJARONNE [phone]   Niece No     Patient's Insurance:    Payor/Plan Subscr  Sex Relation Sub. Ins. ID Effective Group Num   1. HEALTHPARTNER* LASHAWN HUMPHREY 1945 Female  [id number] Not Eff [id number]                                   [address]   2. MEDICARE - ME* LASHAWN HUMPHREY 1945 Female Self [id number] 4/1/10                                    [facility], [address]           Lashawn LOU Shannon is a 75 y.o. female who comes in today for:    Chief Complaint   Patient  presents with   ? Cough     x 2 weeks, non productive, taking Mucinex & Robitussin   ? Tremors     in hands did stop venlafaxine, still has a little tremors left hand is worse right hand does not notice       Active Problem List:  Problem List as of 3/9/2021 Reviewed: 2/16/2021  8:25 PM by Renzo Newell MD       High    Full code status    Ischemic cerebrovascular accident (CVA) of frontal lobe (H) - 2/2020    Nonsmoker    Melanoma of right upper arm (H) - On Keytruda in the past - stage IIIA       Medium    Lumbar spinal stenosis       Low    GERD (gastroesophageal reflux disease)    LBP (low back pain)    Menopausal hot flushes    Migraine headache    Peripheral neuropathy       Unprioritized    Aphasia    Essential tremor    NORMAL COLONOSCOPY - 2009 - Average risk    On antineoplastic chemotherapy - PEMBROLIZUMAB - checkpoint inhibitor           Current Outpatient Medications   Medication Sig   ? aloe vera Gel Apply 1 application topically 2 (two) times a day as needed.   ? aspirin 81 MG EC tablet Take 1 tablet (81 mg total) by mouth daily.   ? cyclobenzaprine (FLEXERIL) 5 MG tablet Take 1 tablet (5 mg total) by mouth 3 (three) times a day as needed for muscle spasms.   ? fluticasone propionate (FLONASE) 50 mcg/actuation nasal spray Apply 1-2 sprays into each nostril daily as needed for rhinitis.   ? guaiFENesin-dextromethorphan (MUCINEX DM) 600-30 mg Tb12 Take 1 tablet by mouth 2 (two) times a day as needed.   ? hydrOXYchloroQUINE (PLAQUENIL) 200 mg tablet Take 1 tablet (200 mg total) by mouth 2 (two) times a day.   ? menthol (BIOFREEZE, MENTHOL,) 4 % Gel Apply 1 application topically daily as needed. Apply topically for pain   ? naproxen (NAPROSYN) 500 MG tablet TAKE ONE TABLET BY MOUTH TWICE A DAY AS NEEDED   ? neomycin-bacitracin-polymyxin (NEOSPORIN) ointment Apply 1 application topically 2 (two) times a day as needed.   ? pantoprazole (PROTONIX) 40 MG tablet Take 1 tablet (40 mg total) by mouth daily.    ? rosuvastatin (CRESTOR) 5 MG tablet Take 1 tablet (5 mg total) by mouth daily.   ? UNABLE TO FIND Med Name: MagniLife Back & Leg Pain Cream   ? amoxicillin (AMOXIL) 500 MG capsule Take 1 capsule (500 mg total) by mouth 3 (three) times a day for 10 days.   ? gabapentin (NEURONTIN) 300 MG capsule TAKE 3 CAPSULES 3 TIMES DAILY   ? predniSONE (DELTASONE) 5 MG tablet Take 15 mg by mouth daily for 5 days, THEN 10 mg daily for 5 days, THEN 5 mg daily for 5 days.     Social History     Social History Narrative    , no children.  Has a niece involved in her care.        Patient of Dr. Newell since 2016.          had severe dementia and passed away in 2019.  Milt.       Subjective:     Patient comes in today for a number of issues.    We first reviewed her sinus symptoms.  She has had ongoing sinus symptoms for 2 to 3 weeks.  She has had sinus congestion.  She has had drainage and sinus pressure.  She has had an ongoing cough.  She denies any fevers, chills, shortness of breath.  She has had this in the past, but usually resolves on its own.    We reviewed her essential tremor.  She did go off of venlafaxine for this.  It has not done anything yet.  He she has recently went off of it.  We reviewed this with her and other options.    Reviewed her hand arthritis.  I assume that this is a inflammatory arthritis at a low level.  She is getting hydroxychloroquine for this.  She has had no new swelling.    She has trigger fingers as well in her right thumb index and middle finger.  The sees up on her at times.  We reviewed this again.  She would like to try the prednisone again to see if this lightens the symptoms.    We reviewed her other issues noted in the assessment but not specifically addressed in the HPI above.     On review of systems, the patient denies any chest pain or shortness of breath.    Objective:     Wt Readings from Last 3 Encounters:   03/09/21 194 lb 3.2 oz (88.1 kg)   02/05/21 200 lb (90.7 kg)    01/21/21 198 lb 6.4 oz (90 kg)     BP Readings from Last 3 Encounters:   03/09/21 132/70   02/05/21 130/70   01/21/21 132/80     /70   Pulse 88   Wt 194 lb 3.2 oz (88.1 kg)   LMP 09/17/1988   SpO2 97%   BMI 30.88 kg/m     Patient is comfortable, no apparent distress.  Mood good.  Insight good.  Ears -clear.  Nose exam shows severe rhinitis.  Throat -clear.  Neck is supple, there is no cervical adenopathy.  No thyromegaly.  Heart regular rate and rhythm.  Lungs are clear to auscultation bilaterally.  Respiratory effort is good.  She appears to still have mostly a intention tremor consistent with essential tremor.    Diagnostics:     Results for orders placed or performed in visit on 01/21/21   HM2(CBC w/o Differential)   Result Value Ref Range    WBC 6.2 4.0 - 11.0 thou/uL    RBC 4.73 3.80 - 5.40 mill/uL    Hemoglobin 13.4 12.0 - 16.0 g/dL    Hematocrit 40.5 35.0 - 47.0 %    MCV 86 80 - 100 fL    MCH 28.2 27.0 - 34.0 pg    MCHC 33.0 32.0 - 36.0 g/dL    RDW 12.8 11.0 - 14.5 %    Platelets 248 140 - 440 thou/uL    MPV 8.1 7.0 - 10.0 fL   C-Reactive Protein(CRP)   Result Value Ref Range    CRP 0.2 0.0 - 0.8 mg/dL   Sedimentation Rate   Result Value Ref Range    Sed Rate 8 0 - 20 mm/hr   Comprehensive Metabolic Panel   Result Value Ref Range    Sodium 142 136 - 145 mmol/L    Potassium 4.3 3.5 - 5.0 mmol/L    Chloride 107 98 - 107 mmol/L    CO2 26 22 - 31 mmol/L    Anion Gap, Calculation 9 5 - 18 mmol/L    Glucose 104 70 - 125 mg/dL    BUN 16 8 - 28 mg/dL    Creatinine 0.84 0.60 - 1.10 mg/dL    GFR MDRD Af Amer >60 >60 mL/min/1.73m2    GFR MDRD Non Af Amer >60 >60 mL/min/1.73m2    Bilirubin, Total 0.4 0.0 - 1.0 mg/dL    Calcium 9.4 8.5 - 10.5 mg/dL    Protein, Total 6.6 6.0 - 8.0 g/dL    Albumin 4.1 3.5 - 5.0 g/dL    Alkaline Phosphatase 64 45 - 120 U/L    AST 20 0 - 40 U/L    ALT 16 0 - 45 U/L   Rheumatoid Factor Quant   Result Value Ref Range    Rheumatoid Factor Quantitative <15.0 0 - 30 IU/mL   CCP  Antibodies   Result Value Ref Range    CCP IgG Antibodies 0.6 <=4.9 U/mL       Assessment:     1. Rhinosinusitis    2. Hand arthritis    3. Essential tremor    4. Trigger thumb, right thumb    5. Trigger finger, left middle finger    6. Trigger finger, left index finger    7. Trochanteric bursitis of right hip    8. Constipation, unspecified constipation type        Quality review:     PHQ-2 Total Score: 0 (2/16/2021  2:00 PM)  Depression Follow-up Plan: patient follow-up to return when and if necessary (9/18/2020  9:00 AM)    PHQ-9 Total Score: 3 (2/16/2021  2:00 PM)    ______________________________________________________________________     BMI Readings from Last 1 Encounters:   03/09/21 30.88 kg/m        Plan:     1. Patient was given a prescription for amoxicillin given the persistence of sinus symptoms.  2. I suspect she may have a low-grade seronegative rheumatoid arthritis.  We are using Plaquenil for this.  She has not started this yet.  3. Monitor tremor off of venlafaxine.  4. Consider beta-blocker for tremor if needed.  5. Consider corticosteroid injections into the trigger thumb and fingers as needed.  6. She is still scheduled for colonoscopy here in the near future.  7. Sent in prescription for prednisone for her hand issues.      Renzo Newell MD  General Internal Medicine  [facility]      Return in about 3 months (around 6/9/2021), or if symptoms worsen or fail to improve, for follow up visit.     Future Appointments   Date Time Provider Department Center   3/17/2021 11:45 AM [facility] COVID VACCINE 21 DAY PFIZER [facility] CS [facility]   4/5/2021 12:45 PM JN PET CTC JN PET JN   4/5/2021  1:45 PM JN PET CT 1 JN PET JN         ______________________________________________________________________     Relevant ICD-10 codes and order associations:      ICD-10-CM    1. Rhinosinusitis  J31.0 amoxicillin (AMOXIL) 500 MG capsule    J32.9    2. Hand arthritis  M19.049 predniSONE  (DELTASONE) 5 MG tablet     DISCONTINUED: predniSONE (DELTASONE) 5 MG tablet   3. Essential tremor  G25.0    4. Trigger thumb, right thumb  M65.311    5. Trigger finger, left middle finger  M65.332    6. Trigger finger, left index finger  M65.322    7. Trochanteric bursitis of right hip  M70.61    8. Constipation, unspecified constipation type  K59.00

## 2021-06-30 NOTE — PROGRESS NOTES
Progress Notes by Renzo Newell MD at 2/5/2021 10:55 AM     Author: Renzo Newell MD Service: -- Author Type: Physician    Filed: 2/6/2021  8:40 AM Encounter Date: 2/5/2021 Status: Signed    : Renzo Newell MD (Physician)            [facility]  Dr. Renzo Newell             Date of Service: 2/5/2021  Primary Provider: Renzo Newell MD    Patient Care Team:  Renzo Newell MD as PCP - General (Internal Medicine)  Shoabi Gloria MD as Physician (Hematology and Oncology)  Bj Orantes MD as Physician (Dermatology)  Sadi Castano MD as Physician (General Surgery)  Renzo Newell MD as Assigned PCP  Carlos Alberto Worthington DO as Physician (Pain Medicine)  Rhona Moura MD (Neurology)  Lynn Christina MD as Assigned Surgical Provider     ______________________________________________________________________     Patient's Pharmacy:    [id number] - [place], [place] - [address] AT [facility] [address]  [address]  [facility] [address]  Phone: [phone] Fax: [phone]    [facility] - [place], [place] - [address]  [address] [facility] [address]  Phone: [phone] Fax: [phone]     Patient's Contacts:  Name Home Phone Work Phone Mobile Phone Relationship Lgl Jany   DEYSI [phone] Niece No   CAMPBELL BERRIOS [phone]   Niece No       Patient's Insurance:    Payor: MEDICARE / Plan: MEDICARE A AND B / Product Type: Medicare /            Lashawn Ortega is a 75 y.o. female who comes in today for:    Chief Complaint   Patient presents with   ? Injections     right hip worse    ? Medication Management     medication options for arthritis   ? Follow-up     hands still swelling and finger still pops       Active Problem List:  Problem List as of 2/5/2021 Reviewed: 9/20/2020 10:57 PM by Renzo Newell MD        High    Full code status    Ischemic cerebrovascular accident (CVA) of frontal lobe (H) - 2/2020    Nonsmoker    Melanoma of right upper arm (H) - On Keytruda in the past - stage IIIA       Medium    Lumbar spinal stenosis       Low    GERD (gastroesophageal reflux disease)    LBP (low back pain)    Menopausal hot flushes    Migraine headache    Peripheral neuropathy       Unprioritized    Aphasia    Essential tremor    NORMAL COLONOSCOPY - 2009 - Average risk    On antineoplastic chemotherapy - PEMBROLIZUMAB - checkpoint inhibitor           Current Outpatient Medications   Medication Sig   ? aloe vera Gel Apply 1 application topically 2 (two) times a day as needed.   ? aspirin 81 MG EC tablet Take 1 tablet (81 mg total) by mouth daily.   ? cyclobenzaprine (FLEXERIL) 5 MG tablet Take 1 tablet (5 mg total) by mouth 3 (three) times a day as needed for muscle spasms.   ? fluticasone propionate (FLONASE) 50 mcg/actuation nasal spray Apply 1-2 sprays into each nostril daily as needed for rhinitis.   ? gabapentin (NEURONTIN) 300 MG capsule TAKE 3 CAPSULES 3 TIMES DAILY   ? guaiFENesin-dextromethorphan (MUCINEX DM) 600-30 mg Tb12 Take 1 tablet by mouth 2 (two) times a day as needed.   ? menthol (BIOFREEZE, MENTHOL,) 4 % Gel Apply 1 application topically daily as needed. Apply topically for pain   ? naproxen (NAPROSYN) 500 MG tablet TAKE ONE TABLET BY MOUTH TWICE A DAY AS NEEDED   ? neomycin-bacitracin-polymyxin (NEOSPORIN) ointment Apply 1 application topically 2 (two) times a day as needed.   ? pantoprazole (PROTONIX) 40 MG tablet Take 1 tablet (40 mg total) by mouth daily.   ? rosuvastatin (CRESTOR) 5 MG tablet Take 1 tablet (5 mg total) by mouth daily.   ? UNABLE TO FIND Med Name: MagniLife Back & Leg Pain Cream   ? venlafaxine (EFFEXOR-XR) 37.5 MG 24 hr capsule TAKE ONE CAPSULE BY MOUTH EVERY DAY   ? predniSONE (DELTASONE) 5 MG tablet Take 15 mg by mouth daily for 5 days, THEN 10 mg daily for 5 days, THEN 5 mg daily  for 5 days.     Social History     Social History Narrative    , no children.  Has a niece involved in her care.        Patient of Dr. Newell since 2016.          had severe dementia and passed away in 2019.  Ash.       Subjective:     In for follow up.    Right hip continues to bother her.  The left is still an issue as well but not as bad.  Would like to try a corticosteroid injection for this.  No new issues there.    Hands continue to bother her.  Mainly the mcps.  Right hand worse than the left.  Some triggering of the finger on the middle and ring fingers.  Balls of feet can bother her as well but she attributes this to the peripheral neuropathy (PN).  She does have cold feet.    Previously reviewed the constipation through [facility].    We reviewed her other issues noted in the assessment but not specifically addressed in the HPI above.     Objective:     Wt Readings from Last 3 Encounters:   02/05/21 200 lb (90.7 kg)   01/21/21 198 lb 6.4 oz (90 kg)   09/18/20 193 lb 4.8 oz (87.7 kg)     BP Readings from Last 3 Encounters:   02/05/21 130/70   01/21/21 132/80   10/20/20 154/78     /70   Pulse 92   Wt 200 lb (90.7 kg)   LMP 09/17/1988   SpO2 99%   BMI 31.80 kg/m     The patient is comfortable, no acute distress.  Mood good.  Insight good.  Hands show no severe synovitis.  There is a tendon nodule over the right ring finger.  Significant tenderness over the trochanteric bursa on the left.  Gait is slow but stable.    Diagnostics:     Results for orders placed or performed in visit on 01/21/21   HM2(CBC w/o Differential)   Result Value Ref Range    WBC 6.2 4.0 - 11.0 thou/uL    RBC 4.73 3.80 - 5.40 mill/uL    Hemoglobin 13.4 12.0 - 16.0 g/dL    Hematocrit 40.5 35.0 - 47.0 %    MCV 86 80 - 100 fL    MCH 28.2 27.0 - 34.0 pg    MCHC 33.0 32.0 - 36.0 g/dL    RDW 12.8 11.0 - 14.5 %    Platelets 248 140 - 440 thou/uL    MPV 8.1 7.0 - 10.0 fL   C-Reactive Protein(CRP)   Result Value Ref Range     CRP 0.2 0.0 - 0.8 mg/dL   Sedimentation Rate   Result Value Ref Range    Sed Rate 8 0 - 20 mm/hr   Comprehensive Metabolic Panel   Result Value Ref Range    Sodium 142 136 - 145 mmol/L    Potassium 4.3 3.5 - 5.0 mmol/L    Chloride 107 98 - 107 mmol/L    CO2 26 22 - 31 mmol/L    Anion Gap, Calculation 9 5 - 18 mmol/L    Glucose 104 70 - 125 mg/dL    BUN 16 8 - 28 mg/dL    Creatinine 0.84 0.60 - 1.10 mg/dL    GFR MDRD Af Amer >60 >60 mL/min/1.73m2    GFR MDRD Non Af Amer >60 >60 mL/min/1.73m2    Bilirubin, Total 0.4 0.0 - 1.0 mg/dL    Calcium 9.4 8.5 - 10.5 mg/dL    Protein, Total 6.6 6.0 - 8.0 g/dL    Albumin 4.1 3.5 - 5.0 g/dL    Alkaline Phosphatase 64 45 - 120 U/L    AST 20 0 - 40 U/L    ALT 16 0 - 45 U/L   Rheumatoid Factor Quant   Result Value Ref Range    Rheumatoid Factor Quantitative <15.0 0 - 30 IU/mL   CCP Antibodies   Result Value Ref Range    CCP IgG Antibodies 0.6 <=4.9 U/mL       ______________________________________________________________________    Pertinent radiology for this visit includes the following:    XR Hands Bilateral PA VW  EXAM DATE:         01/21/2021    EXAM: X-RAY HANDS BILATERAL, TWO VIEWS  LOCATION: [facility]  DATE/TIME: 1/21/2021 3:15 PM    INDICATION: Bilateral hand pain.  COMPARISON: None.    IMPRESSION:  Both hands negative for fracture or dislocation. There is degenerative change at both first CMC and MCP joints. Degenerative change at both first STT joints. No erosive changes are identified.      ______________________________________________________________________      Assessment:     1. Hand arthritis    2. Trochanteric bursitis of right hip    3. Spinal stenosis of lumbar region with neurogenic claudication    4. Constipation, unspecified constipation type        Quality review:     PHQ-2 Total Score: 0 (9/18/2020  9:00 AM)  Depression Follow-up Plan: patient follow-up to return when and if necessary (9/18/2020  9:00 AM)    PHQ-9 Total Score: 3  (9/18/2020  9:00 AM)    ______________________________________________________________________     BMI Readings from Last 1 Encounters:   02/05/21 31.80 kg/m        Plan:     1. Corticosteroid injection to the trochanteric bursa today.  2. Trial of prednisone taper.  Pain is either due to low grade SNRA or pseudogout.  Favor the former at this time.  3. Consider use of hydroxychloroquine (Plaquenil)   4. Follow up spine clinic.  5. Constipation may in part be due to TUMS use.         Renzo Newell MD  General Internal Medicine  [facility]      Return in about 3 months (around 5/5/2021), or if symptoms worsen or fail to improve, for follow up visit.     No future appointments.      ______________________________________________________________________     Relevant ICD-10 codes and order associations:      ICD-10-CM    1. Hand arthritis  M19.049 predniSONE (DELTASONE) 5 MG tablet   2. Trochanteric bursitis of right hip  M70.61 triamcinolone acetonide 40 mg/mL injection 80 mg (KENALOG-40)     Injection tendon or ligament   3. Spinal stenosis of lumbar region with neurogenic claudication  M48.062    4. Constipation, unspecified constipation type  K59.00

## 2021-07-01 ENCOUNTER — HOSPITAL ENCOUNTER (OUTPATIENT)
Dept: PHYSICAL MEDICINE AND REHAB | Facility: CLINIC | Age: 76
Discharge: HOME | End: 2021-07-01
Attending: PAIN MEDICINE
Payer: COMMERCIAL

## 2021-07-01 DIAGNOSIS — M70.61 GREATER TROCHANTERIC BURSITIS OF RIGHT HIP: ICD-10-CM

## 2021-07-01 DIAGNOSIS — M25.561 ACUTE PAIN OF RIGHT KNEE: ICD-10-CM

## 2021-07-01 DIAGNOSIS — M51.26 LUMBAR DISC HERNIATION: ICD-10-CM

## 2021-07-01 DIAGNOSIS — M54.16 LUMBAR RADICULITIS: ICD-10-CM

## 2021-07-01 DIAGNOSIS — M48.062 SPINAL STENOSIS OF LUMBAR REGION WITH NEUROGENIC CLAUDICATION: ICD-10-CM

## 2021-07-01 ASSESSMENT — MIFFLIN-ST. JEOR: SCORE: 1303.95

## 2021-07-02 ENCOUNTER — MEDICAL CORRESPONDENCE (OUTPATIENT)
Dept: HEALTH INFORMATION MANAGEMENT | Facility: CLINIC | Age: 76
End: 2021-07-02

## 2021-07-02 ENCOUNTER — COMMUNICATION - HEALTHEAST (OUTPATIENT)
Dept: PHYSICAL MEDICINE AND REHAB | Facility: CLINIC | Age: 76
End: 2021-07-02
Payer: COMMERCIAL

## 2021-07-04 NOTE — PROGRESS NOTES
Progress Notes by Carlos Alberto Worthington DO at 7/1/2021  8:20 AM     Author: Carlos Ablerto Worthington DO Service: -- Author Type: Physician    Filed: 7/1/2021  8:52 AM Date of Service: 7/1/2021  8:20 AM Status: Signed    : aCrlos Alberto Worthington DO (Physician)         Assessment:     Diagnoses and all orders for this visit:    Greater trochanteric bursitis of right hip  -     Ambulatory referral to Physical Therapy    Lumbar radiculitis  -     Ambulatory referral to Physical Therapy    Lumbar disc herniation    Spinal stenosis of lumbar region with neurogenic claudication    Acute pain of right knee  -     Ambulatory referral to Physical Therapy       Lashawn Ortega is a 76 y.o. y.o. female with past medical history significant for  reflux, migraine headache, low back pain, peripheral neuropathy, melanoma of right upper arm, stroke, elevated liver enzymes who presents today for follow-up regarding low back and right lower extremity pain:    -Patient received 50% relief with her greater trochanter bursa injection on the right side.  She is having new right knee pain likely secondary to osteoarthritis.     Plan:     A shared decision making plan was used. The patient's values and choices were respected. Prior medical records from our last visit on 5/21/2021 were reviewed today. The following represents what was discussed and decided upon by the provider and the patient.        -DIAGNOSTIC TESTS: Images were personally reviewed and interpreted.   --MRI of the lumbar spine dated 7/11/2019 is personally reviewed and images interpreted and shown to patient.  There is multilevel degenerative changes in lumbar spine.  There is severe spinal canal stenosis at L3-4.  There is moderate spinal canal stenosis at L4-5.  There is mild to moderate spinal canal stenosis at L2-3.  There is no severe foraminal stenosis.  --CT of abdomen and pelvis July 2020.  I did not see any compression fractures.  She had a fall  about a month ago    -INTERVENTIONS: No interventions at this time.    -MEDICATIONS: No changes to medications.  -  Discussed side effects of medications and proper use. Patient verbalized understanding.    -PHYSICAL THERAPY:  I have ordered physical therapy for her low back right greater trochanter area as well as right knee pain.       -PATIENT EDUCATION: Discussed pain management in a multimodal fashion including physical therapy, medication management, possible future injections.    -FOLLOW UP: Patient will follow up in 6 weeks in person.  Advised to contact clinic if symptoms worsen or change.    Subjective:     Lashawn Ortega is a 76 y.o. female who presents today for follow-up regarding right lateral thigh pain.  Patient reports that she received 50% relief with her greater trochanter bursa injection under ultrasound guidance.  She continues to have pain although with bending as well as prolonged standing is improved with resting as well as going on short walks and doing her stretches.  Currently she is taking gabapentin, naproxen, cyclobenzaprine and finds it this is helpful.  She has a roll-on menthol applicator that seems to be helpful as well.  Her pain today is 3/10 is worst is 8/10 as best as 3/10.  She denies any bowel or bladder changes, fevers, chills, unintentional weight loss.  She has newer right knee pain and finds that it at times it feels like her leg is going to give out.  She is not had physical therapy for her knees in quite some time.    Treatment to Date: MRI of lumbar spine dated 7/11/2019.  Right L5-S1 transforaminal epidural steroid injection done on 8/12/2019.  Several sessions of in-home physical therapy.  Bilateral L4-5 transforaminal epidural steroid injections done on 8/11/2020.  Right L4-5 transforaminal epidural steroid injection done on 5/7/2021.  Right greater trochanter bursa injection under ultrasound guidance on 6/11/2021.    Patient Active Problem List   Diagnosis   ? GERD  (gastroesophageal reflux disease)   ? Migraine headache   ? LBP (low back pain)   ? Nonsmoker   ? Menopausal hot flushes   ? Full code status   ? NORMAL COLONOSCOPY - 2009 - Average risk   ? Peripheral neuropathy   ? Melanoma of right upper arm (H) - On Keytruda in the past - stage IIIA   ? Ischemic cerebrovascular accident (CVA) of frontal lobe (H) - 2/2020   ? Aphasia   ? Lumbar spinal stenosis   ? Essential tremor   ? On antineoplastic chemotherapy - PEMBROLIZUMAB - checkpoint inhibitor - in the past for melanoma       Current Outpatient Medications on File Prior to Encounter   Medication Sig Dispense Refill   ? aloe vera Gel Apply 1 application topically 2 (two) times a day as needed.     ? aspirin 81 MG EC tablet Take 1 tablet (81 mg total) by mouth daily. 30 tablet 0   ? cyclobenzaprine (FLEXERIL) 5 MG tablet Take 1 tablet (5 mg total) by mouth 3 (three) times a day as needed for muscle spasms. 90 tablet 1   ? fluticasone propionate (FLONASE) 50 mcg/actuation nasal spray Apply 1-2 sprays into each nostril daily as needed for rhinitis. 16 g 1   ? gabapentin (NEURONTIN) 300 MG capsule TAKE 3 CAPSULES 3 TIMES DAILY 270 capsule 3   ? guaiFENesin-dextromethorphan (MUCINEX DM) 600-30 mg Tb12 Take 1 tablet by mouth 2 (two) times a day as needed.     ? hydrOXYchloroQUINE (PLAQUENIL) 200 mg tablet Take 1 tablet (200 mg total) by mouth 2 (two) times a day. 180 tablet 3   ? menthol (BIOFREEZE, MENTHOL,) 4 % Gel Apply 1 application topically daily as needed. Apply topically for pain     ? naproxen (NAPROSYN) 500 MG tablet TAKE ONE TABLET BY MOUTH TWICE A DAY AS NEEDED 180 tablet 3   ? neomycin-bacitracin-polymyxin (NEOSPORIN) ointment Apply 1 application topically 2 (two) times a day as needed.     ? pantoprazole (PROTONIX) 40 MG tablet Take 2 tablets (80 mg total) by mouth daily. 180 tablet 3   ? rosuvastatin (CRESTOR) 5 MG tablet Take 1 tablet (5 mg total) by mouth daily. 90 tablet 3   ? UNABLE TO FIND Med Name:  "MagniLife Back & Leg Pain Cream       No current facility-administered medications on file prior to encounter.        Allergies   Allergen Reactions   ? Erythromycin Base Rash     PARALYSIS, EYE SWELLING, HEADACHE   ? Codeine Dizziness     Other: extreme weakness in legspt stopped taking medication and sxs were gone within a few hours     ? Hydrocodone Nausea Only and Headache   ? Keytruda [Pembrolizumab] Other (See Comments)     Elevated liver function tests (lfts)    ? Tramadol Diarrhea, Dizziness, Headache and Nausea And Vomiting   ? Other Environmental Allergy Rash     mildew   ? Penicillins Swelling and Rash   ? Retinol Rash     HYDROGEL RETINOL CAPSULE   ? Shellfish Containing Products Swelling and Rash     shrimp   ? Sulfa (Sulfonamide Antibiotics) Rash     Burning body rash   ? Vitamin D3 Complete [Mv-Mn-Iron-Fa-Herbal Cmplx#[id number]] Rash     Higher dosage makes her break out       Past Medical History:   Diagnosis Date   ? Aphasia    ? Asthma    ? GERD (gastroesophageal reflux disease)    ? History of transfusion    ? Lumbar spinal stenosis 2/28/2020    EXAM: MR LUMBAR SPINE W WO CONTRAST LOCATION: [facility] DATE/TIME: 7/11/2019 4:49 PM   INDICATION: Back pain going down the right leg.  Going on for 3 months.  Not improving. See above. History of melanoma. COMPARISON: None. CONTRAST: Gadavist 9. TECHNIQUE: Without and with IV contrast   FINDINGS:  Nomenclature is based on 5 lumbar type vertebral bodies. There is no concerning marrow rep   ? Malignant melanoma of right upper arm (H)    ? Migraine headache    ? NORMAL COLONOSCOPY - 2009 - Average risk    ? On antineoplastic chemotherapy     pembrolizumab   ? PN (peripheral neuropathy)    ? PONV (postoperative nausea and vomiting)     \"cold sweats\"   ? Stroke (H) 02/2020        Review of Systems  ROS:  Specifically negative for bowel/bladder dysfunction, balance changes, headache, dizziness, foot drop, fevers, chills, appetite changes, nausea/vomiting, " "unexplained weight loss. Otherwise 13 systems reviewed are negative. Please see the patient's intake questionnaire from today for details.    Reviewed Social, Family, Past Medical and Past Surgical history with patient, no significant changes noted since prior visit.     Objective:     /72 (Patient Site: Left Arm, Patient Position: Sitting, Cuff Size: Adult Large)   Pulse 81   Ht 5' 0.5\" (1.537 m)   Wt 195 lb (88.5 kg)   LMP 09/17/1988   BMI 37.46 kg/m      PHYSICAL EXAMINATION:    --CONSTITUTIONAL: Well developed, well nourished, healthy appearing individual.  --PSYCHIATRIC: Appropriate mood and affect. No difficulty interacting due to temper, social withdrawal, or memory issues.  --RESPIRATORY: Normal rhythm and effort. No abnormal accessory muscle breathing patterns noted.   --LUMBAR SPINE:  Inspection reveals no evidence of deformity. Range of motion is mildly limited in lumbar flexion, extension, lateral rotation.  Straight leg raising in the seated position is negative to radicular pain.   --SACROILIAC JOINT:  One Finger point test negative.  --LOWER EXTREMITY MOTOR TESTING:  Plantar flexion left 5/5, right 5/5   Dorsiflexion left 5/5, right 5/5   Great toe MTP extension left 5/5, right 5/5  Knee flexion left 5/5, right 5/5  Knee extension left 5/5, right 5/5   Hip flexion left 5/5, right 5/5  Hip abduction left 5/5, right 5/5  Hip adduction left 5/5, right 5/5   --NEUROLOGIC:  Sensation to light touch is intact in the bilateral L4, L5, and S1 dermatomes.    RESULTS:   Imaging: Lumbar spine imaging was reviewed today. The images were shown to the patient and the findings were explained using a spine model.    Ops Us Large Joint Injection Unilateral    Result Date: 6/11/2021  Procedure: Ultrasound-guided right hip peritrochanteric bursal region injection Procedure Date: Pre Procedure Diagnosis:  Right greater trochanter bursitis Post Procedure Diagnosis:  Same Procedure Performed:  Right greater " trochanter bursa injection with Ultrasound Guidance Clinical Scenario:  As per office notes Vital Signs:  As per rooming/preprocedure documentation Side Injected: Right After discussing the risks, benefits, and alternatives to the procedure, the patient expressed understanding and wished to proceed.  The patient was brought to the procedure suite and placed in the left lateral decubitus position.  A procedural pause was conducted to verify:  correct patient identity, procedure to be performed and as applicable, correct side and site, correct patient position, and availability of implants, special equipment or special requirements.  A simple surgical tray was used.  Prior to the procedure, the right hip peritrochanteric bursal region was examined with a 3.75 MHz curvilinear transducer to visualize the right hip peritrochanteric bursal region and determine the optimal needle path.  Following this, the groin was prepared with a ChloraPrep scrub, then re-examined using the same transducer, a sterile ultrasound transducer cover, and ultrasound transducer gel.  Local anesthesia was obtained with 2 mL of 1% lidocaine. Thereafter, using ultrasound guidance, a 3.5 inch, 22 gauge needle was advanced into the right hip peritrochanteric bursal region. After visualization of the tip in the target area and negative aspiration for blood, a mixture of 20 mg of Depo-Medrol and 3.5 cc of 2% lidocaine was injected into the right hip peritrochanteric bursal region. Following the injection, the needle was withdrawn.  The patient tolerated the procedure well and there were no apparent complications.  After an appropriate amount of observation, the patient was dismissed from the clinic in good condition under their own power. Pre-pain score: 7/10 Post pain score: 3/10

## 2021-07-04 NOTE — PATIENT INSTRUCTIONS - HE
Patient Instructions by Carlos Alberto Worthington DO at 7/1/2021  8:20 AM     Author: Carlos Alberto Worthington DO Service: -- Author Type: Physician    Filed: 7/1/2021  8:48 AM Date of Service: 7/1/2021  8:20 AM Status: Signed    : Carlos Alberto Worthington DO (Physician)       Ordered physical therapy for you for your back, hip and knee pain.    ~Please call Nurse Navigation line [phone] with any questions or concerns about your treatment plan, if symptoms worsen and you would like to be seen urgently, or if you have problems controlling bladder and bowel function.

## 2021-07-06 VITALS — BODY MASS INDEX: 36.82 KG/M2 | WEIGHT: 195 LBS | HEIGHT: 61 IN

## 2021-07-07 NOTE — PROGRESS NOTES
It was determined that the patient should, in person for her visit.  She is scheduled for next week.  No charge.

## 2021-07-07 NOTE — TELEPHONE ENCOUNTER
Received fax from pharmacy pt states dose has changed and they now take 2 tablets daily.  Please send new RX.  Pt last seen 4/6/21  Due to be seen around 8/6/21  Plan:      1. We will try Ceftin as a next step for her.  2. Reassured by the results of recent PET scan.  3. Consider further work-up for intrinsic lung disease if not improving.  Consider PFTs.  4. Follow-up with oncology as scheduled.  5. Hold off of Plaquenil at this time for her hand arthritis as it seems to make her wheeze and this is a described side effect.  6. Consider urology consultation in the future.  Consider Martin's as well related to this.        Renzo Newell MD  General Internal Medicine  [facility]        Return in about 4 months (around 8/6/2021), or if symptoms worsen or fail to improve, for follow up visit.

## 2021-07-13 ENCOUNTER — RECORDS - HEALTHEAST (OUTPATIENT)
Dept: ADMINISTRATIVE | Facility: CLINIC | Age: 76
End: 2021-07-13

## 2021-07-14 PROBLEM — F32.A DEPRESSION: Status: RESOLVED | Noted: 2020-02-21 | Resolved: 2020-02-28

## 2021-07-14 PROBLEM — I10 ESSENTIAL HYPERTENSION, MALIGNANT: Status: RESOLVED | Noted: 2020-02-21 | Resolved: 2020-02-28

## 2021-07-20 DIAGNOSIS — M51.26 LUMBAR DISC HERNIATION: ICD-10-CM

## 2021-07-20 DIAGNOSIS — M48.062 SPINAL STENOSIS OF LUMBAR REGION WITH NEUROGENIC CLAUDICATION: ICD-10-CM

## 2021-07-20 DIAGNOSIS — M54.16 LUMBAR RADICULITIS: ICD-10-CM

## 2021-07-20 RX ORDER — GABAPENTIN 300 MG
CAPSULE ORAL
Qty: 270 CAPSULE | Refills: 3 | Status: SHIPPED | OUTPATIENT
Start: 2021-07-20 | End: 2021-09-20

## 2021-07-21 ENCOUNTER — RECORDS - HEALTHEAST (OUTPATIENT)
Dept: ADMINISTRATIVE | Facility: CLINIC | Age: 76
End: 2021-07-21

## 2021-08-06 NOTE — PATIENT INSTRUCTIONS - HE
Patient Instructions by Renzo Newell MD at 9/17/2019  8:50 AM     Author: Renzo Newell MD Service: -- Author Type: Physician    Filed: 9/17/2019  9:21 PM Encounter Date: 9/17/2019 Status: Addendum    : Renzo Newell MD (Physician)    Related Notes: Original Note by Renzo Newell MD (Physician) filed at 9/17/2019  9:32 AM       Please schedule an appointment with [facility] by calling [phone].    They are located at:    [address] [place] [address]    Doctors who are at this location include:    Dr. Carlos Alberto Walsh   ______________________________________________________________________      The new shingles shot (Shingrix) is 2 separate shots  by 2-6 months.    The cost out of pocket is around $450 for the 2 shots, so you might want to see if your insurance covers it or a portion of it prior to having it done.  Often by having it done at a pharmacy rather than a clinic, it can be cheaper for you.    It is estimated that any person's risk of shingles over their lifetime is around 10-20%.    If you have had the old shingles vaccine (Zostavax), it is about 50% effective, reducing your risk of shingles to about 5-10% over your lifetime.    The new shot is 90% effective, reducing your risk of shingles to about 1-2% over your lifetime.    Because the shot is strong, it is very common to have flu like symptoms for 2-3 days after the shot.  25-50% of patients will have fever, muscle aches or headache.    I believe that whether or not you have this vaccine is your own decision depending on your values and preferences.  The information above I give you to make an informed decision.    Renzo Newell MD  [facility]    Patient Education     Your Health Risk Assessment indicates you feel you are not in good physical health.    A healthy lifestyle helps keep the body fit and the mind alert. It  helps protect you from disease, helps you fight disease, and helps prevent chronic disease (disease that doesn't go away) from getting worse. This is important as you get older and begin to notice twinges in muscles and joints and a decline in the strength and stamina you once took for granted. A healthy lifestyle includes good healthcare, good nutrition, weight control, recreation, and regular exercise. Avoid harmful substances and do what you can to keep safe. Another part of a healthy lifestyle is stay mentally active and socially involved.    Good healthcare     Have a wellness visit every year.     If you have new symptoms, let us know right away. Don't wait until the next checkup.     Take medicines exactly as prescribed and keep your medicines in a safe place. Tell us if your medicine causes problems.   Healthy diet and weight control     Eat 3 or 4 small, nutritious, low-fat, high-fiber meals a day. Include a variety of fruits, vegetables, and whole-grain foods.     Make sure you get enough calcium in your diet. Calcium, vitamin D, and exercise help prevent osteoporosis (bone thinning).     If you live alone, try eating with others when you can. That way you get a good meal and have company while you eat it.     Try to keep a healthy weight. If you eat more calories than your body uses for energy, it will be stored as fat and you will gain weight.     Recreation   Recreation is not limited to sports and team events. It includes any activity that provides relaxation, interest, enjoyment, and exercise. Recreation provides an outlet for physical, mental, and social energy. It can give a sense of worth and achievement. It can help you stay healthy.       Patient Education   Urinary Incontinence, Female (Adult)  Urinary incontinence means loss of control of the bladder. This problem affects many women, especially as they get older. If you have incontinence, you may be embarrassed to ask for help. But know that  this problem can be treated.  Types of Incontinence  There are different types of incontinence. Two of the main types are described here. You can have more than one type.    Stress incontinence. With this type, urine leaks when pressure (stress) is put on the bladder. This may happen when you cough, sneeze, or laugh. Stress incontinence most often occurs because the pelvic floor muscles that support the bladder and urethra are weak. This can happen after pregnancy and vaginal childbirth or a hysterectomy. It can also be due to excess body weight or hormone changes.    Urge incontinence (also called overactive bladder). With this type, a sudden urge to urinate is felt often. This may happen even though there may not be much urine in the bladder. The need to urinate often during the night is common. Urge incontinence most often occurs because of bladder spasms. This may be due to bladder irritation or infection. Damage to bladder nerves or pelvic muscles, constipation, and certain medicines can also lead to urge incontinence.  Treatment of urinary incontinence depends on the cause. Further evaluation is needed to find the type you have. This will likely include an exam and certain tests. Based on the results, you and your healthcare provider can then plan treatment. Until a diagnosis is made, the home care tips below can help relieve symptoms.  Home care    Do pelvic floor muscle exercises, if they are prescribed. The pelvic floor muscles help support the bladder and urethra. Many women find that their symptoms improve when doing special exercises that strengthen these muscles. To do the exercises contract the muscles you would use to stop your stream of urine, but do this when youre not urinating. Hold for 10 seconds, then relax. Repeat 10 to 20 times in a row, at least 3 times a day. Your provider may give you other instructions for how to do the exercises and how often.    Keep a bladder diary. This helps track how  often and how much you urinate over a set period of time. Bring this diary with you to your next visit with the provider. The information can help your provider learn more about your bladder problem.    Lose weight, if advised to by your provider. Excess weight puts pressure on the bladder. Your provider can help you create a weight-loss plan thats right for you. This may include exercising more and making certain diet changes.    Don't consume foods and drinks that may irritate the bladder. These can include alcohol and caffeinated drinks.    Quit smoking. Smoking and other tobacco use can lead to chronic cough that strains the pelvic floor muscles. Smoking may also damage the bladder and urethra. Talk with your provider about treatments or methods you can use to quit smoking.    If drinking large amounts of fluid causes you to have symptoms, you may be advised to limit your fluid intake. You may also be advised to drink most of your fluids during the day and to limit fluids at night.    If youre worried about urine leakage or accidents, you may wear absorbent pads to catch urine. Change the pads often. This helps reduce discomfort. It may also reduce the risk of skin or bladder infections.  Follow-up care  Follow up with your healthcare provider, or as directed. It may take some to find the right treatment for your problem. Your treatment plan may include special therapies or medicines. Certain procedures or surgery may also be options. Be sure to discuss any questions you have with your provider.  When to seek medical advice  Call the healthcare provider right away if any of these occur:    Fever of 100.4 F (38 C) or higher, or as directed by your provider    Bladder pain or fullness    Abdominal swelling    Nausea or vomiting    Back pain    Weakness, dizziness or fainting  Date Last Reviewed: 10/1/2017    6329-2054 [employer]. [address] [place] [address], [place] [address]. All rights reserved. This  information is not intended as a substitute for professional medical care. Always follow your healthcare professional's instructions.     Patient Education   Your Health Risk Assessment indicates you feel you are not in good emotional health.    Recreation   Recreation is not limited to sports and team events. It includes any activity that provides relaxation, interest, enjoyment, and exercise. Recreation provides an outlet for physical, mental, and social energy. It can give a sense of worth and achievement. It can help you stay healthy.    Mental Exercise and Social Involvement  Mental and emotional health is as important as physical health. Keep in touch with friends and family. Stay as active as possible. Continue to learn and challenge yourself.   Things you can do to stay mentally active are:    Learn something new, like a foreign language or musical instrument.     Play SCRABBLE or do crossword puzzles. If you cannot find people to play these games with you at home, you can play them with others on your computer through the Internet.     Join a games club--anything from card games to chess or checkers or lawn bowling.     Start a new hobby.     Go back to school.     Volunteer.     Read.     Keep up with world events.       Patient Education   Preventing Falls in the Home  As you get older, falls are more likely. Thats because your reaction time slows. Your muscles and joints may also get stiffer, making them less flexible. Illness, medications, and vision changes can also affect your balance. A fall could leave you unable to live on your own. To make your home safer, follow these tips:    Floors    Put nonskid pads under area rugs.    Remove throw rugs.    Replace worn floor coverings.    Tack carpets firmly to each step on carpeted stairs. Put nonskid strips on the edges of uncarpeted stairs.    Keep floors and stairs free of clutter and cords.    Arrange furniture so there are clear pathways.    Clean up  any spills right away.    Bathrooms    Install grab bars in the tub or shower.    Apply nonskid strips or put a nonskid rubber mat in the tub or shower.    Sit on a bath chair to bathe.    Use bathmats with nonskid backing.    Lighting    Keep a flashlight in each room.    Put a nightlight along the pathway between the bedroom and the bathroom.    2506-7828 [employer]. [address] [place] [address], [place] [address]. All rights reserved. This information is not intended as a substitute for professional medical care. Always follow your healthcare professional's instructions.           Advance Directive  Patients advance directive was discussed and I am comfortable with the patients wishes.  Patient Education   Personalized Prevention Plan  You are due for the preventive services outlined below.  Your care team is available to assist you in scheduling these services.  If you have already completed any of these items, please share that information with your care team to update in your medical record.  Health Maintenance   Topic Date Due   ? ZOSTER VACCINES (2 of 3) 03/29/2014   ? INFLUENZA VACCINE RULE BASED (1) 08/01/2019   ? MEDICARE ANNUAL WELLNESS VISIT  09/06/2019   ? COLONOSCOPY  04/19/2020 (Originally 8/21/2019)   ? FALL RISK ASSESSMENT  09/17/2020   ? MAMMOGRAM  09/17/2021   ? ADVANCE CARE PLANNING  07/23/2022   ? TD 18+ HE  05/19/2025   ? DXA SCAN  Completed   ? PNEUMOCOCCAL POLYSACCHARIDE VACCINE AGE 65 AND OVER  Completed   ? PNEUMOCOCCAL CONJUGATE VACCINE FOR ADULTS (PCV13 OR PREVNAR)  Completed   ? HEPATITIS C SCREENING  Discontinued

## 2021-08-12 DIAGNOSIS — E78.2 MIXED HYPERLIPIDEMIA: ICD-10-CM

## 2021-08-12 RX ORDER — ROSUVASTATIN CALCIUM 5 MG
TABLET ORAL
Qty: 90 TABLET | Refills: 3 | Status: SHIPPED | OUTPATIENT
Start: 2021-08-12 | End: 2022-08-15

## 2021-08-19 ENCOUNTER — HOSPITAL ENCOUNTER (OUTPATIENT)
Dept: PHYSICAL THERAPY | Facility: REHABILITATION | Age: 76
End: 2021-08-19
Payer: COMMERCIAL

## 2021-08-19 ENCOUNTER — MYC MEDICAL ADVICE (OUTPATIENT)
Dept: INTERNAL MEDICINE | Facility: CLINIC | Age: 76
End: 2021-08-19

## 2021-08-19 DIAGNOSIS — M25.551 RIGHT HIP PAIN: ICD-10-CM

## 2021-08-19 DIAGNOSIS — G89.29 CHRONIC BILATERAL LOW BACK PAIN WITHOUT SCIATICA: Primary | ICD-10-CM

## 2021-08-19 DIAGNOSIS — M25.561 ACUTE PAIN OF RIGHT KNEE: ICD-10-CM

## 2021-08-19 DIAGNOSIS — J31.0 CHRONIC RHINITIS: ICD-10-CM

## 2021-08-19 DIAGNOSIS — M54.50 CHRONIC BILATERAL LOW BACK PAIN WITHOUT SCIATICA: Primary | ICD-10-CM

## 2021-08-19 PROCEDURE — 97140 MANUAL THERAPY 1/> REGIONS: CPT | Mod: GP | Performed by: PHYSICAL THERAPIST

## 2021-08-19 PROCEDURE — 97110 THERAPEUTIC EXERCISES: CPT | Mod: GP | Performed by: PHYSICAL THERAPIST

## 2021-08-19 RX ORDER — FLUTICASONE PROPIONATE 50 MCG
1-2 SPRAY, SUSPENSION (ML) NASAL DAILY PRN
Qty: 32 G | Refills: 4 | Status: SHIPPED | OUTPATIENT
Start: 2021-08-19 | End: 2023-01-16

## 2021-08-31 ENCOUNTER — HOSPITAL ENCOUNTER (OUTPATIENT)
Dept: PHYSICAL THERAPY | Facility: REHABILITATION | Age: 76
End: 2021-08-31
Payer: COMMERCIAL

## 2021-08-31 DIAGNOSIS — M25.561 ACUTE PAIN OF RIGHT KNEE: ICD-10-CM

## 2021-08-31 DIAGNOSIS — G89.29 CHRONIC BILATERAL LOW BACK PAIN WITHOUT SCIATICA: Primary | ICD-10-CM

## 2021-08-31 DIAGNOSIS — M54.50 CHRONIC BILATERAL LOW BACK PAIN WITHOUT SCIATICA: Primary | ICD-10-CM

## 2021-08-31 DIAGNOSIS — M25.551 RIGHT HIP PAIN: ICD-10-CM

## 2021-08-31 PROCEDURE — 97140 MANUAL THERAPY 1/> REGIONS: CPT | Mod: GP | Performed by: PHYSICAL THERAPIST

## 2021-08-31 PROCEDURE — 97110 THERAPEUTIC EXERCISES: CPT | Mod: GP | Performed by: PHYSICAL THERAPIST

## 2021-09-07 ENCOUNTER — HOSPITAL ENCOUNTER (OUTPATIENT)
Dept: PHYSICAL THERAPY | Facility: REHABILITATION | Age: 76
End: 2021-09-07
Payer: COMMERCIAL

## 2021-09-07 DIAGNOSIS — M54.50 CHRONIC BILATERAL LOW BACK PAIN WITHOUT SCIATICA: Primary | ICD-10-CM

## 2021-09-07 DIAGNOSIS — M25.551 RIGHT HIP PAIN: ICD-10-CM

## 2021-09-07 DIAGNOSIS — G89.29 CHRONIC BILATERAL LOW BACK PAIN WITHOUT SCIATICA: Primary | ICD-10-CM

## 2021-09-07 DIAGNOSIS — M25.561 ACUTE PAIN OF RIGHT KNEE: ICD-10-CM

## 2021-09-07 PROCEDURE — 97110 THERAPEUTIC EXERCISES: CPT | Mod: GP | Performed by: PHYSICAL THERAPIST

## 2021-09-07 PROCEDURE — 97140 MANUAL THERAPY 1/> REGIONS: CPT | Mod: GP | Performed by: PHYSICAL THERAPIST

## 2021-09-13 ENCOUNTER — OFFICE VISIT (OUTPATIENT)
Dept: PHYSICAL MEDICINE AND REHAB | Facility: CLINIC | Age: 76
End: 2021-09-13
Payer: COMMERCIAL

## 2021-09-13 VITALS — DIASTOLIC BLOOD PRESSURE: 74 MMHG | HEART RATE: 92 BPM | OXYGEN SATURATION: 99 % | SYSTOLIC BLOOD PRESSURE: 146 MMHG

## 2021-09-13 DIAGNOSIS — M48.062 SPINAL STENOSIS OF LUMBAR REGION WITH NEUROGENIC CLAUDICATION: ICD-10-CM

## 2021-09-13 DIAGNOSIS — M54.16 LUMBAR RADICULITIS: ICD-10-CM

## 2021-09-13 DIAGNOSIS — M47.816 SPONDYLOSIS OF LUMBAR REGION WITHOUT MYELOPATHY OR RADICULOPATHY: ICD-10-CM

## 2021-09-13 DIAGNOSIS — M51.26 LUMBAR DISC HERNIATION: ICD-10-CM

## 2021-09-13 DIAGNOSIS — M70.61 GREATER TROCHANTERIC BURSITIS OF RIGHT HIP: Primary | ICD-10-CM

## 2021-09-13 PROCEDURE — 99214 OFFICE O/P EST MOD 30 MIN: CPT | Performed by: PAIN MEDICINE

## 2021-09-13 ASSESSMENT — PAIN SCALES - GENERAL: PAINLEVEL: MILD PAIN (2)

## 2021-09-13 ASSESSMENT — PATIENT HEALTH QUESTIONNAIRE - PHQ9: SUM OF ALL RESPONSES TO PHQ QUESTIONS 1-9: 0

## 2021-09-13 NOTE — LETTER
9/13/2021         RE: Lashanw Ortega  [address] [place] [address]        Dear Colleague,    Thank you for referring your patient, Lashawn Ortega, to the [facility] [place]. Please see a copy of my visit note below.      Assessment:     Diagnoses and all orders for this visit:  Greater trochanteric bursitis of right hip  Lumbar radiculitis  Lumbar disc herniation  Spondylosis of lumbar region without myelopathy or radiculopathy  Spinal stenosis of lumbar region with neurogenic claudication     Lashawn Ortega is a 76 year old y.o. female with past medical history significant for  reflux, migraine headache, low back pain, peripheral neuropathy, melanoma of right upper arm, stroke, elevated liver enzymes who presents today for follow-up regarding low back and right lower extremity pain:    -Overall patient is seen improvement of pain.  She still has some, however is improving with physical therapy.  Pain is likely secondary to lumbar spinal stenosis and greater trochanter bursitis.  Continue physical therapy would likely be helpful.  I recommended she continue with sessions.     Plan:     A shared decision making plan was used. The patient's values and choices were respected. Prior medical records from our last visit on 7/1/2021 were reviewed today. The following represents what was discussed and decided upon by the provider and the patient.        -DIAGNOSTIC TESTS: Images were personally reviewed and interpreted.   --MRI of the lumbar spine dated 7/11/2019 is personally reviewed and images interpreted and shown to patient.  There is multilevel degenerative changes in lumbar spine.  There is severe spinal canal stenosis at L3-4.  There is moderate spinal canal stenosis at L4-5.  There is mild to moderate spinal canal stenosis at L2-3.  There is no severe foraminal stenosis.  --CT of abdomen and pelvis July 2020.  I did not see any compression fractures.      -INTERVENTIONS:  No interventions at this time.    -MEDICATIONS: No changes to medications.  -  Discussed side effects of medications and proper use. Patient verbalized understanding.    -PHYSICAL THERAPY: Recommend that she continue with physical therapy sessions and home exercises.    -PATIENT EDUCATION: We discussed pain management in a multimodal fashion including physical therapy, medication management, possible future injections.    -FOLLOW UP: Patient will follow up as needed.  Advised to contact clinic if symptoms worsen or change.    Subjective:     Lashawn Ortega is a 76 year old female who presents today for follow-up regarding low back and right posterior thigh and buttock pain.  As well as lateral thigh pain on the right.  Patient notes that she is doing better.  She feels that physical therapy has been helpful.  She has been able to move around more efficiently getting better.  She does have pain across her low back into the right hip area.  She denies any bowel or bladder changes, fevers, chills, unintentional weight loss.  Her pain today is 2/10 is worst is 8/10.  If she sits and bends forward for too long.  She will have pain.  Pain is improved with sitting and laying.  If she stands pain is worse.  Patient notes that she is lost 12 pounds since I saw her last and this has been helpful for her back pain as well.    Treatment to Date: MRI of lumbar spine dated 7/11/2019.  Right L5-S1 transforaminal epidural steroid injection done on 8/12/2019.  Several sessions of in-home physical therapy.  Bilateral L4-5 transforaminal epidural steroid injections done on 8/11/2020.  Right L4-5 transforaminal epidural steroid injection done on 5/7/2021.  Right greater trochanter bursa injection under ultrasound guidance on 6/11/2021.       Patient Active Problem List   Diagnosis     GERD (gastroesophageal reflux disease)     Migraine headache     LBP (low back pain)     Nonsmoker     Menopausal hot flushes     Full code status      NORMAL COLONOSCOPY - 2009 - Average risk     Peripheral neuropathy     Melanoma of right upper arm (H) - On Keytruda in the past - stage IIIA     Ischemic cerebrovascular accident (CVA) of frontal lobe (H) - 2/2020     Aphasia     Lumbar spinal stenosis     Essential tremor     On antineoplastic chemotherapy - PEMBROLIZUMAB - checkpoint inhibitor - in the past for melanoma       Current Outpatient Medications   Medication     aloe vera Gel     aspirin 81 MG EC tablet     CRESTOR 5 MG tablet     cyclobenzaprine (FLEXERIL) 5 MG tablet     fluticasone (FLONASE) 50 MCG/ACT nasal spray     guaiFENesin-dextromethorphan (MUCINEX DM) 600-30 mg Tb12     hydrOXYchloroQUINE (PLAQUENIL) 200 mg tablet     menthol (BIOFREEZE, MENTHOL,) 4 % Gel     naproxen (NAPROSYN) 500 MG tablet     neomycin-bacitracin-polymyxin (NEOSPORIN) ointment     NEURONTIN 300 MG capsule     pantoprazole (PROTONIX) 40 MG tablet     No current facility-administered medications for this visit.       Allergies   Allergen Reactions     Erythromycin Base [Erythromycin] Rash     PARALYSIS, EYE SWELLING, HEADACHE     Codeine Dizziness     Other: extreme weakness in legspt stopped taking medication and sxs were gone within a few hours       Hydrocodone Nausea and Headache     Keytruda [Pembrolizumab] Other (See Comments)     Elevated liver function tests (lfts)      Tramadol Diarrhea, Dizziness, Headache and Nausea and Vomiting     Other Environmental Allergy Rash     mildew     Penicillins Swelling and Rash     Retinol [Vitamin A] Rash     HYDROGEL RETINOL CAPSULE     Shellfish Containing Products [Shellfish-Derived Products] Swelling and Rash     shrimp     Sulfa (Sulfonamide Antibiotics) [Sulfa Drugs] Rash     Burning body rash     Vitamin D3 Complete [External Allergen Needs Reconciliation - See Comment] Rash     Higher dosage makes her break out       Past Medical History:   Diagnosis Date     Aphasia      Asthma      GERD (gastroesophageal reflux disease)   "    History of transfusion      Lumbar spinal stenosis 2/28/2020    EXAM: MR LUMBAR SPINE W WO CONTRAST LOCATION: [facility] DATE/TIME: 7/11/2019 4:49 PM   INDICATION: Back pain going down the right leg.  Going on for 3 months.  Not improving. See above. History of melanoma. COMPARISON: None. CONTRAST: Gadavist 9. TECHNIQUE: Without and with IV contrast   FINDINGS:  Nomenclature is based on 5 lumbar type vertebral bodies. There is no concerning marrow rep     Malignant melanoma of right upper arm (H)      Migraine headache      On antineoplastic chemotherapy     pembrolizumab     PN (peripheral neuropathy)      PONV (postoperative nausea and vomiting)     \"cold sweats\"     S/P colonoscopy      Stroke (H) 02/2020        Review of Systems  ROS:  Specifically negative for bowel/bladder dysfunction, balance changes, headache, dizziness, foot drop, fevers, chills, appetite changes, nausea/vomiting, unexplained weight loss. Otherwise 13 systems reviewed are negative. Please see the patient's intake questionnaire from today for details.    Reviewed Social, Family, Past Medical and Past Surgical history with patient, no significant changes noted since prior visit.     Objective:     BP (!) 146/74   Pulse 92   SpO2 99%     PHYSICAL EXAMINATION:    --CONSTITUTIONAL: Well developed, well nourished, healthy appearing individual.  --PSYCHIATRIC: Appropriate mood and affect. No difficulty interacting due to temper, social withdrawal, or memory issues.  --SKIN: Lumbar region is dry and intact.   --RESPIRATORY: Normal rhythm and effort. No abnormal accessory muscle breathing patterns noted.   --MUSCULOSKELETAL:  Normal lumbar lordosis noted, no lateral shift.  --GROSS MOTOR: Easily arises from a seated position. Gait is non-antalgic  --LUMBAR SPINE:  Inspection reveals no evidence of deformity. Range of motion is mildly limited in lumbar flexion, extension, lateral rotation.  Tenderness palpation over the low back and " right lateral thigh area. Straight leg raising in the seated position is negative to radicular pain.   --SACROILIAC JOINT: One Finger point test negative.  --LOWER EXTREMITY MOTOR TESTING:  Plantar flexion left 5/5, right 5/5   Dorsiflexion left 5/5, right 5/5   Great toe MTP extension left 5/5, right 5/5  Knee flexion left 5/5, right 5/5  Knee extension left 5/5, right 5/5   Hip flexion left 5/5, right 5/5  Hip abduction left 5/5, right 5/5  Hip adduction left 5/5, right 5/5   --NEUROLOGIC: Sensation to light touch is intact in the bilateral L4, L5, and S1 dermatomes.    RESULTS:   Imaging: Lumbar spine imaging was reviewed today.                           Again, thank you for allowing me to participate in the care of your patient.        Sincerely,        Carlos Alberto Worthington, DO

## 2021-09-13 NOTE — PROGRESS NOTES
Assessment:     Diagnoses and all orders for this visit:  Greater trochanteric bursitis of right hip  Lumbar radiculitis  Lumbar disc herniation  Spondylosis of lumbar region without myelopathy or radiculopathy  Spinal stenosis of lumbar region with neurogenic claudication     Lashawn Ortega is a 76 year old y.o. female with past medical history significant for  reflux, migraine headache, low back pain, peripheral neuropathy, melanoma of right upper arm, stroke, elevated liver enzymes who presents today for follow-up regarding low back and right lower extremity pain:    -Overall patient is seen improvement of pain.  She still has some, however is improving with physical therapy.  Pain is likely secondary to lumbar spinal stenosis and greater trochanter bursitis.  Continue physical therapy would likely be helpful.  I recommended she continue with sessions.     Plan:     A shared decision making plan was used. The patient's values and choices were respected. Prior medical records from our last visit on 7/1/2021 were reviewed today. The following represents what was discussed and decided upon by the provider and the patient.        -DIAGNOSTIC TESTS: Images were personally reviewed and interpreted.   --MRI of the lumbar spine dated 7/11/2019 is personally reviewed and images interpreted and shown to patient.  There is multilevel degenerative changes in lumbar spine.  There is severe spinal canal stenosis at L3-4.  There is moderate spinal canal stenosis at L4-5.  There is mild to moderate spinal canal stenosis at L2-3.  There is no severe foraminal stenosis.  --CT of abdomen and pelvis July 2020.  I did not see any compression fractures.      -INTERVENTIONS: No interventions at this time.    -MEDICATIONS: No changes to medications.  -  Discussed side effects of medications and proper use. Patient verbalized understanding.    -PHYSICAL THERAPY: Recommend that she continue with physical therapy sessions and home  PT Daily Note-Current


Subjective


Pt sitting in recliner upon arrival.  Pt agrees to PT despite slight pain and 

tightness in R hip.





Pain





   Numeric Pain Scale:  5-Moderate Pain


   Location:  Right


   Location Body Site:  Hip


   Pain Description:  Ache, Tightness





Transfers


              Functional [place] Measure


0=Not Assessed/NA   4=Minimal Assistance


1=Total Assistance   5=Supervision or Setup


2=Maximal Assistance   6=Modified [place]


3=Moderate Assistance   7=Complete IndependenceIRFPAI Quality Coding Scale











6 Independent with activity with or without an assistive device


 


5  Patient requires set up or clean up by helper.  Patient completes activity  

by  themselves


 


4 Supervision or touching assist (C[facility]). [employer] provide cues , steadying assist


 


3 The helper provides less than half the effort to complete the activity


 


2 The helper provides more than half the effort to complete the activity


 


1 Dependent.  The helper does all the effort to complete an activity 


 


7 Patient refused to complete or attempt activity


 


9 The patient did not perform the activity before the current illness or injury


 


88 Not attempted due to Medical conditions or safety concerns








Scootin


Sit to/from Stand:  4


Sit to Stand (QC):  4





Weight Bearing


Weight Bearing Restriction:  Weight Bearing/Tolerated


Location Restriction:  R LE





Gait Training


Does the Patient Walk?:  Yes


Distance (FIM):  0=067-87 ft


Distance:  100'


Walk 10 feet (QC):  4


Walk 50 ft with 2 Turns(QC):  4


Gait Level of Assist:  4


Gait Persons Needed:  1


Gait Assistive Device:  FWW


Pt walks with antalgic gait pattern.  Pt walks with slow salma but steady, no 

LOB.





Wheelchair Training


Does the Pt Use a Wheelchair?:  No





Exercises


Seated Therapy Exercises:  Ankle pumps, Sit to stand, Long arc quads, Hip 

flexion, Kicking activity, Hip abd/add


Seated Reps:  15





Treatments


Pt gives PTA info on pain and current condition.  Pt completes Seated Ex in 

recliner.  Pt then transfers from recliner using FWW at C[facility]-Min A.  Pt 

ambulates from room to chair in Therapy [facility] to rest.  While resting, 

another PT staff approaches and continues tx with pt.  Pt is left with staff 

and all needs met at end of tx.





Assessment


Current Status:  Fair Progress


Pt still having moderate pain in R hip and walks with antalgic gait pattern.  

Pt fatigues quickly.





PT Short Term Goals


Short Term Goals


Time Frame:  2017


Transfers (B,C,W/C) (FIM):  4


Gait (FIM):  4


Gait Distance Comment:  150'


Gait Level of Assist:  4


Gait Assistive Device:  FWW


Wheelchair Distance:  50'





PT Long Term Goals


Long Term Goals


PT Long Term Goals Time Frame:  2017


Transfers (B,C,W/C) (FIM):  5 (met 17)


Sit to Lying (QC):  4 (met 17)


Lying-Sitting on Side/Bed(QC):  4 (met 17)


Sit to Stand (QC):  4 (met 17)


Rollin


Roll Left to Right (QC):  4 (met 17)


Chair/Bed-to-Chair Xfer(QC):  4


Gait (FIM):  5


Distance:  200'


Walk 10 feet (QC):  4


Walk 10ft-Uneven Surface(QC):  4


Walk 50ft with 2 Turns (QC):  4


Walk 150 ft (QC):  4


Gait Level of Assist:  5


Gait Assistive Device:  FWW


Stairs (FIM):  2 (met 17)


# of Steps:  4 (met 17)


1 Step (curb) (QC):  4 (met 17)


4 Steps (QC):  4 (met 17)


12 Steps (QC):  88


Stairs Level Of Assist:  4 (met 17)





PT Plan


Problem List


Problem List:  Activity Tolerance, Functional Strength, Safety, Balance, Gait, 

Transfer





Treatment/Plan


Treatment Plan:  Continue Plan of Care


Treatment Plan:  Bed Mobility, Education, Functional Activity Ashley, Functional 

Strength, Group Therapy, Gait, Safety, Therapeutic Exercise, Transfers


Treatment Duration:  2017


Frequency:  At least 5-7 days/Wk (IRF)


Estimated Hrs Per Day:  1.5 hours per day


Patient and/or Family Agrees t:  Yes





Safety Risks/Education


Patient Education:  Gait Training, Transfer Techniques, Correct Positioning, 

Safety Issues


Teaching Recipient:  Patient


Teaching Methods:  Discussion


Response to Teaching:  Verbalize Understanding





Time/GCodes


Time In:  1000


Time Out:  1030


Total Billed Treatment Time:  30


Total Billed Treatment


visit, EX (15m) & GT (15m)











DENNIS ALAN PTA 2017 12:01 exercises.    -PATIENT EDUCATION: We discussed pain management in a multimodal fashion including physical therapy, medication management, possible future injections.    -FOLLOW UP: Patient will follow up as needed.  Advised to contact clinic if symptoms worsen or change.    Subjective:     Lashawn Ortega is a 76 year old female who presents today for follow-up regarding low back and right posterior thigh and buttock pain.  As well as lateral thigh pain on the right.  Patient notes that she is doing better.  She feels that physical therapy has been helpful.  She has been able to move around more efficiently getting better.  She does have pain across her low back into the right hip area.  She denies any bowel or bladder changes, fevers, chills, unintentional weight loss.  Her pain today is 2/10 is worst is 8/10.  If she sits and bends forward for too long.  She will have pain.  Pain is improved with sitting and laying.  If she stands pain is worse.  Patient notes that she is lost 12 pounds since I saw her last and this has been helpful for her back pain as well.    Treatment to Date: MRI of lumbar spine dated 7/11/2019.  Right L5-S1 transforaminal epidural steroid injection done on 8/12/2019.  Several sessions of in-home physical therapy.  Bilateral L4-5 transforaminal epidural steroid injections done on 8/11/2020.  Right L4-5 transforaminal epidural steroid injection done on 5/7/2021.  Right greater trochanter bursa injection under ultrasound guidance on 6/11/2021.       Patient Active Problem List   Diagnosis     GERD (gastroesophageal reflux disease)     Migraine headache     LBP (low back pain)     Nonsmoker     Menopausal hot flushes     Full code status     NORMAL COLONOSCOPY - 2009 - Average risk     Peripheral neuropathy     Melanoma of right upper arm (H) - On Keytruda in the past - stage IIIA     Ischemic cerebrovascular accident (CVA) of frontal lobe (H) - 2/2020     Aphasia     Lumbar spinal stenosis      Essential tremor     On antineoplastic chemotherapy - PEMBROLIZUMAB - checkpoint inhibitor - in the past for melanoma       Current Outpatient Medications   Medication     aloe vera Gel     aspirin 81 MG EC tablet     CRESTOR 5 MG tablet     cyclobenzaprine (FLEXERIL) 5 MG tablet     fluticasone (FLONASE) 50 MCG/ACT nasal spray     guaiFENesin-dextromethorphan (MUCINEX DM) 600-30 mg Tb12     hydrOXYchloroQUINE (PLAQUENIL) 200 mg tablet     menthol (BIOFREEZE, MENTHOL,) 4 % Gel     naproxen (NAPROSYN) 500 MG tablet     neomycin-bacitracin-polymyxin (NEOSPORIN) ointment     NEURONTIN 300 MG capsule     pantoprazole (PROTONIX) 40 MG tablet     No current facility-administered medications for this visit.       Allergies   Allergen Reactions     Erythromycin Base [Erythromycin] Rash     PARALYSIS, EYE SWELLING, HEADACHE     Codeine Dizziness     Other: extreme weakness in legspt stopped taking medication and sxs were gone within a few hours       Hydrocodone Nausea and Headache     Keytruda [Pembrolizumab] Other (See Comments)     Elevated liver function tests (lfts)      Tramadol Diarrhea, Dizziness, Headache and Nausea and Vomiting     Other Environmental Allergy Rash     mildew     Penicillins Swelling and Rash     Retinol [Vitamin A] Rash     HYDROGEL RETINOL CAPSULE     Shellfish Containing Products [Shellfish-Derived Products] Swelling and Rash     shrimp     Sulfa (Sulfonamide Antibiotics) [Sulfa Drugs] Rash     Burning body rash     Vitamin D3 Complete [External Allergen Needs Reconciliation - See Comment] Rash     Higher dosage makes her break out       Past Medical History:   Diagnosis Date     Aphasia      Asthma      GERD (gastroesophageal reflux disease)      History of transfusion      Lumbar spinal stenosis 2/28/2020    EXAM: MR LUMBAR SPINE W WO CONTRAST LOCATION: [facility] DATE/TIME: 7/11/2019 4:49 PM   INDICATION: Back pain going down the right leg.  Going on for 3 months.  Not improving. See  "above. History of melanoma. COMPARISON: None. CONTRAST: Gadavist 9. TECHNIQUE: Without and with IV contrast   FINDINGS:  Nomenclature is based on 5 lumbar type vertebral bodies. There is no concerning marrow rep     Malignant melanoma of right upper arm (H)      Migraine headache      On antineoplastic chemotherapy     pembrolizumab     PN (peripheral neuropathy)      PONV (postoperative nausea and vomiting)     \"cold sweats\"     S/P colonoscopy      Stroke (H) 02/2020        Review of Systems  ROS:  Specifically negative for bowel/bladder dysfunction, balance changes, headache, dizziness, foot drop, fevers, chills, appetite changes, nausea/vomiting, unexplained weight loss. Otherwise 13 systems reviewed are negative. Please see the patient's intake questionnaire from today for details.    Reviewed Social, Family, Past Medical and Past Surgical history with patient, no significant changes noted since prior visit.     Objective:     BP (!) 146/74   Pulse 92   SpO2 99%     PHYSICAL EXAMINATION:    --CONSTITUTIONAL: Well developed, well nourished, healthy appearing individual.  --PSYCHIATRIC: Appropriate mood and affect. No difficulty interacting due to temper, social withdrawal, or memory issues.  --SKIN: Lumbar region is dry and intact.   --RESPIRATORY: Normal rhythm and effort. No abnormal accessory muscle breathing patterns noted.   --MUSCULOSKELETAL:  Normal lumbar lordosis noted, no lateral shift.  --GROSS MOTOR: Easily arises from a seated position. Gait is non-antalgic  --LUMBAR SPINE:  Inspection reveals no evidence of deformity. Range of motion is mildly limited in lumbar flexion, extension, lateral rotation.  Tenderness palpation over the low back and right lateral thigh area. Straight leg raising in the seated position is negative to radicular pain.   --SACROILIAC JOINT: One Finger point test negative.  --LOWER EXTREMITY MOTOR TESTING:  Plantar flexion left 5/5, right 5/5   Dorsiflexion left 5/5, right " 5/5   Great toe MTP extension left 5/5, right 5/5  Knee flexion left 5/5, right 5/5  Knee extension left 5/5, right 5/5   Hip flexion left 5/5, right 5/5  Hip abduction left 5/5, right 5/5  Hip adduction left 5/5, right 5/5   --NEUROLOGIC: Sensation to light touch is intact in the bilateral L4, L5, and S1 dermatomes.    RESULTS:   Imaging: Lumbar spine imaging was reviewed today.

## 2021-09-13 NOTE — PATIENT INSTRUCTIONS
I recommend that you continue going to physical therapy and doing your exercises at home on a consistent basis.  If another physical therapy order is needed please call our office and let us know.  Happy to put it in for you.    ~Please call Nurse Navigation line [phone] with any questions or concerns about your treatment plan, if symptoms worsen and you would like to be seen urgently, or if you have problems controlling bladder and bowel function.

## 2021-09-20 ENCOUNTER — OFFICE VISIT (OUTPATIENT)
Dept: INTERNAL MEDICINE | Facility: CLINIC | Age: 76
End: 2021-09-20
Payer: COMMERCIAL

## 2021-09-20 VITALS
WEIGHT: 190.5 LBS | HEIGHT: 66 IN | OXYGEN SATURATION: 98 % | HEART RATE: 85 BPM | SYSTOLIC BLOOD PRESSURE: 136 MMHG | DIASTOLIC BLOOD PRESSURE: 80 MMHG | BODY MASS INDEX: 30.62 KG/M2

## 2021-09-20 DIAGNOSIS — M06.00 SERONEGATIVE RHEUMATOID ARTHRITIS (H): ICD-10-CM

## 2021-09-20 DIAGNOSIS — N39.3 FEMALE STRESS INCONTINENCE: ICD-10-CM

## 2021-09-20 DIAGNOSIS — R68.2 DRY MOUTH: ICD-10-CM

## 2021-09-20 DIAGNOSIS — C43.61 MELANOMA OF RIGHT UPPER ARM (H): ICD-10-CM

## 2021-09-20 DIAGNOSIS — I63.9 ISCHEMIC CEREBROVASCULAR ACCIDENT (CVA) OF FRONTAL LOBE (H): ICD-10-CM

## 2021-09-20 DIAGNOSIS — M48.062 SPINAL STENOSIS OF LUMBAR REGION WITH NEUROGENIC CLAUDICATION: ICD-10-CM

## 2021-09-20 DIAGNOSIS — M51.26 LUMBAR DISC HERNIATION: ICD-10-CM

## 2021-09-20 DIAGNOSIS — M06.041 SERONEGATIVE RHEUMATOID ARTHRITIS OF BOTH HANDS (H): ICD-10-CM

## 2021-09-20 DIAGNOSIS — M06.042 SERONEGATIVE RHEUMATOID ARTHRITIS OF BOTH HANDS (H): ICD-10-CM

## 2021-09-20 DIAGNOSIS — M54.16 LUMBAR RADICULITIS: ICD-10-CM

## 2021-09-20 DIAGNOSIS — Z00.00 MEDICARE ANNUAL WELLNESS VISIT, SUBSEQUENT: Primary | ICD-10-CM

## 2021-09-20 LAB
ALBUMIN SERPL-MCNC: 4.2 G/DL (ref 3.5–5)
ALP SERPL-CCNC: 54 U/L (ref 45–120)
ALT SERPL W P-5'-P-CCNC: 18 U/L (ref 0–45)
ANION GAP SERPL CALCULATED.3IONS-SCNC: 12 MMOL/L (ref 5–18)
AST SERPL W P-5'-P-CCNC: 23 U/L (ref 0–40)
BILIRUB SERPL-MCNC: 0.5 MG/DL (ref 0–1)
BUN SERPL-MCNC: 12 MG/DL (ref 8–28)
C REACTIVE PROTEIN LHE: 0.3 MG/DL (ref 0–0.8)
CALCIUM SERPL-MCNC: 9.9 MG/DL (ref 8.5–10.5)
CHLORIDE BLD-SCNC: 108 MMOL/L (ref 98–107)
CHOLEST SERPL-MCNC: 126 MG/DL
CO2 SERPL-SCNC: 25 MMOL/L (ref 22–31)
CREAT SERPL-MCNC: 0.85 MG/DL (ref 0.6–1.1)
ERYTHROCYTE [SEDIMENTATION RATE] IN BLOOD BY WESTERGREN METHOD: 12 MM/HR (ref 0–20)
FASTING STATUS PATIENT QL REPORTED: YES
GFR SERPL CREATININE-BSD FRML MDRD: 67 ML/MIN/1.73M2
GLUCOSE BLD-MCNC: 92 MG/DL (ref 70–125)
HDLC SERPL-MCNC: 60 MG/DL
LDLC SERPL CALC-MCNC: 50 MG/DL
POTASSIUM BLD-SCNC: 4.6 MMOL/L (ref 3.5–5)
PROT SERPL-MCNC: 6.6 G/DL (ref 6–8)
SODIUM SERPL-SCNC: 145 MMOL/L (ref 136–145)
TRIGL SERPL-MCNC: 81 MG/DL

## 2021-09-20 PROCEDURE — 99397 PER PM REEVAL EST PAT 65+ YR: CPT | Mod: 25 | Performed by: INTERNAL MEDICINE

## 2021-09-20 PROCEDURE — 80053 COMPREHEN METABOLIC PANEL: CPT | Performed by: INTERNAL MEDICINE

## 2021-09-20 PROCEDURE — 99214 OFFICE O/P EST MOD 30 MIN: CPT | Mod: 25 | Performed by: INTERNAL MEDICINE

## 2021-09-20 PROCEDURE — 90471 IMMUNIZATION ADMIN: CPT | Performed by: INTERNAL MEDICINE

## 2021-09-20 PROCEDURE — 86141 C-REACTIVE PROTEIN HS: CPT | Performed by: INTERNAL MEDICINE

## 2021-09-20 PROCEDURE — 80061 LIPID PANEL: CPT | Performed by: INTERNAL MEDICINE

## 2021-09-20 PROCEDURE — 90662 IIV NO PRSV INCREASED AG IM: CPT | Performed by: INTERNAL MEDICINE

## 2021-09-20 PROCEDURE — 36415 COLL VENOUS BLD VENIPUNCTURE: CPT | Performed by: INTERNAL MEDICINE

## 2021-09-20 PROCEDURE — 86235 NUCLEAR ANTIGEN ANTIBODY: CPT | Performed by: INTERNAL MEDICINE

## 2021-09-20 PROCEDURE — 85652 RBC SED RATE AUTOMATED: CPT | Performed by: INTERNAL MEDICINE

## 2021-09-20 RX ORDER — GABAPENTIN 300 MG/1
900 CAPSULE ORAL 3 TIMES DAILY
Qty: 810 CAPSULE | Refills: 3 | Status: SHIPPED | OUTPATIENT
Start: 2021-09-20 | End: 2022-08-15

## 2021-09-20 ASSESSMENT — PATIENT HEALTH QUESTIONNAIRE - PHQ9
10. IF YOU CHECKED OFF ANY PROBLEMS, HOW DIFFICULT HAVE THESE PROBLEMS MADE IT FOR YOU TO DO YOUR WORK, TAKE CARE OF THINGS AT HOME, OR GET ALONG WITH OTHER PEOPLE: NOT DIFFICULT AT ALL
SUM OF ALL RESPONSES TO PHQ QUESTIONS 1-9: 1
SUM OF ALL RESPONSES TO PHQ QUESTIONS 1-9: 1

## 2021-09-20 ASSESSMENT — MIFFLIN-ST. JEOR: SCORE: 1366.88

## 2021-09-20 ASSESSMENT — ACTIVITIES OF DAILY LIVING (ADL): CURRENT_FUNCTION: TRANSPORTATION REQUIRES ASSISTANCE

## 2021-09-20 NOTE — PROGRESS NOTES
"Answers for HPI/ROS submitted by the patient on 9/20/2021  If you checked off any problems, how difficult have these problems made it for you to do your work, take care of things at home, or get along with other people?: Not difficult at all  In general, how would you rate your overall physical health?: good  Frequency of exercise:: 4-5 days/week  Do you usually eat at least 4 servings of fruit and vegetables a day, include whole grains & fiber, and avoid regularly eating high fat or \"junk\" foods? : No  Medication side effects:: Other  Activities of Daily Living: transportation requires assistance  Home safety: no safety concerns identified  Hearing Impairment:: difficulty understanding soft or whispered speech  In the past 6 months, have you been bothered by leaking of urine?: No  In general, how would you rate your overall mental or emotional health?: good  Additional concerns today:: Yes  Duration of exercise:: 15-30 minutes        HPI  Do you feel safe in your environment? Yes  Fall risk  Fallen 2 or more times in the past year?: (P) No  Any fall with injury in the past year?: (P) No    Cognitive Screening   1) Repeat 3 items (Leader, Season, Table)    2) Clock draw: NORMAL  3) 3 item recall: Recalls 3 objects  Results: 3 items recalled: COGNITIVE IMPAIRMENT LESS LIKELY    Mini-CogTM Copyright CALIN Mccain. Licensed by the author for use in [facility]; reprinted with permission ([email or web address]). All rights reserved.    "

## 2021-09-20 NOTE — PATIENT INSTRUCTIONS
Preventive Health Recommendations    See your health care provider every year (or as recommended) to:    Review health changes.     Discuss preventive care.      Review your medicines and supplements.    Consider having a mammogram at least every 2 years between the ages of 50 and 75 years old.  Yearly if desired.    Consider colon cancer screening between the ages of 50 and 75 years old if necessary.      Cholesterol and diabetes testing as necessary.    At age 65, consider haveing a bone density scan (DEXA) to check for osteoporosis.    Shots:    COVID vaccination if you have not had it yet.    Get a flu shot each year.    Get a tetanus shot every 10 years if you are under the age of 80 years old.    Talk to your doctor about a one time pneumonia vaccine that is recommended at the age of 65 years old.    Talk to your pharmacist about the shingles vaccine.  This is often better covered in the pharmacy through your insurance than if you have it in the clinic.    Lifestyle    Exercise at least 150 minutes a week (30 minutes a day, 5 days a week) if you are able. This will help you control your weight and prevent disease.    Limit alcohol to one drink per day.    No smoking.     Wear sunscreen to prevent skin cancer.     See your dentist twice a year for an exam and cleaning.    See your eye doctor every 1 to 2 years to screen for conditions such as glaucoma, macular degeneration and cataracts.    Personalized Prevention Plan  You are due for the preventive services outlined below.  Your care team is available to assist you in scheduling these services.  If you have already completed any of these items, please share that information with your care team to update in your medical record.    Health Maintenance Due   Topic Date Due     ANNUAL REVIEW OF HM ORDERS  Never done     ZOSTER IMMUNIZATION (2 of 3) 03/29/2014           Patient Education     Treating Incontinence in Women: Nonsurgical Methods     The best treatment  for you will depend on the type of incontinence you have. Your symptoms, age, and any underlying problems that are found also affect your treatment. Some types of incontinence may over time require surgery. But nonsurgical treatments may be effective in many cases. Nonsurgical treatments include lifestyle changes, muscle-strengthening exercises, and medicines.   Nonsurgical treatments  Treatment for stress urinary incontinence includes:     Bladder training    Lifestyle changes such as weight loss and increased activity if incontinence is due to being overweight    Medicines, if bladder training has not helped    Pelvic floor muscle exercises  Lifestyle changes    Losing weight. Excess weight puts extra pressure on the pelvic floor muscles. Exercising and eating right can help you lose weight. This helps other treatments work better.    Making certain diet changes. Some foods may make you need to urinate more, so it may be good not to have them. These include caffeinated drinks and alcohol. Ask your healthcare provider if these or other diet changes might be helpful.    Quitting smoking. Smoking can lead to a long-term (chronic) cough that strains pelvic floor muscles. Smoking may also damage the bladder and urethra.    Pelvic floor muscle exercises  There are exercises you can do to help strengthen your pelvic floor muscles. The pelvic floor muscles act as a sling to help hold the bladder and urethra in place. These muscles also help keep the urethra closed. Weak pelvic floor muscles may allow urine to leak. To strengthen the pelvic floor muscles, do the exercises daily. In a few months, the muscles will be stronger and tighter. This can help prevent urine leakage.   Abhinav last reviewed this educational content on 9/1/2019 2000-2021 [employer]. All rights reserved. This information is not intended as a substitute for professional medical care. Always follow your healthcare professional's  instructions.

## 2021-09-20 NOTE — PROGRESS NOTES
[facility] - Primary [facility] Specialists    Comprehensive and complex medical care - Chronic disease management - Shared decision making - Care coordination - Compassionate care    Patient advocacy - Rational deprescribing - Minimally disruptive medicine - Ethical focus - Customized care         Date of Service: 9/20/2021  Primary Provider: Renzo Newell    Patient Care Team:  Renzo Newell MD as PCP - General  Renzo Newell MD as Assigned PCP  Lynn Christina MD as Assigned Surgical Provider  Shoaib Gloria MD as MD (Hematology & Oncology)  Carlos Alberto Worthington DO as MD (Pain Medicine)  Bj Orantes MD as Resident (Dermatology)  Sadi Castano MD          Patient's Pharmacy:    [facility] - [place], [place] - [address] [employer] [facility]  [address] [address]  Phone: [phone] Fax: [phone]     Patient's Contacts:  Name Home Phone Work Phone Mobile Phone Relationship Lgl Grd   JOYDEYSI [phone] Relative No   CAMPBELL BERRIOS [phone]   Relative No     Patient's Insurance:    Payor: SHERPA assistant / Plan: [employer]AGE / Product Type: HMO /      Subjective:     History of present illness:    Lashawn Ortega is an 76 year old female here for an annual wellness visit.    The issues she would like to address at today's visit include the following:    Chief Complaint   Patient presents with     Annual Visit     dry mouth     Counseling     about study for heart from [facility] booster shot     urine leaking     Refill Request     can get 3 month supply of gabapentin     Wellness Visit       In for follow up multiple issues.    Reviewed her chronic right hip pain and back pain radiating to the right leg.  This still bothers, but not as bad as in the past.  Has followed up with the spine clinic.  Needs refills on the gabapentin (Neurontin) for this.  No worsening lately.    Reviewed her melanoma and  follow up with oncology is planned.  Doing well so far.    Reviewed possible study related to her cerebrovascular accident (stroke).    Essential tremor reviewed and she feels it is worse.  We reviewed possible treatment for this.    Having more issues with dry mouth.  Trying BIOTENE for this.  Reviewed medications which could contribute to this.      SNRA is about the same and doing well with the hydroxychloroquine (Plaquenil).  No active severe joint swelling but does have some issues with the hands and knees and some stiffness in the am.    Has some urine leakage with stress like movement or cough, etc.  Reviewed Kegel's and other options for this.    Reviewed reflux and there are no issues of concern.     Balance a bit worse over time.    We reviewed her other issues noted in the assessment but not specifically addressed in the HPI above.           Active Problem List:  Problem List as of 9/20/2021 Reviewed: 9/20/2021 10:00 AM by Renzo Newell MD       High    Nonsmoker    Full code status    Ischemic cerebrovascular accident (CVA) of frontal lobe (H) - 2/2020    Melanoma of right upper arm (H) - On Keytruda in the past - stage IIIA       Medium    LBP (low back pain)    Lumbar spinal stenosis    Essential tremor    Seronegative rheumatoid arthritis of both hands (H)       Low    GERD (gastroesophageal reflux disease)    Migraine headache    Menopausal hot flushes    NORMAL COLONOSCOPY - 2009 - Average risk    Peripheral neuropathy    Aphasia    On antineoplastic chemotherapy - PEMBROLIZUMAB - checkpoint inhibitor - in the past for melanoma    Female stress incontinence           Past Medical History:   Diagnosis Date     Aphasia      Asthma      GERD (gastroesophageal reflux disease)      History of transfusion      Lumbar spinal stenosis 2/28/2020    EXAM: MR LUMBAR SPINE W WO CONTRAST LOCATION: [facility] DATE/TIME: 7/11/2019 4:49 PM   INDICATION: Back pain going down the right leg.  Going on for 3  "months.  Not improving. See above. History of melanoma. COMPARISON: None. CONTRAST: Gadavist 9. TECHNIQUE: Without and with IV contrast   FINDINGS:  Nomenclature is based on 5 lumbar type vertebral bodies. There is no concerning marrow rep     Malignant melanoma of right upper arm (H)      Migraine headache      On antineoplastic chemotherapy     pembrolizumab     PN (peripheral neuropathy)      PONV (postoperative nausea and vomiting)     \"cold sweats\"     S/P colonoscopy      Stroke (H) 02/2020      Past Surgical History:   Procedure Laterality Date     HYSTERECTOMY  1988     IR LUMBAR EPIDURAL STEROID INJECTION  8/12/2019     IR LUMBAR EPIDURAL STEROID INJECTION  8/12/2019     LAPAROSCOPIC CHOLECYSTECTOMY N/A 7/14/2020    Procedure: CHOLECYSTECTOMY, LAPAROSCOPIC;  Surgeon: Lynn Christina MD;  Location: [facility];  Service: General     AR ERCP DX COLLECTION SPECIMEN BRUSHING/WASHING N/A 6/19/2020    Procedure: ENDOSCOPIC RETROGRADE CHOLANGIO-PANCREATOGRAPHY WITH SPHINCTEROTOMY AND STONE EXTRACTION;  Surgeon: Daniel Das MD;  Location: [facility] OR;  Service: Gastroenterology     AR ERCP DX COLLECTION SPECIMEN BRUSHING/WASHING N/A 7/15/2020    Procedure: ENDOSCOPIC RETROGRADE CHOLANGIOPANCREATOGRAPHY;  Surgeon: Daniel Das MD;  Location: [facility];  Service: Gastroenterology     XR ERCP BILIARY ONLY  6/19/2020     XR ERCP BILIARY ONLY  7/15/2020      Family History   Problem Relation Age of Onset     Pancreatic Cancer Mother 82.00     Melanoma Father 75.00        Metastatic to colon      Family history is otherwise noncontributory.    Social History     Occupational History     Not on file   Tobacco Use     Smoking status: Former Smoker     Packs/day: 0.00     Smokeless tobacco: Never Used     Tobacco comment: 70's   Substance and Sexual Activity     Alcohol use: Yes     Alcohol/week: 0.0 - 1.0 standard drinks     Comment: Alcoholic Drinks/day: occ     Drug use: Never     Sexual " activity: Not on file      Social History     Social History Narrative    , no children.  Has a niece involved in her care.            Patient of Dr. Newell since 2016.              had severe dementia and passed away in 2019.  Milt.      Current Outpatient Medications   Medication Instructions     aloe vera Gel 1 Application, 2 TIMES DAILY PRN     aspirin (ASA) 81 mg, Oral, DAILY     CRESTOR 5 MG tablet TAKE 1 TABLET DAILY     cyclobenzaprine (FLEXERIL) 5 mg, Oral, 3 TIMES DAILY PRN     fluticasone (FLONASE) 50 MCG/ACT nasal spray 1-2 sprays, Both Nostrils, DAILY PRN     gabapentin (NEURONTIN) 900 mg, Oral, 3 TIMES DAILY     guaiFENesin-dextromethorphan (MUCINEX DM) 600-30 mg Tb12 1 tablet, 2 TIMES DAILY PRN     hydroxychloroquine (PLAQUENIL) 200 mg, Oral, 2 TIMES DAILY     menthol (BIOFREEZE, MENTHOL,) 4 % Gel 1 Application, DAILY PRN     naproxen (NAPROSYN) 500 MG tablet [NAPROXEN (NAPROSYN) 500 MG TABLET] TAKE ONE TABLET BY MOUTH TWICE A DAY AS NEEDED     neomycin-bacitracin-polymyxin (NEOSPORIN) ointment 1 Application, 2 TIMES DAILY PRN     pantoprazole (PROTONIX) 80 mg, Oral, DAILY      Allergies: Erythromycin base [erythromycin], Codeine, Hydrocodone, Keytruda [pembrolizumab], Tramadol, Other environmental allergy, Penicillins, Shellfish containing products [shellfish-derived products], Sulfa (sulfonamide antibiotics) [sulfa drugs], and Vitamin d3 complete [external allergen needs reconciliation - see comment]     Immunization History   Administered Date(s) Administered     COVID-19,PF,Pfizer 02/24/2021, 03/17/2021     DT (PEDS <7y) 03/27/1998     HepA-Adult 05/14/2009, 05/19/2010     Influenza, Quad, High Dose, Pf, 65yr+ (Fluzone HD) 09/18/2020, 09/20/2021     Pneumo Conj 13-V (2010&after) 05/19/2015     Pneumococcal 23 valent 05/19/2010     Td (Adult), Adsorbed 05/14/2009     Tdap (Adacel,Boostrix) 05/19/2015     Zoster vaccine, live 02/01/2014      Objective:     Wt Readings from Last 3  "Encounters:   09/20/21 86.4 kg (190 lb 8 oz)   04/06/21 90.9 kg (200 lb 4.8 oz)   03/09/21 88.1 kg (194 lb 3.2 oz)     BP Readings from Last 3 Encounters:   09/20/21 136/80   09/13/21 (!) 146/74   04/06/21 124/68     Vision Screening:  No exam data present     PHYSICAL EXAM  /80   Pulse 85   Ht 1.67 m (5' 5.75\")   Wt 86.4 kg (190 lb 8 oz)   SpO2 98%   BMI 30.98 kg/m     The patient is comfortable, no acute distress.  Mood good.  Insight is good.  No skin lesions or nodules of concern.  Ears clear.  Eyes are nonicteric.  Pupils equal and reactive.  Throat is clear.  Neck is supple without mass, no thyromegaly. No cervical or epitrochlear adenopathy.  No axillary lymph nodes. Heart regular rate and rhythm.  Lungs clear to auscultation bilaterally.  Respiratory effort good.  Abdomen soft and nontender.  No hepatosplenomegaly.  Extremities show no edema. No active synovitis noted today in the hands, wrists and knees.  Gait slightly slow due to pain.     Diagnostics:     Office Visit - [facility] on 04/06/2021   Component Date Value Ref Range Status     Color Urine 04/06/2021 Yellow  Colorless, Yellow, Straw, Light Yellow Final     Appearance Urine 04/06/2021 Clear  Clear Final     Glucose Urine 04/06/2021 Negative  Negative Final     Protein Albumin Urine 04/06/2021 Negative  Negative Final     Bilirubin Urine 04/06/2021 Negative  Negative Final     Urobilinogen Urine 04/06/2021 1.0 E.U./dL  0.2 E.U./dL, 1.0 E.U./dL Final     pH Urine 04/06/2021 7.0  5.0 - 8.0 Final     Blood Urine 04/06/2021 Negative  Negative Final     Ketones Urine 04/06/2021 Negative  Negative Final     Nitrite Urine 04/06/2021 Negative  Negative Final     Leukocyte Esterase Urine 04/06/2021 Negative  Negative Final     Specific Gravity Urine 04/06/2021 1.020  1.005 - 1.030 Final       Results for orders placed or performed in visit on 09/20/21   Comprehensive metabolic panel     Status: Abnormal   Result Value Ref Range    Sodium 145 136 " - 145 mmol/L    Potassium 4.6 3.5 - 5.0 mmol/L    Chloride 108 (H) 98 - 107 mmol/L    Carbon Dioxide (CO2) 25 22 - 31 mmol/L    Anion Gap 12 5 - 18 mmol/L    Urea Nitrogen 12 8 - 28 mg/dL    Creatinine 0.85 0.60 - 1.10 mg/dL    Calcium 9.9 8.5 - 10.5 mg/dL    Glucose 92 70 - 125 mg/dL    Alkaline Phosphatase 54 45 - 120 U/L    AST 23 0 - 40 U/L    ALT 18 0 - 45 U/L    Protein Total 6.6 6.0 - 8.0 g/dL    Albumin 4.2 3.5 - 5.0 g/dL    Bilirubin Total 0.5 0.0 - 1.0 mg/dL    GFR Estimate 67 >60 mL/min/1.73m2   Lipid panel reflex to direct LDL Fasting     Status: None   Result Value Ref Range    Cholesterol 126 <=199 mg/dL    Triglycerides 81 <=149 mg/dL    Direct Measure HDL 60 >=50 mg/dL    LDL Cholesterol Calculated 50 <=129 mg/dL    Patient Fasting > 8hrs? Yes    CRP inflammation     Status: Normal   Result Value Ref Range    CRP 0.3 0.0-<0.8 mg/dL   Erythrocyte sedimentation rate auto     Status: Normal   Result Value Ref Range    Erythrocyte Sedimentation Rate 12 0 - 20 mm/hr       Assessment:     1. Medicare annual wellness visit, subsequent    2. Spinal stenosis of lumbar region with neurogenic claudication    3. Lumbar disc herniation    4. Lumbar radiculitis    5. Ischemic cerebrovascular accident (CVA) of frontal lobe (H) - 2/2020    6. Dry mouth    7. Seronegative rheumatoid arthritis (H)    8. Melanoma of right upper arm (H)    9. Female stress incontinence    10. Seronegative rheumatoid arthritis of both hands (H)         Plan:     1. Check blood work today.     2. Check for Sjogren's syndrome.  3. Refilled gabapentin (Neurontin).  4. Flu shot done today   5. Follow up specialists   6. Review treatment for SNRA based on tests and progress over time.  7. Continue current medications  8. Follow up sooner if issues.      A personalized health plan based on the identified health risks was provided to the patient on the AVS.       Renzo Newell MD  General Internal Medicine  [facility]  Clinic      Return in about 6 months (around 3/20/2022), or if symptoms worsen or fail to improve, for follow up visit.     Future Appointments   Date Time Provider Department Center   10/7/2021 12:00 PM Rasheeda Villela, PT [facility]   [facility]20[facility]   10/14/2021 12:00 PM Rasheeda Villela, PT [facility]   [facility]15[facility]2021 [facility]   10/19/2021 12:00 PM Rasheeda Villela, PT [facility]         Wt Readings from Last 20 Encounters:   09/20/21 86.4 kg (190 lb 8 oz)   04/06/21 90.9 kg (200 lb 4.8 oz)   03/09/21 88.1 kg (194 lb 3.2 oz)   02/05/21 90.7 kg (200 lb)   01/21/21 90 kg (198 lb 6.4 oz)   09/18/20 87.7 kg (193 lb 4.8 oz)   07/28/20 84.8 kg (187 lb)   07/15/20 88 kg (194 lb)   07/15/20 88 kg (194 lb)   07/09/20 85.3 kg (188 lb)   07/09/20 85.3 kg (188 lb)   06/26/20 87.5 kg (193 lb)   06/18/20 87.5 kg (193 lb)   06/16/20 87.5 kg (193 lb)   05/29/20 87.1 kg (192 lb)   05/21/20 87.1 kg (192 lb)   02/27/20 88 kg (194 lb)   09/17/19 84.8 kg (187 lb)   07/30/19 84.4 kg (186 lb)   07/01/19 84.4 kg (186 lb)     BP Readings from Last 20 Encounters:   09/20/21 136/80   09/13/21 (!) 146/74   04/06/21 124/68   03/09/21 132/70   02/05/21 130/70   01/21/21 132/80   09/18/20 126/62   09/11/20 125/70   08/17/20 128/84   08/14/20 106/77   07/28/20 124/76   06/26/20 122/74   06/16/20 122/60   05/29/20 128/64   05/21/20 126/64   04/30/20 122/80   04/21/20 133/80   04/17/20 133/80   04/13/20 121/76   04/04/20 138/80      Pulse Readings from Last 20 Encounters:   09/20/21 85   09/13/21 92   04/06/21 85   03/09/21 88   02/05/21 92   01/21/21 95   09/18/20 82   09/11/20 73   08/17/20 77   08/14/20 88   07/28/20 90   06/26/20 83   06/16/20 70   05/29/20 80   05/21/20 80   04/30/20 80   04/21/20 92   04/17/20 92   04/13/20 73   04/04/20 71     SpO2 Readings from Last 20 Encounters:   09/20/21 98%   09/13/21 99%   04/06/21 98%   03/09/21 97%   02/05/21 99%   01/21/21 98%    09/18/20 98%   09/11/20 99%   08/17/20 98%   08/14/20 97%   07/28/20 97%   06/26/20 98%   05/29/20 96%   05/21/20 96%   04/30/20 98%   04/21/20 96%   04/17/20 97%   04/13/20 98%   04/04/20 95%   04/01/20 95%

## 2021-09-21 ASSESSMENT — PATIENT HEALTH QUESTIONNAIRE - PHQ9: SUM OF ALL RESPONSES TO PHQ QUESTIONS 1-9: 1

## 2021-09-22 LAB
ENA SS-A AB SER IA-ACNC: <0.5 U/ML
ENA SS-A AB SER IA-ACNC: NEGATIVE
ENA SS-B IGG SER IA-ACNC: <0.6 U/ML
ENA SS-B IGG SER IA-ACNC: NEGATIVE

## 2021-10-07 ENCOUNTER — HOSPITAL ENCOUNTER (OUTPATIENT)
Dept: PHYSICAL THERAPY | Facility: REHABILITATION | Age: 76
End: 2021-10-07
Payer: COMMERCIAL

## 2021-10-07 DIAGNOSIS — G89.29 CHRONIC BILATERAL LOW BACK PAIN WITHOUT SCIATICA: Primary | ICD-10-CM

## 2021-10-07 DIAGNOSIS — M25.551 RIGHT HIP PAIN: ICD-10-CM

## 2021-10-07 DIAGNOSIS — M25.561 ACUTE PAIN OF RIGHT KNEE: ICD-10-CM

## 2021-10-07 DIAGNOSIS — M54.50 CHRONIC BILATERAL LOW BACK PAIN WITHOUT SCIATICA: Primary | ICD-10-CM

## 2021-10-07 PROCEDURE — 97140 MANUAL THERAPY 1/> REGIONS: CPT | Mod: GP | Performed by: PHYSICAL THERAPIST

## 2021-10-07 PROCEDURE — 97110 THERAPEUTIC EXERCISES: CPT | Mod: GP | Performed by: PHYSICAL THERAPIST

## 2021-10-08 NOTE — PROGRESS NOTES
OUTPATIENT PHYSICAL THERAPY  PLAN OF TREATMENT FOR OUTPATIENT REHABILITATION AND PROGRESS NOTE           Patient's Last Name, First Name, Lashawn Arnett Date of Birth  1945   Provider's Name  [facility] Medical Record No.  [id number]    Onset Date  7/1/21 Start of Care Date  7/7/21   Type:     _X_PT   ___OT   ___SLP Medical Diagnosis  1. Chronic bilateral low back pain without sciatica  M54.5       G89.29     2. Right hip pain  M25.551     3. Acute pain of right knee  M25.561         PT Diagnosis  Chronic low back pain without sciatica  Plan of Treatment  Frequency/Duration: 1 per week to every other week  Certification date from 10/7/2021 to 12/30/21     Goals:  Goal Identifier Self management    Goal Description Pt. will demonstrate/verbalize independence in self-management of condition in : 12 weeks   Target Date 09/30/21   Date Met      Progress (detail required for progress note): progressing towards      Goal Identifier HEP    Goal Description Pt. will be independent with home exercise program in : 12 weeks   Target Date 09/30/21   Date Met      Progress (detail required for progress note): progressing towards      Goal Identifier Walk   Goal Description Pt. will be able to walk : 30 minutes;20 minutes;with less difficulty;for household mobility;for community mobility;for exercise/recreation;in 12 weeks   Target Date 09/30/21   Date Met      Progress (detail required for progress note): walking about 25 min wtih increased ease with this with the walker.      Goal Identifier Stand   Goal Description Patient will stand : 30 minutes;with less pain;with less difficultty;for home chores;in 12 weeks   Target Date 09/30/21   Date Met      Progress (detail required for progress note): standing >15 min for dishes is better but not able to stand up to 30 min yet        Beginning/End Dates of Progress Note Reporting Period:  7/7/21  to 10/7/2021    Progress Toward  Goals:   Progress this reporting period: see above     Client Self (Subjective) Report for Progress Note Reporting Period: she reports she has been havign a harder time the last 2 weeks and not sure what changed. has been trying stay active.     Outcome Measures (Most Recent Score):      Objective Measurements:   Objective Measure: Pelvic alignement   Details: in supine -minimal lower R medial mallleolus and  higher ASIS indicating a pelvic rotation - corrected with MET  Objective Measure: palpation  Details: tenderness to piriformis, greater trochanter, and TLF/ITB on right hip in SL position - tigtness in piriformis noted as well           I CERTIFY THE NEED FOR THESE SERVICES FURNISHED UNDER        THIS PLAN OF TREATMENT AND WHILE UNDER MY CARE     (Physician co-signature of this document indicates review and certification of the therapy plan).                Referring Provider: Dr Elin Villela, PT

## 2021-10-11 ENCOUNTER — HOSPITAL ENCOUNTER (OUTPATIENT)
Dept: CT IMAGING | Facility: HOSPITAL | Age: 76
Discharge: HOME | End: 2021-10-11
Attending: INTERNAL MEDICINE | Admitting: INTERNAL MEDICINE
Payer: COMMERCIAL

## 2021-10-11 DIAGNOSIS — C43.61 MALIGNANT MELANOMA OF RIGHT UPPER EXTREMITY INCLUDING SHOULDER (H): ICD-10-CM

## 2021-10-11 PROCEDURE — 250N000011 HC RX IP 250 OP 636: Performed by: INTERNAL MEDICINE

## 2021-10-11 PROCEDURE — 71260 CT THORAX DX C+: CPT

## 2021-10-11 RX ORDER — IOPAMIDOL 755 MG/ML
100 INJECTION, SOLUTION INTRAVASCULAR ONCE
Status: COMPLETED | OUTPATIENT
Start: 2021-10-11 | End: 2021-10-11

## 2021-10-11 RX ADMIN — IOPAMIDOL 100 ML: 755 INJECTION, SOLUTION INTRAVENOUS at 11:10

## 2021-10-15 ENCOUNTER — ANCILLARY PROCEDURE (OUTPATIENT)
Dept: MAMMOGRAPHY | Facility: CLINIC | Age: 76
End: 2021-10-15
Attending: INTERNAL MEDICINE
Payer: COMMERCIAL

## 2021-10-15 DIAGNOSIS — Z12.31 VISIT FOR SCREENING MAMMOGRAM: ICD-10-CM

## 2021-10-15 PROCEDURE — 77063 BREAST TOMOSYNTHESIS BI: CPT

## 2021-10-21 ENCOUNTER — IMMUNIZATION (OUTPATIENT)
Dept: NURSING | Facility: CLINIC | Age: 76
End: 2021-10-21
Payer: COMMERCIAL

## 2021-10-21 PROCEDURE — 0004A PR COVID VAC PFIZER DIL RECON 30 MCG/0.3 ML IM: CPT

## 2021-10-21 PROCEDURE — 91300 PR COVID VAC PFIZER DIL RECON 30 MCG/0.3 ML IM: CPT

## 2021-11-10 ENCOUNTER — HOSPITAL ENCOUNTER (OUTPATIENT)
Dept: PHYSICAL THERAPY | Facility: REHABILITATION | Age: 76
End: 2021-11-10
Payer: COMMERCIAL

## 2021-11-10 DIAGNOSIS — M25.561 ACUTE PAIN OF RIGHT KNEE: ICD-10-CM

## 2021-11-10 DIAGNOSIS — G89.29 CHRONIC BILATERAL LOW BACK PAIN WITHOUT SCIATICA: Primary | ICD-10-CM

## 2021-11-10 DIAGNOSIS — M54.50 CHRONIC BILATERAL LOW BACK PAIN WITHOUT SCIATICA: Primary | ICD-10-CM

## 2021-11-10 DIAGNOSIS — M25.551 RIGHT HIP PAIN: ICD-10-CM

## 2021-11-10 PROCEDURE — 97140 MANUAL THERAPY 1/> REGIONS: CPT | Mod: GP | Performed by: PHYSICAL THERAPIST

## 2021-11-10 PROCEDURE — 97110 THERAPEUTIC EXERCISES: CPT | Mod: GP | Performed by: PHYSICAL THERAPIST

## 2021-11-17 ENCOUNTER — HOSPITAL ENCOUNTER (OUTPATIENT)
Dept: PHYSICAL THERAPY | Facility: REHABILITATION | Age: 76
End: 2021-11-17
Payer: COMMERCIAL

## 2021-11-17 DIAGNOSIS — M54.50 CHRONIC BILATERAL LOW BACK PAIN WITHOUT SCIATICA: Primary | ICD-10-CM

## 2021-11-17 DIAGNOSIS — M25.561 ACUTE PAIN OF RIGHT KNEE: ICD-10-CM

## 2021-11-17 DIAGNOSIS — G89.29 CHRONIC BILATERAL LOW BACK PAIN WITHOUT SCIATICA: Primary | ICD-10-CM

## 2021-11-17 DIAGNOSIS — M25.551 RIGHT HIP PAIN: ICD-10-CM

## 2021-11-17 PROCEDURE — 97140 MANUAL THERAPY 1/> REGIONS: CPT | Mod: GP | Performed by: PHYSICAL THERAPIST

## 2021-11-17 PROCEDURE — 97110 THERAPEUTIC EXERCISES: CPT | Mod: GP | Performed by: PHYSICAL THERAPIST

## 2021-12-01 ENCOUNTER — MYC MEDICAL ADVICE (OUTPATIENT)
Dept: INTERNAL MEDICINE | Facility: CLINIC | Age: 76
End: 2021-12-01
Payer: COMMERCIAL

## 2021-12-01 DIAGNOSIS — R05.9 COUGH: Primary | ICD-10-CM

## 2021-12-03 ENCOUNTER — LAB (OUTPATIENT)
Dept: FAMILY MEDICINE | Facility: CLINIC | Age: 76
End: 2021-12-03
Attending: INTERNAL MEDICINE
Payer: COMMERCIAL

## 2021-12-03 DIAGNOSIS — R05.9 COUGH: ICD-10-CM

## 2021-12-03 PROCEDURE — U0003 INFECTIOUS AGENT DETECTION BY NUCLEIC ACID (DNA OR RNA); SEVERE ACUTE RESPIRATORY SYNDROME CORONAVIRUS 2 (SARS-COV-2) (CORONAVIRUS DISEASE [COVID-19]), AMPLIFIED PROBE TECHNIQUE, MAKING USE OF HIGH THROUGHPUT TECHNOLOGIES AS DESCRIBED BY CMS-2020-01-R: HCPCS

## 2021-12-03 PROCEDURE — U0005 INFEC AGEN DETEC AMPLI PROBE: HCPCS

## 2021-12-04 LAB — SARS-COV-2 RNA RESP QL NAA+PROBE: NEGATIVE

## 2021-12-10 ENCOUNTER — HOSPITAL ENCOUNTER (OUTPATIENT)
Dept: PHYSICAL THERAPY | Facility: REHABILITATION | Age: 76
End: 2021-12-10
Payer: COMMERCIAL

## 2021-12-10 DIAGNOSIS — G89.29 CHRONIC BILATERAL LOW BACK PAIN WITHOUT SCIATICA: Primary | ICD-10-CM

## 2021-12-10 DIAGNOSIS — M54.50 CHRONIC BILATERAL LOW BACK PAIN WITHOUT SCIATICA: Primary | ICD-10-CM

## 2021-12-10 DIAGNOSIS — M25.551 RIGHT HIP PAIN: ICD-10-CM

## 2021-12-10 DIAGNOSIS — M25.561 ACUTE PAIN OF RIGHT KNEE: ICD-10-CM

## 2021-12-10 PROCEDURE — 97110 THERAPEUTIC EXERCISES: CPT | Mod: GP | Performed by: PHYSICAL THERAPIST

## 2021-12-10 PROCEDURE — 97140 MANUAL THERAPY 1/> REGIONS: CPT | Mod: GP | Performed by: PHYSICAL THERAPIST

## 2021-12-17 ENCOUNTER — HOSPITAL ENCOUNTER (OUTPATIENT)
Dept: PHYSICAL THERAPY | Facility: REHABILITATION | Age: 76
End: 2021-12-17
Payer: COMMERCIAL

## 2021-12-17 DIAGNOSIS — M25.561 ACUTE PAIN OF RIGHT KNEE: ICD-10-CM

## 2021-12-17 DIAGNOSIS — M25.551 RIGHT HIP PAIN: ICD-10-CM

## 2021-12-17 DIAGNOSIS — M54.50 CHRONIC BILATERAL LOW BACK PAIN WITHOUT SCIATICA: Primary | ICD-10-CM

## 2021-12-17 DIAGNOSIS — G89.29 CHRONIC BILATERAL LOW BACK PAIN WITHOUT SCIATICA: Primary | ICD-10-CM

## 2021-12-17 PROCEDURE — 97110 THERAPEUTIC EXERCISES: CPT | Mod: GP | Performed by: PHYSICAL THERAPIST

## 2021-12-17 PROCEDURE — 97140 MANUAL THERAPY 1/> REGIONS: CPT | Mod: GP | Performed by: PHYSICAL THERAPIST

## 2021-12-17 NOTE — PROGRESS NOTES
[facility]    Outpatient Physical Therapy Discharge Note  Patient: Lashawn Ortega  : 1945    Beginning/End Dates of Reporting Period:  Evaluation  to 2021    Referring Provider: Dr Worthington    Therapy Diagnosis: right hip pain      Client Self Report: Patient reports she has been doing well until she spent a long day wrapping presents leaning over the table and/or on the floor. Patient reports she is stiff today. She is a little down as she broke up with her boyfriend.     Objective Measurements:  Objective Measure: Pelvic alignement   Details: in supine - lower R medial mallleolus and  higher ASIS indicating a pelvic rotation - nearly corrected with MET  Objective Measure: palpation  Details: tight and painful L > R psoas and with manual release the correction to pelvic rotation improved       Goals:  Goal Identifier Self management    Goal Description Pt. will demonstrate/verbalize independence in self-management of condition in : 12 weeks   Target Date 21   Date Met  21   Progress (detail required for progress note): MET      Goal Identifier HEP    Goal Description Pt. will be independent with home exercise program in : 12 weeks   Target Date 21   Date Met  21   Progress (detail required for progress note): MET      Goal Identifier Walk   Goal Description Pt. will be able to walk : 30 minutes;20 minutes;with less difficulty;for household mobility;for community mobility;for exercise/recreation;in 12 weeks   Target Date 21   Date Met  21   Progress (detail required for progress note): MET      Goal Identifier Stand   Goal Description Patient will stand : 30 minutes;with less pain;with less difficultty;for home chores;in 12 weeks   Target Date 21   Date Met  21   Progress (detail required for progress note): MET      Plan:  Discharge from  therapy.    Discharge:    Reason for Discharge: Patient has met all goals.    Equipment Issued: HEP     Discharge Plan: Patient to continue home program.    Rasheeda Villela PT, DPT, ERNESTINE-KELLY

## 2021-12-20 ENCOUNTER — MYC MEDICAL ADVICE (OUTPATIENT)
Dept: INTERNAL MEDICINE | Facility: CLINIC | Age: 76
End: 2021-12-20
Payer: COMMERCIAL

## 2021-12-20 DIAGNOSIS — M51.26 LUMBAR DISC HERNIATION: ICD-10-CM

## 2021-12-20 DIAGNOSIS — M48.062 SPINAL STENOSIS OF LUMBAR REGION WITH NEUROGENIC CLAUDICATION: ICD-10-CM

## 2021-12-21 RX ORDER — CYCLOBENZAPRINE HCL 5 MG
5 TABLET ORAL 3 TIMES DAILY PRN
Qty: 90 TABLET | Refills: 1 | Status: SHIPPED | OUTPATIENT
Start: 2021-12-21 | End: 2023-03-01

## 2022-01-12 VITALS — BODY MASS INDEX: 29.51 KG/M2 | HEIGHT: 67 IN | WEIGHT: 188 LBS

## 2022-01-18 VITALS
OXYGEN SATURATION: 98 % | SYSTOLIC BLOOD PRESSURE: 124 MMHG | BODY MASS INDEX: 31.84 KG/M2 | TEMPERATURE: 98 F | WEIGHT: 200.3 LBS | HEART RATE: 85 BPM | DIASTOLIC BLOOD PRESSURE: 68 MMHG

## 2022-01-18 VITALS
HEART RATE: 95 BPM | DIASTOLIC BLOOD PRESSURE: 80 MMHG | OXYGEN SATURATION: 98 % | WEIGHT: 198.4 LBS | BODY MASS INDEX: 31.54 KG/M2 | SYSTOLIC BLOOD PRESSURE: 132 MMHG

## 2022-01-18 VITALS — BODY MASS INDEX: 29.25 KG/M2 | WEIGHT: 193 LBS | HEIGHT: 68 IN

## 2022-01-18 VITALS
SYSTOLIC BLOOD PRESSURE: 122 MMHG | HEART RATE: 83 BPM | TEMPERATURE: 98.7 F | OXYGEN SATURATION: 98 % | WEIGHT: 193 LBS | BODY MASS INDEX: 30.29 KG/M2 | DIASTOLIC BLOOD PRESSURE: 74 MMHG | HEIGHT: 67 IN

## 2022-01-18 VITALS
DIASTOLIC BLOOD PRESSURE: 71 MMHG | OXYGEN SATURATION: 94 % | HEART RATE: 83 BPM | RESPIRATION RATE: 18 BRPM | SYSTOLIC BLOOD PRESSURE: 128 MMHG

## 2022-01-18 VITALS
DIASTOLIC BLOOD PRESSURE: 80 MMHG | HEART RATE: 80 BPM | RESPIRATION RATE: 16 BRPM | OXYGEN SATURATION: 98 % | TEMPERATURE: 97.6 F | SYSTOLIC BLOOD PRESSURE: 122 MMHG

## 2022-01-18 VITALS
HEART RATE: 68 BPM | DIASTOLIC BLOOD PRESSURE: 75 MMHG | RESPIRATION RATE: 18 BRPM | OXYGEN SATURATION: 96 % | SYSTOLIC BLOOD PRESSURE: 131 MMHG

## 2022-01-18 VITALS
DIASTOLIC BLOOD PRESSURE: 75 MMHG | HEART RATE: 79 BPM | RESPIRATION RATE: 18 BRPM | SYSTOLIC BLOOD PRESSURE: 114 MMHG | OXYGEN SATURATION: 95 %

## 2022-01-18 VITALS
WEIGHT: 192 LBS | BODY MASS INDEX: 30.3 KG/M2 | OXYGEN SATURATION: 96 % | HEART RATE: 80 BPM | DIASTOLIC BLOOD PRESSURE: 64 MMHG | SYSTOLIC BLOOD PRESSURE: 126 MMHG

## 2022-01-18 VITALS
DIASTOLIC BLOOD PRESSURE: 71 MMHG | SYSTOLIC BLOOD PRESSURE: 127 MMHG | OXYGEN SATURATION: 95 % | RESPIRATION RATE: 18 BRPM | HEART RATE: 88 BPM | DIASTOLIC BLOOD PRESSURE: 82 MMHG | DIASTOLIC BLOOD PRESSURE: 77 MMHG | TEMPERATURE: 97.3 F | SYSTOLIC BLOOD PRESSURE: 134 MMHG | HEART RATE: 87 BPM | RESPIRATION RATE: 18 BRPM | OXYGEN SATURATION: 95 % | OXYGEN SATURATION: 97 % | SYSTOLIC BLOOD PRESSURE: 106 MMHG | HEART RATE: 68 BPM

## 2022-01-18 VITALS
HEART RATE: 82 BPM | BODY MASS INDEX: 30.34 KG/M2 | OXYGEN SATURATION: 98 % | SYSTOLIC BLOOD PRESSURE: 126 MMHG | DIASTOLIC BLOOD PRESSURE: 62 MMHG | HEIGHT: 67 IN | WEIGHT: 193.3 LBS

## 2022-01-18 VITALS
SYSTOLIC BLOOD PRESSURE: 121 MMHG | TEMPERATURE: 98.3 F | OXYGEN SATURATION: 98 % | HEART RATE: 73 BPM | DIASTOLIC BLOOD PRESSURE: 76 MMHG

## 2022-01-18 VITALS
SYSTOLIC BLOOD PRESSURE: 125 MMHG | RESPIRATION RATE: 16 BRPM | SYSTOLIC BLOOD PRESSURE: 128 MMHG | BODY MASS INDEX: 30.3 KG/M2 | HEART RATE: 80 BPM | WEIGHT: 192 LBS | DIASTOLIC BLOOD PRESSURE: 71 MMHG | OXYGEN SATURATION: 96 % | TEMPERATURE: 97.8 F | HEART RATE: 73 BPM | DIASTOLIC BLOOD PRESSURE: 64 MMHG | DIASTOLIC BLOOD PRESSURE: 70 MMHG | RESPIRATION RATE: 18 BRPM | SYSTOLIC BLOOD PRESSURE: 128 MMHG | HEART RATE: 82 BPM | OXYGEN SATURATION: 95 % | OXYGEN SATURATION: 99 %

## 2022-01-18 VITALS
BODY MASS INDEX: 26.28 KG/M2 | BODY MASS INDEX: 26.28 KG/M2 | WEIGHT: 194 LBS | WEIGHT: 194 LBS | HEIGHT: 72 IN | HEIGHT: 72 IN

## 2022-01-18 VITALS
HEART RATE: 84 BPM | DIASTOLIC BLOOD PRESSURE: 60 MMHG | WEIGHT: 194 LBS | BODY MASS INDEX: 31.18 KG/M2 | SYSTOLIC BLOOD PRESSURE: 122 MMHG | HEIGHT: 66 IN

## 2022-01-18 VITALS
DIASTOLIC BLOOD PRESSURE: 80 MMHG | OXYGEN SATURATION: 96 % | SYSTOLIC BLOOD PRESSURE: 133 MMHG | TEMPERATURE: 98.3 F | HEART RATE: 92 BPM

## 2022-01-18 VITALS
DIASTOLIC BLOOD PRESSURE: 71 MMHG | SYSTOLIC BLOOD PRESSURE: 138 MMHG | RESPIRATION RATE: 18 BRPM | HEART RATE: 82 BPM | OXYGEN SATURATION: 96 %

## 2022-01-18 VITALS
DIASTOLIC BLOOD PRESSURE: 60 MMHG | SYSTOLIC BLOOD PRESSURE: 124 MMHG | HEART RATE: 70 BPM | OXYGEN SATURATION: 97 % | DIASTOLIC BLOOD PRESSURE: 76 MMHG | WEIGHT: 187 LBS | HEART RATE: 90 BPM | SYSTOLIC BLOOD PRESSURE: 122 MMHG | BODY MASS INDEX: 24.34 KG/M2 | WEIGHT: 193 LBS | BODY MASS INDEX: 30.23 KG/M2

## 2022-01-18 VITALS
OXYGEN SATURATION: 97 % | HEART RATE: 88 BPM | DIASTOLIC BLOOD PRESSURE: 70 MMHG | BODY MASS INDEX: 30.88 KG/M2 | WEIGHT: 194.2 LBS | SYSTOLIC BLOOD PRESSURE: 132 MMHG

## 2022-01-18 VITALS — OXYGEN SATURATION: 96 % | SYSTOLIC BLOOD PRESSURE: 123 MMHG | HEART RATE: 80 BPM | DIASTOLIC BLOOD PRESSURE: 71 MMHG

## 2022-01-18 VITALS
OXYGEN SATURATION: 95 % | HEART RATE: 67 BPM | SYSTOLIC BLOOD PRESSURE: 122 MMHG | OXYGEN SATURATION: 93 % | SYSTOLIC BLOOD PRESSURE: 133 MMHG | SYSTOLIC BLOOD PRESSURE: 142 MMHG | DIASTOLIC BLOOD PRESSURE: 71 MMHG | DIASTOLIC BLOOD PRESSURE: 90 MMHG | TEMPERATURE: 98.2 F | HEART RATE: 76 BPM | DIASTOLIC BLOOD PRESSURE: 80 MMHG | OXYGEN SATURATION: 97 % | TEMPERATURE: 97.8 F | RESPIRATION RATE: 18 BRPM | HEART RATE: 92 BPM

## 2022-01-18 VITALS
OXYGEN SATURATION: 98 % | SYSTOLIC BLOOD PRESSURE: 128 MMHG | HEART RATE: 77 BPM | TEMPERATURE: 98.3 F | DIASTOLIC BLOOD PRESSURE: 84 MMHG

## 2022-01-18 VITALS
HEART RATE: 67 BPM | RESPIRATION RATE: 18 BRPM | DIASTOLIC BLOOD PRESSURE: 71 MMHG | SYSTOLIC BLOOD PRESSURE: 129 MMHG | OXYGEN SATURATION: 95 %

## 2022-01-18 VITALS
SYSTOLIC BLOOD PRESSURE: 138 MMHG | RESPIRATION RATE: 17 BRPM | DIASTOLIC BLOOD PRESSURE: 80 MMHG | OXYGEN SATURATION: 95 % | TEMPERATURE: 97.7 F | HEART RATE: 71 BPM

## 2022-01-18 VITALS
WEIGHT: 200 LBS | HEART RATE: 92 BPM | SYSTOLIC BLOOD PRESSURE: 130 MMHG | OXYGEN SATURATION: 99 % | BODY MASS INDEX: 31.8 KG/M2 | DIASTOLIC BLOOD PRESSURE: 70 MMHG

## 2022-01-18 ASSESSMENT — PATIENT HEALTH QUESTIONNAIRE - PHQ9
SUM OF ALL RESPONSES TO PHQ QUESTIONS 1-9: 3
SUM OF ALL RESPONSES TO PHQ QUESTIONS 1-9: 3
SUM OF ALL RESPONSES TO PHQ QUESTIONS 1-9: 2

## 2022-02-17 PROBLEM — I63.9 ISCHEMIC CEREBROVASCULAR ACCIDENT (CVA) OF FRONTAL LOBE (H): Chronic | Status: ACTIVE | Noted: 2020-02-21

## 2022-02-17 PROBLEM — M48.061 LUMBAR SPINAL STENOSIS: Status: ACTIVE | Noted: 2020-02-28

## 2022-03-29 ENCOUNTER — MYC MEDICAL ADVICE (OUTPATIENT)
Dept: INTERNAL MEDICINE | Facility: CLINIC | Age: 77
End: 2022-03-29
Payer: COMMERCIAL

## 2022-04-20 ENCOUNTER — HOSPITAL ENCOUNTER (OUTPATIENT)
Dept: PET IMAGING | Facility: HOSPITAL | Age: 77
Discharge: HOME | End: 2022-04-20
Attending: INTERNAL MEDICINE | Admitting: INTERNAL MEDICINE
Payer: COMMERCIAL

## 2022-04-20 DIAGNOSIS — C43.8 MALIGNANT MELANOMA OF OVERLAPPING SITES OF SKIN (H): ICD-10-CM

## 2022-04-20 DIAGNOSIS — C43.9 MALIGNANT MELANOMA OF SKIN (H): ICD-10-CM

## 2022-04-20 LAB — GLUCOSE BLDC GLUCOMTR-MCNC: 98 MG/DL (ref 70–99)

## 2022-04-20 PROCEDURE — 78816 PET IMAGE W/CT FULL BODY: CPT | Mod: PS

## 2022-04-20 PROCEDURE — 343N000001 HC RX 343: Performed by: INTERNAL MEDICINE

## 2022-04-20 PROCEDURE — 82962 GLUCOSE BLOOD TEST: CPT

## 2022-04-20 PROCEDURE — A9552 F18 FDG: HCPCS | Performed by: INTERNAL MEDICINE

## 2022-04-20 RX ADMIN — FLUDEOXYGLUCOSE F-18 11.33 MCI.: 500 INJECTION, SOLUTION INTRAVENOUS at 12:47

## 2022-07-06 DIAGNOSIS — M54.41 CHRONIC MIDLINE LOW BACK PAIN WITH RIGHT-SIDED SCIATICA: ICD-10-CM

## 2022-07-06 DIAGNOSIS — G89.29 CHRONIC MIDLINE LOW BACK PAIN WITH RIGHT-SIDED SCIATICA: ICD-10-CM

## 2022-07-06 DIAGNOSIS — K21.9 GASTROESOPHAGEAL REFLUX DISEASE: ICD-10-CM

## 2022-07-06 RX ORDER — PANTOPRAZOLE SODIUM 40 MG
TABLET, DELAYED RELEASE (ENTERIC COATED) ORAL
Qty: 180 TABLET | Refills: 3 | Status: SHIPPED | OUTPATIENT
Start: 2022-07-06 | End: 2023-08-03

## 2022-07-06 RX ORDER — NAPROXEN 500 MG/1
TABLET ORAL
Qty: 180 TABLET | Refills: 3 | Status: SHIPPED | OUTPATIENT
Start: 2022-07-06 | End: 2023-09-28

## 2022-07-06 NOTE — TELEPHONE ENCOUNTER
Recent Labs   Lab Test 04/20/22  1242 09/20/21  1021 04/05/21  1253 01/21/21  1505   NA  --  145  --  142   POTASSIUM  --  4.6  --  4.3   CHLORIDE  --  108*  --  107   CO2  --  25  --  26   ANIONGAP  --  12  --  9   GLC 98 92   < > 104   BUN  --  12  --  16   CR  --  0.85  --  0.84   ALEJANDRO  --  9.9  --  9.4    < > = values in this interval not displayed.

## 2022-07-22 ENCOUNTER — OFFICE VISIT (OUTPATIENT)
Dept: INTERNAL MEDICINE | Facility: CLINIC | Age: 77
End: 2022-07-22
Payer: COMMERCIAL

## 2022-07-22 VITALS
DIASTOLIC BLOOD PRESSURE: 74 MMHG | SYSTOLIC BLOOD PRESSURE: 132 MMHG | RESPIRATION RATE: 16 BRPM | WEIGHT: 177 LBS | BODY MASS INDEX: 29.49 KG/M2 | HEIGHT: 65 IN | TEMPERATURE: 98.2 F | HEART RATE: 74 BPM | OXYGEN SATURATION: 97 %

## 2022-07-22 DIAGNOSIS — H25.13 AGE-RELATED NUCLEAR CATARACT OF BOTH EYES: ICD-10-CM

## 2022-07-22 DIAGNOSIS — Z01.818 PREOPERATIVE EXAMINATION: Primary | ICD-10-CM

## 2022-07-22 DIAGNOSIS — I63.9 ISCHEMIC CEREBROVASCULAR ACCIDENT (CVA) OF FRONTAL LOBE (H): Chronic | ICD-10-CM

## 2022-07-22 DIAGNOSIS — M48.061 SPINAL STENOSIS OF LUMBAR REGION, UNSPECIFIED WHETHER NEUROGENIC CLAUDICATION PRESENT: ICD-10-CM

## 2022-07-22 DIAGNOSIS — R63.4 WEIGHT LOSS: ICD-10-CM

## 2022-07-22 DIAGNOSIS — C43.61 MELANOMA OF RIGHT UPPER ARM (H): ICD-10-CM

## 2022-07-22 DIAGNOSIS — M06.00 SERONEGATIVE RHEUMATOID ARTHRITIS (H): ICD-10-CM

## 2022-07-22 PROBLEM — L71.9 ROSACEA: Status: ACTIVE | Noted: 2022-07-22

## 2022-07-22 PROCEDURE — 99214 OFFICE O/P EST MOD 30 MIN: CPT | Performed by: INTERNAL MEDICINE

## 2022-07-22 RX ORDER — AZELAIC ACID 0.15 G/G
GEL TOPICAL
COMMUNITY
Start: 2022-04-09

## 2022-07-22 NOTE — PROGRESS NOTES
[facility]  [facility]    Care coordination - Customized care -  Comprehensive and complex medical care            Date of Service: 7/22/2022  Primary Provider: Renzo Newell    Patient Care Team:  Renzo Newell MD as PCP - General  Renzo Newell MD as Assigned PCP  Shoaib Gloria MD as MD (Hematology & Oncology)  Carlos Alberto Worthington DO as MD (Pain Medicine)  Bj Orantes MD, MD as Resident (Dermatology)  Sadi Castano MD Stanek, Richard Paul, MD as MD (Ophthalmology)            Patient's Pharmacy:    [facility] - [place], [place] - [address] [facility]  [address] [address]  Phone: [phone] Fax: [phone]     Patient's Contacts:  Name Home Phone Work Phone Mobile Phone Relationship Lgl Grd   DEYSI [phone] Relative No   AGUSTINA CAMPBELL [phone]   Relative No       Patient's Insurance:    Payor: [employer] / Plan: [employer] / Product Type: HMO /           Preoperative examination    Lashawn Ortega is a 77 year old female (1945) who I am asked to see by her surgeon regarding her fitness for upcoming surgery.      Chief Complaint   Patient presents with     Pre-Op Exam     8/2/22, cataract, Aug. 2nd left eye & Aug. 16th right eye, [facility] eye, [place]     Back Pain     And spasms, picking up boxes that had been to heavy, bed was to firm     Rosacea     RECHECK     Not wearing compression gloves        Subjective:     Going through cataract surgery in the near future due to worsening vision.  Dr. Workman.    Having more back pain in the last 2 months.  Was moving some stuff for a garage sale.  Back and into bilateral legs at times.  Working on this.    SNRA reviewed and has some issues with trigger finger at times.  Some CARPOMETACARPAL (CMC) arthritis also bothering her and left wrist pain.    Rosacea recently diagnosed through dermatology and on medications for  this.    Has lost weight on purpose and is doing well with this.    No chest pain or shortness of breath with exertion at this time.    ______________________________________________________________________     Pre-op Questionnaire 7/15/2022   1. Have you ever had a heart attack or stroke? YES - history of stroke   2. Have you ever had surgery on your heart or blood vessels, such as a stent placement, a coronary artery bypass, or surgery on an artery in your head, neck, heart, or legs? No   3. Do you have chest pain with activity? No   4. Do you have a history of  heart failure? No   5. Do you currently have a cold, bronchitis or symptoms of other infection? No   6. Do you have a cough, shortness of breath, or wheezing? No   7. Do you or anyone in your family have previous history of blood clots? No   8. Do you or does anyone in your family have a serious bleeding problem such as prolonged bleeding following surgeries or cuts? No    9. Have you ever had problems with anemia or been told to take iron pills? No   10. Have you had any abnormal blood loss such as black, tarry or bloody stools, or abnormal vaginal bleeding? No   11. Have you ever had a blood transfusion? YES    11a. Have you ever had a transfusion reaction? No   12. Are you willing to have a blood transfusion if it is medically needed before, during, or after your surgery? Yes   13. Have you or any of your relatives ever had problems with anesthesia? Nausea with anesthesia   14. Do you have sleep apnea, excessive snoring or daytime drowsiness? No   15. Do you have any artifical heart valves or other implanted medical devices like a pacemaker, defibrillator, or continuous glucose monitor? No   16. Do you have artificial joints? No   17. Are you allergic to latex? No     ______________________________________________________________________     We reviewed her other issues noted in the assessment but not specifically addressed in the HPI above.    ______________________________________________________________________     Patient Active Problem List   Diagnosis     GERD (gastroesophageal reflux disease)     Migraine headache     LBP (low back pain)     Nonsmoker     Menopausal hot flushes     Full code status     Peripheral neuropathy     Melanoma of right upper arm (H) - On Keytruda in the past - stage IIIA     Ischemic cerebrovascular accident (CVA) of frontal lobe (H) - 2/2020     Aphasia     Lumbar spinal stenosis     Essential tremor     On antineoplastic chemotherapy - PEMBROLIZUMAB - checkpoint inhibitor - in the past for melanoma     Female stress incontinence     Seronegative rheumatoid arthritis of both hands (H)     Rosacea       Past Surgical History:   Procedure Laterality Date     HYSTERECTOMY  01/01/1988     IR LUMBAR EPIDURAL STEROID INJECTION  08/12/2019     IR LUMBAR EPIDURAL STEROID INJECTION  08/12/2019     LAPAROSCOPIC CHOLECYSTECTOMY N/A 07/14/2020    Procedure: CHOLECYSTECTOMY, LAPAROSCOPIC;  Surgeon: Lynn Christina MD;  Location: [facility] [place];  Service: General     OTHER SURGICAL HISTORY  05/2019    MELANOMA RESECTION WITH SENTINEL LYMPH NODE     TN ERCP DX COLLECTION SPECIMEN BRUSHING/WASHING N/A 06/19/2020    Procedure: ENDOSCOPIC RETROGRADE CHOLANGIO-PANCREATOGRAPHY WITH SPHINCTEROTOMY AND STONE EXTRACTION;  Surgeon: Daniel Das MD;  Location: [facility] [place];  Service: Gastroenterology     TN ERCP DX COLLECTION SPECIMEN BRUSHING/WASHING N/A 07/15/2020    Procedure: ENDOSCOPIC RETROGRADE CHOLANGIOPANCREATOGRAPHY;  Surgeon: Daniel Das MD;  Location: [facility] [place];  Service: Gastroenterology      Social History     Occupational History     Not on file   Tobacco Use     Smoking status: Former Smoker     Packs/day: 0.00     Smokeless tobacco: Never Used     Tobacco comment: 70's   Substance and Sexual Activity     Alcohol use: Yes     Alcohol/week: 0.0 - 1.0 standard drinks     Comment: Alcoholic Drinks/day:  occ     Drug use: Never     Sexual activity: Not on file     Social History     Social History Narrative    , no children.  Has a niece involved in her care.            Patient of Dr. Newell since 2016.              had severe dementia and passed away in 2019.  Ash.      Family History   Problem Relation Age of Onset     Pancreatic Cancer Mother 82.00     Melanoma Father 75.00        Metastatic to colon      Family history is noncontributory otherwise.    Allergies: Erythromycin base [erythromycin], Codeine, Hydrocodone, Keytruda [pembrolizumab], Tramadol, Other environmental allergy, Penicillins, Shellfish containing products [shellfish-derived products], Sulfa (sulfonamide antibiotics) [sulfa drugs], and Vitamin d3 complete [external allergen needs reconciliation - see comment]     Current medications:  Current Outpatient Medications   Medication Instructions     aloe vera Gel 1 Application, 2 TIMES DAILY PRN     aspirin (ASA) 81 mg, Oral, DAILY     azelaic acid (FINACIA) 15 % external gel APPLY TOPICALLY DAILY TO SKIN EVERY MORNING.     CRESTOR 5 MG tablet TAKE 1 TABLET DAILY     cyclobenzaprine (FLEXERIL) 5 mg, Oral, 3 TIMES DAILY PRN     diphenhydrAMINE-Acetaminophen (PERCOGESIC PO) 375 mg, Oral     fluticasone (FLONASE) 50 MCG/ACT nasal spray 1-2 sprays, Both Nostrils, DAILY PRN     gabapentin (NEURONTIN) 900 mg, Oral, 3 TIMES DAILY     guaiFENesin-dextromethorphan (MUCINEX DM) 600-30 mg Tb12 1 tablet, 2 TIMES DAILY PRN     menthol (BIOFREEZE, MENTHOL,) 4 % Gel 1 Application, DAILY PRN     metroNIDAZOLE (METROCREAM) 0.75 % external cream APPLY TOPICALLY TO FACE EVERY NIGHT AT BEDTIME.     naproxen (NAPROSYN) 500 MG tablet TAKE 1 TABLET 2 TIMES DAILY AS NEEDED     neomycin-bacitracin-polymyxin (NEOSPORIN) ointment 1 Application, 2 TIMES DAILY PRN     PLAQUENIL 200 MG tablet TAKE 1 TABLET 2 TIMES DAILY     PROTONIX 40 MG EC tablet TAKE 2 TABLETS DAILY.     Pseudoephedrine-DM-GG (ROBITUSSIN  "COUGH/COLD D PO) Oral     UNABLE TO FIND MEDICATION NAME: benadryl topical cream     UNABLE TO FIND MEDICATION NAME: Magni- life cream        Objective:     Wt Readings from Last 3 Encounters:   07/22/22 80.3 kg (177 lb)   09/20/21 86.4 kg (190 lb 8 oz)   04/06/21 90.9 kg (200 lb 4.8 oz)     BP Readings from Last 3 Encounters:   07/22/22 132/74   09/20/21 136/80   09/13/21 (!) 146/74     /74   Pulse 74   Temp 98.2  F (36.8  C) (Oral)   Resp 16   Ht 1.656 m (5' 5.2\")   Wt 80.3 kg (177 lb)   SpO2 97%   BMI 29.27 kg/m     Patient is in no acute distress.  Mood good.  Insight good.  Eyes nonicteric.  Pupils equal.  Ears clear.  Nose clear.  Throat clear.  Neck is supple.  No cervical adenopathy.  No thyromegaly.  Heart is regular rate and rhythm.  Lungs clear to auscultation bilaterally.  Respiratory effort is good.  Abdomen is soft, nontender, no hepatosplenomegaly.  Extremities no edema.   Moves slowly due to back pain.    Diagnostics:     Hospital Outpatient Visit on 04/20/2022   Component Date Value Ref Range Status     GLUCOSE BY METER POCT 04/20/2022 98  70 - 99 mg/dL Final        No results found for any visits on 07/22/22.     Impression:     1. Preoperative examination    2. Age-related nuclear cataract of both eyes    3. Seronegative rheumatoid arthritis (H)     - - Continue to monitor - - continue hydroxychloroquine (Plaquenil).   4. Melanoma of right upper arm (H)     - - Continue to monitor - - continue to follow up with oncology.    5. Spinal stenosis of lumbar region, unspecified whether neurogenic claudication present    6. Ischemic cerebrovascular accident (CVA) of frontal lobe (H) - 2/2020    7. Weight loss, intentional        Plan:     1. Okay to proceed with surgery as planned.  2. No need to take any medications on the am of the surgery.  3. Follow up with spine clinic related to her back.  4. Follow up for annual wellness visit in the future with blood work.  5. Continue current " medications.       Renzo Newell MD  General Internal Medicine  [facility]    Return in about 2 months (around 9/22/2022), or if symptoms worsen or fail to improve, for annual wellness visit.     Future Appointments   Date Time Provider Department Center   9/22/2022 11:00 AM Renzo Newell MD [facility]       Wt Readings from Last 20 Encounters:   07/22/22 80.3 kg (177 lb)   09/20/21 86.4 kg (190 lb 8 oz)   04/06/21 90.9 kg (200 lb 4.8 oz)   03/09/21 88.1 kg (194 lb 3.2 oz)   02/05/21 90.7 kg (200 lb)   01/21/21 90 kg (198 lb 6.4 oz)   09/18/20 87.7 kg (193 lb 4.8 oz)   07/28/20 84.8 kg (187 lb)   07/15/20 88 kg (194 lb)   07/09/20 85.3 kg (188 lb)   07/15/20 88 kg (194 lb)   07/09/20 85.3 kg (188 lb)   07/09/20 85.3 kg (188 lb)   06/26/20 87.5 kg (193 lb)   06/18/20 87.5 kg (193 lb)   06/16/20 87.5 kg (193 lb)   05/29/20 87.1 kg (192 lb)   05/21/20 87.1 kg (192 lb)   02/27/20 88 kg (194 lb)   09/17/19 84.8 kg (187 lb)     BP Readings from Last 20 Encounters:   07/22/22 132/74   09/20/21 136/80   09/13/21 (!) 146/74   04/06/21 124/68   03/09/21 132/70   02/05/21 130/70   01/21/21 132/80   09/18/20 126/62   09/11/20 125/70   08/17/20 128/84   08/14/20 106/77   07/28/20 124/76   06/26/20 122/74   06/16/20 122/60   05/29/20 128/64   05/21/20 126/64   04/30/20 122/80   04/21/20 133/80   04/17/20 133/80   04/13/20 121/76      Pulse Readings from Last 20 Encounters:   07/22/22 74   09/20/21 85   09/13/21 92   04/06/21 85   03/09/21 88   02/05/21 92   01/21/21 95   09/18/20 82   09/11/20 73   08/17/20 77   08/14/20 88   07/28/20 90   06/26/20 83   06/16/20 70   05/29/20 80   05/21/20 80   04/30/20 80   04/21/20 92   04/17/20 92   04/13/20 73     SpO2 Readings from Last 20 Encounters:   07/22/22 97%   09/20/21 98%   09/13/21 99%   04/06/21 98%   03/09/21 97%   02/05/21 99%   01/21/21 98%   09/18/20 98%   09/11/20 99%   08/17/20 98%   08/14/20 97%   07/28/20 97%   06/26/20 98%    05/29/20 96%   05/21/20 96%   04/30/20 98%   04/21/20 96%   04/17/20 97%   04/13/20 98%   04/04/20 95%

## 2022-07-23 NOTE — PATIENT INSTRUCTIONS
Please follow up if you have any further issues.    You may contact me by phone or MyChart if you are worsening or if things are not improving.    ______________________________________________________________________     Please remember that you can call [phone] to schedule an appointment.     You can schedule appointments 24 hours a day, 7 days a week.  Sometimes the best time to schedule an appointment is after clinic hours when less people are calling in.  Weekends are another option for calling in to schedule appointments.     ______________________________________________________________________     Future Appointments   Date Time Provider Department [place]   9/22/2022 11:00 AM Renzo Newell MD [facility]

## 2022-08-15 DIAGNOSIS — M06.041 SERONEGATIVE RHEUMATOID ARTHRITIS OF BOTH HANDS (H): ICD-10-CM

## 2022-08-15 DIAGNOSIS — M06.042 SERONEGATIVE RHEUMATOID ARTHRITIS OF BOTH HANDS (H): ICD-10-CM

## 2022-08-15 DIAGNOSIS — M48.062 SPINAL STENOSIS OF LUMBAR REGION WITH NEUROGENIC CLAUDICATION: ICD-10-CM

## 2022-08-15 DIAGNOSIS — E78.2 MIXED HYPERLIPIDEMIA: ICD-10-CM

## 2022-08-15 DIAGNOSIS — M51.26 LUMBAR DISC HERNIATION: ICD-10-CM

## 2022-08-15 DIAGNOSIS — M54.16 LUMBAR RADICULITIS: ICD-10-CM

## 2022-08-15 RX ORDER — HYDROXYCHLOROQUINE SULFATE 200 MG/1
TABLET ORAL
Qty: 180 TABLET | Refills: 1 | Status: SHIPPED | OUTPATIENT
Start: 2022-08-15 | End: 2023-04-11

## 2022-08-15 RX ORDER — GABAPENTIN 300 MG
CAPSULE ORAL
Qty: 810 CAPSULE | Refills: 3 | Status: SHIPPED | OUTPATIENT
Start: 2022-08-15 | End: 2023-08-11

## 2022-08-15 RX ORDER — ROSUVASTATIN CALCIUM 5 MG
TABLET ORAL
Qty: 90 TABLET | Refills: 3 | Status: SHIPPED | OUTPATIENT
Start: 2022-08-15 | End: 2023-08-11

## 2022-08-29 ENCOUNTER — OFFICE VISIT (OUTPATIENT)
Dept: PHYSICAL MEDICINE AND REHAB | Facility: CLINIC | Age: 77
End: 2022-08-29
Payer: COMMERCIAL

## 2022-08-29 VITALS — DIASTOLIC BLOOD PRESSURE: 75 MMHG | OXYGEN SATURATION: 96 % | HEART RATE: 83 BPM | SYSTOLIC BLOOD PRESSURE: 143 MMHG

## 2022-08-29 DIAGNOSIS — M54.16 LUMBAR RADICULITIS: ICD-10-CM

## 2022-08-29 DIAGNOSIS — M48.062 SPINAL STENOSIS OF LUMBAR REGION WITH NEUROGENIC CLAUDICATION: Primary | ICD-10-CM

## 2022-08-29 DIAGNOSIS — M51.26 LUMBAR DISC HERNIATION: ICD-10-CM

## 2022-08-29 PROCEDURE — 99214 OFFICE O/P EST MOD 30 MIN: CPT | Performed by: PAIN MEDICINE

## 2022-08-29 ASSESSMENT — PAIN SCALES - GENERAL: PAINLEVEL: SEVERE PAIN (6)

## 2022-08-29 NOTE — PATIENT INSTRUCTIONS
I have ordered injections for you.  You will need a .    ~Please call Nurse Navigation line [phone] with any questions or concerns about your treatment plan, if symptoms worsen and you would like to be seen urgently, or if you have problems controlling bladder and bowel function.

## 2022-08-29 NOTE — PROGRESS NOTES
Assessment:     Diagnoses and all orders for this visit:  Spinal stenosis of lumbar region with neurogenic claudication  -     PAIN Transforaminal DORON Inj Lumbosacral One Level Bilateral; Future  Lumbar radiculitis  -     PAIN Transforaminal DORON Inj Lumbosacral One Level Bilateral; Future  Lumbar disc herniation  -     PAIN Transforaminal DORON Inj Lumbosacral One Level Bilateral; Future     Lashawn Ortega is a 77 year old y.o. female with past medical history significant for reflux, migraine headache, low back pain, peripheral neuropathy, melanoma of right upper arm, stroke, elevated liver enzymes who presents today for follow-up regarding low back and right lower extremity pain:    -Patient has been having worsening of low back and bilateral lower extremity pain for the last 2 months.  She received 6 months of very good relief up to 80 to 90% with bilateral L4-5 transforaminal epidural steroid injections in the past.     Plan:     A shared decision making plan was used. The patient's values and choices were respected. Prior medical records from our last visit on 9/13/2021 were reviewed today. The following represents what was discussed and decided upon by the provider and the patient.        -DIAGNOSTIC TESTS: Images were personally reviewed and interpreted.   --MRI of the lumbar spine dated 7/11/2019 is personally reviewed and images interpreted and shown to patient.  There is multilevel degenerative changes in lumbar spine.  There is severe spinal canal stenosis at L3-4.  There is moderate spinal canal stenosis at L4-5.  There is mild to moderate spinal canal stenosis at L2-3.  There is no severe foraminal stenosis.  --CT of abdomen and pelvis July 2020.  I did not see any compression fractures.    -INTERVENTIONS: I ordered repeat bilateral L4-5 transforaminal epidural steroid injections for the patient.  She will need a  for these injections.    -MEDICATIONS: No changes to medications.  -  Discussed side  effects of medications and proper use. Patient verbalized understanding.    -PHYSICAL THERAPY: Recommend that she continue with home exercises on a consistent basis.      -PATIENT EDUCATION: We discussed pain management in a multimodal fashion including physical therapy, medication management, possible future injections.    -FOLLOW UP: Patient will follow up 2 weeks after injections.  Advised to contact clinic if symptoms worsen or change.    Subjective:     Lashawn Ortega is a 77 year old female who presents today for follow-up regarding low back and bilateral lower extremity pain.  Patient reports that over the last 2 months she is having worsening of pain with pain down her left lateral thigh to just above the knee and right posterior lateral thigh and calf and into the anterior ankle area on the right.  Pain today is 6/10 is worst is 8/10 at its best is 4/10.  Pain is worse with bending and standing and walking and improved with sitting and lying down.  Gabapentin, naproxen and cyclobenzaprine are helping with pain.  She notes 6 months at least with pain relief of bilateral L4-5 transforaminal epidural steroid injections of 80 to 90% relief.  She is wondering if she can have another injection.  She continues to do physical therapy exercises, however she is not able to do all of them secondary to pain.  She denies any bowel or bladder changes, fevers, chills, unintentional weight loss.    Treatment to Date: MRI of lumbar spine dated 7/11/2019.  Right L5-S1 transforaminal epidural steroid injection done on 8/12/2019.  Several sessions of in-home physical therapy.  Bilateral L4-5 transforaminal epidural steroid injections done on 8/11/2020.  Right L4-5 transforaminal epidural steroid injection done on 5/7/2021.  Right greater trochanter bursa injection under ultrasound guidance on 6/11/2021.    Patient Active Problem List   Diagnosis     GERD (gastroesophageal reflux disease)     Migraine headache     LBP (low  back pain)     Nonsmoker     Menopausal hot flushes     Full code status     Peripheral neuropathy     Melanoma of right upper arm (H) - On Keytruda in the past - stage IIIA     Ischemic cerebrovascular accident (CVA) of frontal lobe (H) - 2/2020     Aphasia     Lumbar spinal stenosis     Essential tremor     On antineoplastic chemotherapy - PEMBROLIZUMAB - checkpoint inhibitor - in the past for melanoma     Female stress incontinence     Seronegative rheumatoid arthritis of both hands (H)     Rosacea       Current Outpatient Medications   Medication     aloe vera Gel     aspirin 81 MG EC tablet     azelaic acid (FINACIA) 15 % external gel     CRESTOR 5 MG tablet     cyclobenzaprine (FLEXERIL) 5 MG tablet     diphenhydrAMINE-Acetaminophen (PERCOGESIC PO)     fluticasone (FLONASE) 50 MCG/ACT nasal spray     guaiFENesin-dextromethorphan (MUCINEX DM) 600-30 mg Tb12     menthol (BIOFREEZE, MENTHOL,) 4 % Gel     metroNIDAZOLE (METROCREAM) 0.75 % external cream     naproxen (NAPROSYN) 500 MG tablet     neomycin-bacitracin-polymyxin (NEOSPORIN) ointment     NEURONTIN 300 MG capsule     PLAQUENIL 200 MG tablet     PROTONIX 40 MG EC tablet     Pseudoephedrine-DM-GG (ROBITUSSIN COUGH/COLD D PO)     UNABLE TO FIND     UNABLE TO FIND     No current facility-administered medications for this visit.       Allergies   Allergen Reactions     Erythromycin Base [Erythromycin] Rash     PARALYSIS, EYE SWELLING, HEADACHE     Codeine Dizziness     Other: extreme weakness in legspt stopped taking medication and sxs were gone within a few hours       Hydrocodone Nausea and Headache     Keytruda [Pembrolizumab] Other (See Comments)     Elevated liver function tests (lfts)      Tramadol Diarrhea, Dizziness, Headache and Nausea and Vomiting     Other Environmental Allergy Rash     mildew     Penicillins Swelling and Rash     Shellfish Containing Products [Shellfish-Derived Products] Swelling and Rash     shrimp     Sulfa (Sulfonamide  "Antibiotics) [Sulfa Drugs] Rash     Burning body rash     Vitamin D3 Complete [External Allergen Needs Reconciliation - See Comment] Rash     Higher dosage makes her break out       Past Medical History:   Diagnosis Date     Aphasia      Asthma      GERD (gastroesophageal reflux disease)      History of transfusion      Lumbar spinal stenosis 2/28/2020    EXAM: MR LUMBAR SPINE W WO CONTRAST LOCATION: [facility] DATE/TIME: 7/11/2019 4:49 PM   INDICATION: Back pain going down the right leg.  Going on for 3 months.  Not improving. See above. History of melanoma. COMPARISON: None. CONTRAST: Gadavist 9. TECHNIQUE: Without and with IV contrast   FINDINGS:  Nomenclature is based on 5 lumbar type vertebral bodies. There is no concerning marrow rep     Malignant melanoma of right upper arm (H)      Migraine headache      On antineoplastic chemotherapy     pembrolizumab     PN (peripheral neuropathy)      PONV (postoperative nausea and vomiting)     \"cold sweats\"     S/P colonoscopy      Stroke (H) 02/2020        Review of Systems  ROS:  Specifically negative for bowel/bladder dysfunction, balance changes, headache, dizziness, foot drop, fevers, chills, appetite changes, nausea/vomiting, unexplained weight loss. Otherwise 13 systems reviewed are negative. Please see the patient's intake questionnaire from today for details.    Reviewed Social, Family, Past Medical and Past Surgical history with patient, no significant changes noted since prior visit.     Objective:     BP (!) 143/75   Pulse 83   SpO2 96%     PHYSICAL EXAMINATION:    --CONSTITUTIONAL: Well developed, well nourished, healthy appearing individual.  --PSYCHIATRIC: Appropriate mood and affect. No difficulty interacting due to temper, social withdrawal, or memory issues.  --SKIN: Lumbar region is dry and intact.   --RESPIRATORY: Normal rhythm and effort. No abnormal accessory muscle breathing patterns noted.   --MUSCULOSKELETAL:  Normal lumbar lordosis " noted, no lateral shift.  --GROSS MOTOR: Easily arises from a seated position. Gait is non-antalgic  --LUMBAR SPINE:  Inspection reveals no evidence of deformity. Range of motion is mildly limited in lumbar flexion, extension, lateral rotation. No tenderness to palpation lumbar spine. Straight leg raising in the seated position is negative to radicular pain.   --SACROILIAC JOINT:  One Finger point test negative.  --LOWER EXTREMITY MOTOR TESTING:  Plantar flexion left 5/5, right 5/5   Dorsiflexion left 5/5, right 5/5   Great toe MTP extension left 5/5, right 5/5  Knee flexion left 5/5, right 5/5  Knee extension left 5/5, right 5/5   Hip flexion left 5/5, right 5/5  Hip abduction left 5/5, right 5/5  Hip adduction left 5/5, right 5/5    --NEUROLOGIC:  Sensation to light touch is intact in the bilateral L4, L5, and S1 dermatomes.    RESULTS:   Imaging: Lumbar spine imaging was reviewed today.

## 2022-08-29 NOTE — LETTER
8/29/2022         RE: Lashawn Ortega  [address] [place] [address]        Dear Colleague,    Thank you for referring your patient, Lashawn Ortega, to the [facility]. Please see a copy of my visit note below.      Again, thank you for allowing me to participate in the care of your patient.        Sincerely,        Carlos Alberto Worthington, DO

## 2022-09-15 ASSESSMENT — ENCOUNTER SYMPTOMS
NAUSEA: 0
BREAST MASS: 0
HEMATURIA: 0
PARESTHESIAS: 0
HEMATOCHEZIA: 0
ARTHRALGIAS: 0
HEADACHES: 0
DYSURIA: 0
DIARRHEA: 0
ABDOMINAL PAIN: 0
CHILLS: 0
EYE PAIN: 0
WEAKNESS: 0
COUGH: 0
HEARTBURN: 0
SHORTNESS OF BREATH: 0
FEVER: 0
SORE THROAT: 0
MYALGIAS: 0
PALPITATIONS: 0
FREQUENCY: 0
DIZZINESS: 0
JOINT SWELLING: 0
NERVOUS/ANXIOUS: 0
CONSTIPATION: 0

## 2022-09-15 ASSESSMENT — ACTIVITIES OF DAILY LIVING (ADL): CURRENT_FUNCTION: NO ASSISTANCE NEEDED

## 2022-09-18 ENCOUNTER — HEALTH MAINTENANCE LETTER (OUTPATIENT)
Age: 77
End: 2022-09-18

## 2022-09-22 ENCOUNTER — OFFICE VISIT (OUTPATIENT)
Dept: INTERNAL MEDICINE | Facility: CLINIC | Age: 77
End: 2022-09-22
Payer: COMMERCIAL

## 2022-09-22 VITALS
HEART RATE: 80 BPM | HEIGHT: 67 IN | TEMPERATURE: 98.3 F | RESPIRATION RATE: 16 BRPM | WEIGHT: 176.5 LBS | SYSTOLIC BLOOD PRESSURE: 130 MMHG | DIASTOLIC BLOOD PRESSURE: 70 MMHG | BODY MASS INDEX: 27.7 KG/M2 | OXYGEN SATURATION: 98 %

## 2022-09-22 DIAGNOSIS — M48.061 SPINAL STENOSIS OF LUMBAR REGION, UNSPECIFIED WHETHER NEUROGENIC CLAUDICATION PRESENT: ICD-10-CM

## 2022-09-22 DIAGNOSIS — G25.0 ESSENTIAL TREMOR: ICD-10-CM

## 2022-09-22 DIAGNOSIS — G60.9 IDIOPATHIC PERIPHERAL NEUROPATHY: ICD-10-CM

## 2022-09-22 DIAGNOSIS — M18.2 POST-TRAUMATIC OSTEOARTHRITIS OF BOTH FIRST CARPOMETACARPAL JOINTS: ICD-10-CM

## 2022-09-22 DIAGNOSIS — M65.341 TRIGGER FINGER, RIGHT RING FINGER: ICD-10-CM

## 2022-09-22 DIAGNOSIS — Z00.00 MEDICARE ANNUAL WELLNESS VISIT, SUBSEQUENT: Primary | ICD-10-CM

## 2022-09-22 DIAGNOSIS — C43.61 MELANOMA OF RIGHT UPPER ARM (H): ICD-10-CM

## 2022-09-22 DIAGNOSIS — M06.041 SERONEGATIVE RHEUMATOID ARTHRITIS OF BOTH HANDS (H): ICD-10-CM

## 2022-09-22 DIAGNOSIS — M06.042 SERONEGATIVE RHEUMATOID ARTHRITIS OF BOTH HANDS (H): ICD-10-CM

## 2022-09-22 DIAGNOSIS — I63.9 ISCHEMIC CEREBROVASCULAR ACCIDENT (CVA) OF FRONTAL LOBE (H): Chronic | ICD-10-CM

## 2022-09-22 LAB
ALBUMIN SERPL BCG-MCNC: 4.5 G/DL (ref 3.5–5.2)
ALP SERPL-CCNC: 50 U/L (ref 35–104)
ALT SERPL W P-5'-P-CCNC: 12 U/L (ref 10–35)
ANION GAP SERPL CALCULATED.3IONS-SCNC: 12 MMOL/L (ref 7–15)
AST SERPL W P-5'-P-CCNC: 25 U/L (ref 10–35)
BILIRUB SERPL-MCNC: 0.7 MG/DL
BUN SERPL-MCNC: 15 MG/DL (ref 8–23)
CALCIUM SERPL-MCNC: 9.7 MG/DL (ref 8.8–10.2)
CHLORIDE SERPL-SCNC: 102 MMOL/L (ref 98–107)
CHOLEST SERPL-MCNC: 122 MG/DL
CREAT SERPL-MCNC: 0.83 MG/DL (ref 0.51–0.95)
DEPRECATED HCO3 PLAS-SCNC: 25 MMOL/L (ref 22–29)
GFR SERPL CREATININE-BSD FRML MDRD: 72 ML/MIN/1.73M2
GLUCOSE SERPL-MCNC: 95 MG/DL (ref 70–99)
HDLC SERPL-MCNC: 58 MG/DL
LDLC SERPL CALC-MCNC: 52 MG/DL
NONHDLC SERPL-MCNC: 64 MG/DL
POTASSIUM SERPL-SCNC: 4.1 MMOL/L (ref 3.4–5.3)
PROT SERPL-MCNC: 6.7 G/DL (ref 6.4–8.3)
SODIUM SERPL-SCNC: 139 MMOL/L (ref 136–145)
TRIGL SERPL-MCNC: 61 MG/DL

## 2022-09-22 PROCEDURE — 99214 OFFICE O/P EST MOD 30 MIN: CPT | Mod: 25 | Performed by: INTERNAL MEDICINE

## 2022-09-22 PROCEDURE — 91312 COVID-19,PF,PFIZER BOOSTER BIVALENT: CPT | Performed by: INTERNAL MEDICINE

## 2022-09-22 PROCEDURE — 80061 LIPID PANEL: CPT | Performed by: INTERNAL MEDICINE

## 2022-09-22 PROCEDURE — 80053 COMPREHEN METABOLIC PANEL: CPT | Performed by: INTERNAL MEDICINE

## 2022-09-22 PROCEDURE — 36415 COLL VENOUS BLD VENIPUNCTURE: CPT | Performed by: INTERNAL MEDICINE

## 2022-09-22 PROCEDURE — G0439 PPPS, SUBSEQ VISIT: HCPCS | Performed by: INTERNAL MEDICINE

## 2022-09-22 PROCEDURE — 0124A COVID-19,PF,PFIZER BOOSTER BIVALENT: CPT | Performed by: INTERNAL MEDICINE

## 2022-09-22 PROCEDURE — G0008 ADMIN INFLUENZA VIRUS VAC: HCPCS | Mod: 59 | Performed by: INTERNAL MEDICINE

## 2022-09-22 PROCEDURE — 90662 IIV NO PRSV INCREASED AG IM: CPT | Performed by: INTERNAL MEDICINE

## 2022-09-22 ASSESSMENT — ACTIVITIES OF DAILY LIVING (ADL): CURRENT_FUNCTION: NO ASSISTANCE NEEDED

## 2022-09-22 NOTE — PROGRESS NOTES
[facility] - Primary [facility] Specialists    Comprehensive and complex medical care - Chronic disease management - Shared decision making - Care coordination - Compassionate care    Patient advocacy - Rational deprescribing - Minimally disruptive medicine - Ethical focus - Customized care         Date of Service: 9/22/2022  Primary Provider: Renzo Newell    Patient Care Team:  Renzo Newell MD as PCP - General  Renzo Newell MD as Assigned PCP  Shoaib Gloria MD as MD (Hematology & Oncology)  Carlos Alberto Worthington DO as MD (Pain Medicine)  Bj Orantes MD, MD as Resident (Dermatology)  Sadi Castano MD Stanek, Richard Paul, MD as MD (Ophthalmology)          Patient's Pharmacy:    [facility] - [place], [place] - [address] [address] [facility]  [address] [facility] [address]  Phone: [phone] Fax: [phone]    [id number] - [place], [place] - [address] AT [facility] [address]  [address]  [facility] [address]  Phone: [phone] Fax: [phone]     Patient's Contacts:  Name Home Phone Work Phone Mobile Phone Relationship Lgl Grd   DEYSI [phone] Relative No   CAMPBELL BERRIOS [phone]   Relative No     Patient's Insurance:    Payor: [employer] / Plan: [employer] MEDICARE ADVANTAGE / Product Type: HMO /      Subjective:     History of present illness:    Lashawn Ortega is an 77 year old here for an annual wellness visit.    The issues she would like to address at today's visit include the following:    Chief Complaint   Patient presents with     Wellness Visit       In for follow up and annual wellness visit.    Stress level still high at times since the death of her .  Does have a male friend at this time who can support her at times.    Following up on melanoma and she will be seeing dermatology in the future.  Did get a genetic counseling evaluation and they are doing  to do some genetic testing on her.    Reviewed her peripheral neuropathy (PN) and her balance has continued to be an issue and the peripheral neuropathy (PN) seems to be increased.    Has seronegative rheumatoid arthritis managed with hydroxychloroquine (Plaquenil) and this is going well.    No new cerebrovascular accident (stroke) symptoms.    Blood pressure generally doing well.    Has issues with the carpometacarpal (CMC) joints of the thumbs.  Right ring finger triggering.    Essential tremor also reviewed.    We reviewed her other issues noted in the assessment but not specifically addressed in the HPI above.           Active Problem List:  Problem List as of 9/22/2022 Reviewed: 9/20/2021 10:00 AM by Renzo Newell MD       High    Nonsmoker    Full code status    Ischemic cerebrovascular accident (CVA) of frontal lobe (H) - 2/2020    Melanoma of right upper arm (H) - On Keytruda in the past - stage IIIA       Medium    Lumbar spinal stenosis    Seronegative rheumatoid arthritis of both hands (H)       Low    GERD (gastroesophageal reflux disease)    Migraine headache    Menopausal hot flushes    Aphasia    On antineoplastic chemotherapy - PEMBROLIZUMAB - checkpoint inhibitor - in the past for melanoma    Female stress incontinence    Rosacea       Other    Low back pain    Essential tremor       Other    Peripheral neuropathy           Past Medical History:   Diagnosis Date     Aphasia      Asthma      GERD (gastroesophageal reflux disease)      History of transfusion      Lumbar spinal stenosis 2/28/2020    EXAM: MR LUMBAR SPINE W WO CONTRAST LOCATION: [facility] DATE/TIME: 7/11/2019 4:49 PM   INDICATION: Back pain going down the right leg.  Going on for 3 months.  Not improving. See above. History of melanoma. COMPARISON: None. CONTRAST: Gadavist 9. TECHNIQUE: Without and with IV contrast   FINDINGS:  Nomenclature is based on 5 lumbar type vertebral bodies. There is no concerning marrow rep      "Malignant melanoma of right upper arm (H)      Migraine headache      On antineoplastic chemotherapy     pembrolizumab     PN (peripheral neuropathy)      PONV (postoperative nausea and vomiting)     \"cold sweats\"     S/P colonoscopy      Stroke (H) 02/2020      Past Surgical History:   Procedure Laterality Date     CATARACT EXTRACTION, BILATERAL  2022     HYSTERECTOMY  01/01/1988     IR LUMBAR EPIDURAL STEROID INJECTION  08/12/2019     IR LUMBAR EPIDURAL STEROID INJECTION  08/12/2019     LAPAROSCOPIC CHOLECYSTECTOMY N/A 07/14/2020    Procedure: CHOLECYSTECTOMY, LAPAROSCOPIC;  Surgeon: Lynn Christina MD;  Location: [facility];  Service: General     OTHER SURGICAL HISTORY  05/2019    MELANOMA RESECTION WITH SENTINEL LYMPH NODE     SC ERCP DX COLLECTION SPECIMEN BRUSHING/WASHING N/A 06/19/2020    Procedure: ENDOSCOPIC RETROGRADE CHOLANGIO-PANCREATOGRAPHY WITH SPHINCTEROTOMY AND STONE EXTRACTION;  Surgeon: Daniel Das MD;  Location: [facility];  Service: Gastroenterology     SC ERCP DX COLLECTION SPECIMEN BRUSHING/WASHING N/A 07/15/2020    Procedure: ENDOSCOPIC RETROGRADE CHOLANGIOPANCREATOGRAPHY;  Surgeon: Daniel Das MD;  Location: [facility];  Service: Gastroenterology      Family History   Problem Relation Age of Onset     Pancreatic Cancer Mother 82.00     Melanoma Father 75.00        Metastatic to colon      Family history is otherwise noncontributory.    Social History     Occupational History     Not on file   Tobacco Use     Smoking status: Former Smoker     Packs/day: 0.00     Smokeless tobacco: Never Used     Tobacco comment: 70's   Substance and Sexual Activity     Alcohol use: Yes     Alcohol/week: 0.0 - 1.0 standard drinks     Comment: Alcoholic Drinks/day: occ     Drug use: Never     Sexual activity: Not on file      Social History     Social History Narrative    , no children.  Has a niece involved in her care.            Patient of Dr. Newell since 2016.      "         had severe dementia and passed away in 2019.  Milt.      Current Outpatient Medications   Medication Instructions     aloe vera Gel 1 Application, 2 TIMES DAILY PRN     aspirin (ASA) 81 mg, Oral, DAILY     azelaic acid (FINACIA) 15 % external gel APPLY TOPICALLY DAILY TO SKIN EVERY MORNING.     CRESTOR 5 MG tablet TAKE 1 TABLET DAILY     cyclobenzaprine (FLEXERIL) 5 mg, Oral, 3 TIMES DAILY PRN     diphenhydrAMINE-Acetaminophen (PERCOGESIC PO) 375 mg, Oral     fluticasone (FLONASE) 50 MCG/ACT nasal spray 1-2 sprays, Both Nostrils, DAILY PRN     guaiFENesin-dextromethorphan (MUCINEX DM) 600-30 mg Tb12 1 tablet, 2 TIMES DAILY PRN     menthol (BIOFREEZE, MENTHOL,) 4 % Gel 1 Application, DAILY PRN     metroNIDAZOLE (METROCREAM) 0.75 % external cream APPLY TOPICALLY TO FACE EVERY NIGHT AT BEDTIME.     naproxen (NAPROSYN) 500 MG tablet TAKE 1 TABLET 2 TIMES DAILY AS NEEDED     neomycin-bacitracin-polymyxin (NEOSPORIN) ointment 1 Application, 2 TIMES DAILY PRN     NEURONTIN 300 MG capsule TAKE 3 CAPSULES 3 TIMES DAILY     PLAQUENIL 200 MG tablet TAKE 1 TABLET 2 TIMES DAILY     PROTONIX 40 MG EC tablet TAKE 2 TABLETS DAILY.     Pseudoephedrine-DM-GG (ROBITUSSIN COUGH/COLD D PO) Oral     UNABLE TO FIND MEDICATION NAME: benadryl topical cream     UNABLE TO FIND MEDICATION NAME: Magni- life cream      Allergies: Erythromycin base [erythromycin], Codeine, Hydrocodone, Keytruda [pembrolizumab], Tramadol, Other environmental allergy, Penicillins, Shellfish containing products [shellfish-derived products], Sulfa (sulfonamide antibiotics) [sulfa drugs], and Vitamin d3 complete [external allergen needs reconciliation - see comment]     Immunization History   Administered Date(s) Administered     COVID-19,PF,Pfizer (12+ Yrs) 02/24/2021, 03/17/2021, 10/21/2021, 07/22/2022     COVID-19,PF,Pfizer 12+ YRS BIVALENT Booster 09/22/2022     DT (PEDS <7y) 03/27/1998     HepA-Adult 05/14/2009, 05/19/2010     Influenza, Quad, High  "Dose, Pf, 65yr+ (Fluzone HD) 09/18/2020, 09/20/2021, 09/22/2022     Pneumo Conj 13-V (2010&after) 05/19/2015     Pneumococcal 23 valent 05/19/2010     Td (Adult), Adsorbed 05/14/2009     Tdap (Adacel,Boostrix) 05/19/2015     Zoster vaccine, live 02/01/2014      Objective:     Wt Readings from Last 3 Encounters:   09/22/22 80.1 kg (176 lb 8 oz)   07/22/22 80.3 kg (177 lb)   09/20/21 86.4 kg (190 lb 8 oz)     BP Readings from Last 3 Encounters:   09/22/22 130/70   08/29/22 (!) 143/75   07/22/22 132/74       PHYSICAL EXAM  /70 (BP Location: Right arm, Patient Position: Sitting, Cuff Size: Adult Regular)   Pulse 80   Temp 98.3  F (36.8  C) (Oral)   Resp 16   Ht 1.689 m (5' 6.5\")   Wt 80.1 kg (176 lb 8 oz)   SpO2 98%   BMI 28.06 kg/m     The patient is comfortable, no acute distress.  Mood good.  Insight is good.  No skin lesions or nodules of concern.  Ears clear.  Eyes are nonicteric.  Pupils equal and reactive.  Throat is clear.  Neck is supple without mass, no thyromegaly. No cervical or epitrochlear adenopathy.  No axillary lymph nodes. Heart regular rate and rhythm.  Lungs clear to auscultation bilaterally.  Respiratory effort good.  Abdomen soft and nontender.  No hepatosplenomegaly.  Extremities show no edema. Gait is slow due to back and peripheral neuropathy (PN) affecting balance.     Diagnostics:     Hospital Outpatient Visit on 04/20/2022   Component Date Value Ref Range Status     GLUCOSE BY METER POCT 04/20/2022 98  70 - 99 mg/dL Final       Results for orders placed or performed in visit on 09/22/22   Comprehensive metabolic panel     Status: Normal   Result Value Ref Range    Sodium 139 136 - 145 mmol/L    Potassium 4.1 3.4 - 5.3 mmol/L    Chloride 102 98 - 107 mmol/L    Carbon Dioxide (CO2) 25 22 - 29 mmol/L    Anion Gap 12 7 - 15 mmol/L    Urea Nitrogen 15.0 8.0 - 23.0 mg/dL    Creatinine 0.83 0.51 - 0.95 mg/dL    Calcium 9.7 8.8 - 10.2 mg/dL    Glucose 95 70 - 99 mg/dL    Alkaline " Phosphatase 50 35 - 104 U/L    AST 25 10 - 35 U/L    ALT 12 10 - 35 U/L    Protein Total 6.7 6.4 - 8.3 g/dL    Albumin 4.5 3.5 - 5.2 g/dL    Bilirubin Total 0.7 <=1.2 mg/dL    GFR Estimate 72 >60 mL/min/1.73m2   Lipid panel reflex to direct LDL Fasting     Status: Normal   Result Value Ref Range    Cholesterol 122 <200 mg/dL    Triglycerides 61 <150 mg/dL    Direct Measure HDL 58 >=50 mg/dL    LDL Cholesterol Calculated 52 <=100 mg/dL    Non HDL Cholesterol 64 <130 mg/dL    Narrative    Cholesterol  Desirable:  <200 mg/dL    Triglycerides  Normal:  Less than 150 mg/dL  Borderline High:  150-199 mg/dL  High:  200-499 mg/dL  Very High:  Greater than or equal to 500 mg/dL    Direct Measure HDL  Female:  Greater than or equal to 50 mg/dL   Male:  Greater than or equal to 40 mg/dL    LDL Cholesterol  Desirable:  <100mg/dL  Above Desirable:  100-129 mg/dL   Borderline High:  130-159 mg/dL   High:  160-189 mg/dL   Very High:  >= 190 mg/dL    Non HDL Cholesterol  Desirable:  130 mg/dL  Above Desirable:  130-159 mg/dL  Borderline High:  160-189 mg/dL  High:  190-219 mg/dL  Very High:  Greater than or equal to 220 mg/dL       Assessment:     1. Medicare annual wellness visit, subsequent    2. Melanoma of right upper arm (H) - On Keytruda in the past - stage IIIA    3. Ischemic cerebrovascular accident (CVA) of frontal lobe (H) - 2/2020    4. Essential tremor    5. Spinal stenosis of lumbar region, unspecified whether neurogenic claudication present    6. Seronegative rheumatoid arthritis of both hands (H)    7. Idiopathic peripheral neuropathy    8. Post-traumatic osteoarthritis of both first carpometacarpal joints    9. Trigger finger, right ring finger         Plan:     1. Check blood work today.     2. Flu shot done today and covid booster done today   3. Follow up specialists as needed.  4. No other changes at this time.  5. Continue current medications  6. Follow up sooner if issues.    Orders Placed This Encounter    Procedures     INFLUENZA, QUAD, HIGH DOSE, PF, 65YR + (FLUZONE HD)     COVID-19,PF,PFIZER BOOSTER BIVALENT (12+YRS)     Comprehensive metabolic panel     Lipid panel reflex to direct LDL Fasting        A personalized health plan based on the identified health risks was provided to the patient on the AVS.       Renzo Newell MD  General Internal Medicine  [facility]      Return in about 1 year (around 9/22/2023), or if symptoms worsen or fail to improve, for Annual Wellness Visit, annual wellness visit, visit and blood work.     Future Appointments   Date Time Provider Department Center   9/26/2022 12:40 PM Carlos Alberto Worthington DO [facility]   10/17/2022 10:00 AM [id number]         Health Maintenance   Topic Date Due     ANNUAL REVIEW OF  ORDERS  Never done     ZOSTER IMMUNIZATION (2 of 3) 03/29/2014     LUNG CANCER SCREENING  04/20/2023     MEDICARE ANNUAL WELLNESS VISIT  09/22/2023     FALL RISK ASSESSMENT  09/22/2023     DTAP/TDAP/TD IMMUNIZATION (2 - Td or Tdap) 05/19/2025     LIPID  09/22/2027     ADVANCE CARE PLANNING  09/23/2027     DEXA  07/08/2030     HEPATITIS C SCREENING  Completed     PHQ-2 (once per calendar year)  Completed     INFLUENZA VACCINE  Completed     Pneumococcal Vaccine: 65+ Years  Completed     COVID-19 Vaccine  Completed     IPV IMMUNIZATION  Aged Out     MENINGITIS IMMUNIZATION  Aged Out     HEPATITIS B IMMUNIZATION  Aged Out        The patient was counseled and encouraged to consider modifying their diet and eating habits. She was provided with information on recommended healthy diet options.  The patient was provided with written information regarding signs of hearing loss.

## 2022-09-22 NOTE — PROGRESS NOTES
"  Are you in the first 12 months of your Medicare coverage?  No    Healthy Habits:     In general, how would you rate your overall health?  Good    Frequency of exercise:  6-7 days/week    Duration of exercise:  15-30 minutes    Do you usually eat at least 4 servings of fruit and vegetables a day, include whole grains    & fiber and avoid regularly eating high fat or \"junk\" foods?  No    Taking medications regularly:  Yes    Medication side effects:  None    Ability to successfully perform activities of daily living:  No assistance needed    Home Safety:  No safety concerns identified    Hearing Impairment:  Difficulty following a conversation in a noisy restaurant or crowded room, find that men's voices are easier to understand than woman's and difficulty understanding soft or whispered speech    In the past 6 months, have you been bothered by leaking of urine?  No    In general, how would you rate your overall mental or emotional health?  Good      PHQ-2 Total Score: 0    Additional concerns today:  No    Do you feel safe in your environment? Yes    Have you ever done Advance Care Planning? (For example, a Health Directive, POLST, or a discussion with a medical provider or your loved ones about your wishes): Yes, advance care planning is on file.       Fall risk  Fallen 2 or more times in the past year?: No  Any fall with injury in the past year?: No    Cognitive Screening   1) Repeat 3 items (Leader, Season, Table)    2) Clock draw: NORMAL  3) 3 item recall: Recalls 3 objects  Results: 3 items recalled: COGNITIVE IMPAIRMENT LESS LIKELY    Mini-CogTM Copyright CALIN Mccain. Licensed by the author for use in [facility]; reprinted with permission (nori@.[email or web address]). All rights reserved.      "

## 2022-09-22 NOTE — PATIENT INSTRUCTIONS
Preventive Health Recommendations    See your health care provider every year (or as recommended) to:  Review health changes.   Discuss preventive care.    Review your medicines and supplements.  Consider having a mammogram at least every 2 years between the ages of 50 and 75 years old.  Yearly if desired.  Consider colon cancer screening between the ages of 50 and 75 years old if necessary.    Cholesterol and diabetes testing as necessary.  At age 65, consider haveing a bone density scan (DEXA) to check for osteoporosis.    Shots:  COVID vaccination if you have not had it yet.  Get a flu shot each year.  Get a tetanus shot every 10 years if you are under the age of 80 years old.  Talk to your doctor about a one time pneumonia vaccine that is recommended at the age of 65 years old.  Talk to your pharmacist about the shingles vaccine.  This is often better covered in the pharmacy through your insurance than if you have it in the clinic.    Lifestyle  Exercise at least 150 minutes a week (30 minutes a day, 5 days a week) if you are able. This will help you control your weight and prevent disease.  Limit alcohol to one drink per day.  No smoking.   Wear sunscreen to prevent skin cancer.   See your dentist twice a year for an exam and cleaning.  See your eye doctor every 1 to 2 years to screen for conditions such as glaucoma, macular degeneration and cataracts.    Personalized Prevention Plan  You are due for the preventive services outlined below.  Your care team is available to assist you in scheduling these services.  If you have already completed any of these items, please share that information with your care team to update in your medical record.    Health Maintenance   Topic Date Due    ANNUAL REVIEW OF HM ORDERS  Never done    ZOSTER IMMUNIZATION (2 of 3) 03/29/2014    INFLUENZA VACCINE (1) 09/01/2022    COVID-19 Vaccine (5 - Booster for Pfizer series) 09/16/2022    LUNG CANCER SCREENING  04/20/2023    MEDICARE  ANNUAL WELLNESS VISIT  09/22/2023    FALL RISK ASSESSMENT  09/22/2023    DTAP/TDAP/TD IMMUNIZATION (2 - Td or Tdap) 05/19/2025    LIPID  09/20/2026    ADVANCE CARE PLANNING  09/22/2027    DEXA  07/08/2030    HEPATITIS C SCREENING  Completed    PHQ-2 (once per calendar year)  Completed    Pneumococcal Vaccine: 65+ Years  Completed    IPV IMMUNIZATION  Aged Out    MENINGITIS IMMUNIZATION  Aged Out    HEPATITIS B IMMUNIZATION  Aged Out          Patient Education     Understanding USDA MyPlate  The USDA has guidelines to help you make healthy food choices. These are called MyPlate. MyPlate shows the food groups that make up healthy meals using the image of a place setting. Before you eat, think about the healthiest choices for what to put on your plate or in your cup or bowl. To learn more about building a healthy plate, visit www.choosemyplate.gov.    The food groups  Fruits. Any fruit or 100% fruit juice counts as part of the Fruit Group. Fruits may be fresh, canned, frozen, or dried, and may be whole, cut-up, or pureed. Make 1/2 of your plate fruits and vegetables.  Vegetables. Any vegetable or 100% vegetable juice counts as a member of the Vegetable Group. Vegetables may be fresh, frozen, canned, or dried. They can be served raw or cooked and may be whole, cut-up, or mashed. Make 1/2 of your plate fruits and vegetables.  Grains. All foods made from grains are part of the Grains Group. These include wheat, rice, oats, cornmeal, and barley. Grains are often used to make foods such as bread, pasta, oatmeal, cereal, tortillas, and grits. Grains should be no more than 1/4 of your plate. At least half of your grains should be whole grains.  Protein. This group includes meat, poultry, seafood, beans and peas, eggs, processed soy products (such as tofu), nuts (including nut butters), and seeds. Make protein choices no more than 1/4 of your plate. Meat and poultry choices should be lean or low fat.  Dairy. The Dairy Group  includes all fluid milk products and foods made from milk that contain calcium, such as yogurt and cheese. (Foods that have little calcium, such as cream, butter, and cream cheese, are not part of this group.) Most dairy choices should be low-fat or fat-free.  Oils. Oils aren't a food group, but they do contain essential nutrients. However it's important to watch your intake of oils. These are fats that are liquid at room temperature. They include canola, corn, olive, soybean, vegetable, and sunflower oil. Foods that are mainly oil include mayonnaise, certain salad dressings, and soft margarines. You likely already get your daily oil allowance from the foods you eat.  Things to limit  Eating healthy also means limiting these things in your diet:     Salt (sodium). Many processed foods have a lot of sodium. To keep sodium intake down, eat fresh vegetables, meats, poultry, and seafood when possible. Purchase low-sodium, reduced-sodium, or no-salt-added food products at the store. And don't add salt to your meals at home. Instead, season them with herbs and spices such as dill, oregano, cumin, and paprika. Or try adding flavor with lemon or lime zest and juice.  Saturated fat. Saturated fats are most often found in animal products such as beef, pork, and chicken. They are often solid at room temperature, such as butter. To reduce your saturated fat intake, choose leaner cuts of meat and poultry. And try healthier cooking methods such as grilling, broiling, roasting, or baking. For a simple lower-fat swap, use plain nonfat yogurt instead of mayonnaise when making potato salad or macaroni salad.  Added sugars. These are sugars added to foods. They are in foods such as ice cream, candy, soda, fruit drinks, sports drinks, energy drinks, cookies, pastries, jams, and syrups. Cut down on added sugars by sharing sweet treats with a family member or friend. You can also choose fruit for dessert, and drink water or other  unsweetened beverages.     [employer] last reviewed this educational content on 6/1/2020 2000-2021 [employer]. All rights reserved. This information is not intended as a substitute for professional medical care. Always follow your healthcare professional's instructions.          Signs of Hearing Loss      Hearing much better with one ear can be a sign of hearing loss.   Hearing loss is a problem shared by many people. In fact, it is one of the most common health problems, particularly as people age. Most people age 65 and older have some hearing loss. By age 80, almost everyone does. Hearing loss often occurs slowly over the years. So you may not realize your hearing has gotten worse.  Have your hearing checked  Call your healthcare provider if you:  Have to strain to hear normal conversation  Have to watch other people s faces very carefully to follow what they re saying  Need to ask people to repeat what they ve said  Often misunderstand what people are saying  Turn the volume of the television or radio up so high that others complain  Feel that people are mumbling when they re talking to you  Find that the effort to hear leaves you feeling tired and irritated  Notice, when using the phone, that you hear better with one ear than the other  [employer] last reviewed this educational content on 1/1/2020 2000-2021 [employer]. All rights reserved. This information is not intended as a substitute for professional medical care. Always follow your healthcare professional's instructions.

## 2022-09-26 ENCOUNTER — RADIOLOGY INJECTION OFFICE VISIT (OUTPATIENT)
Dept: PHYSICAL MEDICINE AND REHAB | Facility: CLINIC | Age: 77
End: 2022-09-26
Attending: PAIN MEDICINE
Payer: COMMERCIAL

## 2022-09-26 VITALS
OXYGEN SATURATION: 99 % | SYSTOLIC BLOOD PRESSURE: 176 MMHG | DIASTOLIC BLOOD PRESSURE: 79 MMHG | HEART RATE: 79 BPM | TEMPERATURE: 98.1 F

## 2022-09-26 DIAGNOSIS — M48.062 SPINAL STENOSIS OF LUMBAR REGION WITH NEUROGENIC CLAUDICATION: ICD-10-CM

## 2022-09-26 DIAGNOSIS — M51.26 LUMBAR DISC HERNIATION: ICD-10-CM

## 2022-09-26 DIAGNOSIS — M54.16 LUMBAR RADICULITIS: ICD-10-CM

## 2022-09-26 ASSESSMENT — PAIN SCALES - GENERAL: PAINLEVEL: EXTREME PAIN (8)

## 2022-09-26 NOTE — PROGRESS NOTES
Lashawn Sutherlandson is here today for bilateral L4-5 TFESI injections.  The patient is not able to have the injection today because she had a COVID vaccine on 9/22/2022. The recommendation is that there be 2 weeks between vaccines and steroid injections so the vaccine can be as effective as possible. The patient stated understanding and was in agreement with this.  The patient will be rescheduled for 10/6 or later.      No charge for today s visit.

## 2022-10-03 ENCOUNTER — TRANSFERRED RECORDS (OUTPATIENT)
Dept: HEALTH INFORMATION MANAGEMENT | Facility: CLINIC | Age: 77
End: 2022-10-03

## 2022-10-14 ENCOUNTER — TRANSFERRED RECORDS (OUTPATIENT)
Dept: HEALTH INFORMATION MANAGEMENT | Facility: CLINIC | Age: 77
End: 2022-10-14

## 2022-10-17 ENCOUNTER — ANCILLARY PROCEDURE (OUTPATIENT)
Dept: MAMMOGRAPHY | Facility: CLINIC | Age: 77
End: 2022-10-17
Attending: INTERNAL MEDICINE
Payer: COMMERCIAL

## 2022-10-17 ENCOUNTER — RADIOLOGY INJECTION OFFICE VISIT (OUTPATIENT)
Dept: PHYSICAL MEDICINE AND REHAB | Facility: CLINIC | Age: 77
End: 2022-10-17
Attending: PAIN MEDICINE
Payer: COMMERCIAL

## 2022-10-17 VITALS
TEMPERATURE: 98.2 F | DIASTOLIC BLOOD PRESSURE: 80 MMHG | OXYGEN SATURATION: 97 % | HEART RATE: 82 BPM | SYSTOLIC BLOOD PRESSURE: 151 MMHG | RESPIRATION RATE: 16 BRPM

## 2022-10-17 DIAGNOSIS — Z12.31 VISIT FOR SCREENING MAMMOGRAM: ICD-10-CM

## 2022-10-17 PROCEDURE — 77067 SCR MAMMO BI INCL CAD: CPT

## 2022-10-17 PROCEDURE — 64483 NJX AA&/STRD TFRM EPI L/S 1: CPT | Mod: 50 | Performed by: PAIN MEDICINE

## 2022-10-17 RX ORDER — DEXAMETHASONE SODIUM PHOSPHATE 10 MG/ML
INJECTION, SOLUTION INTRAMUSCULAR; INTRAVENOUS
Status: COMPLETED | OUTPATIENT
Start: 2022-10-17 | End: 2022-10-17

## 2022-10-17 RX ORDER — LIDOCAINE HYDROCHLORIDE 10 MG/ML
INJECTION, SOLUTION EPIDURAL; INFILTRATION; INTRACAUDAL; PERINEURAL
Status: COMPLETED | OUTPATIENT
Start: 2022-10-17 | End: 2022-10-17

## 2022-10-17 RX ADMIN — LIDOCAINE HYDROCHLORIDE 4 ML: 10 INJECTION, SOLUTION EPIDURAL; INFILTRATION; INTRACAUDAL; PERINEURAL at 13:13

## 2022-10-17 RX ADMIN — DEXAMETHASONE SODIUM PHOSPHATE 20 MG: 10 INJECTION, SOLUTION INTRAMUSCULAR; INTRAVENOUS at 13:16

## 2022-10-17 ASSESSMENT — PAIN SCALES - GENERAL: PAINLEVEL: EXTREME PAIN (9)

## 2022-10-17 NOTE — PATIENT INSTRUCTIONS
Follow-up visit with Dr. Worthington in 2 weeks to discuss injection outcome and determine care plan going forward.       DISCHARGE INSTRUCTIONS    During office hours (8:00 a.m.- 4:00 p.m.) questions or concerns may be answered  by calling [facility] Nurses at  [phone].  Messages received after hours will be returned the following business day.      In the case of an emergency, please dial 911 or seek assistance at the nearest Emergency Room/Urgent Care facility.     All Patients:    You may experience an increase in your symptoms for the first 2 days (It may take anywhere between 2 days- 2 weeks for the steroid to have maximum effect).    You may use ice on the injection site, as frequently as 20 minutes each hour if needed.    You may take your pain medicine.    You may continue taking your regular medication after your injection. If you have had a Medial Branch Block you may resume pain medication once your pain diary is completed.    You may shower. No swimming, tub bath or hot tub for 48 hours.  You may remove your bandaid/bandage as soon as you are home.    You may resume light activities, as tolerated.    Resume your usual diet as tolerated.    It is strongly advised that you do not drive for 1-3 hours post injection.    If you have had oral sedation:  Do not drive for 8 hours post injection.      If you have had IV sedation:  Do not drive for 24 hours post injection.  Do not operate hazardous machinery or make important personal/business decisions for 24 hours.      POSSIBLE STEROID SIDE EFFECTS (If steroid/cortisone was used for your procedure)    -If you experience these symptoms, it should only last for a short period    Swelling of the legs              Skin redness (flushing)     Mouth (oral) irritation   Blood sugar (glucose) levels            Sweats                    Mood changes  Headache  Sleeplessness  Weakened immune system for up to 14 days, which could increase the risk of  dimas the COVID-19 virus and/or experiencing more severe symptoms of the disease, if exposed.  Decreased effectiveness of the flu vaccine if given within 2 weeks of the steroid.         POSSIBLE PROCEDURE SIDE EFFECTS  -Call the Spine Center if you are concerned  Increased Pain           Increased numbness/tingling      Nausea/Vomiting          Bruising/bleeding at site      Redness or swelling                                              Difficulty walking      Weakness           Fever greater than 100.5    *In the event of a severe headache after an epidural steroid injection that is relieved by lying down, please call the [facility] to speak with a clinical staff member*

## 2022-10-18 ENCOUNTER — MYC MEDICAL ADVICE (OUTPATIENT)
Dept: INTERNAL MEDICINE | Facility: CLINIC | Age: 77
End: 2022-10-18

## 2022-11-09 ENCOUNTER — OFFICE VISIT (OUTPATIENT)
Dept: PHYSICAL MEDICINE AND REHAB | Facility: CLINIC | Age: 77
End: 2022-11-09
Payer: COMMERCIAL

## 2022-11-09 VITALS — OXYGEN SATURATION: 98 % | DIASTOLIC BLOOD PRESSURE: 64 MMHG | SYSTOLIC BLOOD PRESSURE: 155 MMHG | HEART RATE: 98 BPM

## 2022-11-09 DIAGNOSIS — M54.16 LUMBAR RADICULITIS: ICD-10-CM

## 2022-11-09 DIAGNOSIS — M48.062 SPINAL STENOSIS OF LUMBAR REGION WITH NEUROGENIC CLAUDICATION: Primary | ICD-10-CM

## 2022-11-09 DIAGNOSIS — M70.61 GREATER TROCHANTERIC BURSITIS OF RIGHT HIP: ICD-10-CM

## 2022-11-09 PROCEDURE — 99214 OFFICE O/P EST MOD 30 MIN: CPT | Performed by: PAIN MEDICINE

## 2022-11-09 ASSESSMENT — PAIN SCALES - GENERAL: PAINLEVEL: SEVERE PAIN (6)

## 2022-11-09 NOTE — PATIENT INSTRUCTIONS
I ordered physical therapy for you again.    ~Please call Nurse Navigation line [phone] with any questions or concerns about your treatment plan, if symptoms worsen and you would like to be seen urgently, or if you have problems controlling bladder and bowel function.

## 2022-11-09 NOTE — PROGRESS NOTES
Assessment:     Diagnoses and all orders for this visit:  Spinal stenosis of lumbar region with neurogenic claudication  -     Physical Therapy Referral; Future  Lumbar radiculitis  -     Physical Therapy Referral; Future  Greater trochanteric bursitis of right hip  -     Physical Therapy Referral; Future     Lashawn Ortega is a 77 year old y.o. female with past medical history significant for reflux, migraine headache, low back pain, peripheral neuropathy, melanoma of right upper arm, stroke, elevated liver enzymes who presents today for follow-up regarding low back and right lower extremity pain:    -Patient had good relief at first with her epidural steroid injections.  Pain is somewhat returned now.  I reordered some physical therapy for her.  She did pain not improve with physical therapy and I will order a new MRI of her lumbar spine.     Plan:     A shared decision making plan was used. The patient's values and choices were respected. Prior medical records from our last visit on 8/29/2022 were reviewed today. The following represents what was discussed and decided upon by the provider and the patient.        -DIAGNOSTIC TESTS: Images were personally reviewed and interpreted.   --MRI of the lumbar spine dated 7/11/2019 is personally reviewed and images interpreted and shown to patient.  There is multilevel degenerative changes in lumbar spine.  There is severe spinal canal stenosis at L3-4.  There is moderate spinal canal stenosis at L4-5.  There is mild to moderate spinal canal stenosis at L2-3.  There is no severe foraminal stenosis.  --CT of abdomen and pelvis July 2020.  I did not see any compression fractures.    -INTERVENTIONS: No interventions at this time.    -MEDICATIONS: No changes to medications.  -  Discussed side effects of medications and proper use. Patient verbalized understanding.    -PHYSICAL THERAPY: I ordered physical therapy for her.  Discussed the importance of core strengthening, ROM,  stretching exercises with the patient and how each of these entities is important in decreasing pain.  Explained to the patient that the purpose of physical therapy is to teach the patient a home exercise program.  These exercises need to be performed every day in order to decrease pain and prevent future occurrences of pain.        -PATIENT EDUCATION: We discussed pain management in a multimodal fashion including physical therapy, medication management, possible future injections.    -FOLLOW UP: Patient will follow up after 8 weeks.  Advised to contact clinic if symptoms worsen or change.    Subjective:     Lashawn Ortega is a 77 year old female who presents today for follow-up regarding low back and lower extremity pain.  Patient notes that at first she was doing very well with her injection and things are much improved, however she became sick and thereafter pain returned.  Pain today is 6/10.  She feels that she is still doing somewhat better than she was.  She has pain down the right low back and buttock and into the calf.  She notes that she has difficulty with bending and hanging up close in the closet.  Walking is also difficult.  Laying down or sitting helps.  She denies any bowel or bladder changes, fevers, chills, unintentional weight loss.    Treatment to Date: MRI of lumbar spine dated 7/11/2019.  Right L5-S1 transforaminal epidural steroid injection done on 8/12/2019.  Several sessions of in-home physical therapy.  Bilateral L4-5 transforaminal epidural steroid injections done on 8/11/2020.  Right L4-5 transforaminal epidural steroid injection done on 5/7/2021.  Right greater trochanter bursa injection under ultrasound guidance on 6/11/2021.  Bilateral L4-5 transforaminal epidural steroid injections done on 9/26/2022.    Patient Active Problem List   Diagnosis     GERD (gastroesophageal reflux disease)     Migraine headache     LBP (low back pain)     Nonsmoker     Menopausal hot flushes     Full code  status     Peripheral neuropathy     Melanoma of right upper arm (H) - On Keytruda in the past - stage IIIA     Ischemic cerebrovascular accident (CVA) of frontal lobe (H) - 2/2020     Aphasia     Lumbar spinal stenosis     Essential tremor     On antineoplastic chemotherapy - PEMBROLIZUMAB - checkpoint inhibitor - in the past for melanoma     Female stress incontinence     Seronegative rheumatoid arthritis of both hands (H)     Rosacea       Current Outpatient Medications   Medication     aloe vera Gel     aspirin 81 MG EC tablet     azelaic acid (FINACIA) 15 % external gel     CRESTOR 5 MG tablet     cyclobenzaprine (FLEXERIL) 5 MG tablet     diphenhydrAMINE-Acetaminophen (PERCOGESIC PO)     fluticasone (FLONASE) 50 MCG/ACT nasal spray     guaiFENesin-dextromethorphan (MUCINEX DM) 600-30 mg Tb12     menthol (BIOFREEZE, MENTHOL,) 4 % Gel     metroNIDAZOLE (METROCREAM) 0.75 % external cream     naproxen (NAPROSYN) 500 MG tablet     neomycin-bacitracin-polymyxin (NEOSPORIN) ointment     NEURONTIN 300 MG capsule     PLAQUENIL 200 MG tablet     PROTONIX 40 MG EC tablet     Pseudoephedrine-DM-GG (ROBITUSSIN COUGH/COLD D PO)     UNABLE TO FIND     UNABLE TO FIND     No current facility-administered medications for this visit.       Allergies   Allergen Reactions     Erythromycin Base [Erythromycin] Rash     PARALYSIS, EYE SWELLING, HEADACHE     Codeine Dizziness     Other: extreme weakness in legspt stopped taking medication and sxs were gone within a few hours       Hydrocodone Nausea and Headache     Keytruda [Pembrolizumab] Other (See Comments)     Elevated liver function tests (lfts)      Tramadol Diarrhea, Dizziness, Headache and Nausea and Vomiting     Other Environmental Allergy Rash     mildew     Penicillins Swelling and Rash     Shellfish Containing Products [Shellfish-Derived Products] Swelling and Rash     shrimp     Sulfa (Sulfonamide Antibiotics) [Sulfa Drugs] Rash     Burning body rash     Vitamin D3  "Complete [External Allergen Needs Reconciliation - See Comment] Rash     Higher dosage makes her break out       Past Medical History:   Diagnosis Date     Aphasia      Asthma      GERD (gastroesophageal reflux disease)      History of transfusion      Lumbar spinal stenosis 2/28/2020    EXAM: MR LUMBAR SPINE W WO CONTRAST LOCATION: [facility] DATE/TIME: 7/11/2019 4:49 PM   INDICATION: Back pain going down the right leg.  Going on for 3 months.  Not improving. See above. History of melanoma. COMPARISON: None. CONTRAST: Gadavist 9. TECHNIQUE: Without and with IV contrast   FINDINGS:  Nomenclature is based on 5 lumbar type vertebral bodies. There is no concerning marrow rep     Malignant melanoma of right upper arm (H)      Migraine headache      On antineoplastic chemotherapy     pembrolizumab     PN (peripheral neuropathy)      PONV (postoperative nausea and vomiting)     \"cold sweats\"     S/P colonoscopy      Stroke (H) 02/2020        Review of Systems  ROS:  Specifically negative for bowel/bladder dysfunction, balance changes, headache, dizziness, foot drop, fevers, chills, appetite changes, nausea/vomiting, unexplained weight loss. Otherwise 13 systems reviewed are negative. Please see the patient's intake questionnaire from today for details.    Reviewed Social, Family, Past Medical and Past Surgical history with patient, no significant changes noted since prior visit.     Objective:     BP (!) 155/64   Pulse 98   SpO2 98%     PHYSICAL EXAMINATION:    --CONSTITUTIONAL: Well developed, well nourished, healthy appearing individual.  --PSYCHIATRIC: Appropriate mood and affect. No difficulty interacting due to temper, social withdrawal, or memory issues.  --SKIN: Lumbar region is dry and intact.   --RESPIRATORY: Normal rhythm and effort. No abnormal accessory muscle breathing patterns noted.   --MUSCULOSKELETAL:  Normal lumbar lordosis noted, no lateral shift.  --GROSS MOTOR: Easily arises from a seated " position. Gait is non-antalgic  --LUMBAR SPINE:  Inspection reveals no evidence of deformity. Range of motion is mildly limited in lumbar flexion, extension, lateral rotation.  Tenderness palpation across the low back straight leg raising in the seated position is negative to radicular pain.   --SACROILIAC JOINT:  One Finger point test negative.  --LOWER EXTREMITY MOTOR TESTING:  Plantar flexion left 5/5, right 5/5   Dorsiflexion left 5/5, right 5/5   Great toe MTP extension left 5/5, right 5/5  Knee flexion left 5/5, right 5/5  Knee extension left 5/5, right 5/5   Hip flexion left 5/5, right 5/5  Hip abduction left 5/5, right 5/5  Hip adduction left 5/5, right 5/5   --NEUROLOGIC: Sensation to light touch is intact in the bilateral L4, L5, and S1 dermatomes.    RESULTS:   Imaging: Lumbar spine imaging was reviewed today.     MA Screen Bilateral w/Dustin    Result Date: 10/17/2022  BILATERAL FULL FIELD DIGITAL SCREENING MAMMOGRAM WITH TOMOSYNTHESIS Performed on: 10/17/22 Compared to: 10/15/2021, 10/14/2020, 09/17/2019, 09/13/2018, 07/13/2017, 07/11/2016, 06/30/2015, 06/25/2014, 06/12/2013, and 07/28/2010 Technique:  This study was evaluated with the assistance of Computer-Aided Detection.  Breast Tomosynthesis was used in interpretation. Findings: The breasts have scattered areas of fibroglandular density.  There is no radiographic evidence of malignancy.     IMPRESSION: ACR BI-RADS Category 1: Negative RECOMMENDED FOLLOW-UP: Annual routine screening mammogram The results and recommendations of this examination will be communicated to the patient.     PAIN Transforaminal DORON Inj Lumbosacral One Level Bilateral    Result Date: 10/17/2022  LUMBAR EPIDURAL STEROID INJECTION TRANSFORAMINAL APPROACH WITH FLUOROSCOPIC GUIDANCE Performed on: 10/17/22 Pre Procedure Diagnosis:  LBP, Lumbar radiculitis Post Procedure Diagnosis:  Same Procedure Performed:  Lumbar Transforaminal Epidural Steroid Injection with Fluoroscopic  "Guidance Clinical Scenario:  As per office notes Anesthesia/Fluids:  As per intra-procedure documentation Vital Signs:  As per intra-procedure documentation Level Injected: L4-5 Side Injected: Bilateral CC: Lashawn Ortega  is a 77 year-old female who presents today for a bilateral L4-5 transforminal epidural steroid injections as ordered by Dr. Worthington.  The patient complains of low back and lower extremity pain.  The patient's MRI is reviewed and shows L4-5 foraminal narrowing.  The patient would like to proceed with the injection today. The patient denies any symptoms of an active infection and denies taking antibiotics. The patient denies taking any prescription blood thinning medications. The patient denies any allergies to iodine or iodine contrast.  The patient has had other conservative treatment but wishes to pursue spine injections.  The procedure of lumbar transforaminal epidural steroid injection was discussed in detail, using a spine model to demonstrate.  The patient had the opportunity to directly ask the physician questions regarding the procedure and the questions were answered prior to the consent form being presented.  The risks of the procedure, including but not limited to:  Back pain/soreness, infection, bleeding, permanent, nerve damage/injury, epidural hematoma (with the need for surgical evaluation), paralysis, allergic reaction,  worsening of back/leg pain or no change in pain. The patient elected to proceed and signed informed consent.   The patient was placed in a prone position on the fluoroscopy table.  A procedure pause was performed to verify the patient's identity, site, and side of the procedure.    The lower back was prepped and draped in usual fashion.  After anesthetizing the skin with 1% lidocaine, a 5\", 22 gauge needle was introduced at the rightL4 pedicle under fluoroscopic guidance.  After aspiration was negative for blood or CSF, 1 mL of Omnipaque-300 was injected on each " side.  An epidural flow pattern without vascular uptake was seen.   Subsequently, 1 mL of 1% lidocaine was injected on the right side.  After waiting 2 minutes the patient sensation and muscle strength was tested and found to have no substantial change in strength or sensation.  Then 1 mL 10 mg of dexamethasone was slowly injected on the right side.  The exact same procedure was repeated on the left side with with a test dose of 1 mL of 1% lidocaine, followed by a 1 mL 10 mg of dexamethasone was injected epidural flow pattern was seen. PRE-PROCEDURE PAIN SCORE: 9/10 POST-PROCEDURE PAIN SCORE:  6/10  The patient tolerated the procedure well.  After a short period of observation the patient was discharged under their own power.   The patient was instructed to call the [facility] Clinic if any questions/concerns arise after the procedure.  The patient will follow up with Dr. Worthington in 2 to 4 weeks.

## 2022-11-09 NOTE — LETTER
11/9/2022         RE: Lashawn Ortega  [address] [place] [address]        Dear Colleague,    Thank you for referring your patient, Lashawn Ortega, to the [facility]. Please see a copy of my visit note below.      Assessment:     Diagnoses and all orders for this visit:  Spinal stenosis of lumbar region with neurogenic claudication  -     Physical Therapy Referral; Future  Lumbar radiculitis  -     Physical Therapy Referral; Future  Greater trochanteric bursitis of right hip  -     Physical Therapy Referral; Future     Lashawn Ortega is a 77 year old y.o. female with past medical history significant for reflux, migraine headache, low back pain, peripheral neuropathy, melanoma of right upper arm, stroke, elevated liver enzymes who presents today for follow-up regarding low back and right lower extremity pain:    -Patient had good relief at first with her epidural steroid injections.  Pain is somewhat returned now.  I reordered some physical therapy for her.  She did pain not improve with physical therapy and I will order a new MRI of her lumbar spine.     Plan:     A shared decision making plan was used. The patient's values and choices were respected. Prior medical records from our last visit on 8/29/2022 were reviewed today. The following represents what was discussed and decided upon by the provider and the patient.        -DIAGNOSTIC TESTS: Images were personally reviewed and interpreted.   --MRI of the lumbar spine dated 7/11/2019 is personally reviewed and images interpreted and shown to patient.  There is multilevel degenerative changes in lumbar spine.  There is severe spinal canal stenosis at L3-4.  There is moderate spinal canal stenosis at L4-5.  There is mild to moderate spinal canal stenosis at L2-3.  There is no severe foraminal stenosis.  --CT of abdomen and pelvis July 2020.  I did not see any compression fractures.    -INTERVENTIONS: No  interventions at this time.    -MEDICATIONS: No changes to medications.  -  Discussed side effects of medications and proper use. Patient verbalized understanding.    -PHYSICAL THERAPY: I ordered physical therapy for her.  Discussed the importance of core strengthening, ROM, stretching exercises with the patient and how each of these entities is important in decreasing pain.  Explained to the patient that the purpose of physical therapy is to teach the patient a home exercise program.  These exercises need to be performed every day in order to decrease pain and prevent future occurrences of pain.        -PATIENT EDUCATION: We discussed pain management in a multimodal fashion including physical therapy, medication management, possible future injections.    -FOLLOW UP: Patient will follow up after 8 weeks.  Advised to contact clinic if symptoms worsen or change.    Subjective:     Lashawn Ortega is a 77 year old female who presents today for follow-up regarding low back and lower extremity pain.  Patient notes that at first she was doing very well with her injection and things are much improved, however she became sick and thereafter pain returned.  Pain today is 6/10.  She feels that she is still doing somewhat better than she was.  She has pain down the right low back and buttock and into the calf.  She notes that she has difficulty with bending and hanging up close in the closet.  Walking is also difficult.  Laying down or sitting helps.  She denies any bowel or bladder changes, fevers, chills, unintentional weight loss.    Treatment to Date: MRI of lumbar spine dated 7/11/2019.  Right L5-S1 transforaminal epidural steroid injection done on 8/12/2019.  Several sessions of in-home physical therapy.  Bilateral L4-5 transforaminal epidural steroid injections done on 8/11/2020.  Right L4-5 transforaminal epidural steroid injection done on 5/7/2021.  Right greater trochanter bursa injection under ultrasound guidance on  6/11/2021.  Bilateral L4-5 transforaminal epidural steroid injections done on 9/26/2022.    Patient Active Problem List   Diagnosis     GERD (gastroesophageal reflux disease)     Migraine headache     LBP (low back pain)     Nonsmoker     Menopausal hot flushes     Full code status     Peripheral neuropathy     Melanoma of right upper arm (H) - On Keytruda in the past - stage IIIA     Ischemic cerebrovascular accident (CVA) of frontal lobe (H) - 2/2020     Aphasia     Lumbar spinal stenosis     Essential tremor     On antineoplastic chemotherapy - PEMBROLIZUMAB - checkpoint inhibitor - in the past for melanoma     Female stress incontinence     Seronegative rheumatoid arthritis of both hands (H)     Rosacea       Current Outpatient Medications   Medication     aloe vera Gel     aspirin 81 MG EC tablet     azelaic acid (FINACIA) 15 % external gel     CRESTOR 5 MG tablet     cyclobenzaprine (FLEXERIL) 5 MG tablet     diphenhydrAMINE-Acetaminophen (PERCOGESIC PO)     fluticasone (FLONASE) 50 MCG/ACT nasal spray     guaiFENesin-dextromethorphan (MUCINEX DM) 600-30 mg Tb12     menthol (BIOFREEZE, MENTHOL,) 4 % Gel     metroNIDAZOLE (METROCREAM) 0.75 % external cream     naproxen (NAPROSYN) 500 MG tablet     neomycin-bacitracin-polymyxin (NEOSPORIN) ointment     NEURONTIN 300 MG capsule     PLAQUENIL 200 MG tablet     PROTONIX 40 MG EC tablet     Pseudoephedrine-DM-GG (ROBITUSSIN COUGH/COLD D PO)     UNABLE TO FIND     UNABLE TO FIND     No current facility-administered medications for this visit.       Allergies   Allergen Reactions     Erythromycin Base [Erythromycin] Rash     PARALYSIS, EYE SWELLING, HEADACHE     Codeine Dizziness     Other: extreme weakness in legspt stopped taking medication and sxs were gone within a few hours       Hydrocodone Nausea and Headache     Keytruda [Pembrolizumab] Other (See Comments)     Elevated liver function tests (lfts)      Tramadol Diarrhea, Dizziness, Headache and Nausea and  "Vomiting     Other Environmental Allergy Rash     mildew     Penicillins Swelling and Rash     Shellfish Containing Products [Shellfish-Derived Products] Swelling and Rash     shrimp     Sulfa (Sulfonamide Antibiotics) [Sulfa Drugs] Rash     Burning body rash     Vitamin D3 Complete [External Allergen Needs Reconciliation - See Comment] Rash     Higher dosage makes her break out       Past Medical History:   Diagnosis Date     Aphasia      Asthma      GERD (gastroesophageal reflux disease)      History of transfusion      Lumbar spinal stenosis 2/28/2020    EXAM: MR LUMBAR SPINE W WO CONTRAST LOCATION: [facility] DATE/TIME: 7/11/2019 4:49 PM   INDICATION: Back pain going down the right leg.  Going on for 3 months.  Not improving. See above. History of melanoma. COMPARISON: None. CONTRAST: Gadavist 9. TECHNIQUE: Without and with IV contrast   FINDINGS:  Nomenclature is based on 5 lumbar type vertebral bodies. There is no concerning marrow rep     Malignant melanoma of right upper arm (H)      Migraine headache      On antineoplastic chemotherapy     pembrolizumab     PN (peripheral neuropathy)      PONV (postoperative nausea and vomiting)     \"cold sweats\"     S/P colonoscopy      Stroke (H) 02/2020        Review of Systems  ROS:  Specifically negative for bowel/bladder dysfunction, balance changes, headache, dizziness, foot drop, fevers, chills, appetite changes, nausea/vomiting, unexplained weight loss. Otherwise 13 systems reviewed are negative. Please see the patient's intake questionnaire from today for details.    Reviewed Social, Family, Past Medical and Past Surgical history with patient, no significant changes noted since prior visit.     Objective:     BP (!) 155/64   Pulse 98   SpO2 98%     PHYSICAL EXAMINATION:    --CONSTITUTIONAL: Well developed, well nourished, healthy appearing individual.  --PSYCHIATRIC: Appropriate mood and affect. No difficulty interacting due to temper, social " withdrawal, or memory issues.  --SKIN: Lumbar region is dry and intact.   --RESPIRATORY: Normal rhythm and effort. No abnormal accessory muscle breathing patterns noted.   --MUSCULOSKELETAL:  Normal lumbar lordosis noted, no lateral shift.  --GROSS MOTOR: Easily arises from a seated position. Gait is non-antalgic  --LUMBAR SPINE:  Inspection reveals no evidence of deformity. Range of motion is mildly limited in lumbar flexion, extension, lateral rotation.  Tenderness palpation across the low back straight leg raising in the seated position is negative to radicular pain.   --SACROILIAC JOINT:  One Finger point test negative.  --LOWER EXTREMITY MOTOR TESTING:  Plantar flexion left 5/5, right 5/5   Dorsiflexion left 5/5, right 5/5   Great toe MTP extension left 5/5, right 5/5  Knee flexion left 5/5, right 5/5  Knee extension left 5/5, right 5/5   Hip flexion left 5/5, right 5/5  Hip abduction left 5/5, right 5/5  Hip adduction left 5/5, right 5/5   --NEUROLOGIC: Sensation to light touch is intact in the bilateral L4, L5, and S1 dermatomes.    RESULTS:   Imaging: Lumbar spine imaging was reviewed today.     MA Screen Bilateral w/Dustin    Result Date: 10/17/2022  BILATERAL FULL FIELD DIGITAL SCREENING MAMMOGRAM WITH TOMOSYNTHESIS Performed on: 10/17/22 Compared to: 10/15/2021, 10/14/2020, 09/17/2019, 09/13/2018, 07/13/2017, 07/11/2016, 06/30/2015, 06/25/2014, 06/12/2013, and 07/28/2010 Technique:  This study was evaluated with the assistance of Computer-Aided Detection.  Breast Tomosynthesis was used in interpretation. Findings: The breasts have scattered areas of fibroglandular density.  There is no radiographic evidence of malignancy.     IMPRESSION: ACR BI-RADS Category 1: Negative RECOMMENDED FOLLOW-UP: Annual routine screening mammogram The results and recommendations of this examination will be communicated to the patient.     PAIN Transforaminal DORON Inj Lumbosacral One Level Bilateral    Result Date:  10/17/2022  LUMBAR EPIDURAL STEROID INJECTION TRANSFORAMINAL APPROACH WITH FLUOROSCOPIC GUIDANCE Performed on: 10/17/22 Pre Procedure Diagnosis:  LBP, Lumbar radiculitis Post Procedure Diagnosis:  Same Procedure Performed:  Lumbar Transforaminal Epidural Steroid Injection with Fluoroscopic Guidance Clinical Scenario:  As per office notes Anesthesia/Fluids:  As per intra-procedure documentation Vital Signs:  As per intra-procedure documentation Level Injected: L4-5 Side Injected: Bilateral CC: Lashawn Ortega  is a 77 year-old female who presents today for a bilateral L4-5 transforminal epidural steroid injections as ordered by Dr. Worthington.  The patient complains of low back and lower extremity pain.  The patient's MRI is reviewed and shows L4-5 foraminal narrowing.  The patient would like to proceed with the injection today. The patient denies any symptoms of an active infection and denies taking antibiotics. The patient denies taking any prescription blood thinning medications. The patient denies any allergies to iodine or iodine contrast.  The patient has had other conservative treatment but wishes to pursue spine injections.  The procedure of lumbar transforaminal epidural steroid injection was discussed in detail, using a spine model to demonstrate.  The patient had the opportunity to directly ask the physician questions regarding the procedure and the questions were answered prior to the consent form being presented.  The risks of the procedure, including but not limited to:  Back pain/soreness, infection, bleeding, permanent, nerve damage/injury, epidural hematoma (with the need for surgical evaluation), paralysis, allergic reaction,  worsening of back/leg pain or no change in pain. The patient elected to proceed and signed informed consent.   The patient was placed in a prone position on the fluoroscopy table.  A procedure pause was performed to verify the patient's identity, site, and side of the  "procedure.    The lower back was prepped and draped in usual fashion.  After anesthetizing the skin with 1% lidocaine, a 5\", 22 gauge needle was introduced at the rightL4 pedicle under fluoroscopic guidance.  After aspiration was negative for blood or CSF, 1 mL of Omnipaque-300 was injected on each side.  An epidural flow pattern without vascular uptake was seen.   Subsequently, 1 mL of 1% lidocaine was injected on the right side.  After waiting 2 minutes the patient sensation and muscle strength was tested and found to have no substantial change in strength or sensation.  Then 1 mL 10 mg of dexamethasone was slowly injected on the right side.  The exact same procedure was repeated on the left side with with a test dose of 1 mL of 1% lidocaine, followed by a 1 mL 10 mg of dexamethasone was injected epidural flow pattern was seen. PRE-PROCEDURE PAIN SCORE: 9/10 POST-PROCEDURE PAIN SCORE:  6/10  The patient tolerated the procedure well.  After a short period of observation the patient was discharged under their own power.   The patient was instructed to call the Spine Clinic if any questions/concerns arise after the procedure.  The patient will follow up with Dr. Worthington in 2 to 4 weeks.                              Again, thank you for allowing me to participate in the care of your patient.        Sincerely,        Carlos Alberto Worthington, DO    "

## 2022-12-07 ENCOUNTER — HOSPITAL ENCOUNTER (OUTPATIENT)
Dept: CT IMAGING | Facility: HOSPITAL | Age: 77
Discharge: HOME | End: 2022-12-07
Attending: INTERNAL MEDICINE | Admitting: INTERNAL MEDICINE
Payer: COMMERCIAL

## 2022-12-07 DIAGNOSIS — Z85.820 PERSONAL HISTORY OF MALIGNANT MELANOMA OF SKIN: ICD-10-CM

## 2022-12-07 LAB
CREAT BLD-MCNC: 0.8 MG/DL (ref 0.6–1.1)
GFR SERPL CREATININE-BSD FRML MDRD: >60 ML/MIN/1.73M2

## 2022-12-07 PROCEDURE — 82565 ASSAY OF CREATININE: CPT

## 2022-12-07 PROCEDURE — 250N000011 HC RX IP 250 OP 636: Performed by: INTERNAL MEDICINE

## 2022-12-07 PROCEDURE — 74177 CT ABD & PELVIS W/CONTRAST: CPT

## 2022-12-07 RX ORDER — IOPAMIDOL 755 MG/ML
100 INJECTION, SOLUTION INTRAVASCULAR ONCE
Status: COMPLETED | OUTPATIENT
Start: 2022-12-07 | End: 2022-12-07

## 2022-12-07 RX ADMIN — IOPAMIDOL 100 ML: 755 INJECTION, SOLUTION INTRAVENOUS at 10:25

## 2022-12-08 ENCOUNTER — TRANSFERRED RECORDS (OUTPATIENT)
Dept: HEALTH INFORMATION MANAGEMENT | Facility: CLINIC | Age: 77
End: 2022-12-08

## 2023-01-13 DIAGNOSIS — J31.0 CHRONIC RHINITIS: ICD-10-CM

## 2023-01-16 RX ORDER — FLUTICASONE PROPIONATE 50 MCG
SPRAY, SUSPENSION (ML) NASAL
Qty: 48 G | Refills: 2 | Status: SHIPPED | OUTPATIENT
Start: 2023-01-16 | End: 2023-09-28

## 2023-01-16 NOTE — TELEPHONE ENCOUNTER
"Routing refill request to provider for review/approval because:  A break in medication    Last Written Prescription Date:  8/19/21  Last Fill Quantity: 32 g,  # refills: 4   Last office visit provider:  9/22/22     Requested Prescriptions   Pending Prescriptions Disp Refills     fluticasone (FLONASE) 50 MCG/ACT nasal spray [Pharmacy Med Name: FLUTICASONE  SPR 50MCG]       Sig: SPRAY 1 TO 2 SPRAYS INTO BOTH NOSTRILS DAILY AS NEEDED FOR ALLERGIES       Nasal Allergy Protocol Passed - 1/16/2023 10:32 AM        Passed - Patient is age 12 or older        Passed - Recent (12 mo) or future (30 days) visit within the authorizing provider's specialty     Patient has had an office visit with the authorizing provider or a provider within the authorizing providers department within the previous 12 mos or has a future within next 30 days. See \"Patient Info\" tab in inbasket, or \"Choose Columns\" in Meds & Orders section of the refill encounter.              Passed - Medication is active on med list             David Yen RN 01/16/23 10:32 AM  "

## 2023-01-23 ENCOUNTER — HOSPITAL ENCOUNTER (OUTPATIENT)
Dept: PHYSICAL THERAPY | Facility: REHABILITATION | Age: 78
Discharge: HOME | End: 2023-01-23
Attending: PAIN MEDICINE
Payer: COMMERCIAL

## 2023-01-23 DIAGNOSIS — M25.551 CHRONIC HIP PAIN, BILATERAL: ICD-10-CM

## 2023-01-23 DIAGNOSIS — G89.29 CHRONIC HIP PAIN, BILATERAL: ICD-10-CM

## 2023-01-23 DIAGNOSIS — M62.81 MUSCLE WEAKNESS (GENERALIZED): ICD-10-CM

## 2023-01-23 DIAGNOSIS — M70.61 GREATER TROCHANTERIC BURSITIS OF RIGHT HIP: ICD-10-CM

## 2023-01-23 DIAGNOSIS — M48.062 SPINAL STENOSIS OF LUMBAR REGION WITH NEUROGENIC CLAUDICATION: ICD-10-CM

## 2023-01-23 DIAGNOSIS — M54.16 LUMBAR RADICULITIS: ICD-10-CM

## 2023-01-23 DIAGNOSIS — M25.552 CHRONIC HIP PAIN, BILATERAL: ICD-10-CM

## 2023-01-23 DIAGNOSIS — M54.50 CHRONIC BILATERAL LOW BACK PAIN WITHOUT SCIATICA: Primary | ICD-10-CM

## 2023-01-23 DIAGNOSIS — G89.29 CHRONIC BILATERAL LOW BACK PAIN WITHOUT SCIATICA: Primary | ICD-10-CM

## 2023-01-23 PROCEDURE — 97110 THERAPEUTIC EXERCISES: CPT | Mod: GP | Performed by: PHYSICAL THERAPIST

## 2023-01-23 PROCEDURE — 97161 PT EVAL LOW COMPLEX 20 MIN: CPT | Mod: GP | Performed by: PHYSICAL THERAPIST

## 2023-01-23 PROCEDURE — 97112 NEUROMUSCULAR REEDUCATION: CPT | Mod: GP | Performed by: PHYSICAL THERAPIST

## 2023-01-23 NOTE — PROGRESS NOTES
[facility]    OUTPATIENT PHYSICAL THERAPY ORTHOPEDIC EVALUATION  PLAN OF TREATMENT FOR OUTPATIENT REHABILITATION  (COMPLETE FOR INITIAL CLAIMS ONLY)  Patient's Last Name, First Name, M.I.  YOB: 1945  Lashawn Ortega    Provider s Name:  [facility]   Medical Record No.  [id number]   Start of Care Date:  01/23/23   Onset Date:  05/01/22   Type:     _X__PT   ___OT   ___SLP Medical Diagnosis:  Spinal stenosis of lumbar region with neurogenic claudication, Greater trochanteric bursitis of right hip, Lumbar radiculitis     PT Diagnosis:  chronic sarah lumbar spine pain, chronic sarah hip pain likely secondary to bursitis, muscle weakness   Visits from SOC:  1      _________________________________________________________________________________  Plan of Treatment/Functional Goals:  balance training, gait training, joint mobilization, manual therapy, neuromuscular re-education, ROM, strengthening, stretching     Cryotherapy, Hot packs, TENS     Goals  Goal Identifier: HEP  Goal Description: Pt will be independent in HEP to address impairments and improve function  Target Date: 03/24/23    Goal Identifier: Pain  Goal Description: Pt will report <5/10 pain throughout the day with bending, lifting and daily activities to return to PLOF  Target Date: 04/23/23    Goal Identifier: (P) Bend/lift  Goal Description: (P) Pt will bend and lift for 20 min with <4/10 pain to put her groceries away  Target Date: (P) 04/23/23                                                           Therapy Frequency:  other (see comments) (pt prefers every other week)  Predicted Duration of Therapy Intervention:  12 weeks for 6 sessions    Tona Carrera, PT                 I CERTIFY THE NEED FOR THESE SERVICES FURNISHED UNDER        THIS PLAN OF TREATMENT AND WHILE UNDER MY CARE     (Physician  "co-signature of this document indicates review and certification of the therapy plan).                     Certification Date From:  01/23/23   Certification Date To:  04/23/23    Referring Provider:  Carlos Alberto Worthington, DO    Initial Assessment        See Epic Evaluation Start of Care Date: 01/23/23 01/23/23 1000   General Information   Type of Visit Initial OP Ortho PT Evaluation   Start of Care Date 01/23/23   Referring Physician Carlos Alberto Worthington, DO   Patient/Family Goals Statement \"alignment, exercise\"   Certification Required? Yes   Medical Diagnosis Spinal stenosis of lumbar region with neurogenic claudication, Greater trochanteric bursitis of right hip, Lumbar radiculitis   Surgical/Medical history reviewed Yes   General Information Comments PMH: past medical history significant for reflux, migraine headache, low back pain, peripheral neuropathy, melanoma of right upper arm, stroke, elevated liver enzymes   Body Part(s)   Body Part(s) Lumbar Spine/SI   Presentation and Etiology   Pertinent history of current problem (include personal factors and/or comorbidities that impact the POC) Pt reports she has been doing better since her cortisone injections. Her pain increased in May 2022. In June, she had to crawl behind cabinets and empty things out overhead for a party. She was in awkward positions, bending and lifting more than normal. She has done PT in the past in late 2021. She reports the beginning of her day is better. She has difficulty with bending and lifting to put away groceries. She does do a few of the previous PT exercises throughout her day   Impairments A. Pain   Functional Limitations perform activities of daily living;perform desired leisure / sports activities   Symptom Location sarah lumbar spine, sarah hips   How/Where did it occur From Degenerative Joint Disease   Onset date of current episode/exacerbation 05/01/22   Pain rating (0-10 point scale) Best (/10);Worst (/10)   Best " (/10) 4   Worst (/10) 8   Pain/symptoms are:   (worse later throughout the day)   Pain/symptoms exacerbated by C. Lifting;D. Carrying;I. Bending;J. ADL;K. Home tasks  (standing at a counter)   Fall Risk Screen   Fall screen completed by PT   Have you fallen 2 or more times in the past year? No   Have you fallen and had an injury in the past year? No   Is patient a fall risk? No   Fall screen comments Uses walker safely   Abuse Screen (yes response referral indicated)   Feels Unsafe at Home or Work/School no   Feels Threatened by Someone no   Does Anyone Try to Keep You From Having Contact with Others or Doing Things Outside Your Home? no   Physical Signs of Abuse Present no   System Outcome Measures   Outcome Measures Low Back Pain (see Oswestry and Bernardo)  (28%)   Lumbar Spine/SI Objective Findings   Gait/Locomotion slow with use of 4ww   Flexion ROM to proximal knees with pain along sarah lumbar spine   Extension ROM severe with pain   Right Side Bending ROM severe   Left Side Bending ROM severe   Lumbar ROM Comment Rotation:R severe, L moderate   Pelvic Screen supine: R leg appears longer, L ASIS elevated, pt reports history of scoliosis   Hip Screen unable to roll onto right side due to hip pain   Palpation tender along sarah greater trochanters, not tender along sarah IT bands   Slump Test positive sarah   Planned Therapy Interventions   Planned Therapy Interventions balance training;gait training;joint mobilization;manual therapy;neuromuscular re-education;ROM;strengthening;stretching   Planned Modality Interventions   Planned Modality Interventions Cryotherapy;Hot packs;TENS   Clinical Impression   Criteria for Skilled Therapeutic Interventions Met yes, treatment indicated   PT Diagnosis chronic sarah lumbar spine pain, chronic sarah hip pain likely secondary to bursitis, muscle weakness   Influenced by the following impairments pain with transitional movements, decreased lumbar AROM with pain, positive neurodynamic  testing sarah   Functional limitations due to impairments standing, walking, bending, lifting, ADLs   Clinical Presentation Stable/Uncomplicated   Clinical Decision Making (Complexity) Low complexity   Therapy Frequency other (see comments)  (pt prefers every other week)   Predicted Duration of Therapy Intervention (days/wks) 12 weeks for 6 sessions   Risk & Benefits of therapy have been explained Yes   Patient, Family & other staff in agreement with plan of care Yes   Clinical Impression Comments Pt presents with sarah lumbar spine and hip pain that increased in May 2022. She has had relief in the past with a previous injection. She has been starting to improve over the last few months and continues to work on PT exercises from a previous episode of care. Pt is appropriate for skilled PT intervention to return to PLOF and improve quality of life following recent flare up of symptoms.   ORTHO GOALS   PT Ortho Eval Goals 1;2;3   Ortho Goal 1   Goal Identifier HEP   Goal Description Pt will be independent in HEP to address impairments and improve function   Target Date 03/24/23   Ortho Goal 2   Goal Identifier Pain   Goal Description Pt will report <5/10 pain throughout the day with bending, lifting and daily activities to return to PLOF   Target Date 04/23/23   Ortho Goal 3   Goal Identifier Bend/lift   Goal Description Pt will bend and lift for 20 min with <4/10 pain to put her groceries away   Target Date 04/23/23   Total Evaluation Time   PT Eval, Low Complexity Minutes (22566) 28   Therapy Certification   Certification date from 01/23/23   Certification date to 04/23/23   Medical Diagnosis Spinal stenosis of lumbar region with neurogenic claudication, Greater trochanteric bursitis of right hip, Lumbar radiculitis

## 2023-01-29 ENCOUNTER — MYC MEDICAL ADVICE (OUTPATIENT)
Dept: INTERNAL MEDICINE | Facility: CLINIC | Age: 78
End: 2023-01-29
Payer: COMMERCIAL

## 2023-01-30 NOTE — TELEPHONE ENCOUNTER
May offer an appointment on the week of February 6th in a reserved slot.    Renzo Newell MD  General Internal Medicine  [facility]  1/29/2023, 9:52 PM

## 2023-01-31 NOTE — TELEPHONE ENCOUNTER
Patient is scheduled with you on February 7th. Please advise on anything that can be done until appointment

## 2023-02-02 ENCOUNTER — HOSPITAL ENCOUNTER (OUTPATIENT)
Dept: PHYSICAL THERAPY | Facility: REHABILITATION | Age: 78
Discharge: HOME | End: 2023-02-02
Payer: COMMERCIAL

## 2023-02-02 DIAGNOSIS — M62.81 MUSCLE WEAKNESS (GENERALIZED): ICD-10-CM

## 2023-02-02 DIAGNOSIS — M54.16 LUMBAR RADICULITIS: ICD-10-CM

## 2023-02-02 DIAGNOSIS — M48.062 SPINAL STENOSIS OF LUMBAR REGION WITH NEUROGENIC CLAUDICATION: Primary | ICD-10-CM

## 2023-02-02 PROCEDURE — 97140 MANUAL THERAPY 1/> REGIONS: CPT | Mod: GP | Performed by: PHYSICAL THERAPIST

## 2023-02-02 PROCEDURE — 97110 THERAPEUTIC EXERCISES: CPT | Mod: GP | Performed by: PHYSICAL THERAPIST

## 2023-02-07 ENCOUNTER — OFFICE VISIT (OUTPATIENT)
Dept: INTERNAL MEDICINE | Facility: CLINIC | Age: 78
End: 2023-02-07
Payer: COMMERCIAL

## 2023-02-07 VITALS
OXYGEN SATURATION: 97 % | RESPIRATION RATE: 18 BRPM | HEIGHT: 66 IN | TEMPERATURE: 97.8 F | DIASTOLIC BLOOD PRESSURE: 70 MMHG | WEIGHT: 182.1 LBS | HEART RATE: 86 BPM | BODY MASS INDEX: 29.27 KG/M2 | SYSTOLIC BLOOD PRESSURE: 134 MMHG

## 2023-02-07 DIAGNOSIS — Z85.820 HISTORY OF MELANOMA: ICD-10-CM

## 2023-02-07 DIAGNOSIS — M06.042 SERONEGATIVE RHEUMATOID ARTHRITIS OF BOTH HANDS (H): ICD-10-CM

## 2023-02-07 DIAGNOSIS — M54.12 CERVICAL RADICULOPATHY: Primary | ICD-10-CM

## 2023-02-07 DIAGNOSIS — M25.511 RIGHT SHOULDER PAIN, UNSPECIFIED CHRONICITY: ICD-10-CM

## 2023-02-07 DIAGNOSIS — M06.041 SERONEGATIVE RHEUMATOID ARTHRITIS OF BOTH HANDS (H): ICD-10-CM

## 2023-02-07 DIAGNOSIS — M70.61 TROCHANTERIC BURSITIS OF RIGHT HIP: ICD-10-CM

## 2023-02-07 DIAGNOSIS — M54.2 NECK PAIN: ICD-10-CM

## 2023-02-07 PROCEDURE — 99214 OFFICE O/P EST MOD 30 MIN: CPT | Performed by: INTERNAL MEDICINE

## 2023-02-07 NOTE — PROGRESS NOTES
[facility]    Comprehensive and complex medical care - Chronic disease management - Shared decision making - Care coordination - Compassionate care    Patient advocacy - Rational deprescribing - Minimally disruptive medicine - Ethical focus - Customized care         Date of Service: 2/7/2023  Primary Provider: Renzo Newell    Patient Care Team:  Renzo Newell MD as PCP - General  Renzo Newell MD as Assigned PCP  Shoaib Gloria MD as MD (Hematology & Oncology)  Carlos Alberto Worthington DO as MD (Pain Medicine)  Bj Orantes MD, MD as Resident (Dermatology)  Sadi Castano MD Stanek, Richard Paul, MD as MD (Ophthalmology)          Patient's Pharmacy:    [facility] - [place], [place] - [address] [facility]  [address] [facility] [address]  Phone: [phone] Fax: [phone]    [id number] - [place], [place] - [address] AT [facility] [address]  [address]  [facility] [address]  Phone: [phone] Fax: [phone]     Patient's Contacts:  Name Home Phone Work Phone Mobile Phone Relationship Lgl Grd   DEYSI [phone] Relative No   CAMPBELL EBRRIOS [phone]   Relative No     Patient's Insurance:    Payor: [employer] / Plan: Life[employer] MEDICARE ADVANTAGE / Product Type: HMO /            Active Problem List:  Problem List as of 2/7/2023 Reviewed: 2/7/2023 12:40 PM by Renzo Newell MD       High    Nonsmoker    Full code status    Ischemic cerebrovascular accident (CVA) of frontal lobe (H) - 2/2020    Melanoma of right upper arm (H) - On Keytruda in the past - stage IIIA       Medium    LBP (low back pain)    Essential tremor    Lumbar spinal stenosis    Seronegative rheumatoid arthritis of both hands (H)       Low    Peripheral neuropathy    GERD (gastroesophageal reflux disease)    Migraine headache    Menopausal hot flushes    Aphasia    On  antineoplastic chemotherapy - PEMBROLIZUMAB - checkpoint inhibitor - in the past for melanoma    Female stress incontinence    Rosacea        Current Outpatient Medications   Medication Instructions     aloe vera Gel 1 Application, 2 TIMES DAILY PRN     aspirin (ASA) 81 mg, Oral, DAILY     azelaic acid (FINACIA) 15 % external gel APPLY TOPICALLY DAILY TO SKIN EVERY MORNING.     CRESTOR 5 MG tablet TAKE 1 TABLET DAILY     cyclobenzaprine (FLEXERIL) 5 mg, Oral, 3 TIMES DAILY PRN     diphenhydrAMINE-Acetaminophen (PERCOGESIC PO) 375 mg, Oral     fluticasone (FLONASE) 50 MCG/ACT nasal spray SPRAY 1 TO 2 SPRAYS INTO BOTH NOSTRILS DAILY AS NEEDED FOR ALLERGIES     guaiFENesin-dextromethorphan (MUCINEX DM) 600-30 mg Tb12 1 tablet, 2 TIMES DAILY PRN     menthol (BIOFREEZE, MENTHOL,) 4 % Gel 1 Application, DAILY PRN     metroNIDAZOLE (METROCREAM) 0.75 % external cream APPLY TOPICALLY TO FACE EVERY NIGHT AT BEDTIME.     naproxen (NAPROSYN) 500 MG tablet TAKE 1 TABLET 2 TIMES DAILY AS NEEDED     neomycin-bacitracin-polymyxin (NEOSPORIN) ointment 1 Application, 2 TIMES DAILY PRN     NEURONTIN 300 MG capsule TAKE 3 CAPSULES 3 TIMES DAILY     PLAQUENIL 200 MG tablet TAKE 1 TABLET 2 TIMES DAILY     PROTONIX 40 MG EC tablet TAKE 2 TABLETS DAILY.     Pseudoephedrine-DM-GG (ROBITUSSIN COUGH/COLD D PO) Oral     UNABLE TO FIND MEDICATION NAME: benadryl topical cream     UNABLE TO FIND MEDICATION NAME: Magni- life cream        Social History     Social History Narrative    , no children.  Has a niece involved in her care.            Patient of Dr. Newell since 2016.              had severe dementia and passed away in 2019.  Milt.       Subjective:     Lashawn Ortega is a 77 year old female who comes in today for:    Chief Complaint   Patient presents with     Shoulder Pain     Pt states not as bad as when I called, I think I know what I did to aggravate it, chopping ice       Patient comes in today for discussion of neck  "pain, right posterior shoulder pain and pain into her posterior upper arm.  This occurred after Nuzhat.  She was putting some boxes up high and looking up quite a bit.  A few of the boxes had slipped as well.  More recently, she had been working on removing some icicles off of her house by looking up and also had some symptoms with this.  She had numbness into the left upper arm.  She had pain radiating from the neck to the upper scapula to the posterior arm near the triceps.  She also noted some weakness in the arm.  This has improved in the interim.  She is trying to be a little bit more careful.  She used some cyclobenzaprine with some mild help.  She follows with the spine clinic.  She has never had imaging of her neck but has known lumbar disease.  She denies any symptoms on the left.  She denies any bowel or bladder dysfunction that is new and denies any change in her gait.    We reviewed her low back pain and she does follow-up with the spine clinic in the future.  She did have epidural in the past which was helpful for her.    She has been dealing with some right trochanteric bursitis as well and will be having injection if possible through Dr. Worthington.    Her most recent follow-up for her melanoma shows no signs of recurrent disease.    We reviewed her other issues noted in the assessment but not specifically addressed in the HPI above.     Objective:     Wt Readings from Last 3 Encounters:   02/07/23 82.6 kg (182 lb 1.6 oz)   09/22/22 80.1 kg (176 lb 8 oz)   07/22/22 80.3 kg (177 lb)     BP Readings from Last 3 Encounters:   02/07/23 134/70   11/09/22 (!) 155/64   10/17/22 (!) 151/80     /70 (BP Location: Right arm, Patient Position: Sitting, Cuff Size: Adult Regular)   Pulse 86   Temp 97.8  F (36.6  C) (Oral)   Resp 18   Ht 1.676 m (5' 6\")   Wt 82.6 kg (182 lb 1.6 oz)   SpO2 97%   BMI 29.39 kg/m     The patient is comfortable, no acute distress.  Mood good.  Insight good.  Eyes are " nonicteric.  Neck is supple without mass.  No cervical adenopathy.  No thyromegaly. Heart regular rate and rhythm.  Lungs clear to auscultation bilaterally.  Respiratory effort is good.  Extremities no edema.  Gait is good.  Reflexes in the upper extremities are equal.  Dawit's negative.  Stiffness in the neck noted.  Minimal impingement signs on examination.      Diagnostics:     Hospital Outpatient Visit on 12/07/2022   Component Date Value Ref Range Status     Creatinine POCT 12/07/2022 0.8  0.6 - 1.1 mg/dL Final     GFR, ESTIMATED POCT 12/07/2022 >60  >60 mL/min/1.73m2 Final       No results found for any visits on 02/07/23.     Assessment and Plan:     1. Cervical radiculopathy  Consider MRI of the neck if worsening.  Monitor at this time.    2. Seronegative rheumatoid arthritis of both hands (H)  Arthritis doing well on current management plan.  Continue to monitor.    3. Melanoma of right upper arm (H)  Most recent CT scan in December shows no signs of recurrence.  Continue to follow with oncology.    4. Neck pain    5. Right shoulder pain, unspecified chronicity  May have some mild impingement but I do not believe her shoulder is the main cause for her issue.  Strength of the rotator cuff is intact.    6. Trochanteric bursitis of right hip  Follow-up with Dr. Worthington.     30 minutes or greater was spent today on the patient's care on the day of service.      This includes time for chart preparation, reviewing medical tests done before or during the visit, talking with the patient, review of quality indicators, required documentation, and other elements of care.     Continue current medications otherwise.  Follow up sooner if issues.          Renzo Newell MD  General Internal Medicine  [facility]      Return in about 7 months (around 9/23/2023) for annual wellness visit, visit and blood work.     Future Appointments   Date Time Provider Department Center   2/13/2023   8:00 AM Tona Carrera, PT [facility]   2/15/2023  8:40 AM Carlos Alberto Worthington DO [facility]   3/1/2023  9:00 AM Rasheeda Villela, PT [facility]   3/15/2023 11:00 AM Rasheeda Villela, PT [facility]   3/29/2023 11:00 AM Rasheeda Villela, PT [facility]         HCC issues resolved at this visit.

## 2023-02-07 NOTE — PATIENT INSTRUCTIONS
Please follow up if you have any further issues.    You may contact me by phone or MyChart if you are worsening or if things are not improving.    ______________________________________________________________________     You can call [phone] during weekday hours to get a hold of our team coordinators if you need to get a message to me.    There are 3 people in our building taking phone calls for me.  Be sure to listen to the prompts to get a hold of them.    You first press 1 for English (press another number for a different language) and then press 2 to get the .    They can then take your message and forward it onto me.    ______________________________________________________________________     Please remember that you can call [phone] ANYTIME to schedule an appointment.     You can schedule appointments 24 hours a day, 7 days a week.      Sometimes the best time to schedule an appointment is after clinic hours when less people are calling in.      Weekends are another option for calling in to schedule appointments.     ______________________________________________________________________   Preventative Measures:  Health Maintenance   Topic Date Due    ANNUAL REVIEW OF HM ORDERS  Never done    ZOSTER IMMUNIZATION (2 of 3) 03/29/2014    MEDICARE ANNUAL WELLNESS VISIT  09/22/2023    FALL RISK ASSESSMENT  09/22/2023    LUNG CANCER SCREENING  12/07/2023    DTAP/TDAP/TD IMMUNIZATION (2 - Td or Tdap) 05/19/2025    LIPID  09/22/2027    ADVANCE CARE PLANNING  09/23/2027    DEXA  07/08/2030    HEPATITIS C SCREENING  Completed    PHQ-2 (once per calendar year)  Completed    INFLUENZA VACCINE  Completed    Pneumococcal Vaccine: 65+ Years  Completed    COVID-19 Vaccine  Completed    IPV IMMUNIZATION  Aged Out    MENINGITIS IMMUNIZATION  Aged Out    MAMMO SCREENING  Discontinued    COLORECTAL CANCER SCREENING  Discontinued

## 2023-02-13 ENCOUNTER — HOSPITAL ENCOUNTER (OUTPATIENT)
Dept: PHYSICAL THERAPY | Facility: REHABILITATION | Age: 78
Discharge: HOME | End: 2023-02-13
Payer: COMMERCIAL

## 2023-02-13 DIAGNOSIS — M54.16 LUMBAR RADICULITIS: ICD-10-CM

## 2023-02-13 DIAGNOSIS — M62.81 MUSCLE WEAKNESS (GENERALIZED): ICD-10-CM

## 2023-02-13 DIAGNOSIS — M48.062 SPINAL STENOSIS OF LUMBAR REGION WITH NEUROGENIC CLAUDICATION: Primary | ICD-10-CM

## 2023-02-13 PROCEDURE — 97110 THERAPEUTIC EXERCISES: CPT | Mod: GP | Performed by: PHYSICAL THERAPIST

## 2023-02-13 PROCEDURE — 97140 MANUAL THERAPY 1/> REGIONS: CPT | Mod: GP | Performed by: PHYSICAL THERAPIST

## 2023-02-15 ENCOUNTER — OFFICE VISIT (OUTPATIENT)
Dept: PHYSICAL MEDICINE AND REHAB | Facility: CLINIC | Age: 78
End: 2023-02-15
Payer: COMMERCIAL

## 2023-02-15 VITALS — DIASTOLIC BLOOD PRESSURE: 71 MMHG | HEART RATE: 99 BPM | SYSTOLIC BLOOD PRESSURE: 129 MMHG | OXYGEN SATURATION: 100 %

## 2023-02-15 DIAGNOSIS — M70.61 GREATER TROCHANTERIC BURSITIS OF RIGHT HIP: ICD-10-CM

## 2023-02-15 DIAGNOSIS — M54.2 CERVICALGIA: Primary | ICD-10-CM

## 2023-02-15 DIAGNOSIS — M79.18 MYOFASCIAL PAIN: ICD-10-CM

## 2023-02-15 DIAGNOSIS — M48.062 SPINAL STENOSIS OF LUMBAR REGION WITH NEUROGENIC CLAUDICATION: ICD-10-CM

## 2023-02-15 PROCEDURE — 99214 OFFICE O/P EST MOD 30 MIN: CPT | Performed by: PAIN MEDICINE

## 2023-02-15 ASSESSMENT — PAIN SCALES - GENERAL: PAINLEVEL: EXTREME PAIN (8)

## 2023-02-15 NOTE — PROGRESS NOTES
Assessment:     Diagnoses and all orders for this visit:  Cervicalgia  -     XR Cervical Spine 2/3 Views; Future  -     Physical Therapy Referral; Future  Greater trochanteric bursitis of right hip  -     PAIN US Large Joint Injection Unilateral; Future  Myofascial pain  Spinal stenosis of lumbar region with neurogenic claudication     Lashawn Ortega is a 77 year old y.o. female with past medical history significant for reflux, migraine headache, low back pain, peripheral neuropathy, melanoma of right upper arm, stroke, elevated liver enzymes who presents today for follow-up regarding low back and right lower extremity pain:    -Patient is having right lateral thigh pain likely secondary to greater trochanter bursitis.  Worsening of neck pain is likely secondary to cervical spondylosis without myelopathy.      Plan:     A shared decision making plan was used. The patient's values and choices were respected. Prior medical records from our last visit on 11/9/2022 were reviewed today. The following represents what was discussed and decided upon by the provider and the patient.        -DIAGNOSTIC TESTS: Images were personally reviewed and interpreted.   --MRI of the lumbar spine dated 7/11/2019 is personally reviewed and images interpreted and shown to patient.  There is multilevel degenerative changes in lumbar spine.  There is severe spinal canal stenosis at L3-4.  There is moderate spinal canal stenosis at L4-5.  There is mild to moderate spinal canal stenosis at L2-3.  There is no severe foraminal stenosis.  --CT of abdomen and pelvis July 2020.  I did not see any compression fractures.  -- I ordered an x-ray of the cervical spine.  Once have reviewed this I will have one of our nurses give her a call with results and recommendations.  Should she not have improvement with physical therapy then would likely order MRI of cervical spine.    -INTERVENTIONS: I ordered a right greater trochanter bursa injection under  ultrasound guidance.    -MEDICATIONS: No changes to medications.  -  Discussed side effects of medications and proper use. Patient verbalized understanding.    -PHYSICAL THERAPY: She is currently undergoing physical therapy for her low back pain.  I have ordered physical therapy for her neck as well now.    Discussed the importance of core strengthening, ROM, stretching exercises with the patient and how each of these entities is important in decreasing pain.  Explained to the patient that the purpose of physical therapy is to teach the patient a home exercise program.  These exercises need to be performed every day in order to decrease pain and prevent future occurrences of pain.        -PATIENT EDUCATION: We discussed pain management in a multiple fashion including physical therapy, medication management, possible future injections.    -FOLLOW UP: Patient will follow up 2 weeks after injections with either Betsy Houston or Rhina Childers.  Advised to contact clinic if symptoms worsen or change.    Subjective:     Lashawn Ortega is a 77 year old female who presents today for follow-up regarding low back and lower extremity pain.  Patient notes that physical therapy seems to be helping her low back and lower extremity pain.  She is having increased pain over the lateral thigh area especially if she lays on it.  Pain today is 6/10 at its worst is 8/10 is best is 4/10.  She is also been having worsening of neck pain over the last several weeks.  She has not had physical therapy for her neck in a long time.  She wonders if this would be helpful.  She does not have any x-rays of her neck.  Pain is improved with sitting in her comfy chair and worsens by doing household chores.  She denies any bowel or bladder changes, fevers, chills, unintentional weight loss.    Treatment to Date: MRI of lumbar spine dated 7/11/2019.  Right L5-S1 transforaminal epidural steroid injection done on 8/12/2019.  Several sessions of in-home  physical therapy.  Bilateral L4-5 transforaminal epidural steroid injections done on 8/11/2020.  Right L4-5 transforaminal epidural steroid injection done on 5/7/2021.  Right greater trochanter bursa injection under ultrasound guidance on 6/11/2021.  Bilateral L4-5 transforaminal epidural steroid injections done on 9/26/2022.    Patient Active Problem List   Diagnosis     GERD (gastroesophageal reflux disease)     Migraine headache     LBP (low back pain)     Nonsmoker     Menopausal hot flushes     Full code status     Peripheral neuropathy     Melanoma of right upper arm (H) - On Keytruda in the past - stage IIIA     Ischemic cerebrovascular accident (CVA) of frontal lobe (H) - 2/2020     Aphasia     Lumbar spinal stenosis     Essential tremor     On antineoplastic chemotherapy - PEMBROLIZUMAB - checkpoint inhibitor - in the past for melanoma     Female stress incontinence     Seronegative rheumatoid arthritis of both hands (H)     Rosacea       Current Outpatient Medications   Medication     aloe vera Gel     aspirin 81 MG EC tablet     azelaic acid (FINACIA) 15 % external gel     CRESTOR 5 MG tablet     cyclobenzaprine (FLEXERIL) 5 MG tablet     diphenhydrAMINE-Acetaminophen (PERCOGESIC PO)     fluticasone (FLONASE) 50 MCG/ACT nasal spray     guaiFENesin-dextromethorphan (MUCINEX DM) 600-30 mg Tb12     menthol (BIOFREEZE, MENTHOL,) 4 % Gel     metroNIDAZOLE (METROCREAM) 0.75 % external cream     naproxen (NAPROSYN) 500 MG tablet     neomycin-bacitracin-polymyxin (NEOSPORIN) ointment     NEURONTIN 300 MG capsule     PLAQUENIL 200 MG tablet     PROTONIX 40 MG EC tablet     Pseudoephedrine-DM-GG (ROBITUSSIN COUGH/COLD D PO)     UNABLE TO FIND     UNABLE TO FIND     No current facility-administered medications for this visit.       Allergies   Allergen Reactions     Erythromycin Base [Erythromycin] Rash     PARALYSIS, EYE SWELLING, HEADACHE     Codeine Dizziness     Other: extreme weakness in legspt stopped taking  "medication and sxs were gone within a few hours       Hydrocodone Nausea and Headache     Keytruda [Pembrolizumab] Other (See Comments)     Elevated liver function tests (lfts)      Tramadol Diarrhea, Dizziness, Headache and Nausea and Vomiting     Alcohol Rash     Cholecalciferol Rash     Other Environmental Allergy Rash     mildew     Penicillins Swelling and Rash     Shellfish Containing Products [Shellfish-Derived Products] Swelling and Rash     shrimp     Sulfa (Sulfonamide Antibiotics) [Sulfa Drugs] Rash     Burning body rash     Vitamin D3 Complete [External Allergen Needs Reconciliation - See Comment] Rash     Higher dosage makes her break out       Past Medical History:   Diagnosis Date     Aphasia      Asthma      GERD (gastroesophageal reflux disease)      History of transfusion      Lumbar spinal stenosis 2/28/2020    EXAM: MR LUMBAR SPINE W WO CONTRAST LOCATION: [facility] DATE/TIME: 7/11/2019 4:49 PM   INDICATION: Back pain going down the right leg.  Going on for 3 months.  Not improving. See above. History of melanoma. COMPARISON: None. CONTRAST: Gadavist 9. TECHNIQUE: Without and with IV contrast   FINDINGS:  Nomenclature is based on 5 lumbar type vertebral bodies. There is no concerning marrow rep     Malignant melanoma of right upper arm (H)      Migraine headache      On antineoplastic chemotherapy     pembrolizumab     PN (peripheral neuropathy)      PONV (postoperative nausea and vomiting)     \"cold sweats\"     S/P colonoscopy      Stroke (H) 02/2020        Review of Systems  ROS:  Specifically negative for bowel/bladder dysfunction, balance changes, headache, dizziness, foot drop, fevers, chills, appetite changes, nausea/vomiting, unexplained weight loss. Otherwise 13 systems reviewed are negative. Please see the patient's intake questionnaire from today for details.    Reviewed Social, Family, Past Medical and Past Surgical history with patient, no significant changes noted since " prior visit.     Objective:     /71   Pulse 99   SpO2 100%     PHYSICAL EXAMINATION:    --CONSTITUTIONAL: Well developed, well nourished, healthy appearing individual.  --PSYCHIATRIC: Appropriate mood and affect. No difficulty interacting due to temper, social withdrawal, or memory issues.  --RESPIRATORY: Normal rhythm and effort. No abnormal accessory muscle breathing patterns noted.   --MUSCULOSKELETAL:  Normal lumbar lordosis noted, no lateral shift.  --GROSS MOTOR: Easily arises from a seated position. Gait is non-antalgic  -- Cervical and LUMBAR SPINE:  Inspection reveals no evidence of deformity. Range of motion is mildly limited in lumbar flexion, extension, lateral rotation.  Tenderness palpation over the right greater trochanter area.  Tenderness to palpation of the cervical paraspinal muscles.   --SACROILIAC JOINT:  One Finger point test negative.  --LOWER EXTREMITY MOTOR TESTING:  Plantar flexion left 5/5, right 5/5   Dorsiflexion left 5/5, right 5/5   Great toe MTP extension left 5/5, right 5/5  Knee flexion left 5/5, right 5/5  Knee extension left 5/5, right 5/5   Hip flexion left 5/5, right 5/5  Hip abduction left 5/5, right 5/5  Hip adduction left 5/5, right 5/5   --NEUROLOGIC:  Sensation to light touch is intact in the bilateral L4, L5, and S1 dermatomes.    RESULTS:   Imaging: Lumbar spine imaging was reviewed today.

## 2023-02-15 NOTE — PATIENT INSTRUCTIONS
Discussed the importance of core strengthening, ROM, stretching exercises with the patient and how each of these entities is important in decreasing pain.  Explained to the patient that the purpose of physical therapy is to teach the patient a home exercise program.  These exercises need to be performed every day in order to decrease pain and prevent future occurrences of pain.        I ordered an x-ray of your neck.  Once I review this I will have one of our nurses give you a call with results and recommendations.    [facility] Injection Requirements    A  is required for all fluoroscopically-guided injections.  Injection appointments may be cancelled if there are signs/symptoms of an active infection or if the patient is being actively treated with antibiotics for a diagnosed infection.  Patients may have their steroid injection cancelled if they have had another steroid injection within 2 weeks.  Diabetic patients will have their blood glucose levels checked the day of their injection and the appointment will be rescheduled if the blood glucose level is 300 or higher.  Patients with allergies to cortisone, local anesthetics, iodine, or contrast dye should contact the Spine Center to further discuss these considerations.  Patients scheduled for medial branch block diagnostic injections should refrain from taking pain medication the day of the procedure.  The medial branch block injection appointment will be rescheduled if the patient's pain rating is not 5/10 or greater at the time of the procedure.  Patients taking warfarin/Coumadin will have their INR checked the day of the procedure and the procedure may be rescheduled if the INR is greater than 3.0.  Please contact the Spine Center (#[phone]) if you are taking any prescription blood-thinning medications (warfarin, Plavix, Lovenox, Eliquis, Brilinta, Effient, etc.) as special dosing adjustments may need to be made  depending on the type of injection you are scheduled to receive.  It is recommended that you delay having your steroid injection if you have received any vaccines within 2 weeks.    ~Please call Nurse Navigation line [phone] with any questions or concerns about your treatment plan, if symptoms worsen and you would like to be seen urgently, or if you have problems controlling bladder and bowel function.

## 2023-02-15 NOTE — LETTER
2/15/2023         RE: Lashawn Ortega  [address] [place] [address]        Dear Colleague,    Thank you for referring your patient, Lashawn Ortega, to the [facility]. Please see a copy of my visit note below.      Assessment:     Diagnoses and all orders for this visit:  Cervicalgia  -     XR Cervical Spine 2/3 Views; Future  -     Physical Therapy Referral; Future  Greater trochanteric bursitis of right hip  -     PAIN US Large Joint Injection Unilateral; Future  Myofascial pain  Spinal stenosis of lumbar region with neurogenic claudication     Lashawn Ortega is a 77 year old y.o. female with past medical history significant for reflux, migraine headache, low back pain, peripheral neuropathy, melanoma of right upper arm, stroke, elevated liver enzymes who presents today for follow-up regarding low back and right lower extremity pain:    -Patient is having right lateral thigh pain likely secondary to greater trochanter bursitis.  Worsening of neck pain is likely secondary to cervical spondylosis without myelopathy.      Plan:     A shared decision making plan was used. The patient's values and choices were respected. Prior medical records from our last visit on 11/9/2022 were reviewed today. The following represents what was discussed and decided upon by the provider and the patient.        -DIAGNOSTIC TESTS: Images were personally reviewed and interpreted.   --MRI of the lumbar spine dated 7/11/2019 is personally reviewed and images interpreted and shown to patient.  There is multilevel degenerative changes in lumbar spine.  There is severe spinal canal stenosis at L3-4.  There is moderate spinal canal stenosis at L4-5.  There is mild to moderate spinal canal stenosis at L2-3.  There is no severe foraminal stenosis.  --CT of abdomen and pelvis July 2020.  I did not see any compression fractures.  -- I ordered an x-ray of the cervical spine.  Once have reviewed  this I will have one of our nurses give her a call with results and recommendations.  Should she not have improvement with physical therapy then would likely order MRI of cervical spine.    -INTERVENTIONS: I ordered a right greater trochanter bursa injection under ultrasound guidance.    -MEDICATIONS: No changes to medications.  -  Discussed side effects of medications and proper use. Patient verbalized understanding.    -PHYSICAL THERAPY: She is currently undergoing physical therapy for her low back pain.  I have ordered physical therapy for her neck as well now.    Discussed the importance of core strengthening, ROM, stretching exercises with the patient and how each of these entities is important in decreasing pain.  Explained to the patient that the purpose of physical therapy is to teach the patient a home exercise program.  These exercises need to be performed every day in order to decrease pain and prevent future occurrences of pain.        -PATIENT EDUCATION: We discussed pain management in a multiple fashion including physical therapy, medication management, possible future injections.    -FOLLOW UP: Patient will follow up 2 weeks after injections with either Betsy Houston or Rhina Childers.  Advised to contact clinic if symptoms worsen or change.    Subjective:     Lashawn Ortega is a 77 year old female who presents today for follow-up regarding low back and lower extremity pain.  Patient notes that physical therapy seems to be helping her low back and lower extremity pain.  She is having increased pain over the lateral thigh area especially if she lays on it.  Pain today is 6/10 at its worst is 8/10 is best is 4/10.  She is also been having worsening of neck pain over the last several weeks.  She has not had physical therapy for her neck in a long time.  She wonders if this would be helpful.  She does not have any x-rays of her neck.  Pain is improved with sitting in her comfy chair and worsens by doing  household chores.  She denies any bowel or bladder changes, fevers, chills, unintentional weight loss.    Treatment to Date: MRI of lumbar spine dated 7/11/2019.  Right L5-S1 transforaminal epidural steroid injection done on 8/12/2019.  Several sessions of in-home physical therapy.  Bilateral L4-5 transforaminal epidural steroid injections done on 8/11/2020.  Right L4-5 transforaminal epidural steroid injection done on 5/7/2021.  Right greater trochanter bursa injection under ultrasound guidance on 6/11/2021.  Bilateral L4-5 transforaminal epidural steroid injections done on 9/26/2022.    Patient Active Problem List   Diagnosis     GERD (gastroesophageal reflux disease)     Migraine headache     LBP (low back pain)     Nonsmoker     Menopausal hot flushes     Full code status     Peripheral neuropathy     Melanoma of right upper arm (H) - On Keytruda in the past - stage IIIA     Ischemic cerebrovascular accident (CVA) of frontal lobe (H) - 2/2020     Aphasia     Lumbar spinal stenosis     Essential tremor     On antineoplastic chemotherapy - PEMBROLIZUMAB - checkpoint inhibitor - in the past for melanoma     Female stress incontinence     Seronegative rheumatoid arthritis of both hands (H)     Rosacea       Current Outpatient Medications   Medication     aloe vera Gel     aspirin 81 MG EC tablet     azelaic acid (FINACIA) 15 % external gel     CRESTOR 5 MG tablet     cyclobenzaprine (FLEXERIL) 5 MG tablet     diphenhydrAMINE-Acetaminophen (PERCOGESIC PO)     fluticasone (FLONASE) 50 MCG/ACT nasal spray     guaiFENesin-dextromethorphan (MUCINEX DM) 600-30 mg Tb12     menthol (BIOFREEZE, MENTHOL,) 4 % Gel     metroNIDAZOLE (METROCREAM) 0.75 % external cream     naproxen (NAPROSYN) 500 MG tablet     neomycin-bacitracin-polymyxin (NEOSPORIN) ointment     NEURONTIN 300 MG capsule     PLAQUENIL 200 MG tablet     PROTONIX 40 MG EC tablet     Pseudoephedrine-DM-GG (ROBITUSSIN COUGH/COLD D PO)     UNABLE TO FIND     UNABLE  "TO FIND     No current facility-administered medications for this visit.       Allergies   Allergen Reactions     Erythromycin Base [Erythromycin] Rash     PARALYSIS, EYE SWELLING, HEADACHE     Codeine Dizziness     Other: extreme weakness in legspt stopped taking medication and sxs were gone within a few hours       Hydrocodone Nausea and Headache     Keytruda [Pembrolizumab] Other (See Comments)     Elevated liver function tests (lfts)      Tramadol Diarrhea, Dizziness, Headache and Nausea and Vomiting     Alcohol Rash     Cholecalciferol Rash     Other Environmental Allergy Rash     mildew     Penicillins Swelling and Rash     Shellfish Containing Products [Shellfish-Derived Products] Swelling and Rash     shrimp     Sulfa (Sulfonamide Antibiotics) [Sulfa Drugs] Rash     Burning body rash     Vitamin D3 Complete [External Allergen Needs Reconciliation - See Comment] Rash     Higher dosage makes her break out       Past Medical History:   Diagnosis Date     Aphasia      Asthma      GERD (gastroesophageal reflux disease)      History of transfusion      Lumbar spinal stenosis 2/28/2020    EXAM: MR LUMBAR SPINE W WO CONTRAST LOCATION: [facility] DATE/TIME: 7/11/2019 4:49 PM   INDICATION: Back pain going down the right leg.  Going on for 3 months.  Not improving. See above. History of melanoma. COMPARISON: None. CONTRAST: Gadavist 9. TECHNIQUE: Without and with IV contrast   FINDINGS:  Nomenclature is based on 5 lumbar type vertebral bodies. There is no concerning marrow rep     Malignant melanoma of right upper arm (H)      Migraine headache      On antineoplastic chemotherapy     pembrolizumab     PN (peripheral neuropathy)      PONV (postoperative nausea and vomiting)     \"cold sweats\"     S/P colonoscopy      Stroke (H) 02/2020        Review of Systems  ROS:  Specifically negative for bowel/bladder dysfunction, balance changes, headache, dizziness, foot drop, fevers, chills, appetite changes, " nausea/vomiting, unexplained weight loss. Otherwise 13 systems reviewed are negative. Please see the patient's intake questionnaire from today for details.    Reviewed Social, Family, Past Medical and Past Surgical history with patient, no significant changes noted since prior visit.     Objective:     /71   Pulse 99   SpO2 100%     PHYSICAL EXAMINATION:    --CONSTITUTIONAL: Well developed, well nourished, healthy appearing individual.  --PSYCHIATRIC: Appropriate mood and affect. No difficulty interacting due to temper, social withdrawal, or memory issues.  --RESPIRATORY: Normal rhythm and effort. No abnormal accessory muscle breathing patterns noted.   --MUSCULOSKELETAL:  Normal lumbar lordosis noted, no lateral shift.  --GROSS MOTOR: Easily arises from a seated position. Gait is non-antalgic  -- Cervical and LUMBAR SPINE:  Inspection reveals no evidence of deformity. Range of motion is mildly limited in lumbar flexion, extension, lateral rotation.  Tenderness palpation over the right greater trochanter area.  Tenderness to palpation of the cervical paraspinal muscles.   --SACROILIAC JOINT:  One Finger point test negative.  --LOWER EXTREMITY MOTOR TESTING:  Plantar flexion left 5/5, right 5/5   Dorsiflexion left 5/5, right 5/5   Great toe MTP extension left 5/5, right 5/5  Knee flexion left 5/5, right 5/5  Knee extension left 5/5, right 5/5   Hip flexion left 5/5, right 5/5  Hip abduction left 5/5, right 5/5  Hip adduction left 5/5, right 5/5   --NEUROLOGIC:  Sensation to light touch is intact in the bilateral L4, L5, and S1 dermatomes.    RESULTS:   Imaging: Lumbar spine imaging was reviewed today.                     Again, thank you for allowing me to participate in the care of your patient.        Sincerely,        Carlos Alberto Worthington, DO

## 2023-02-17 ENCOUNTER — HOSPITAL ENCOUNTER (OUTPATIENT)
Dept: GENERAL RADIOLOGY | Facility: HOSPITAL | Age: 78
Discharge: HOME | End: 2023-02-17
Attending: PAIN MEDICINE | Admitting: PAIN MEDICINE
Payer: COMMERCIAL

## 2023-02-17 DIAGNOSIS — M54.2 CERVICALGIA: ICD-10-CM

## 2023-02-17 PROCEDURE — 72040 X-RAY EXAM NECK SPINE 2-3 VW: CPT

## 2023-02-20 ENCOUNTER — TELEPHONE (OUTPATIENT)
Dept: PHYSICAL MEDICINE AND REHAB | Facility: CLINIC | Age: 78
End: 2023-02-20
Payer: COMMERCIAL

## 2023-02-20 NOTE — TELEPHONE ENCOUNTER
----- Message from Carlos Alberto Worthington DO sent at 2/20/2023  7:26 AM CST -----  Please call the patient and let her know that I reviewed x-ray of her cervical spine.  It does show wear-and-tear changes including arthritis.  These are likely causes of pain.  I recommend that she start physical therapy for her neck and follow-up as scheduled.

## 2023-03-01 ENCOUNTER — HOSPITAL ENCOUNTER (OUTPATIENT)
Dept: PHYSICAL THERAPY | Facility: REHABILITATION | Age: 78
Discharge: HOME | End: 2023-03-01
Payer: COMMERCIAL

## 2023-03-01 DIAGNOSIS — M54.16 LUMBAR RADICULITIS: ICD-10-CM

## 2023-03-01 DIAGNOSIS — M48.062 SPINAL STENOSIS OF LUMBAR REGION WITH NEUROGENIC CLAUDICATION: ICD-10-CM

## 2023-03-01 DIAGNOSIS — M70.61 GREATER TROCHANTERIC BURSITIS OF RIGHT HIP: ICD-10-CM

## 2023-03-01 DIAGNOSIS — M62.81 MUSCLE WEAKNESS (GENERALIZED): ICD-10-CM

## 2023-03-01 DIAGNOSIS — M48.062 SPINAL STENOSIS OF LUMBAR REGION WITH NEUROGENIC CLAUDICATION: Primary | ICD-10-CM

## 2023-03-01 DIAGNOSIS — M51.26 LUMBAR DISC HERNIATION: ICD-10-CM

## 2023-03-01 DIAGNOSIS — M54.12 CERVICAL RADICULOPATHY: ICD-10-CM

## 2023-03-01 PROCEDURE — 97110 THERAPEUTIC EXERCISES: CPT | Mod: GP | Performed by: PHYSICAL THERAPIST

## 2023-03-01 PROCEDURE — 97140 MANUAL THERAPY 1/> REGIONS: CPT | Mod: GP | Performed by: PHYSICAL THERAPIST

## 2023-03-01 RX ORDER — CYCLOBENZAPRINE HCL 5 MG
TABLET ORAL
Qty: 90 TABLET | Refills: 1 | Status: SHIPPED | OUTPATIENT
Start: 2023-03-01

## 2023-03-01 NOTE — PROGRESS NOTES
[facility]    OUTPATIENT PHYSICAL THERAPY  PLAN OF TREATMENT FOR OUTPATIENT REHABILITATION AND PROGRESS NOTE           Patient's Last Name, First Name, Lashawn Arnett Date of Birth  1945   Provider's Name  [facility] Medical Record No.  [id number]    Onset Date  5/1/22 Start of Care Date  1/23/23   Type:     _X_PT   ___OT   ___SLP Medical Diagnosis  Low Back Pain, Right hip pain, cervical pain    PT Diagnosis  Neck pain and decreased cervical ROM, chronic low back pain, greater trochanter pain Plan of Treatment  Frequency/Duration: 1 time per every other week x 12 weeks for total of 6 additional sessions, (12 total from initial POC)   Certification date from 3/1/2023 to 5/24/23     Goals:  Goal Identifier HEP   Goal Description Pt will be independent in HEP to address impairments and improve function   Target Date 03/24/23   Date Met      Progress (detail required for progress note): improving- doing more consistently now     Goal Identifier Pain   Goal Description Pt will report <5/10 pain throughout the day with bending, lifting and daily activities to return to PLOF   Target Date 04/23/23   Date Met      Progress (detail required for progress note): not consistent     Goal Identifier Bend/lift   Goal Description Pt will bend and lift for 20 min with <4/10 pain to put her groceries away   Target Date 04/23/23   Date Met      Progress (detail required for progress note):       Goal Identifier Cervical ROM   Goal Description Patient will be able to increase cervical extension and rotation by 5 deg without increase in pain for increase ease in ADLs and home tasks in 12 weeks   Target Date 05/24/23   Date Met      Progress (detail required for progress note):         Beginning/End Dates of Progress Note Reporting Period:  1/23/23 to 3/1/2023    Progress  Toward Goals:   Progress this reporting period: see below     Client Self (Subjective) Report for Progress Note Reporting Period: Patient saw Dr Stallworth and he referred her to therapy to include her cervical spine. She sees him again tomorrow for a hip injection. Patient reports the cervical pain started again with doing more work around the home and reaching over her head hanging curtains and lights. She did find her 3 exercises she was doing in the past and started them. it had helped before. She loves to do her pedalling, marching and kicking. she has been doing the bridges and other supine exercise and it helps her leg pain and allows her to sleep better.    Outcome Measures (Most Recent Score):    SANNA - see flowsheet    Objective Measurements:   Objective Measure: Palpation  Details: supine: R leg appears longer, R ASIS rotated forward  Objective Measure: Pain  Details: lumbar spine and R hip: 6/10, cervical pain 6/10 in bilateral posterior neck and into the upper traps and upp thoracic spine  Objective Measure: Cervical and shoulder ROM  Details: cervical AROM: extension 34, pulling and tight B; flexion 32 deg - a lot of pulling B; right rotation 38 pulling and restricted, L rotation 26 deg restricted and tight; SB 13-15 deg each way, pulling and restricted again; Shoulder AROM: function reaching overhead min dec in flexion and no pain, reaching behind head to lower cervical spine with pulling into the upper arms, reaching behind the back to L2-3 wiht pulling into the anterior shoulders  Objective Measure: posture  Details: forward head; rounded shoulders, protracted shoulders          I CERTIFY THE NEED FOR THESE SERVICES FURNISHED UNDER        THIS PLAN OF TREATMENT AND WHILE UNDER MY CARE     (Physician co-signature of this document indicates review and certification of the therapy plan).              Referring Provider: Dr Elin Villela, PT, DPT, [facility]

## 2023-03-02 ENCOUNTER — RADIOLOGY INJECTION OFFICE VISIT (OUTPATIENT)
Dept: PHYSICAL MEDICINE AND REHAB | Facility: CLINIC | Age: 78
End: 2023-03-02
Attending: PAIN MEDICINE
Payer: COMMERCIAL

## 2023-03-02 VITALS
OXYGEN SATURATION: 97 % | SYSTOLIC BLOOD PRESSURE: 128 MMHG | TEMPERATURE: 97.9 F | RESPIRATION RATE: 16 BRPM | DIASTOLIC BLOOD PRESSURE: 72 MMHG | HEART RATE: 91 BPM

## 2023-03-02 DIAGNOSIS — M70.61 GREATER TROCHANTERIC BURSITIS OF RIGHT HIP: ICD-10-CM

## 2023-03-02 PROCEDURE — 20611 DRAIN/INJ JOINT/BURSA W/US: CPT | Mod: RT | Performed by: PAIN MEDICINE

## 2023-03-02 RX ORDER — METHYLPREDNISOLONE ACETATE 40 MG/ML
INJECTION, SUSPENSION INTRA-ARTICULAR; INTRALESIONAL; INTRAMUSCULAR; SOFT TISSUE
Status: COMPLETED | OUTPATIENT
Start: 2023-03-02 | End: 2023-03-02

## 2023-03-02 RX ORDER — LIDOCAINE HYDROCHLORIDE 10 MG/ML
INJECTION, SOLUTION EPIDURAL; INFILTRATION; INTRACAUDAL; PERINEURAL
Status: COMPLETED | OUTPATIENT
Start: 2023-03-02 | End: 2023-03-02

## 2023-03-02 RX ADMIN — LIDOCAINE HYDROCHLORIDE 1 ML: 10 INJECTION, SOLUTION EPIDURAL; INFILTRATION; INTRACAUDAL; PERINEURAL at 09:32

## 2023-03-02 RX ADMIN — METHYLPREDNISOLONE ACETATE 20 MG: 40 INJECTION, SUSPENSION INTRA-ARTICULAR; INTRALESIONAL; INTRAMUSCULAR; SOFT TISSUE at 09:39

## 2023-03-02 ASSESSMENT — PAIN SCALES - GENERAL
PAINLEVEL: MODERATE PAIN (5)
PAINLEVEL: MODERATE PAIN (4)

## 2023-03-02 NOTE — PATIENT INSTRUCTIONS
Follow-up visit with either LAWRENCE Marinelli or Rhina Childers CNP in 2 weeks to discuss injection outcome and determine care plan going forward.       DISCHARGE INSTRUCTIONS    During office hours (8:00 a.m.- 4:00 p.m.) questions or concerns may be answered  by calling [facility] Navigation Nurses at  [phone].  Messages received after hours will be returned the following business day.      In the case of an emergency, please dial 911 or seek assistance at the nearest Emergency Room/Urgent Care facility.     All Patients:    You may experience an increase in your symptoms for the first 2 days (It may take anywhere between 2 days- 2 weeks for the steroid to have maximum effect).    You may use ice on the injection site, as frequently as 20 minutes each hour if needed.    You may take your pain medicine.    You may continue taking your regular medication after your injection. If you have had a Medial Branch Block you may resume pain medication once your pain diary is completed.    You may shower. No swimming, tub bath or hot tub for 48 hours.  You may remove your bandaid/bandage as soon as you are home.    You may resume light activities, as tolerated.    Resume your usual diet as tolerated.    It is strongly advised that you do not drive for 1-3 hours post injection.    If you have had oral sedation:  Do not drive for 8 hours post injection.      If you have had IV sedation:  Do not drive for 24 hours post injection.  Do not operate hazardous machinery or make important personal/business decisions for 24 hours.      POSSIBLE STEROID SIDE EFFECTS (If steroid/cortisone was used for your procedure)    -If you experience these symptoms, it should only last for a short period    Swelling of the legs              Skin redness (flushing)     Mouth (oral) irritation   Blood sugar (glucose) levels            Sweats                    Mood changes  Headache  Sleeplessness  Weakened immune system for up to 14 days,  which could increase the risk of dimas the COVID-19 virus and/or experiencing more severe symptoms of the disease, if exposed.  Decreased effectiveness of the flu vaccine if given within 2 weeks of the steroid.         POSSIBLE PROCEDURE SIDE EFFECTS  -Call the Spine Center if you are concerned  Increased Pain           Increased numbness/tingling      Nausea/Vomiting          Bruising/bleeding at site      Redness or swelling                                              Difficulty walking      Weakness           Fever greater than 100.5    *In the event of a severe headache after an epidural steroid injection that is relieved by lying down, please call the [facility] to speak with a clinical staff member*

## 2023-03-15 ENCOUNTER — HOSPITAL ENCOUNTER (OUTPATIENT)
Dept: PHYSICAL THERAPY | Facility: REHABILITATION | Age: 78
Discharge: HOME | End: 2023-03-15
Payer: COMMERCIAL

## 2023-03-15 DIAGNOSIS — M70.61 GREATER TROCHANTERIC BURSITIS OF RIGHT HIP: ICD-10-CM

## 2023-03-15 DIAGNOSIS — M62.81 MUSCLE WEAKNESS (GENERALIZED): ICD-10-CM

## 2023-03-15 DIAGNOSIS — M48.062 SPINAL STENOSIS OF LUMBAR REGION WITH NEUROGENIC CLAUDICATION: Primary | ICD-10-CM

## 2023-03-15 DIAGNOSIS — M54.12 CERVICAL RADICULOPATHY: ICD-10-CM

## 2023-03-15 DIAGNOSIS — M54.16 LUMBAR RADICULITIS: ICD-10-CM

## 2023-03-15 PROCEDURE — 97140 MANUAL THERAPY 1/> REGIONS: CPT | Mod: GP | Performed by: PHYSICAL THERAPIST

## 2023-03-15 PROCEDURE — 97110 THERAPEUTIC EXERCISES: CPT | Mod: GP | Performed by: PHYSICAL THERAPIST

## 2023-03-29 ENCOUNTER — HOSPITAL ENCOUNTER (OUTPATIENT)
Dept: PHYSICAL THERAPY | Facility: REHABILITATION | Age: 78
Discharge: HOME | End: 2023-03-29
Payer: COMMERCIAL

## 2023-03-29 DIAGNOSIS — M54.16 LUMBAR RADICULITIS: ICD-10-CM

## 2023-03-29 DIAGNOSIS — M70.61 GREATER TROCHANTERIC BURSITIS OF RIGHT HIP: ICD-10-CM

## 2023-03-29 DIAGNOSIS — M48.062 SPINAL STENOSIS OF LUMBAR REGION WITH NEUROGENIC CLAUDICATION: Primary | ICD-10-CM

## 2023-03-29 DIAGNOSIS — M54.12 CERVICAL RADICULOPATHY: ICD-10-CM

## 2023-03-29 DIAGNOSIS — M62.81 MUSCLE WEAKNESS (GENERALIZED): ICD-10-CM

## 2023-03-29 PROCEDURE — 97110 THERAPEUTIC EXERCISES: CPT | Mod: GP | Performed by: PHYSICAL THERAPIST

## 2023-03-29 PROCEDURE — 97140 MANUAL THERAPY 1/> REGIONS: CPT | Mod: GP | Performed by: PHYSICAL THERAPIST

## 2023-04-03 ENCOUNTER — TRANSFERRED RECORDS (OUTPATIENT)
Dept: HEALTH INFORMATION MANAGEMENT | Facility: CLINIC | Age: 78
End: 2023-04-03
Payer: COMMERCIAL

## 2023-04-04 NOTE — PROGRESS NOTES
Assessment:     Diagnoses and all orders for this visit:  Greater trochanteric bursitis of right hip  Lumbar radiculitis  Lumbar disc herniation  Spinal stenosis of lumbar region with neurogenic claudication     Lashawn Ortega is a 78 year old y.o. female with past medical history significant for reflux, migraine headache, low back pain, peripheral neuropathy, melanoma of right upper arm, stroke, elevated liver enzymes who presents today for follow-up regarding:     -Left greater trochanteric bursitis with significant relief with recent bursa injection.    -Chronic bilateral low back pain with intermittent right sciatica, spondylolisthesis with stenosis and bilateral foraminal stenosis noted at L4-5 on imaging.      -Chronic neck pain.  Working with physical therapy currently.    *Dr. Worthington primary spine provider.    Plan:     A shared decision making plan was used.  The patient's values and choices were respected. Prior medical records were reviewed today. The following represents what was discussed and decided upon by the provider and the patient.     -DIAGNOSTIC TESTS: Images were personally reviewed and interpreted.    --MRI of the lumbar spine dated 7/11/2019 is personally reviewed and images interpreted and shown to patient.  There is multilevel degenerative changes in lumbar spine.  There is severe spinal canal stenosis at L3-4.  There is moderate spinal canal stenosis at L4-5.  There is mild to moderate spinal canal stenosis at L2-3.  There is no severe foraminal stenosis.  --CT of abdomen and pelvis July 2020.  I did not see any compression fractures.  -- Cervical spine x-ray 2/17/2023 with 1 mm anterior spondylolisthesis C2-3 and 2 mm C3-4.  Degenerative changes C4-C7 with endplate osteophytes.  Mid and lower cervical facet arthropathy left greater than right.    -INTERVENTIONS: Discussed with patient that we could trial a repeat bilateral L4-5 transforaminal epidural steroid injection at any point  as she does feel her back pain has worsened and she has gotten good relief with this injection in the past.  She wants to wait as she is getting company in the next couple weeks therefore she will call us when she would like to schedule this.    -MEDICATIONS: No changes in medications today  Discussed side effects of medications and proper use. Patient verbalized understanding.    -PHYSICAL THERAPY: Encourage patient to continue with home exercises from recent physical therapy sessions, she does have further sessions scheduled as well.  Discussed the importance of core strengthening, ROM, stretching exercises with the patient and how each of these entities is important in decreasing pain.  Explained to the patient that the purpose of physical therapy is to teach the patient a home exercise program.  These exercises need to be performed every day in order to decrease pain and prevent future occurrences of pain.        -PATIENT EDUCATION: Total time of 32 minutes, on the day of service, spent with the patient, reviewing the chart, placing orders, and documenting.   -Today we also discussed the issues related to the current COVID-19 pandemic, the pros and cons of the current treatment plan, the CDC guidelines such as social distancing, washing the hands, and covering the cough.    -FOLLOW UP: Follow-up as needed  Advised to contact clinic if symptoms worsen or change.    Subjective:     Lashawn Ortega is a 78 year old female who presents today for follow-up regarding 2 weeks post right greater trochanteric bursa steroid injection in which she received significant relief at 90% and overall feels much better in regards to her right lateral hip pain.  She does have ongoing bilateral low back pain that is still bothersome but overall tolerable at this time.  She did have an episode of having to kneel on the ground for a period of time cleaning up some spilled water yesterday and has had slightly aggravated sciatica she  reports into the right posterior thigh and posterior calf.  She is interested in another injection in her back but wants to wait a little bit of time since he just had an injection and is having company towards the end of the month.  She has done well with injections in the past and is working with physical therapy currently as well.    No myelopathic symptoms.     Treatment to Date:   MRI of lumbar spine dated 7/11/2019.    Right L5-S1 transforaminal epidural steroid injection done on 8/12/2019.    Several sessions of in-home physical therapy.    Bilateral L4-5 transforaminal epidural steroid injections done on 8/11/2020.    Right L4-5 transforaminal epidural steroid injection done on 5/7/2021.    Rght greater trochanter bursa injection under ultrasound guidance on 6/11/2021.    Bilateral L4-5 transforaminal epidural steroid injections done on 9/26/2022.  Right greater trochanteric bursa steroid injection ultrasound 3/2/2023 with 90% relief.   Current physical therapy [facility] for neck and back pain.  Last session 3/1/2023.    Patient Active Problem List   Diagnosis     GERD (gastroesophageal reflux disease)     Migraine headache     LBP (low back pain)     Nonsmoker     Menopausal hot flushes     Full code status     Peripheral neuropathy     Melanoma of right upper arm (H) - On Keytruda in the past - stage IIIA     Ischemic cerebrovascular accident (CVA) of frontal lobe (H) - 2/2020     Aphasia     Lumbar spinal stenosis     Essential tremor     On antineoplastic chemotherapy - PEMBROLIZUMAB - checkpoint inhibitor - in the past for melanoma     Female stress incontinence     Seronegative rheumatoid arthritis of both hands (H)     Rosacea       Current Outpatient Medications   Medication     aloe vera Gel     aspirin 81 MG EC tablet     azelaic acid (FINACIA) 15 % external gel     CRESTOR 5 MG tablet     cyclobenzaprine (FLEXERIL) 5 MG tablet     diphenhydrAMINE-Acetaminophen (PERCOGESIC PO)     fluticasone  (FLONASE) 50 MCG/ACT nasal spray     guaiFENesin-dextromethorphan (MUCINEX DM) 600-30 mg Tb12     menthol (BIOFREEZE, MENTHOL,) 4 % Gel     metroNIDAZOLE (METROCREAM) 0.75 % external cream     naproxen (NAPROSYN) 500 MG tablet     neomycin-bacitracin-polymyxin (NEOSPORIN) ointment     NEURONTIN 300 MG capsule     PLAQUENIL 200 MG tablet     PROTONIX 40 MG EC tablet     Pseudoephedrine-DM-GG (ROBITUSSIN COUGH/COLD D PO)     UNABLE TO FIND     UNABLE TO FIND     No current facility-administered medications for this visit.       Allergies   Allergen Reactions     Erythromycin Base [Erythromycin] Rash     PARALYSIS, EYE SWELLING, HEADACHE     Codeine Dizziness     Other: extreme weakness in legspt stopped taking medication and sxs were gone within a few hours       Hydrocodone Nausea and Headache     Keytruda [Pembrolizumab] Other (See Comments)     Elevated liver function tests (lfts)      Tramadol Diarrhea, Dizziness, Headache and Nausea and Vomiting     Alcohol Rash     Cholecalciferol Rash     Other Environmental Allergy Rash     mildew     Penicillins Swelling and Rash     Shellfish Containing Products [Shellfish-Derived Products] Swelling and Rash     shrimp     Sulfa (Sulfonamide Antibiotics) [Sulfa Drugs] Rash     Burning body rash     Vitamin D3 Complete [External Allergen Needs Reconciliation - See Comment] Rash     Higher dosage makes her break out       Past Medical History:   Diagnosis Date     Aphasia      Asthma      GERD (gastroesophageal reflux disease)      History of transfusion      Lumbar spinal stenosis 2/28/2020    EXAM: MR LUMBAR SPINE W WO CONTRAST LOCATION: [facility] DATE/TIME: 7/11/2019 4:49 PM   INDICATION: Back pain going down the right leg.  Going on for 3 months.  Not improving. See above. History of melanoma. COMPARISON: None. CONTRAST: Gadavist 9. TECHNIQUE: Without and with IV contrast   FINDINGS:  Nomenclature is based on 5 lumbar type vertebral bodies. There is no concerning  "marrow rep     Malignant melanoma of right upper arm (H)      Migraine headache      On antineoplastic chemotherapy     pembrolizumab     PN (peripheral neuropathy)      PONV (postoperative nausea and vomiting)     \"cold sweats\"     S/P colonoscopy      Stroke (H) 02/2020        Review of Systems  ROS: Specifically negative for bowel/bladder dysfunction, balance changes, headache, dizziness, foot drop, fevers, chills, appetite changes, nausea/vomiting, unexplained weight loss. Otherwise 13 systems reviewed are negative. Please see the patient's intake questionnaire from today for details.    Reviewed Social, Family, Past Medical and Past Surgical history with patient, no significant changes noted since prior visit.     Objective:     There were no vitals taken for this visit.    PHYSICAL EXAMINATION:   --CONSTITUTIONAL: Vital signs as above. No acute distress. The patient is well nourished and well groomed.  --PSYCHIATRIC:  The patient is awake, alert, oriented to person, place, time and answering questions appropriately with clear speech. Appropriate mood and affect   --HEENT: Sclera are non-injected. Extraocular muscles are intact.   --SKIN: Skin over the face, bilateral upper extremities, and posterior torso is clean, dry, intact without rashes.  --RESPIRATORY: Normal rhythm and effort. No abnormal accessory muscle breathing patterns noted.   --GROSS MOTOR: Easily arises from a seated position.   --CERVICAL SPINE: Inspection reveals no evidence of deformity.    Imaging: Spine imaging was reviewed today. The images were shown to the patient and the findings were explained using a spine model.              "

## 2023-04-05 ENCOUNTER — OFFICE VISIT (OUTPATIENT)
Dept: PHYSICAL MEDICINE AND REHAB | Facility: CLINIC | Age: 78
End: 2023-04-05
Payer: COMMERCIAL

## 2023-04-05 DIAGNOSIS — M70.61 GREATER TROCHANTERIC BURSITIS OF RIGHT HIP: Primary | ICD-10-CM

## 2023-04-05 DIAGNOSIS — M43.16 SPONDYLOLISTHESIS OF LUMBAR REGION: ICD-10-CM

## 2023-04-05 DIAGNOSIS — M54.16 LUMBAR RADICULITIS: ICD-10-CM

## 2023-04-05 DIAGNOSIS — M51.26 LUMBAR DISC HERNIATION: ICD-10-CM

## 2023-04-05 DIAGNOSIS — M48.062 SPINAL STENOSIS OF LUMBAR REGION WITH NEUROGENIC CLAUDICATION: ICD-10-CM

## 2023-04-05 DIAGNOSIS — M54.2 CERVICALGIA: ICD-10-CM

## 2023-04-05 PROCEDURE — 99214 OFFICE O/P EST MOD 30 MIN: CPT | Performed by: NURSE PRACTITIONER

## 2023-04-05 RX ORDER — DOXYCYCLINE HYCLATE 100 MG
TABLET ORAL
COMMUNITY
Start: 2023-04-03 | End: 2024-09-30

## 2023-04-05 NOTE — LETTER
4/5/2023         RE: Lashawn Ortega  [address] [place] [address]        Dear Colleague,    Thank you for referring your patient, Lashawn Ortega, to the [facility]. Please see a copy of my visit note below.      Assessment:     Diagnoses and all orders for this visit:  Greater trochanteric bursitis of right hip  Lumbar radiculitis  Lumbar disc herniation  Spinal stenosis of lumbar region with neurogenic claudication     Lashawn Ortega is a 78 year old y.o. female with past medical history significant for reflux, migraine headache, low back pain, peripheral neuropathy, melanoma of right upper arm, stroke, elevated liver enzymes who presents today for follow-up regarding:     -Left greater trochanteric bursitis with significant relief with recent bursa injection.    -Chronic bilateral low back pain with intermittent right sciatica, spondylolisthesis with stenosis and bilateral foraminal stenosis noted at L4-5 on imaging.      -Chronic neck pain.  Working with physical therapy currently.    *Dr. Worthington primary spine provider.    Plan:     A shared decision making plan was used.  The patient's values and choices were respected. Prior medical records were reviewed today. The following represents what was discussed and decided upon by the provider and the patient.     -DIAGNOSTIC TESTS: Images were personally reviewed and interpreted.    --MRI of the lumbar spine dated 7/11/2019 is personally reviewed and images interpreted and shown to patient.  There is multilevel degenerative changes in lumbar spine.  There is severe spinal canal stenosis at L3-4.  There is moderate spinal canal stenosis at L4-5.  There is mild to moderate spinal canal stenosis at L2-3.  There is no severe foraminal stenosis.  --CT of abdomen and pelvis July 2020.  I did not see any compression fractures.  -- Cervical spine x-ray 2/17/2023 with 1 mm anterior spondylolisthesis C2-3 and 2 mm C3-4.   Degenerative changes C4-C7 with endplate osteophytes.  Mid and lower cervical facet arthropathy left greater than right.    -INTERVENTIONS: Discussed with patient that we could trial a repeat bilateral L4-5 transforaminal epidural steroid injection at any point as she does feel her back pain has worsened and she has gotten good relief with this injection in the past.  She wants to wait as she is getting company in the next couple weeks therefore she will call us when she would like to schedule this.    -MEDICATIONS: No changes in medications today  Discussed side effects of medications and proper use. Patient verbalized understanding.    -PHYSICAL THERAPY: Encourage patient to continue with home exercises from recent physical therapy sessions, she does have further sessions scheduled as well.  Discussed the importance of core strengthening, ROM, stretching exercises with the patient and how each of these entities is important in decreasing pain.  Explained to the patient that the purpose of physical therapy is to teach the patient a home exercise program.  These exercises need to be performed every day in order to decrease pain and prevent future occurrences of pain.        -PATIENT EDUCATION: Total time of 32 minutes, on the day of service, spent with the patient, reviewing the chart, placing orders, and documenting.   -Today we also discussed the issues related to the current COVID-19 pandemic, the pros and cons of the current treatment plan, the CDC guidelines such as social distancing, washing the hands, and covering the cough.    -FOLLOW UP: Follow-up as needed  Advised to contact clinic if symptoms worsen or change.    Subjective:     Lashawn Ortega is a 78 year old female who presents today for follow-up regarding 2 weeks post right greater trochanteric bursa steroid injection in which she received significant relief at 90% and overall feels much better in regards to her right lateral hip pain.  She does  have ongoing bilateral low back pain that is still bothersome but overall tolerable at this time.  She did have an episode of having to kneel on the ground for a period of time cleaning up some spilled water yesterday and has had slightly aggravated sciatica she reports into the right posterior thigh and posterior calf.  She is interested in another injection in her back but wants to wait a little bit of time since he just had an injection and is having company towards the end of the month.  She has done well with injections in the past and is working with physical therapy currently as well.    No myelopathic symptoms.     Treatment to Date:   MRI of lumbar spine dated 7/11/2019.    Right L5-S1 transforaminal epidural steroid injection done on 8/12/2019.    Several sessions of in-home physical therapy.    Bilateral L4-5 transforaminal epidural steroid injections done on 8/11/2020.    Right L4-5 transforaminal epidural steroid injection done on 5/7/2021.    Rght greater trochanter bursa injection under ultrasound guidance on 6/11/2021.    Bilateral L4-5 transforaminal epidural steroid injections done on 9/26/2022.  Right greater trochanteric bursa steroid injection ultrasound 3/2/2023 with 90% relief.   Current physical therapy [facility] for neck and back pain.  Last session 3/1/2023.    Patient Active Problem List   Diagnosis     GERD (gastroesophageal reflux disease)     Migraine headache     LBP (low back pain)     Nonsmoker     Menopausal hot flushes     Full code status     Peripheral neuropathy     Melanoma of right upper arm (H) - On Keytruda in the past - stage IIIA     Ischemic cerebrovascular accident (CVA) of frontal lobe (H) - 2/2020     Aphasia     Lumbar spinal stenosis     Essential tremor     On antineoplastic chemotherapy - PEMBROLIZUMAB - checkpoint inhibitor - in the past for melanoma     Female stress incontinence     Seronegative rheumatoid arthritis of both hands (H)     Rosacea       Current  Outpatient Medications   Medication     aloe vera Gel     aspirin 81 MG EC tablet     azelaic acid (FINACIA) 15 % external gel     CRESTOR 5 MG tablet     cyclobenzaprine (FLEXERIL) 5 MG tablet     diphenhydrAMINE-Acetaminophen (PERCOGESIC PO)     fluticasone (FLONASE) 50 MCG/ACT nasal spray     guaiFENesin-dextromethorphan (MUCINEX DM) 600-30 mg Tb12     menthol (BIOFREEZE, MENTHOL,) 4 % Gel     metroNIDAZOLE (METROCREAM) 0.75 % external cream     naproxen (NAPROSYN) 500 MG tablet     neomycin-bacitracin-polymyxin (NEOSPORIN) ointment     NEURONTIN 300 MG capsule     PLAQUENIL 200 MG tablet     PROTONIX 40 MG EC tablet     Pseudoephedrine-DM-GG (ROBITUSSIN COUGH/COLD D PO)     UNABLE TO FIND     UNABLE TO FIND     No current facility-administered medications for this visit.       Allergies   Allergen Reactions     Erythromycin Base [Erythromycin] Rash     PARALYSIS, EYE SWELLING, HEADACHE     Codeine Dizziness     Other: extreme weakness in legspt stopped taking medication and sxs were gone within a few hours       Hydrocodone Nausea and Headache     Keytruda [Pembrolizumab] Other (See Comments)     Elevated liver function tests (lfts)      Tramadol Diarrhea, Dizziness, Headache and Nausea and Vomiting     Alcohol Rash     Cholecalciferol Rash     Other Environmental Allergy Rash     mildew     Penicillins Swelling and Rash     Shellfish Containing Products [Shellfish-Derived Products] Swelling and Rash     shrimp     Sulfa (Sulfonamide Antibiotics) [Sulfa Drugs] Rash     Burning body rash     Vitamin D3 Complete [External Allergen Needs Reconciliation - See Comment] Rash     Higher dosage makes her break out       Past Medical History:   Diagnosis Date     Aphasia      Asthma      GERD (gastroesophageal reflux disease)      History of transfusion      Lumbar spinal stenosis 2/28/2020    EXAM: MR LUMBAR SPINE W WO CONTRAST LOCATION: [facility] DATE/TIME: 7/11/2019 4:49 PM   INDICATION: Back pain going down  "the right leg.  Going on for 3 months.  Not improving. See above. History of melanoma. COMPARISON: None. CONTRAST: Gadavist 9. TECHNIQUE: Without and with IV contrast   FINDINGS:  Nomenclature is based on 5 lumbar type vertebral bodies. There is no concerning marrow rep     Malignant melanoma of right upper arm (H)      Migraine headache      On antineoplastic chemotherapy     pembrolizumab     PN (peripheral neuropathy)      PONV (postoperative nausea and vomiting)     \"cold sweats\"     S/P colonoscopy      Stroke (H) 02/2020        Review of Systems  ROS: Specifically negative for bowel/bladder dysfunction, balance changes, headache, dizziness, foot drop, fevers, chills, appetite changes, nausea/vomiting, unexplained weight loss. Otherwise 13 systems reviewed are negative. Please see the patient's intake questionnaire from today for details.    Reviewed Social, Family, Past Medical and Past Surgical history with patient, no significant changes noted since prior visit.     Objective:     There were no vitals taken for this visit.    PHYSICAL EXAMINATION:   --CONSTITUTIONAL: Vital signs as above. No acute distress. The patient is well nourished and well groomed.  --PSYCHIATRIC:  The patient is awake, alert, oriented to person, place, time and answering questions appropriately with clear speech. Appropriate mood and affect   --HEENT: Sclera are non-injected. Extraocular muscles are intact.   --SKIN: Skin over the face, bilateral upper extremities, and posterior torso is clean, dry, intact without rashes.  --RESPIRATORY: Normal rhythm and effort. No abnormal accessory muscle breathing patterns noted.   --GROSS MOTOR: Easily arises from a seated position.   --CERVICAL SPINE: Inspection reveals no evidence of deformity.    Imaging: Spine imaging was reviewed today. The images were shown to the patient and the findings were explained using a spine model.                  Again, thank you for allowing me to participate in " the care of your patient.        Sincerely,        Rhina Childers, CNP

## 2023-04-05 NOTE — PATIENT INSTRUCTIONS
~Please call our [facility] Nurse Navigation line [phone] with any questions or concerns about your treatment plan, if symptoms worsen and you would like to be seen urgently, or if you have problems controlling bladder and bowel function.  ~Please note that any My Chart messages may take multiple days for a response due to the high volume of patients seen in clinic.   Anything sent Thursday night or after will be answered the following week when able, as Rhina Childers CNP does not work in clinic on Fridays.        Importance of specialized Physical Therapy: Discussed the importance of core strengthening, ROM, stretching exercises with the patient and how each of these entities is important in decreasing pain.  Explained to the patient that the purpose of physical therapy is to teach the patient a home exercise program individualized to them and their specific health concerns.  These exercises need to be performed every day in order to decrease pain and prevent future occurrences of pain.

## 2023-04-11 DIAGNOSIS — M06.041 SERONEGATIVE RHEUMATOID ARTHRITIS OF BOTH HANDS (H): ICD-10-CM

## 2023-04-11 DIAGNOSIS — M06.042 SERONEGATIVE RHEUMATOID ARTHRITIS OF BOTH HANDS (H): ICD-10-CM

## 2023-04-11 RX ORDER — HYDROXYCHLOROQUINE SULFATE 200 MG/1
TABLET ORAL
Qty: 180 TABLET | Refills: 1 | Status: SHIPPED | OUTPATIENT
Start: 2023-04-11 | End: 2023-09-28

## 2023-04-12 ENCOUNTER — HOSPITAL ENCOUNTER (OUTPATIENT)
Dept: PHYSICAL THERAPY | Facility: REHABILITATION | Age: 78
Discharge: HOME | End: 2023-04-12
Payer: COMMERCIAL

## 2023-04-12 DIAGNOSIS — M54.16 LUMBAR RADICULITIS: ICD-10-CM

## 2023-04-12 DIAGNOSIS — M54.12 CERVICAL RADICULOPATHY: ICD-10-CM

## 2023-04-12 DIAGNOSIS — M62.81 MUSCLE WEAKNESS (GENERALIZED): ICD-10-CM

## 2023-04-12 DIAGNOSIS — M48.062 SPINAL STENOSIS OF LUMBAR REGION WITH NEUROGENIC CLAUDICATION: Primary | ICD-10-CM

## 2023-04-12 DIAGNOSIS — M70.61 GREATER TROCHANTERIC BURSITIS OF RIGHT HIP: ICD-10-CM

## 2023-04-12 PROCEDURE — 97110 THERAPEUTIC EXERCISES: CPT | Mod: GP | Performed by: PHYSICAL THERAPIST

## 2023-04-12 PROCEDURE — 97140 MANUAL THERAPY 1/> REGIONS: CPT | Mod: GP | Performed by: PHYSICAL THERAPIST

## 2023-05-11 DIAGNOSIS — K21.9 GASTROESOPHAGEAL REFLUX DISEASE: ICD-10-CM

## 2023-05-12 RX ORDER — PANTOPRAZOLE SODIUM 40 MG
TABLET, DELAYED RELEASE (ENTERIC COATED) ORAL
Qty: 180 TABLET | Refills: 3 | OUTPATIENT
Start: 2023-05-12

## 2023-05-12 NOTE — TELEPHONE ENCOUNTER
"Routing refill request to provider for review/approval because:  Should have refills on file    Last Written Prescription Date:  7/6/2022  Last Fill Quantity: 180,  # refills: 3   Last office visit provider:  2/7/2023     Requested Prescriptions   Refused Prescriptions Disp Refills     PROTONIX 40 MG EC tablet [Pharmacy Med Name: PROTONIX DR 40MG TABLET] 180 tablet 3     Sig: TAKE 2 TABLETS DAILY       PPI Protocol Passed - 5/11/2023  6:04 PM        Passed - Not on Clopidogrel (unless Pantoprazole ordered)        Passed - No diagnosis of osteoporosis on record        Passed - Recent (12 mo) or future (30 days) visit within the authorizing provider's specialty     Patient has had an office visit with the authorizing provider or a provider within the authorizing providers department within the previous 12 mos or has a future within next 30 days. See \"Patient Info\" tab in inbasket, or \"Choose Columns\" in Meds & Orders section of the refill encounter.              Passed - Medication is active on med list        Passed - Patient is age 18 or older        Passed - No active pregnacy on record        Passed - No positive pregnancy test in past 12 months             Brenda Castañeda RN 05/12/23 2:37 PM      "

## 2023-05-17 ENCOUNTER — THERAPY VISIT (OUTPATIENT)
Dept: PHYSICAL THERAPY | Facility: REHABILITATION | Age: 78
End: 2023-05-17
Payer: COMMERCIAL

## 2023-05-17 DIAGNOSIS — M48.062 SPINAL STENOSIS OF LUMBAR REGION WITH NEUROGENIC CLAUDICATION: Primary | ICD-10-CM

## 2023-05-17 DIAGNOSIS — M54.16 LUMBAR RADICULITIS: ICD-10-CM

## 2023-05-17 DIAGNOSIS — M70.61 GREATER TROCHANTERIC BURSITIS OF RIGHT HIP: ICD-10-CM

## 2023-05-17 DIAGNOSIS — M62.81 MUSCLE WEAKNESS (GENERALIZED): ICD-10-CM

## 2023-05-17 DIAGNOSIS — M54.12 CERVICAL RADICULOPATHY: ICD-10-CM

## 2023-05-17 PROCEDURE — 97110 THERAPEUTIC EXERCISES: CPT | Mod: GP | Performed by: PHYSICAL THERAPIST

## 2023-05-17 PROCEDURE — 97140 MANUAL THERAPY 1/> REGIONS: CPT | Mod: GP | Performed by: PHYSICAL THERAPIST

## 2023-05-17 NOTE — PROGRESS NOTES
05/17/23 0500   Appointment Info   Signing clinician's name / credentials Rasheeda Villela, PT, DPT, CLT-KELLY   Visits Used 8/12   Medical Diagnosis Spinal stenosis of lumbar region with neurogenic claudication, Greater trochanteric bursitis of right hip, Lumbar radiculitis   PT Tx Diagnosis chronic sarah lumbar spine pain, chronic sarah hip pain likely secondary to bursitis, muscle weakness   Quick Adds Certification   Progress Note/Certification   Start of Care Date 01/23/23   Onset of illness/injury or Date of Surgery 05/01/22   Therapy Frequency 1 time every 2-4 weeks   Predicted Duration up to 12 weeks   Certification date from 03/01/23   Certification date to 05/24/23   Progress Note Due Date   (10th session)       Present No   GOALS   PT Goals 2;3;4   PT Goal 1   Goal Identifier Cervical ROM   Goal Description Patient will be able to increase cervical extension and rotation by 5 deg without increase in pain for increase ease in ADLs and home tasks in 12 weeks   Goal Progress met   Target Date 05/24/23   Date Met 05/17/23   PT Goal 2   Goal Identifier HEP   Goal Description Pt will be independent in HEP to address impairments and improve function   Goal Progress Goal Met   Target Date 03/24/23   Date Met 05/17/23   PT Goal 3   Goal Identifier Pain   Goal Description Pt will report <5/10 pain throughout the day with bending, lifting and daily activities to return to PLOF   Goal Progress pain overall its steady and controlled pain levels throughout the day   Target Date 04/23/23   Date Met 05/17/23   PT Goal 4   Goal Identifier Bend/Lift   Goal Description Pt will bend and lift for 20 min with <4/10 pain to put her groceries away   Goal Progress Nearly met - pain levels maintained well, needs rest before 20 min to keep pain better   Target Date 04/23/23   Date Met 05/17/23   Subjective Report   Subjective Report Pateint reports she has been doing ok, she feels like she has the right exercises to  do but needs to continue to work on increasing strength and reps overall and she will like to continue on her own at this time.   Objective Measures   Objective Measures Objective Measure 1;Objective Measure 2;Objective Measure 3;Objective Measure 4;Objective Measure 5   Objective Measure 1   Objective Measure Palpation   Objective Measure 2   Objective Measure Pain   Details lumbar spine: 4-5/10 across the mid back;  R hip no pain, cervical pain 4-5/10 in bilateral posterior neck   Objective Measure 3   Objective Measure Cervical and shoulder ROM   Objective Measure 4   Objective Measure posture   Details mild fwd head, min uncrease in upper thoracic kyphosis   Treatment Interventions (PT)   Interventions Therapeutic Procedure/Exercise;Neuromuscular Re-education;Manual Therapy   Therapeutic Procedure/Exercise   Therapeutic Procedures: strength, endurance, ROM, flexibillity minutes (02370) 30   Skilled Intervention modifications and progression as able of her HEP to reduce R hip and LBP   Patient Response/Progress improved tolerance with cueing   Ther Proc 1 For hip, trunk and axial rotation, LE strengthening   Ther Proc 1 - Details - NuStep WL 5 x 12 minutes; seated and reviewed and performed 5-8 reps of each : seated AAROM cervical extension wiht towel for support, chin tucks small ROM, B shoulder ER and scap (no resistance today but instructed in yellow band for home), cervical seated AROM rotation each way; For hip, trunk and axial rotation,   Therapeutic Procedures Ther Proc 2   Ther Proc 2 for cervical ROM and cervical and core stabilizations   Ther Proc 2 - Details seated AAROM cervical extension wiht towel for support, chin tucks small ROM, B shoulder ER and scap (no resistance today but instructed in yellow band for home), cervical seated AROM rotation each way; seated and reviewed and performed 5-8 reps of each for cervical ROM and stabilizations: seated AAROM cervical extension wiht towel for support,  "chin tucks small ROM, B shoulder ER and scap (no resistance today), cervical seated AROM rotation each way; verbally reviewed -  bridges 5\" hold x 10 reps, TA set with marching x 10 reps B;   Manual Therapy   Manual Therapy: Mobilization, MFR, MLD, friction massage minutes (32877) 23   Skilled Intervention STM/MFR to reduce pain and improve tissue mobility   Patient Response/Progress tolerated well overall   Treatment Detail STM/MFR to the upper trap, levator scap, for pain and inflammation, seated STM/MFR to upper trap; levatro scap and paraspinals, PAs to 1st rib on right side   Education   Learner/Method Patient   Plan   Home program (pedaling) PPT, bridging, abd set with marching, hooklying hip abduction yellow band, piriformis stretching as above, seated HS stretching, cervical AROM and supported cervical extension, AAROM wiht towel cervical rotation, B shoulder ER with band   Plan for next session Dc today with I HEP as above   Comments   Comments Patient as met or nearly met all goals for therapy, plan to continue HEP and focus on gained strength with current exercises. Plan to DC today from therapy. Patient is in agreement with this plan   Total Session Time   Timed Code Treatment Minutes 53   Total Treatment Time (sum of timed and untimed services) 53   Medicare Claim Information   Medical Diagnosis Spinal stenosis of lumbar region with neurogenic claudication, Greater trochanteric bursitis of right hip, Lumbar radiculitis   PT Diagnosis chronic sarah lumbar spine pain, chronic sarah hip pain likely secondary to bursitis, muscle weakness   Start of Care Date 01/23/23   Onset date of current episode/exacerbation 05/01/22   Certification date from 03/01/23   Ortho Goal 1   Goal Identifier HEP   Goal Description Pt will be independent in HEP to address impairments and improve function   Goal Progress goal met   Target Date 03/24/23   Date Met 05/17/23   Ortho Goal 2   Goal Identifier Pain   Goal Description Pt " will report <5/10 pain throughout the day with bending, lifting and daily activities to return to PLOF   Goal Progress pain overall its steady and controlled pain   Target Date 04/23/23   Date Met 05/17/23   Ortho Goal 3   Goal Identifier Bend/lift   Goal Description Pt will bend and lift for 20 min with <4/10 pain to put her groceries away   Target Date 04/23/23   Date Met 05/17/23   Goal Progress nearly met, can not do a full 20 min but otherwise improved and pain is better, just needs rest breaks   Ortho Goal 4   Goal Identifier Cervical ROM   Goal Description Patient will be able to increase cervical extension and rotation by 5 deg without increase in pain for increase ease in ADLs and home tasks in 12 weeks   Target Date 05/24/23   Date Met 05/17/23       DISCHARGE  Reason for Discharge: Patient has met all goals.  No further expectation of progress.  Patient chooses to discontinue therapy.    Equipment Issued: none    Discharge Plan: Patient to continue home program.    Referring Provider:  Renzo Newell

## 2023-06-07 ENCOUNTER — HOSPITAL ENCOUNTER (OUTPATIENT)
Dept: PET IMAGING | Facility: HOSPITAL | Age: 78
Discharge: HOME | End: 2023-06-07
Attending: INTERNAL MEDICINE | Admitting: INTERNAL MEDICINE
Payer: COMMERCIAL

## 2023-06-07 DIAGNOSIS — C43.61 MALIGNANT MELANOMA OF RIGHT UPPER EXTREMITY INCLUDING SHOULDER (H): ICD-10-CM

## 2023-06-07 LAB — GLUCOSE BLDC GLUCOMTR-MCNC: 74 MG/DL (ref 70–99)

## 2023-06-07 PROCEDURE — 82962 GLUCOSE BLOOD TEST: CPT

## 2023-06-07 PROCEDURE — A9552 F18 FDG: HCPCS | Performed by: INTERNAL MEDICINE

## 2023-06-07 PROCEDURE — 343N000001 HC RX 343: Performed by: INTERNAL MEDICINE

## 2023-06-07 PROCEDURE — 78816 PET IMAGE W/CT FULL BODY: CPT | Mod: PS

## 2023-06-07 RX ADMIN — FLUDEOXYGLUCOSE F-18 12.25 MILLICURIE: 500 INJECTION, SOLUTION INTRAVENOUS at 09:22

## 2023-06-12 ENCOUNTER — MEDICAL CORRESPONDENCE (OUTPATIENT)
Dept: HEALTH INFORMATION MANAGEMENT | Facility: CLINIC | Age: 78
End: 2023-06-12

## 2023-06-12 ENCOUNTER — TRANSFERRED RECORDS (OUTPATIENT)
Dept: HEALTH INFORMATION MANAGEMENT | Facility: CLINIC | Age: 78
End: 2023-06-12
Payer: COMMERCIAL

## 2023-08-03 DIAGNOSIS — K21.9 GASTROESOPHAGEAL REFLUX DISEASE: ICD-10-CM

## 2023-08-03 RX ORDER — PANTOPRAZOLE SODIUM 40 MG/1
80 TABLET, DELAYED RELEASE ORAL DAILY
Qty: 60 TABLET | Refills: 0 | Status: SHIPPED | OUTPATIENT
Start: 2023-08-03 | End: 2023-09-11

## 2023-08-03 NOTE — TELEPHONE ENCOUNTER
"Routing to pcp for sig question.    Last Written Prescription Date:  7/6/22  Last Fill Quantity: 180,  # refills: 3   Last office visit provider:  2/7/23     Requested Prescriptions   Pending Prescriptions Disp Refills    pantoprazole (PROTONIX) 40 MG EC tablet 180 tablet 3     Sig: Take 2 tablets (80 mg) by mouth daily       PPI Protocol Passed - 8/3/2023  8:18 AM        Passed - Not on Clopidogrel (unless Pantoprazole ordered)        Passed - No diagnosis of osteoporosis on record        Passed - Recent (12 mo) or future (30 days) visit within the authorizing provider's specialty     Patient has had an office visit with the authorizing provider or a provider within the authorizing providers department within the previous 12 mos or has a future within next 30 days. See \"Patient Info\" tab in inbasket, or \"Choose Columns\" in Meds & Orders section of the refill encounter.              Passed - Medication is active on med list        Passed - Patient is age 18 or older        Passed - No active pregnacy on record        Passed - No positive pregnancy test in past 12 months             Manasa Lynne RN 08/03/23 8:18 AM  "

## 2023-08-03 NOTE — TELEPHONE ENCOUNTER
Patient calling to get a medication refill on medications attached      Patient has 5 left and looking to expedite it with 5 left before the weekend

## 2023-08-11 DIAGNOSIS — M48.062 SPINAL STENOSIS OF LUMBAR REGION WITH NEUROGENIC CLAUDICATION: ICD-10-CM

## 2023-08-11 DIAGNOSIS — E78.2 MIXED HYPERLIPIDEMIA: ICD-10-CM

## 2023-08-11 DIAGNOSIS — M54.16 LUMBAR RADICULITIS: ICD-10-CM

## 2023-08-11 DIAGNOSIS — M51.26 LUMBAR DISC HERNIATION: ICD-10-CM

## 2023-08-11 RX ORDER — ROSUVASTATIN CALCIUM 5 MG
TABLET ORAL
Qty: 90 TABLET | Refills: 3 | Status: SHIPPED | OUTPATIENT
Start: 2023-08-11 | End: 2024-02-15

## 2023-08-11 RX ORDER — GABAPENTIN 300 MG
CAPSULE ORAL
Qty: 810 CAPSULE | Refills: 3 | Status: SHIPPED | OUTPATIENT
Start: 2023-08-11 | End: 2024-01-17

## 2023-08-11 NOTE — TELEPHONE ENCOUNTER
"Routing refill request to provider for review/approval because:    CRESTOR 5 MG   Last Written Prescription Date:  8/15/22  Last Fill Quantity: 90,  # refills: 3   -epic requesting review of cost based alternitives    NEURONTIN 300 MG   Last Written Prescription Date:  8/15/22  Last Fill Quantity: 810,  # refills: 3   -FAIL: no protocol    Last office visit provider:  2/7/23 with ryan     Requested Prescriptions   Pending Prescriptions Disp Refills    NEURONTIN 300 MG capsule [Pharmacy Med Name: NEURONTIN 300MG CAPSULE] 810 capsule 3     Sig: TAKE 3 CAPSULES 3 TIMES DAILY       There is no refill protocol information for this order       CRESTOR 5 MG tablet [Pharmacy Med Name: CRESTOR 5MG TABLET] 90 tablet 3     Sig: TAKE 1 TABLET DAILY       Statins Protocol Passed - 8/11/2023  2:38 PM        Passed - LDL on file in past 12 months     Recent Labs   Lab Test 09/22/22  1217   LDL 52             Passed - No abnormal creatine kinase in past 12 months     No lab results found.             Passed - Recent (12 mo) or future (30 days) visit within the authorizing provider's specialty     Patient has had an office visit with the authorizing provider or a provider within the authorizing providers department within the previous 12 mos or has a future within next 30 days. See \"Patient Info\" tab in inbasket, or \"Choose Columns\" in Meds & Orders section of the refill encounter.              Passed - Medication is active on med list        Passed - Patient is age 18 or older        Passed - No active pregnancy on record        Passed - No positive pregnancy test in past 12 months             TISH COLÓN RN 08/11/23 2:38 PM  "

## 2023-08-30 PROBLEM — Z85.820 HISTORY OF MELANOMA: Status: ACTIVE | Noted: 2019-05-28

## 2023-09-11 ENCOUNTER — MYC MEDICAL ADVICE (OUTPATIENT)
Dept: INTERNAL MEDICINE | Facility: CLINIC | Age: 78
End: 2023-09-11
Payer: COMMERCIAL

## 2023-09-11 DIAGNOSIS — K21.9 GASTROESOPHAGEAL REFLUX DISEASE, UNSPECIFIED WHETHER ESOPHAGITIS PRESENT: Primary | ICD-10-CM

## 2023-09-11 DIAGNOSIS — K21.9 GASTROESOPHAGEAL REFLUX DISEASE: ICD-10-CM

## 2023-09-11 RX ORDER — PANTOPRAZOLE SODIUM 40 MG/1
40 TABLET, DELAYED RELEASE ORAL 2 TIMES DAILY
Qty: 180 TABLET | Refills: 3 | Status: SHIPPED | OUTPATIENT
Start: 2023-09-11 | End: 2024-01-17

## 2023-09-21 ASSESSMENT — ENCOUNTER SYMPTOMS
ARTHRALGIAS: 1
PARESTHESIAS: 0
BREAST MASS: 0
NAUSEA: 0
HEMATURIA: 0
HEARTBURN: 0
ABDOMINAL PAIN: 0
COUGH: 0
DIZZINESS: 0
DYSURIA: 0
HEMATOCHEZIA: 0
DIARRHEA: 0
NERVOUS/ANXIOUS: 0
FEVER: 0
HEADACHES: 0
SORE THROAT: 0
SHORTNESS OF BREATH: 0
CHILLS: 0
EYE PAIN: 0
CONSTIPATION: 0
MYALGIAS: 1
FREQUENCY: 0
JOINT SWELLING: 0
PALPITATIONS: 0
WEAKNESS: 0

## 2023-09-21 ASSESSMENT — ACTIVITIES OF DAILY LIVING (ADL): CURRENT_FUNCTION: NO ASSISTANCE NEEDED

## 2023-09-25 PROBLEM — K21.9 GASTROESOPHAGEAL REFLUX DISEASE, UNSPECIFIED WHETHER ESOPHAGITIS PRESENT: Status: ACTIVE | Noted: 2023-09-25

## 2023-09-28 ENCOUNTER — OFFICE VISIT (OUTPATIENT)
Dept: INTERNAL MEDICINE | Facility: CLINIC | Age: 78
End: 2023-09-28
Payer: COMMERCIAL

## 2023-09-28 VITALS
OXYGEN SATURATION: 100 % | SYSTOLIC BLOOD PRESSURE: 130 MMHG | BODY MASS INDEX: 26.65 KG/M2 | DIASTOLIC BLOOD PRESSURE: 70 MMHG | HEART RATE: 85 BPM | RESPIRATION RATE: 16 BRPM | HEIGHT: 66 IN | WEIGHT: 165.8 LBS

## 2023-09-28 DIAGNOSIS — M06.042 SERONEGATIVE RHEUMATOID ARTHRITIS OF BOTH HANDS (H): ICD-10-CM

## 2023-09-28 DIAGNOSIS — G25.0 ESSENTIAL TREMOR: ICD-10-CM

## 2023-09-28 DIAGNOSIS — Z85.820 HISTORY OF MELANOMA: ICD-10-CM

## 2023-09-28 DIAGNOSIS — M06.041 SERONEGATIVE RHEUMATOID ARTHRITIS OF BOTH HANDS (H): ICD-10-CM

## 2023-09-28 DIAGNOSIS — J31.0 CHRONIC RHINITIS: ICD-10-CM

## 2023-09-28 DIAGNOSIS — M54.41 CHRONIC MIDLINE LOW BACK PAIN WITH RIGHT-SIDED SCIATICA: ICD-10-CM

## 2023-09-28 DIAGNOSIS — Z00.00 MEDICARE ANNUAL WELLNESS VISIT, SUBSEQUENT: Primary | ICD-10-CM

## 2023-09-28 DIAGNOSIS — G89.29 CHRONIC MIDLINE LOW BACK PAIN WITH RIGHT-SIDED SCIATICA: ICD-10-CM

## 2023-09-28 DIAGNOSIS — K21.9 GASTROESOPHAGEAL REFLUX DISEASE, UNSPECIFIED WHETHER ESOPHAGITIS PRESENT: ICD-10-CM

## 2023-09-28 DIAGNOSIS — M48.061 SPINAL STENOSIS OF LUMBAR REGION, UNSPECIFIED WHETHER NEUROGENIC CLAUDICATION PRESENT: ICD-10-CM

## 2023-09-28 DIAGNOSIS — I63.9 ISCHEMIC CEREBROVASCULAR ACCIDENT (CVA) OF FRONTAL LOBE (H): Chronic | ICD-10-CM

## 2023-09-28 DIAGNOSIS — G60.9 IDIOPATHIC PERIPHERAL NEUROPATHY: ICD-10-CM

## 2023-09-28 PROBLEM — N95.1 MENOPAUSAL HOT FLUSHES: Status: RESOLVED | Noted: 2017-07-23 | Resolved: 2023-09-28

## 2023-09-28 LAB
ALBUMIN SERPL BCG-MCNC: 4.3 G/DL (ref 3.5–5.2)
ALP SERPL-CCNC: 47 U/L (ref 35–104)
ALT SERPL W P-5'-P-CCNC: 15 U/L (ref 0–50)
ANION GAP SERPL CALCULATED.3IONS-SCNC: 10 MMOL/L (ref 7–15)
AST SERPL W P-5'-P-CCNC: 31 U/L (ref 0–45)
BILIRUB SERPL-MCNC: 0.6 MG/DL
BUN SERPL-MCNC: 14.1 MG/DL (ref 8–23)
CALCIUM SERPL-MCNC: 9.7 MG/DL (ref 8.8–10.2)
CHLORIDE SERPL-SCNC: 104 MMOL/L (ref 98–107)
CHOLEST SERPL-MCNC: 127 MG/DL
CREAT SERPL-MCNC: 0.87 MG/DL (ref 0.51–0.95)
EGFRCR SERPLBLD CKD-EPI 2021: 68 ML/MIN/1.73M2
ERYTHROCYTE [DISTWIDTH] IN BLOOD BY AUTOMATED COUNT: 12.1 % (ref 10–15)
GLUCOSE SERPL-MCNC: 88 MG/DL (ref 70–99)
HCO3 SERPL-SCNC: 27 MMOL/L (ref 22–29)
HCT VFR BLD AUTO: 41.4 % (ref 35–47)
HDLC SERPL-MCNC: 64 MG/DL
HGB BLD-MCNC: 14.1 G/DL (ref 11.7–15.7)
LDLC SERPL CALC-MCNC: 50 MG/DL
MCH RBC QN AUTO: 32.2 PG (ref 26.5–33)
MCHC RBC AUTO-ENTMCNC: 34.1 G/DL (ref 31.5–36.5)
MCV RBC AUTO: 95 FL (ref 78–100)
NONHDLC SERPL-MCNC: 63 MG/DL
PLATELET # BLD AUTO: 199 10E3/UL (ref 150–450)
POTASSIUM SERPL-SCNC: 4 MMOL/L (ref 3.4–5.3)
PROT SERPL-MCNC: 6.6 G/DL (ref 6.4–8.3)
RBC # BLD AUTO: 4.38 10E6/UL (ref 3.8–5.2)
SODIUM SERPL-SCNC: 141 MMOL/L (ref 135–145)
TRIGL SERPL-MCNC: 67 MG/DL
WBC # BLD AUTO: 4.9 10E3/UL (ref 4–11)

## 2023-09-28 PROCEDURE — 80053 COMPREHEN METABOLIC PANEL: CPT | Performed by: INTERNAL MEDICINE

## 2023-09-28 PROCEDURE — 90662 IIV NO PRSV INCREASED AG IM: CPT | Performed by: INTERNAL MEDICINE

## 2023-09-28 PROCEDURE — 36415 COLL VENOUS BLD VENIPUNCTURE: CPT | Performed by: INTERNAL MEDICINE

## 2023-09-28 PROCEDURE — G0439 PPPS, SUBSEQ VISIT: HCPCS | Performed by: INTERNAL MEDICINE

## 2023-09-28 PROCEDURE — G0008 ADMIN INFLUENZA VIRUS VAC: HCPCS | Performed by: INTERNAL MEDICINE

## 2023-09-28 PROCEDURE — 80061 LIPID PANEL: CPT | Performed by: INTERNAL MEDICINE

## 2023-09-28 PROCEDURE — 85027 COMPLETE CBC AUTOMATED: CPT | Performed by: INTERNAL MEDICINE

## 2023-09-28 PROCEDURE — 99214 OFFICE O/P EST MOD 30 MIN: CPT | Mod: 25 | Performed by: INTERNAL MEDICINE

## 2023-09-28 RX ORDER — HYDROXYCHLOROQUINE SULFATE 200 MG/1
200 TABLET, FILM COATED ORAL 2 TIMES DAILY
Qty: 180 TABLET | Refills: 1 | Status: SHIPPED | OUTPATIENT
Start: 2023-09-28 | End: 2024-04-17

## 2023-09-28 RX ORDER — NAPROXEN 500 MG/1
TABLET ORAL
Qty: 180 TABLET | Refills: 3 | Status: SHIPPED | OUTPATIENT
Start: 2023-09-28

## 2023-09-28 RX ORDER — FLUTICASONE PROPIONATE 50 MCG
SPRAY, SUSPENSION (ML) NASAL
Qty: 48 G | Refills: 2 | Status: SHIPPED | OUTPATIENT
Start: 2023-09-28

## 2023-09-28 ASSESSMENT — PAIN SCALES - GENERAL: PAINLEVEL: NO PAIN (0)

## 2023-09-28 ASSESSMENT — ACTIVITIES OF DAILY LIVING (ADL): CURRENT_FUNCTION: NO ASSISTANCE NEEDED

## 2023-09-28 NOTE — PATIENT INSTRUCTIONS
Patient Education   Personalized Prevention Plan  You are due for the preventive services outlined below.  Your care team is available to assist you in scheduling these services.  If you have already completed any of these items, please share that information with your care team to update in your medical record.  Health Maintenance Due   Topic Date Due    ANNUAL REVIEW OF  ORDERS  Never done    Zoster (Shingles) Vaccine (2 of 3) 03/29/2014    COVID-19 Vaccine (6 - Pfizer series) 01/22/2023     Your Health Risk Assessment indicates you feel you are not in good health    A healthy lifestyle helps keep the body fit and the mind alert. It helps protect you from disease, helps you fight disease, and helps prevent chronic disease (disease that doesn't go away) from getting worse. This is important as you get older and begin to notice twinges in muscles and joints and a decline in the strength and stamina you once took for granted. A healthy lifestyle includes good healthcare, good nutrition, weight control, recreation, and regular exercise. Avoid harmful substances and do what you can to keep safe. Another part of a healthy lifestyle is stay mentally active and socially involved.    Good healthcare   Have a wellness visit every year.   If you have new symptoms, let us know right away. Don't wait until the next checkup.   Take medicines exactly as prescribed and keep your medicines in a safe place. Tell us if your medicine causes problems.   Healthy diet and weight control   Eat 3 or 4 small, nutritious, low-fat, high-fiber meals a day. Include a variety of fruits, vegetables, and whole-grain foods.   Make sure you get enough calcium in your diet. Calcium, vitamin D, and exercise help prevent osteoporosis (bone thinning).   If you live alone, try eating with others when you can. That way you get a good meal and have company while you eat it.   Try to keep a healthy weight. If you eat more calories than your body uses for  energy, it will be stored as fat and you will gain weight.     Recreation   Recreation is not limited to sports and team events. It includes any activity that provides relaxation, interest, enjoyment, and exercise. Recreation provides an outlet for physical, mental, and social energy. It can give a sense of worth and achievement. It can help you stay healthy.    Mental Exercise and Social Involvement  Mental and emotional health is as important as physical health. Keep in touch with friends and family. Stay as active as possible. Continue to learn and challenge yourself.   Things you can do to stay mentally active are:  Learn something new, like a foreign language or musical instrument.   Play SCRABBLE or do crossword puzzles. If you cannot find people to play these games with you at home, you can play them with others on your computer through the Internet.   Join a games club--anything from card games to chess or checkers or lawn bowling.   Start a new hobby.   Go back to school.   Volunteer.   Read.   Keep up with world events.  Learning About Dietary Guidelines  What are the Dietary Guidelines for Americans?     Dietary Guidelines for Americans provide tips for eating well and staying healthy. This helps reduce the risk for long-term (chronic) diseases.  These guidelines recommend that you:  Eat and drink the right amount for you. The U.[place] government's food guide is called MyPlate. It can help you make your own well-balanced eating plan.  Try to balance your eating with your activity. This helps you stay at a healthy weight.  Drink alcohol in moderation, if at all.  Limit foods high in salt, saturated fat, trans fat, and added sugar.  These guidelines are from the U.S. Department of Agriculture and the U.S. Department of Health and Human Services. They are updated every 5 years.  What is MyPlate?  MyPlate is the U.[place] government's food guide. It can help you make your own well-balanced eating plan. A balanced  "eating plan means that you eat enough, but not too much, and that your food gives you the nutrients you need to stay healthy.  MyPlate focuses on eating plenty of whole grains, fruits, and vegetables, and on limiting fat and sugar. It is available online at www.ChooseMyPlate.gov.  How can you get started?  If you're trying to eat healthier, you can slowly change your eating habits over time. You don't have to make big changes all at once. Start by adding one or two healthy foods to your meals each day.  Grains  Choose whole-grain breads and cereals and whole-wheat pasta and whole-grain crackers.  Vegetables  Eat a variety of vegetables every day. They have lots of nutrients and are part of a heart-healthy diet.  Fruits  Eat a variety of fruits every day. Fruits contain lots of nutrients. Choose fresh fruit instead of fruit juice.  Protein foods  Choose fish and lean poultry more often. Eat red meat and fried meats less often. Dried beans, tofu, and nuts are also good sources of protein.  Dairy  Choose low-fat or fat-free products from this food group. If you have problems digesting milk, try eating cheese or yogurt instead.  Fats and oils  Limit fats and oils if you're trying to cut calories. Choose healthy fats when you cook. These include canola oil and olive oil.  Where can you learn more?  Go to https://www.Mang[employer].net/patiented  Enter D676 in the search box to learn more about \"Learning About Dietary Guidelines.\"  Current as of: March 1, 2023               Content Version: 13.7    7599-4365 [employer].   Care instructions adapted under license by your healthcare professional. If you have questions about a medical condition or this instruction, always ask your healthcare professional. [employer] disclaims any warranty or liability for your use of this information.      Hearing Loss: Care Instructions  Overview     Hearing loss is a sudden or slow decrease in how well you hear. It " can range from slight to profound. Permanent hearing loss can occur with aging. It also can happen when you are exposed long-term to loud noise. Examples include listening to loud music, riding motorcycles, or being around other loud machines.  Hearing loss can affect your work and home life. It can make you feel lonely or depressed. You may feel that you have lost your independence. But hearing aids and other devices can help you hear better and feel connected to others.  Follow-up care is a key part of your treatment and safety. Be sure to make and go to all appointments, and call your doctor if you are having problems. It's also a good idea to know your test results and keep a list of the medicines you take.  How can you care for yourself at home?  Avoid loud noises whenever possible. This helps keep your hearing from getting worse.  Always wear hearing protection around loud noises.  Wear a hearing aid as directed.  A professional can help you pick a hearing aid that will work best for you.  You can also get hearing aids over the counter for mild to moderate hearing loss.  Have hearing tests as your doctor suggests. They can show whether your hearing has changed. Your hearing aid may need to be adjusted.  Use other devices as needed. These may include:  Telephone amplifiers and hearing aids that can connect to a television, stereo, radio, or microphone.  Devices that use lights or vibrations. These alert you to the doorbell, a ringing telephone, or a baby monitor.  Television closed-captioning. This shows the words at the bottom of the screen. Most new TVs can do this.  TTY (text telephone). This lets you type messages back and forth on the telephone instead of talking or listening. These devices are also called TDD. When messages are typed on the keyboard, they are sent over the phone line to a receiving TTY. The message is shown on a monitor.  Use text messaging, social media, and email if it is hard for you  "to communicate by telephone.  Try to learn a listening technique called speechreading. It is not lipreading. You pay attention to people's gestures, expressions, posture, and tone of voice. These clues can help you understand what a person is saying. Face the person you are talking to, and have them face you. Make sure the lighting is good. You need to see the other person's face clearly.  Think about counseling if you need help to adjust to your hearing loss.  When should you call for help?  Watch closely for changes in your health, and be sure to contact your doctor if:    You think your hearing is getting worse.     You have new symptoms, such as dizziness or nausea.   Where can you learn more?  Go to https://www.[employer].net/patiented  Enter R798 in the search box to learn more about \"Hearing Loss: Care Instructions.\"  Current as of: March 1, 2023               Content Version: 13.7    4270-0900 [employer].   Care instructions adapted under license by your healthcare professional. If you have questions about a medical condition or this instruction, always ask your healthcare professional. [employer], [employer] disclaims any warranty or liability for your use of this information.         "

## 2023-09-28 NOTE — TELEPHONE ENCOUNTER
Patient calling to get a medication refill on medication(s) attached    Patient calling to get it sent in ASAP if possible

## 2023-09-28 NOTE — PROGRESS NOTES
"  Are you in the first 12 months of your Medicare coverage?  No    Healthy Habits:     In general, how would you rate your overall health?  Fair    Frequency of exercise:  6-7 days/week    Duration of exercise:  15-30 minutes    Do you usually eat at least 4 servings of fruit and vegetables a day, include whole grains    & fiber and avoid regularly eating high fat or \"junk\" foods?  No    Taking medications regularly:  Yes    Medication side effects:  None    Ability to successfully perform activities of daily living:  No assistance needed    Home Safety:  No safety concerns identified    Hearing Impairment:  Find that men's voices are easier to understand than woman's and difficulty understanding soft or whispered speech    In the past 6 months, have you been bothered by leaking of urine?  No    In general, how would you rate your overall mental or emotional health?  Good    Additional concerns today:  No      Today's PHQ-2 Score:       9/27/2023     9:31 AM   PHQ-2 ( 1999 Pfizer)   Q1: Little interest or pleasure in doing things 0   Q2: Feeling down, depressed or hopeless 0   PHQ-2 Score 0   Q1: Little interest or pleasure in doing things Not at all   Q2: Feeling down, depressed or hopeless Not at all   PHQ-2 Score 0           Have you ever done Advance Care Planning? (For example, a Health Directive, POLST, or a discussion with a medical provider or your loved ones about your wishes): Yes, patient states has an Advance Care Planning document and will bring a copy to the clinic.       Fall risk  Fallen 2 or more times in the past year?: No  Any fall with injury in the past year?: No    Cognitive Screening   1) Repeat 3 items (Leader, Season, Table)    2) Clock draw: NORMAL  3) 3 item recall: Recalls 3 objects  Results: 3 items recalled: COGNITIVE IMPAIRMENT LESS LIKELY    Mini-CogTM Copyright S Kalyn. Licensed by the author for use in [facility]; reprinted with permission ([email or web address].[email or web address]). All rights " reserved.

## 2023-09-28 NOTE — PROGRESS NOTES
[facility]    Comprehensive and complex medical care - Chronic disease management - Shared decision making - Care coordination - Compassionate care    Patient advocacy - Rational deprescribing - Minimally disruptive medicine - Ethical focus - Customized care         Date of Service: 9/28/2023  Primary Provider: Renzo Newell    Patient Care Team:  Renzo Newell MD as PCP - General  Renzo Newell MD as Assigned PCP  Shoaib Gloria MD as MD (Hematology & Oncology)  Carlos Alberto Worthington DO as MD (Pain Medicine)  Bj Orantes MD, MD as Resident (Dermatology)  Sadi Castano MD Stanek, Richard Paul, MD as MD (Ophthalmology)          Patient's Pharmacy:    [facility] - [place], [place] - [address] [facility]  [address] [facility] [address]  Phone: [phone] Fax: [phone]    [id number] - [place], [place] - [address] AT [facility] [address]  [address]  [facility] [address]  Phone: [phone] Fax: [phone]     Patient's Contacts:  Name Home Phone Work Phone Mobile Phone Relationship Lgl Grd   DEYSI [phone] Relative No   CAMPBELL BERRIOS [phone]   Relative No     Patient's Insurance:    Payor: [employer] / Plan: [employer] / Product Type: HMO /      Subjective:     History of present illness:    Lashawn Ortega is an 78 year old here for an annual wellness visit.    The issues she would like to address at today's visit include the following:    Chief Complaint   Patient presents with    Physical           2/7/2023     7:54 AM   Additional Questions   Roomed by Napoleon BOWERS CMA   Accompanied by N/A     Patient comes in today for routine annual wellness visit and for other issues.    She had her eye exam recently and has been doing okay with this.    We reviewed her back pain and she continues to work on this.  She has seen  the spine clinic in the past.  She has issues with her low back and her neck.    Reviewed her peripheral neuropathy and her balance issues associated with this.    Her inflammatory arthropathy is doing well which has been characterized as seronegative rheumatoid arthritis.  She is on Plaquenil and has had no recurrence of her synovitis.    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    She has lost weight and says this is intentional by reduced intake.    She denies any stomach issues.  She does use a NSAID regularly.  This helps with her pain.    She has had no signs or symptoms of recurrent stroke.    She is 5 years out from her diagnosis of melanoma and is no longer going to be following with oncology related to this.    We reviewed her other issues noted in the assessment but not specifically addressed in the HPI above.           Active Problem List:  Problem List as of 9/28/2023 Reviewed: 9/25/2023  7:05 PM by Renzo Newell MD         High    Nonsmoker    Full code status    Ischemic cerebrovascular accident (CVA) of frontal lobe (H) - 2/2020    History of melanoma - Melanoma of right upper arm - On Keytruda in the past - stage IIIA       Medium    LBP (low back pain)    Lumbar spinal stenosis    Essential tremor    Seronegative rheumatoid arthritis of both hands (H)       Low    Migraine headache    Menopausal hot flushes    Peripheral neuropathy    Aphasia    On antineoplastic chemotherapy - PEMBROLIZUMAB - checkpoint inhibitor - in the past for melanoma    Female stress incontinence    Rosacea    Gastroesophageal reflux disease, unspecified whether esophagitis present        Past Medical History:   Diagnosis Date    Aphasia     Asthma     GERD (gastroesophageal reflux disease)     History of transfusion     Lumbar spinal stenosis 02/28/2020    EXAM: MR LUMBAR SPINE W WO CONTRAST LOCATION: [facility] DATE/TIME: 7/11/2019 4:49 PM   INDICATION:  "Back pain going down the right leg.  Going on for 3 months.  Not improving. See above. History of melanoma. COMPARISON: None. CONTRAST: Gadavist 9. TECHNIQUE: Without and with IV contrast   FINDINGS:  Nomenclature is based on 5 lumbar type vertebral bodies. There is no concerning marrow rep    Malignant melanoma of right upper arm (H)     Menopausal hot flushes 07/23/2017    On venlafaxine for this.       Migraine headache     On antineoplastic chemotherapy     pembrolizumab    PN (peripheral neuropathy)     PONV (postoperative nausea and vomiting)     \"cold sweats\"    S/P colonoscopy     Stroke (H) 02/01/2020      Past Surgical History:   Procedure Laterality Date    CATARACT EXTRACTION, BILATERAL  2022    HYSTERECTOMY  01/01/1988    IR LUMBAR EPIDURAL STEROID INJECTION  08/12/2019    IR LUMBAR EPIDURAL STEROID INJECTION  08/12/2019    LAPAROSCOPIC CHOLECYSTECTOMY N/A 07/14/2020    Procedure: CHOLECYSTECTOMY, LAPAROSCOPIC;  Surgeon: Lynn Christina MD;  Location: [facility];  Service: General    HI ERCP DX COLLECTION SPECIMEN BRUSHING/WASHING N/A 06/19/2020    Procedure: ENDOSCOPIC RETROGRADE CHOLANGIO-PANCREATOGRAPHY WITH SPHINCTEROTOMY AND STONE EXTRACTION;  Surgeon: Daniel Das MD;  Location: [facility];  Service: Gastroenterology    HI ERCP DX COLLECTION SPECIMEN BRUSHING/WASHING N/A 07/15/2020    Procedure: ENDOSCOPIC RETROGRADE CHOLANGIOPANCREATOGRAPHY;  Surgeon: Daniel Das MD;  Location: [facility];  Service: Gastroenterology    SKIN CANCER EXCISION  05/2019    MELANOMA RESECTION WITH SENTINEL LYMPH NODE      Family History   Problem Relation Age of Onset    Pancreatic Cancer Mother 82.00    Melanoma Father 75.00        Metastatic to colon      Family history is otherwise noncontributory.    Social History     Occupational History    Not on file   Tobacco Use    Smoking status: Former     Packs/day: 0.00     Types: Cigarettes    Smokeless tobacco: Never    Tobacco " comments:     70's   Vaping Use    Vaping Use: Never used   Substance and Sexual Activity    Alcohol use: Yes     Alcohol/week: 0.0 - 1.0 standard drinks of alcohol     Comment: Alcoholic Drinks/day: occ    Drug use: Never    Sexual activity: Not on file      Social History     Social History Narrative    , no children.  Has a niece involved in her care.            Patient of Dr. Newell since 2016.              had severe dementia and passed away in 2019.  Milt.      Current Outpatient Medications   Medication Instructions    aloe vera Gel 1 Application., 2 TIMES DAILY PRN    aspirin (ASA) 81 mg, Oral, DAILY    azelaic acid (FINACIA) 15 % external gel APPLY TOPICALLY DAILY TO SKIN EVERY MORNING.    CRESTOR 5 MG tablet TAKE 1 TABLET DAILY    cyclobenzaprine (FLEXERIL) 5 MG tablet TAKE 1 TABLET 3 TIMES DAILY AS NEEDED FOR MUSCLE SPASMS    diphenhydrAMINE-Acetaminophen (PERCOGESIC PO) 375 mg, Oral    doxycycline hyclate (VIBRA-TABS) 100 MG tablet TAKE 1 TABLET BY MOUTH TWICE A DAY. TAKE WITH FOOD AND FULL GLASS OF WATER. DO NOT LIE DOWN FOR 30 MINUTES AFTER TAKIN. CAUSES SUN SENSITIVITY.    fluticasone (FLONASE) 50 MCG/ACT nasal spray SPRAY 1 TO 2 SPRAYS INTO BOTH NOSTRILS DAILY AS NEEDED FOR ALLERGIES    guaiFENesin-dextromethorphan (MUCINEX DM) 600-30 mg Tb12 1 tablet, 2 TIMES DAILY PRN    menthol (BIOFREEZE, MENTHOL,) 4 % Gel 1 Application., DAILY PRN    metroNIDAZOLE (METROCREAM) 0.75 % external cream APPLY TOPICALLY TO FACE EVERY NIGHT AT BEDTIME.    naproxen (NAPROSYN) 500 MG tablet TAKE 1 TABLET 2 TIMES DAILY AS NEEDED    neomycin-bacitracin-polymyxin (NEOSPORIN) ointment 1 Application., 2 TIMES DAILY PRN    NEURONTIN 300 MG capsule TAKE 3 CAPSULES 3 TIMES DAILY    pantoprazole (PROTONIX) 40 mg, Oral, 2 TIMES DAILY    PLAQUENIL 200 MG tablet TAKE 1 TABLET 2 TIMES DAILY    Pseudoephedrine-DM-GG (ROBITUSSIN COUGH/COLD D PO) Oral    UNABLE TO FIND MEDICATION NAME: benadryl topical cream    UNABLE TO  "FIND MEDICATION NAME: Magni- life cream      Allergies: Erythromycin base [erythromycin], Codeine, Hydrocodone, Keytruda [pembrolizumab], Tramadol, Alcohol, Cholecalciferol, Other environmental allergy, Penicillins, Shellfish containing products [shellfish-derived products], Sulfa (sulfonamide antibiotics) [sulfa antibiotics], and Vitamin d3 complete [external allergen needs reconciliation - see comment]     Immunization History   Administered Date(s) Administered    COVID-19 Bivalent 12+ (Pfizer) 09/22/2022    COVID-19 MONOVALENT 12+ (Pfizer) 02/24/2021, 03/17/2021, 10/21/2021, 07/22/2022    DT (PEDS <7y) 03/27/1998    Hepatitis A (ADULT 19+) 05/14/2009, 05/19/2010    Influenza Vaccine 65+ (Fluzone HD) 09/18/2020, 09/20/2021, 09/22/2022, 09/28/2023    Pneumo Conj 13-V (2010&after) 05/19/2015    Pneumococcal 23 valent 05/19/2010    TDAP (Adacel,Boostrix) 05/19/2015    Td (Adult), Adsorbed 05/14/2009    Zoster vaccine, live 02/01/2014      Objective:     Wt Readings from Last 3 Encounters:   09/28/23 75.2 kg (165 lb 12.8 oz)   02/07/23 82.6 kg (182 lb 1.6 oz)   09/22/22 80.1 kg (176 lb 8 oz)     BP Readings from Last 3 Encounters:   09/28/23 130/70   03/02/23 128/72   02/15/23 129/71       PHYSICAL EXAM  /70 (BP Location: Right arm, Patient Position: Sitting, Cuff Size: Adult Regular)   Pulse 85   Resp 16   Ht 1.676 m (5' 6\")   Wt 75.2 kg (165 lb 12.8 oz)   SpO2 100%   BMI 26.76 kg/m     The patient is comfortable, no acute distress.  Mood good.  Insight is fair.  No skin lesions or nodules of concern.  Ears clear.  Eyes are nonicteric.  Pupils equal and reactive.  Throat is clear.  Neck is supple without mass, no thyromegaly. No cervical or epitrochlear adenopathy.  Heart regular rate and rhythm.  Lungs clear to auscultation bilaterally.  Respiratory effort good.  Abdomen soft and nontender.  No hepatosplenomegaly.  Extremities show no edema.      Diagnostics:     Hospital Outpatient Visit on 06/07/2023 "   Component Date Value Ref Range Status    GLUCOSE BY METER POCT 06/07/2023 74  70 - 99 mg/dL Final       Results for orders placed or performed in visit on 09/28/23   Lipid panel reflex to direct LDL Fasting     Status: Normal   Result Value Ref Range    Cholesterol 127 <200 mg/dL    Triglycerides 67 <150 mg/dL    Direct Measure HDL 64 >=50 mg/dL    LDL Cholesterol Calculated 50 <=100 mg/dL    Non HDL Cholesterol 63 <130 mg/dL    Narrative    Cholesterol  Desirable:  <200 mg/dL    Triglycerides  Normal:  Less than 150 mg/dL  Borderline High:  150-199 mg/dL  High:  200-499 mg/dL  Very High:  Greater than or equal to 500 mg/dL    Direct Measure HDL  Female:  Greater than or equal to 50 mg/dL   Male:  Greater than or equal to 40 mg/dL    LDL Cholesterol  Desirable:  <100mg/dL  Above Desirable:  100-129 mg/dL   Borderline High:  130-159 mg/dL   High:  160-189 mg/dL   Very High:  >= 190 mg/dL    Non HDL Cholesterol  Desirable:  130 mg/dL  Above Desirable:  130-159 mg/dL  Borderline High:  160-189 mg/dL  High:  190-219 mg/dL  Very High:  Greater than or equal to 220 mg/dL   CBC with platelets     Status: Normal   Result Value Ref Range    WBC Count 4.9 4.0 - 11.0 10e3/uL    RBC Count 4.38 3.80 - 5.20 10e6/uL    Hemoglobin 14.1 11.7 - 15.7 g/dL    Hematocrit 41.4 35.0 - 47.0 %    MCV 95 78 - 100 fL    MCH 32.2 26.5 - 33.0 pg    MCHC 34.1 31.5 - 36.5 g/dL    RDW 12.1 10.0 - 15.0 %    Platelet Count 199 150 - 450 10e3/uL   Comprehensive metabolic panel     Status: Normal   Result Value Ref Range    Sodium 141 135 - 145 mmol/L    Potassium 4.0 3.4 - 5.3 mmol/L    Carbon Dioxide (CO2) 27 22 - 29 mmol/L    Anion Gap 10 7 - 15 mmol/L    Urea Nitrogen 14.1 8.0 - 23.0 mg/dL    Creatinine 0.87 0.51 - 0.95 mg/dL    GFR Estimate 68 >60 mL/min/1.73m2    Calcium 9.7 8.8 - 10.2 mg/dL    Chloride 104 98 - 107 mmol/L    Glucose 88 70 - 99 mg/dL    Alkaline Phosphatase 47 35 - 104 U/L    AST 31 0 - 45 U/L    ALT 15 0 - 50 U/L    Protein  Total 6.6 6.4 - 8.3 g/dL    Albumin 4.3 3.5 - 5.2 g/dL    Bilirubin Total 0.6 <=1.2 mg/dL       Assessment:     1. Medicare annual wellness visit, subsequent    2. Spinal stenosis of lumbar region, unspecified whether neurogenic claudication present    3. Ischemic cerebrovascular accident (CVA) of frontal lobe (H) - 2/2020    4. History of melanoma - Melanoma of right upper arm - On Keytruda in the past - stage IIIA    5. Essential tremor    6. Seronegative rheumatoid arthritis of both hands (H)    7. Gastroesophageal reflux disease, unspecified whether esophagitis present    8. Idiopathic peripheral neuropathy    9. Chronic midline low back pain with right-sided sciatica    10. Chronic rhinitis         Plan:     Flu shot done today.  Blood work done today.  She is getting her shingles vaccine today.  COVID booster when able.  No change in medications.  Continue current medications  Follow up sooner if issues.    Orders Placed This Encounter   Procedures    INFLUENZA VACCINE 65+ (FLUZONE HD)    Lipid panel reflex to direct LDL Fasting    CBC with platelets    Comprehensive metabolic panel        A personalized health plan based on the identified health risks was provided to the patient on the AVS.       Renzo Newell MD  General Internal Medicine  [facility]      Return in about 1 year (around 9/28/2024) for annual wellness visit.     No future appointments.      Health Maintenance   Topic Date Due    ANNUAL REVIEW OF  ORDERS  Never done    ZOSTER IMMUNIZATION (2 of 3) 03/29/2014    COVID-19 Vaccine (6 - Pfizer series) 01/22/2023    LUNG CANCER SCREENING  06/07/2024    MEDICARE ANNUAL WELLNESS VISIT  09/28/2024    FALL RISK ASSESSMENT  09/28/2024    DTAP/TDAP/TD IMMUNIZATION (2 - Td or Tdap) 05/19/2025    LIPID  09/28/2028    ADVANCE CARE PLANNING  09/28/2028    DEXA  07/08/2030    HEPATITIS C SCREENING  Completed    PHQ-2 (once per calendar year)  Completed    INFLUENZA VACCINE   Completed    Pneumococcal Vaccine: 65+ Years  Completed    IPV IMMUNIZATION  Aged Out    HPV IMMUNIZATION  Aged Out    MENINGITIS IMMUNIZATION  Aged Out    MAMMO SCREENING  Discontinued    COLORECTAL CANCER SCREENING  Discontinued      I have reviewed Opioid Use Disorder and Substance Use Disorder risk factors and made any needed referrals.  This may not apply to this individual patient, but this documentation is required by Medicare.    The patient was provided with suggestions to help her develop a healthy physical lifestyle.  The patient was counseled and encouraged to consider modifying their diet and eating habits. She was provided with information on recommended healthy diet options.  The patient was provided with written information regarding signs of hearing loss.

## 2023-10-19 ENCOUNTER — ANCILLARY PROCEDURE (OUTPATIENT)
Dept: MAMMOGRAPHY | Facility: CLINIC | Age: 78
End: 2023-10-19
Attending: INTERNAL MEDICINE
Payer: COMMERCIAL

## 2023-10-19 DIAGNOSIS — Z12.31 VISIT FOR SCREENING MAMMOGRAM: ICD-10-CM

## 2023-10-19 PROCEDURE — 77067 SCR MAMMO BI INCL CAD: CPT
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
## 2023-12-13 ENCOUNTER — HOSPITAL ENCOUNTER (OUTPATIENT)
Dept: CT IMAGING | Facility: HOSPITAL | Age: 78
Discharge: HOME | End: 2023-12-13
Attending: INTERNAL MEDICINE | Admitting: INTERNAL MEDICINE
Payer: COMMERCIAL

## 2023-12-13 DIAGNOSIS — Z85.820 PERSONAL HISTORY OF MALIGNANT MELANOMA OF SKIN: ICD-10-CM

## 2023-12-13 LAB
CREAT BLD-MCNC: 0.9 MG/DL (ref 0.6–1.1)
EGFRCR SERPLBLD CKD-EPI 2021: >60 ML/MIN/1.73M2

## 2023-12-13 PROCEDURE — 82565 ASSAY OF CREATININE: CPT

## 2023-12-13 PROCEDURE — 74177 CT ABD & PELVIS W/CONTRAST: CPT

## 2023-12-13 PROCEDURE — 250N000011 HC RX IP 250 OP 636: Performed by: INTERNAL MEDICINE

## 2023-12-13 RX ORDER — IOPAMIDOL 755 MG/ML
90 INJECTION, SOLUTION INTRAVASCULAR ONCE
Status: COMPLETED | OUTPATIENT
Start: 2023-12-13 | End: 2023-12-13

## 2023-12-13 RX ADMIN — IOPAMIDOL 90 ML: 755 INJECTION, SOLUTION INTRAVENOUS at 10:04

## 2023-12-18 ENCOUNTER — TRANSFERRED RECORDS (OUTPATIENT)
Dept: HEALTH INFORMATION MANAGEMENT | Facility: CLINIC | Age: 78
End: 2023-12-18
Payer: COMMERCIAL

## 2024-01-16 ENCOUNTER — TELEPHONE (OUTPATIENT)
Dept: INTERNAL MEDICINE | Facility: CLINIC | Age: 79
End: 2024-01-16
Payer: COMMERCIAL

## 2024-01-16 NOTE — TELEPHONE ENCOUNTER
Reason for Call:  Medication refill and  Pharmacy Change    Do you use a [facility]? No  Name of the pharmacy and phone number for the current request:  [facility]    Name of the medication requested: Pharmacy Change requesting all RX sent to [facility]    Other request: Patient is very frustrated and upset. Extends apologies did not call earlier she was under the impression [facility] would be sending RX information with new pharmacy [facility].    She states she needs gabapentin and rosuvastatin refilled now- these medications do not appear on current medication list.     Patient has new insurance for 20[facility] instead of [employer].  [facility] has been updated.    Can we leave a detailed message on this number? YES    Phone number patient can be reached at: Home number on file [phone] (home)    Best Time: any    Call taken on 1/16/2024 at 3:16 PM by Laurie Reese

## 2024-01-17 DIAGNOSIS — M54.16 LUMBAR RADICULITIS: ICD-10-CM

## 2024-01-17 DIAGNOSIS — M48.062 SPINAL STENOSIS OF LUMBAR REGION WITH NEUROGENIC CLAUDICATION: ICD-10-CM

## 2024-01-17 DIAGNOSIS — K21.9 GASTROESOPHAGEAL REFLUX DISEASE, UNSPECIFIED WHETHER ESOPHAGITIS PRESENT: ICD-10-CM

## 2024-01-17 DIAGNOSIS — M51.26 LUMBAR DISC HERNIATION: ICD-10-CM

## 2024-01-17 RX ORDER — GABAPENTIN 300 MG/1
900 CAPSULE ORAL 3 TIMES DAILY
Qty: 900 CAPSULE | Refills: 3 | Status: SHIPPED | OUTPATIENT
Start: 2024-01-17 | End: 2025-02-20

## 2024-01-17 RX ORDER — PANTOPRAZOLE SODIUM 40 MG/1
40 TABLET, DELAYED RELEASE ORAL 2 TIMES DAILY
Qty: 180 TABLET | Refills: 1 | Status: SHIPPED | OUTPATIENT
Start: 2024-01-17 | End: 2024-06-15

## 2024-01-17 NOTE — TELEPHONE ENCOUNTER
Patient calling to get a medication refill on medication(s) attached    Patient has about 3 weeks left but wants it to be know that she will not use [facility]  anymore due to insurance preferences     She will be using [facility] going forward

## 2024-02-15 DIAGNOSIS — E78.2 MIXED HYPERLIPIDEMIA: ICD-10-CM

## 2024-02-15 RX ORDER — ROSUVASTATIN CALCIUM 5 MG/1
5 TABLET, COATED ORAL DAILY
Qty: 90 TABLET | Refills: 0 | Status: SHIPPED | OUTPATIENT
Start: 2024-02-15 | End: 2024-04-02

## 2024-02-15 RX ORDER — ROSUVASTATIN CALCIUM 5 MG/1
5 TABLET, COATED ORAL DAILY
Qty: 90 TABLET | Refills: 3 | Status: CANCELLED | OUTPATIENT
Start: 2024-02-15

## 2024-03-08 ENCOUNTER — OFFICE VISIT (OUTPATIENT)
Dept: PHYSICAL MEDICINE AND REHAB | Facility: CLINIC | Age: 79
End: 2024-03-08
Payer: COMMERCIAL

## 2024-03-08 VITALS — DIASTOLIC BLOOD PRESSURE: 66 MMHG | OXYGEN SATURATION: 100 % | HEART RATE: 81 BPM | SYSTOLIC BLOOD PRESSURE: 119 MMHG

## 2024-03-08 DIAGNOSIS — M48.062 SPINAL STENOSIS OF LUMBAR REGION WITH NEUROGENIC CLAUDICATION: Primary | ICD-10-CM

## 2024-03-08 DIAGNOSIS — M70.61 GREATER TROCHANTERIC BURSITIS OF RIGHT HIP: ICD-10-CM

## 2024-03-08 DIAGNOSIS — M79.18 MYOFASCIAL PAIN: ICD-10-CM

## 2024-03-08 DIAGNOSIS — M54.16 LUMBAR RADICULITIS: ICD-10-CM

## 2024-03-08 PROCEDURE — 99214 OFFICE O/P EST MOD 30 MIN: CPT | Performed by: PAIN MEDICINE

## 2024-03-08 NOTE — PATIENT INSTRUCTIONS
[facility] Injection Requirements    A  is required for all fluoroscopically-guided injections.  Injection appointments may be cancelled if there are signs/symptoms of an active infection or if the patient is being actively treated with antibiotics for a diagnosed infection.  Patients may have their steroid injection cancelled if they have had another steroid injection within 2 weeks.  Diabetic patients will have their blood glucose levels checked the day of their injection and the appointment will be rescheduled if the blood glucose level is 300 or higher.  Patients with allergies to cortisone, local anesthetics, iodine, or contrast dye should contact the Spine Center to further discuss these considerations.  Patients scheduled for medial branch block diagnostic injections should refrain from taking pain medication the day of the procedure.  The medial branch block injection appointment will be rescheduled if the patient's pain rating is not 5/10 or greater at the time of the procedure.  Patients taking warfarin/Coumadin will have their INR checked the day of the procedure and the procedure may be rescheduled if the INR is greater than 3.0.  Please contact the Spine Center [phone]) if you are taking any prescription blood-thinning medications (aspirin, warfarin, Plavix, Lovenox, Eliquis, Brilinta, Effient, etc.) as special dosing adjustments may need to be made depending on the type of injection you are scheduled to receive.  It is recommended that you delay having your steroid injection if you have received any vaccines within 2 weeks.    ~Please call Nurse Navigation line [phone] with any questions or concerns about your treatment plan, if symptoms worsen and you would like to be seen urgently, or if you have problems controlling bladder and bowel function.

## 2024-03-08 NOTE — PROGRESS NOTES
Assessment:     Diagnoses and all orders for this visit:  Spinal stenosis of lumbar region with neurogenic claudication  -     PAIN Transforaminal DORON Inj Lumbosacral One Level Bilateral; Future  Lumbar radiculitis  Greater trochanteric bursitis of right hip  Myofascial pain     Lashawn Ortega is a 78 year old y.o. female with past medical history significant for  reflux, migraine headache, low back pain, peripheral neuropathy, melanoma of right upper arm, stroke, elevated liver enzymes who presents today for follow-up regarding low back and right lower extremity pain:    -Patient's pain is likely secondary to lumbar spinal stenosis with neurogenic claudication.  The last time she had epidural steroid injection she had near 100% relief for 6 months.     Plan:     A shared decision making plan was used. The patient's values and choices were respected. Prior medical records from our last visit on 2/15/2023 were reviewed today. The following represents what was discussed and decided upon by the provider and the patient.        -DIAGNOSTIC TESTS: Images were personally reviewed and interpreted.   --MRI of the lumbar spine dated 7/11/2019 is personally reviewed and images interpreted and shown to patient.  There is multilevel degenerative changes in lumbar spine.  There is severe spinal canal stenosis at L3-4.  There is moderate spinal canal stenosis at L4-5.  There is mild to moderate spinal canal stenosis at L2-3.  There is no severe foraminal stenosis.  --CT of abdomen and pelvis July 2020.  I did not see any compression fractures.  -- X-ray of cervical spine dated 2/17/2023 is personally reviewed images interpreted and discussed with the patient.  There is a grade 1 anterolisthesis of C2 on C3 and C3 on C4.  There is mid and lower cervical facet arthropathy left greater than right.    -INTERVENTIONS: I ordered bilateral L4-5 transforaminal epidural steroid injections.  Could consider right ultrasound-guided  greater trochanter bursa injection in the future.  Would also consider for CMC joint injections bilaterally.     -MEDICATIONS: No changes to medications.  -  Discussed side effects of medications and proper use. Patient verbalized understanding.    -PHYSICAL THERAPY: I recommend that she continue with home exercises on a consistent basis.  Discussed the importance of core strengthening, ROM, stretching exercises with the patient and how each of these entities is important in decreasing pain.  Explained to the patient that the purpose of physical therapy is to teach the patient a home exercise program.  These exercises need to be performed every day in order to decrease pain and prevent future occurrences of pain.        -PATIENT EDUCATION: We discussed pain management in a multiple to fashion including physical therapy, medication management, possible future injections.    -FOLLOW UP: Patient will follow-up 2 to 4 weeks after injections.  Advised to contact clinic if symptoms worsen or change.    Subjective:     Lashawn Ortega is a 78 year old female who presents today for follow-up regarding bilateral low back and buttock pain.  Patient notes that her pain is worse with standing on her feet for any length of time as well as walking.  If she sits down pain is improved.  Physical therapy exercises are also helpful.  She notes that when she is using her walker she is often bent forward when walking and she is able to walk farther with this.  She had bilateral L4-5 transforaminal epidural steroid injections in the past with at least 6 months of relief which was almost 100%.  Right greater trochanter bursa injection also gave great relief for several months of near 80% relief.  She does have tenderness to palpation over the right greater trochanteric bursa area and difficulty sleeping on that side as well.  Pain today is 3/10 at its worst is 7/10 at its best is 3/10.  She denies any bowel or bladder changes, fevers,  chills, unintentional weight loss.  She also notes first CMC area pain in her thumbs.    Treatment to Date: MRI of lumbar spine dated 7/11/2019.  Right L5-S1 transforaminal epidural steroid injection done on 8/12/2019.  Several sessions of in-home physical therapy.  Bilateral L4-5 transforaminal epidural steroid injections done on 8/11/2020.  Right L4-5 transforaminal epidural steroid injection done on 5/7/2021.  Right greater trochanter bursa injection under ultrasound guidance on 6/11/2021.  Bilateral L4-5 transforaminal epidural steroid injections done on 9/26/2022.  X-ray of cervical spine dated 2/17/2023.  Right greater trochanter bursa injection under ultrasound guidance on 3/2/2023.    Patient Active Problem List   Diagnosis    Migraine headache    LBP (low back pain)    Nonsmoker    Full code status    Peripheral neuropathy    History of melanoma - Melanoma of right upper arm - On Keytruda in the past - stage IIIA    Ischemic cerebrovascular accident (CVA) of frontal lobe (H) - 2/2020    Aphasia    Lumbar spinal stenosis    Essential tremor    On antineoplastic chemotherapy - PEMBROLIZUMAB - checkpoint inhibitor - in the past for melanoma    Female stress incontinence    Seronegative rheumatoid arthritis of both hands (H)    Rosacea    Gastroesophageal reflux disease, unspecified whether esophagitis present       Current Outpatient Medications   Medication    aloe vera Gel    aspirin 81 MG EC tablet    azelaic acid (FINACIA) 15 % external gel    cyclobenzaprine (FLEXERIL) 5 MG tablet    diphenhydrAMINE-Acetaminophen (PERCOGESIC PO)    doxycycline hyclate (VIBRA-TABS) 100 MG tablet    fluticasone (FLONASE) 50 MCG/ACT nasal spray    gabapentin (NEURONTIN) 300 MG capsule    guaiFENesin-dextromethorphan (MUCINEX DM) 600-30 mg Tb12    hydroxychloroquine (PLAQUENIL) 200 MG tablet    menthol (BIOFREEZE, MENTHOL,) 4 % Gel    metroNIDAZOLE (METROCREAM) 0.75 % external cream    naproxen (NAPROSYN) 500 MG tablet     neomycin-bacitracin-polymyxin (NEOSPORIN) ointment    pantoprazole (PROTONIX) 40 MG EC tablet    Pseudoephedrine-DM-GG (ROBITUSSIN COUGH/COLD D PO)    rosuvastatin (CRESTOR) 5 MG tablet    UNABLE TO FIND    UNABLE TO FIND     No current facility-administered medications for this visit.       Allergies   Allergen Reactions    Erythromycin Base [Erythromycin] Rash     PARALYSIS, EYE SWELLING, HEADACHE    Codeine Dizziness     Other: extreme weakness in legspt stopped taking medication and sxs were gone within a few hours      Hydrocodone Nausea and Headache    Keytruda [Pembrolizumab] Other (See Comments)     Elevated liver function tests (lfts)     Tramadol Diarrhea, Dizziness, Headache and Nausea and Vomiting    Alcohol Rash    Cholecalciferol Rash    Other Environmental Allergy Rash     mildew    Penicillins Swelling and Rash    Shellfish Containing Products [Shellfish-Derived Products] Swelling and Rash     shrimp    Sulfa (Sulfonamide Antibiotics) [Sulfa Antibiotics] Rash     Burning body rash    Vitamin D3 Complete [External Allergen Needs Reconciliation - See Comment] Rash     Higher dosage makes her break out       Past Medical History:   Diagnosis Date    Aphasia     Asthma     GERD (gastroesophageal reflux disease)     History of transfusion     Lumbar spinal stenosis 02/28/2020    EXAM: MR LUMBAR SPINE W WO CONTRAST LOCATION: [facility] DATE/TIME: 7/11/2019 4:49 PM   INDICATION: Back pain going down the right leg.  Going on for 3 months.  Not improving. See above. History of melanoma. COMPARISON: None. CONTRAST: Gadavist 9. TECHNIQUE: Without and with IV contrast   FINDINGS:  Nomenclature is based on 5 lumbar type vertebral bodies. There is no concerning marrow rep    Malignant melanoma of right upper arm (H)     Menopausal hot flushes 07/23/2017    On venlafaxine for this.       Migraine headache     On antineoplastic chemotherapy     pembrolizumab    PN (peripheral neuropathy)     PONV  "(postoperative nausea and vomiting)     \"cold sweats\"    S/P colonoscopy     Stroke (H) 02/01/2020        Review of Systems  ROS:  Specifically negative for bowel/bladder dysfunction, balance changes, headache, dizziness, foot drop, fevers, chills, appetite changes, nausea/vomiting, unexplained weight loss. Otherwise 13 systems reviewed are negative. Please see the patient's intake questionnaire from today for details.    Reviewed Social, Family, Past Medical and Past Surgical history with patient, no significant changes noted since prior visit.     Objective:     /66   Pulse 81   SpO2 100%     PHYSICAL EXAMINATION:    --CONSTITUTIONAL: Well developed, well nourished, healthy appearing individual.  --PSYCHIATRIC: Appropriate mood and affect. No difficulty interacting due to temper, social withdrawal, or memory issues.  --RESPIRATORY: Normal rhythm and effort. No abnormal accessory muscle breathing patterns noted.   --MUSCULOSKELETAL:  Normal lumbar lordosis noted, no lateral shift.  --GROSS MOTOR: Easily arises from a seated position. Gait is non-antalgic  --LUMBAR SPINE:  Inspection reveals no evidence of deformity. Range of motion is mildly limited in lumbar flexion, extension, lateral rotation.  Tenderness to palpation across the low back.  --SACROILIAC JOINT:  One Finger point test negative.  --LOWER EXTREMITY MOTOR TESTING:  Plantar flexion left 5/5, right 5/5   Dorsiflexion left 5/5, right 5/5   Great toe MTP extension left 5/5, right 5/5  Knee flexion left 5/5, right 5/5  Knee extension left 5/5, right 5/5   Hip flexion left 5/5, right 5/5  Hip abduction left 5/5, right 5/5  Hip adduction left 5/5, right 5/5   --NEUROLOGIC: Sensation to light touch is intact in the bilateral L4, L5, and S1 dermatomes.    RESULTS:   Imaging: Lumbar spine imaging was reviewed today.                     "

## 2024-03-08 NOTE — LETTER
3/8/2024         RE: Lashawn Ortega  [address] [place] [address]        Dear Colleague,    Thank you for referring your patient, Lashawn Ortega, to the [facility]. Please see a copy of my visit note below.      Assessment:     Diagnoses and all orders for this visit:  Spinal stenosis of lumbar region with neurogenic claudication  -     PAIN Transforaminal DORON Inj Lumbosacral One Level Bilateral; Future  Lumbar radiculitis  Greater trochanteric bursitis of right hip  Myofascial pain     Lashawn Ortega is a 78 year old y.o. female with past medical history significant for  reflux, migraine headache, low back pain, peripheral neuropathy, melanoma of right upper arm, stroke, elevated liver enzymes who presents today for follow-up regarding low back and right lower extremity pain:    -Patient's pain is likely secondary to lumbar spinal stenosis with neurogenic claudication.  The last time she had epidural steroid injection she had near 100% relief for 6 months.     Plan:     A shared decision making plan was used. The patient's values and choices were respected. Prior medical records from our last visit on 2/15/2023 were reviewed today. The following represents what was discussed and decided upon by the provider and the patient.        -DIAGNOSTIC TESTS: Images were personally reviewed and interpreted.   --MRI of the lumbar spine dated 7/11/2019 is personally reviewed and images interpreted and shown to patient.  There is multilevel degenerative changes in lumbar spine.  There is severe spinal canal stenosis at L3-4.  There is moderate spinal canal stenosis at L4-5.  There is mild to moderate spinal canal stenosis at L2-3.  There is no severe foraminal stenosis.  --CT of abdomen and pelvis July 2020.  I did not see any compression fractures.  -- X-ray of cervical spine dated 2/17/2023 is personally reviewed images interpreted and discussed with the patient.  There is  a grade 1 anterolisthesis of C2 on C3 and C3 on C4.  There is mid and lower cervical facet arthropathy left greater than right.    -INTERVENTIONS: I ordered bilateral L4-5 transforaminal epidural steroid injections.  Could consider right ultrasound-guided greater trochanter bursa injection in the future.  Would also consider for CMC joint injections bilaterally.     -MEDICATIONS: No changes to medications.  -  Discussed side effects of medications and proper use. Patient verbalized understanding.    -PHYSICAL THERAPY: I recommend that she continue with home exercises on a consistent basis.  Discussed the importance of core strengthening, ROM, stretching exercises with the patient and how each of these entities is important in decreasing pain.  Explained to the patient that the purpose of physical therapy is to teach the patient a home exercise program.  These exercises need to be performed every day in order to decrease pain and prevent future occurrences of pain.        -PATIENT EDUCATION: We discussed pain management in a multiple to fashion including physical therapy, medication management, possible future injections.    -FOLLOW UP: Patient will follow-up 2 to 4 weeks after injections.  Advised to contact clinic if symptoms worsen or change.    Subjective:     Lashawn Ortega is a 78 year old female who presents today for follow-up regarding bilateral low back and buttock pain.  Patient notes that her pain is worse with standing on her feet for any length of time as well as walking.  If she sits down pain is improved.  Physical therapy exercises are also helpful.  She notes that when she is using her walker she is often bent forward when walking and she is able to walk farther with this.  She had bilateral L4-5 transforaminal epidural steroid injections in the past with at least 6 months of relief which was almost 100%.  Right greater trochanter bursa injection also gave great relief for several months of near  80% relief.  She does have tenderness to palpation over the right greater trochanteric bursa area and difficulty sleeping on that side as well.  Pain today is 3/10 at its worst is 7/10 at its best is 3/10.  She denies any bowel or bladder changes, fevers, chills, unintentional weight loss.  She also notes first CMC area pain in her thumbs.    Treatment to Date: MRI of lumbar spine dated 7/11/2019.  Right L5-S1 transforaminal epidural steroid injection done on 8/12/2019.  Several sessions of in-home physical therapy.  Bilateral L4-5 transforaminal epidural steroid injections done on 8/11/2020.  Right L4-5 transforaminal epidural steroid injection done on 5/7/2021.  Right greater trochanter bursa injection under ultrasound guidance on 6/11/2021.  Bilateral L4-5 transforaminal epidural steroid injections done on 9/26/2022.  X-ray of cervical spine dated 2/17/2023.  Right greater trochanter bursa injection under ultrasound guidance on 3/2/2023.    Patient Active Problem List   Diagnosis     Migraine headache     LBP (low back pain)     Nonsmoker     Full code status     Peripheral neuropathy     History of melanoma - Melanoma of right upper arm - On Keytruda in the past - stage IIIA     Ischemic cerebrovascular accident (CVA) of frontal lobe (H) - 2/2020     Aphasia     Lumbar spinal stenosis     Essential tremor     On antineoplastic chemotherapy - PEMBROLIZUMAB - checkpoint inhibitor - in the past for melanoma     Female stress incontinence     Seronegative rheumatoid arthritis of both hands (H)     Rosacea     Gastroesophageal reflux disease, unspecified whether esophagitis present       Current Outpatient Medications   Medication     aloe vera Gel     aspirin 81 MG EC tablet     azelaic acid (FINACIA) 15 % external gel     cyclobenzaprine (FLEXERIL) 5 MG tablet     diphenhydrAMINE-Acetaminophen (PERCOGESIC PO)     doxycycline hyclate (VIBRA-TABS) 100 MG tablet     fluticasone (FLONASE) 50 MCG/ACT nasal spray      gabapentin (NEURONTIN) 300 MG capsule     guaiFENesin-dextromethorphan (MUCINEX DM) 600-30 mg Tb12     hydroxychloroquine (PLAQUENIL) 200 MG tablet     menthol (BIOFREEZE, MENTHOL,) 4 % Gel     metroNIDAZOLE (METROCREAM) 0.75 % external cream     naproxen (NAPROSYN) 500 MG tablet     neomycin-bacitracin-polymyxin (NEOSPORIN) ointment     pantoprazole (PROTONIX) 40 MG EC tablet     Pseudoephedrine-DM-GG (ROBITUSSIN COUGH/COLD D PO)     rosuvastatin (CRESTOR) 5 MG tablet     UNABLE TO FIND     UNABLE TO FIND     No current facility-administered medications for this visit.       Allergies   Allergen Reactions     Erythromycin Base [Erythromycin] Rash     PARALYSIS, EYE SWELLING, HEADACHE     Codeine Dizziness     Other: extreme weakness in legspt stopped taking medication and sxs were gone within a few hours       Hydrocodone Nausea and Headache     Keytruda [Pembrolizumab] Other (See Comments)     Elevated liver function tests (lfts)      Tramadol Diarrhea, Dizziness, Headache and Nausea and Vomiting     Alcohol Rash     Cholecalciferol Rash     Other Environmental Allergy Rash     mildew     Penicillins Swelling and Rash     Shellfish Containing Products [Shellfish-Derived Products] Swelling and Rash     shrimp     Sulfa (Sulfonamide Antibiotics) [Sulfa Antibiotics] Rash     Burning body rash     Vitamin D3 Complete [External Allergen Needs Reconciliation - See Comment] Rash     Higher dosage makes her break out       Past Medical History:   Diagnosis Date     Aphasia      Asthma      GERD (gastroesophageal reflux disease)      History of transfusion      Lumbar spinal stenosis 02/28/2020    EXAM: MR LUMBAR SPINE W WO CONTRAST LOCATION: [facility] DATE/TIME: 7/11/2019 4:49 PM   INDICATION: Back pain going down the right leg.  Going on for 3 months.  Not improving. See above. History of melanoma. COMPARISON: None. CONTRAST: Gadavist 9. TECHNIQUE: Without and with IV contrast   FINDINGS:  Nomenclature is based  "on 5 lumbar type vertebral bodies. There is no concerning marrow rep     Malignant melanoma of right upper arm (H)      Menopausal hot flushes 07/23/2017    On venlafaxine for this.        Migraine headache      On antineoplastic chemotherapy     pembrolizumab     PN (peripheral neuropathy)      PONV (postoperative nausea and vomiting)     \"cold sweats\"     S/P colonoscopy      Stroke (H) 02/01/2020        Review of Systems  ROS:  Specifically negative for bowel/bladder dysfunction, balance changes, headache, dizziness, foot drop, fevers, chills, appetite changes, nausea/vomiting, unexplained weight loss. Otherwise 13 systems reviewed are negative. Please see the patient's intake questionnaire from today for details.    Reviewed Social, Family, Past Medical and Past Surgical history with patient, no significant changes noted since prior visit.     Objective:     /66   Pulse 81   SpO2 100%     PHYSICAL EXAMINATION:    --CONSTITUTIONAL: Well developed, well nourished, healthy appearing individual.  --PSYCHIATRIC: Appropriate mood and affect. No difficulty interacting due to temper, social withdrawal, or memory issues.  --RESPIRATORY: Normal rhythm and effort. No abnormal accessory muscle breathing patterns noted.   --MUSCULOSKELETAL:  Normal lumbar lordosis noted, no lateral shift.  --GROSS MOTOR: Easily arises from a seated position. Gait is non-antalgic  --LUMBAR SPINE:  Inspection reveals no evidence of deformity. Range of motion is mildly limited in lumbar flexion, extension, lateral rotation.  Tenderness to palpation across the low back.  --SACROILIAC JOINT:  One Finger point test negative.  --LOWER EXTREMITY MOTOR TESTING:  Plantar flexion left 5/5, right 5/5   Dorsiflexion left 5/5, right 5/5   Great toe MTP extension left 5/5, right 5/5  Knee flexion left 5/5, right 5/5  Knee extension left 5/5, right 5/5   Hip flexion left 5/5, right 5/5  Hip abduction left 5/5, right 5/5  Hip adduction left 5/5, " right 5/5   --NEUROLOGIC: Sensation to light touch is intact in the bilateral L4, L5, and S1 dermatomes.    RESULTS:   Imaging: Lumbar spine imaging was reviewed today.                       Again, thank you for allowing me to participate in the care of your patient.        Sincerely,        Carlos Alberto Worthington, DO

## 2024-04-01 ENCOUNTER — RADIOLOGY INJECTION OFFICE VISIT (OUTPATIENT)
Dept: PHYSICAL MEDICINE AND REHAB | Facility: CLINIC | Age: 79
End: 2024-04-01
Attending: PAIN MEDICINE
Payer: COMMERCIAL

## 2024-04-01 VITALS
SYSTOLIC BLOOD PRESSURE: 142 MMHG | OXYGEN SATURATION: 80 % | HEART RATE: 80 BPM | DIASTOLIC BLOOD PRESSURE: 66 MMHG | RESPIRATION RATE: 16 BRPM | TEMPERATURE: 97.3 F

## 2024-04-01 DIAGNOSIS — E78.2 MIXED HYPERLIPIDEMIA: ICD-10-CM

## 2024-04-01 DIAGNOSIS — M48.062 SPINAL STENOSIS OF LUMBAR REGION WITH NEUROGENIC CLAUDICATION: ICD-10-CM

## 2024-04-01 PROCEDURE — 64483 NJX AA&/STRD TFRM EPI L/S 1: CPT | Mod: 50 | Performed by: PAIN MEDICINE

## 2024-04-01 RX ORDER — DEXAMETHASONE SODIUM PHOSPHATE 10 MG/ML
INJECTION, SOLUTION INTRAMUSCULAR; INTRAVENOUS
Status: COMPLETED | OUTPATIENT
Start: 2024-04-01 | End: 2024-04-01

## 2024-04-01 RX ORDER — LIDOCAINE HYDROCHLORIDE 10 MG/ML
INJECTION, SOLUTION EPIDURAL; INFILTRATION; INTRACAUDAL; PERINEURAL
Status: COMPLETED | OUTPATIENT
Start: 2024-04-01 | End: 2024-04-01

## 2024-04-01 RX ADMIN — DEXAMETHASONE SODIUM PHOSPHATE 10 MG: 10 INJECTION, SOLUTION INTRAMUSCULAR; INTRAVENOUS at 13:38

## 2024-04-01 RX ADMIN — LIDOCAINE HYDROCHLORIDE 4 ML: 10 INJECTION, SOLUTION EPIDURAL; INFILTRATION; INTRACAUDAL; PERINEURAL at 13:37

## 2024-04-01 ASSESSMENT — PAIN SCALES - GENERAL
PAINLEVEL: MODERATE PAIN (5)
PAINLEVEL: EXTREME PAIN (8)

## 2024-04-01 NOTE — PATIENT INSTRUCTIONS
Follow-up visit with Dr. Worthington in 2-4 weeks to discuss injection outcome and determine care plan going forward.       DISCHARGE INSTRUCTIONS    During office hours (8:00 a.m.- 4:00 p.m.) questions or concerns may be answered  by calling [facility] Navigation Nurses at  [phone].  Messages received after hours will be returned the following business day.      In the case of an emergency, please dial 911 or seek assistance at the nearest Emergency Room/Urgent Care facility.     All Patients:    You may experience an increase in your symptoms for the first 2 days (It may take anywhere between 2 days- 2 weeks for the steroid to have maximum effect).    You may use ice on the injection site, as frequently as 20 minutes each hour if needed.    You may take your pain medicine.    You may continue taking your regular medication after your injection. If you have had a Medial Branch Block you may resume pain medication once your pain diary is completed.    You may shower. No swimming, tub bath or hot tub for 48 hours.  You may remove your bandaid/bandage as soon as you are home.    You may resume light activities, as tolerated.    Resume your usual diet as tolerated.    It is strongly advised that you do not drive for 1-3 hours post injection.    If you have had oral sedation:  Do not drive for 8 hours post injection.      If you have had IV sedation:  Do not drive for 24 hours post injection.  Do not operate hazardous machinery or make important personal/business decisions for 24 hours.      POSSIBLE STEROID SIDE EFFECTS (If steroid/cortisone was used for your procedure)    -If you experience these symptoms, it should only last for a short period    Swelling of the legs              Skin redness (flushing)     Mouth (oral) irritation   Blood sugar (glucose) levels            Sweats                    Mood changes  Headache  Sleeplessness  Weakened immune system for up to 14 days, which could increase the risk of  dimas the COVID-19 virus and/or experiencing more severe symptoms of the disease, if exposed.  Decreased effectiveness of the flu vaccine if given within 2 weeks of the steroid.         POSSIBLE PROCEDURE SIDE EFFECTS  -Call the Spine Center if you are concerned  Increased Pain           Increased numbness/tingling      Nausea/Vomiting          Bruising/bleeding at site      Redness or swelling                                              Difficulty walking      Weakness           Fever greater than 100.5    *In the event of a severe headache after an epidural steroid injection that is relieved by lying down, please call the [facility] to speak with a clinical staff member*

## 2024-04-02 RX ORDER — ROSUVASTATIN CALCIUM 5 MG/1
5 TABLET, COATED ORAL DAILY
Qty: 90 TABLET | Refills: 3 | Status: SHIPPED | OUTPATIENT
Start: 2024-04-02

## 2024-04-16 ENCOUNTER — TELEPHONE (OUTPATIENT)
Dept: INTERNAL MEDICINE | Facility: CLINIC | Age: 79
End: 2024-04-16
Payer: COMMERCIAL

## 2024-04-16 NOTE — TELEPHONE ENCOUNTER
Pt calling, about 2 wks ago she had the lid from the garbage can fall and hit her right on the brow area. She stated she did the ice pack on it for the first couple days and now its just very bruised around her entire eye and down her cheek bone, she stated it does not hurt only right above the brow area. She was asking if she needs to be seen for it at all?     May call pt back if needed

## 2024-04-16 NOTE — TELEPHONE ENCOUNTER
"Contacted patient to further assess her injury.  Reports her heavy plastic garbage can sits in her garage, recently moved forward some due to space issues.  As a result when she had the lid propped back, it didn't stay and came down striking her in her right brow area.  No loc, laceration, and wasn't knocked down as a result.  Did get a \"thumb sized\" lump on her head that is now down to the size of \"my pinky.\"  She does notice she get slight headache near the end of the day but sums this up to not using her cheaters recently due to bruising on her face and lump on head.  Her bruise \"looks horrible\" and has started to \"sink\" down her face.  No pain unless she palpates lump area, hurt \"a lot the first day\" and I iced it several times over the next couple days.  Baby asa daily, no other anticoag drugs.  Advised she should continue to monitor and update care team with any new or worsening symptoms.  Patient was in very good spirits during call and laughed several times stating \"I should have recorded you so my niece could hear I was fine\" this as her niece pushed her to call.  "

## 2024-04-17 DIAGNOSIS — M06.041 SERONEGATIVE RHEUMATOID ARTHRITIS OF BOTH HANDS (H): ICD-10-CM

## 2024-04-17 DIAGNOSIS — K21.9 GASTROESOPHAGEAL REFLUX DISEASE, UNSPECIFIED WHETHER ESOPHAGITIS PRESENT: ICD-10-CM

## 2024-04-17 DIAGNOSIS — M06.042 SERONEGATIVE RHEUMATOID ARTHRITIS OF BOTH HANDS (H): ICD-10-CM

## 2024-04-17 RX ORDER — PANTOPRAZOLE SODIUM 40 MG/1
40 TABLET, DELAYED RELEASE ORAL 2 TIMES DAILY
Qty: 180 TABLET | Refills: 3 | OUTPATIENT
Start: 2024-04-17

## 2024-04-17 RX ORDER — HYDROXYCHLOROQUINE SULFATE 200 MG/1
200 TABLET, FILM COATED ORAL 2 TIMES DAILY
Qty: 180 TABLET | Refills: 1 | Status: SHIPPED | OUTPATIENT
Start: 2024-04-17 | End: 2024-09-28

## 2024-04-25 ENCOUNTER — OFFICE VISIT (OUTPATIENT)
Dept: PHYSICAL MEDICINE AND REHAB | Facility: CLINIC | Age: 79
End: 2024-04-25
Payer: COMMERCIAL

## 2024-04-25 VITALS — DIASTOLIC BLOOD PRESSURE: 65 MMHG | SYSTOLIC BLOOD PRESSURE: 106 MMHG | OXYGEN SATURATION: 100 % | HEART RATE: 87 BPM

## 2024-04-25 DIAGNOSIS — M70.62 TROCHANTERIC BURSITIS OF BOTH HIPS: Primary | ICD-10-CM

## 2024-04-25 DIAGNOSIS — M48.062 SPINAL STENOSIS OF LUMBAR REGION WITH NEUROGENIC CLAUDICATION: ICD-10-CM

## 2024-04-25 DIAGNOSIS — M43.16 SPONDYLOLISTHESIS OF LUMBAR REGION: ICD-10-CM

## 2024-04-25 DIAGNOSIS — M70.61 TROCHANTERIC BURSITIS OF BOTH HIPS: Primary | ICD-10-CM

## 2024-04-25 DIAGNOSIS — M79.18 MYOFASCIAL PAIN: ICD-10-CM

## 2024-04-25 PROCEDURE — 99214 OFFICE O/P EST MOD 30 MIN: CPT | Performed by: PAIN MEDICINE

## 2024-04-25 ASSESSMENT — PAIN SCALES - GENERAL: PAINLEVEL: SEVERE PAIN (6)

## 2024-04-25 NOTE — PROGRESS NOTES
Assessment:     Diagnoses and all orders for this visit:  Trochanteric bursitis of both hips  -     PAIN US Large Joint Injection Bilateral; Future  Myofascial pain  Spinal stenosis of lumbar region with neurogenic claudication  Spondylolisthesis of lumbar region     Lashawn Ortega is a 79 year old y.o. female with past medical history significant for  reflux, migraine headache, low back pain, peripheral neuropathy, melanoma of right upper arm, stroke, elevated liver enzymes who presents today for follow-up regarding bilateral lateral thigh pain:    -The patient had minimal relief with her bilateral L4-5 transforaminal epidural steroid injections.  She does have point tenderness over the greater trochanter area on both sides.     Plan:     A shared decision making plan was used. The patient's values and choices were respected. Prior medical records from our last visit on 3/8/2024 were reviewed today. The following represents what was discussed and decided upon by the provider and the patient.        -DIAGNOSTIC TESTS: Images were personally reviewed and interpreted.   --MRI of the lumbar spine dated 7/11/2019 is personally reviewed and images interpreted and shown to patient.  There is multilevel degenerative changes in lumbar spine.  There is severe spinal canal stenosis at L3-4.  There is moderate spinal canal stenosis at L4-5.  There is mild to moderate spinal canal stenosis at L2-3.  There is no severe foraminal stenosis.  --CT of abdomen and pelvis July 2020.  I did not see any compression fractures.  -- X-ray of cervical spine dated 2/17/2023 is personally reviewed images interpreted and discussed with the patient.  There is a grade 1 anterolisthesis of C2 on C3 and C3 on C4.  There is mid and lower cervical facet arthropathy left greater than right.    -INTERVENTIONS: I ordered bilateral ultrasound-guided greater trochanteric bursa injections.  If these are not helpful then would recommend repeat of lumbar  spine.    -MEDICATIONS: No changes to medications.  -  Discussed side effects of medications and proper use. Patient verbalized understanding.    -PHYSICAL THERAPY: Recommend that she continue with home exercises on a consistent basis.      -PATIENT EDUCATION: We discussed pain management in a multiple fashion including physical therapy, medication management, possible future injections.    -FOLLOW UP: Patient will follow-up 2 to 4 weeks after injections.  Advised to contact clinic if symptoms worsen or change.    Subjective:     Lashawn Ortega is a 79 year old female who presents today for follow-up regarding low back and lower extremity pain.  Patient notes that she had minimal relief with her bilateral L4-5 transforaminal epidural steroid injections.  She wonders if this is because she was very active.  The injections with moving furniture.  She does note that a lot of her pain is over her lateral thigh area especially.  If she is up on her feet for long time pain is worse.  With rest as well as doing some of the exercises.  She is currently taking cyclobenzaprine, gabapentin and naproxen which are somewhat helpful for her pain.  She denies any bowel or bladder changes, fevers, chills, unintentional weight loss she does also have low back and buttock pain proved with greater trochanter bursa injections.  She has pain behind her knee so.    Treatment to Date: MRI of lumbar spine dated 7/11/2019.  Right L5-S1 transforaminal epidural steroid injection done on 8/12/2019.  Several sessions of in-home physical therapy.  Bilateral L4-5 transforaminal epidural steroid injections done on 8/11/2020.  Right L4-5 transforaminal epidural steroid injection done on 5/7/2021.  Right greater trochanter bursa injection under ultrasound guidance on 6/11/2021.  Bilateral L4-5 transforaminal epidural steroid injections done on 9/26/2022.  X-ray of cervical spine dated 2/17/2023.  Right greater trochanter bursa injection under  ultrasound guidance on 3/2/2023.  Bilateral L4-5 transforaminal epidural steroid injections done on 4/1/2024.    Patient Active Problem List   Diagnosis    Migraine headache    LBP (low back pain)    Nonsmoker    Full code status    Peripheral neuropathy    History of melanoma - Melanoma of right upper arm - On Keytruda in the past - stage IIIA    Ischemic cerebrovascular accident (CVA) of frontal lobe (H) - 2/2020    Aphasia    Lumbar spinal stenosis    Essential tremor    On antineoplastic chemotherapy - PEMBROLIZUMAB - checkpoint inhibitor - in the past for melanoma    Female stress incontinence    Seronegative rheumatoid arthritis of both hands (H)    Rosacea    Gastroesophageal reflux disease, unspecified whether esophagitis present       Current Outpatient Medications   Medication Sig Dispense Refill    aloe vera Gel [ALOE VERA GEL] Apply 1 application topically 2 (two) times a day as needed.      aspirin 81 MG EC tablet [ASPIRIN 81 MG EC TABLET] Take 1 tablet (81 mg total) by mouth daily. 30 tablet 0    azelaic acid (FINACIA) 15 % external gel APPLY TOPICALLY DAILY TO SKIN EVERY MORNING.      cyclobenzaprine (FLEXERIL) 5 MG tablet TAKE 1 TABLET 3 TIMES DAILY AS NEEDED FOR MUSCLE SPASMS 90 tablet 1    diphenhydrAMINE-Acetaminophen (PERCOGESIC PO) Take 375 mg by mouth      doxycycline hyclate (VIBRA-TABS) 100 MG tablet TAKE 1 TABLET BY MOUTH TWICE A DAY. TAKE WITH FOOD AND FULL GLASS OF WATER. DO NOT LIE DOWN FOR 30 MINUTES AFTER TAKIN. CAUSES SUN SENSITIVITY.      fluticasone (FLONASE) 50 MCG/ACT nasal spray SPRAY 1 TO 2 SPRAYS INTO BOTH NOSTRILS DAILY AS NEEDED FOR ALLERGIES 48 g 2    gabapentin (NEURONTIN) 300 MG capsule Take 3 capsules (900 mg) by mouth 3 times daily 900 capsule 3    guaiFENesin-dextromethorphan (MUCINEX DM) 600-30 mg Tb12 [GUAIFENESIN-DEXTROMETHORPHAN (MUCINEX DM) 600-30 MG TB12] Take 1 tablet by mouth 2 (two) times a day as needed.      hydroxychloroquine (PLAQUENIL) 200 MG tablet Take 1  tablet (200 mg) by mouth 2 times daily 180 tablet 1    menthol (BIOFREEZE, MENTHOL,) 4 % Gel [MENTHOL (BIOFREEZE, MENTHOL,) 4 % GEL] Apply 1 application topically daily as needed. Apply topically for pain      metroNIDAZOLE (METROCREAM) 0.75 % external cream APPLY TOPICALLY TO FACE EVERY NIGHT AT BEDTIME.      naproxen (NAPROSYN) 500 MG tablet TAKE 1 TABLET 2 TIMES DAILY AS NEEDED 180 tablet 3    neomycin-bacitracin-polymyxin (NEOSPORIN) ointment [NEOMYCIN-BACITRACIN-POLYMYXIN (NEOSPORIN) OINTMENT] Apply 1 application topically 2 (two) times a day as needed.      pantoprazole (PROTONIX) 40 MG EC tablet Take 1 tablet (40 mg) by mouth 2 times daily 180 tablet 1    Pseudoephedrine-DM-GG (ROBITUSSIN COUGH/COLD D PO)       rosuvastatin (CRESTOR) 5 MG tablet TAKE 1 TABLET BY MOUTH DAILY 90 tablet 3    UNABLE TO FIND MEDICATION NAME: benadryl topical cream      UNABLE TO FIND MEDICATION NAME: MagniRedapt life cream       No current facility-administered medications for this visit.       Allergies   Allergen Reactions    Erythromycin Base [Erythromycin] Rash     PARALYSIS, EYE SWELLING, HEADACHE    Codeine Dizziness     Other: extreme weakness in legspt stopped taking medication and sxs were gone within a few hours      Hydrocodone Nausea and Headache    Keytruda [Pembrolizumab] Other (See Comments)     Elevated liver function tests (lfts)     Tramadol Diarrhea, Dizziness, Headache and Nausea and Vomiting    Alcohol Rash    Cholecalciferol Rash    Other Environmental Allergy Rash     mildew    Penicillins Swelling and Rash    Shellfish Containing Products [Shellfish-Derived Products] Swelling and Rash     shrimp    Sulfa (Sulfonamide Antibiotics) [Sulfa Antibiotics] Rash     Burning body rash    Vitamin D3 Complete [External Allergen Needs Reconciliation - See Comment] Rash     Higher dosage makes her break out       Past Medical History:   Diagnosis Date    Aphasia     Asthma     GERD (gastroesophageal reflux disease)      "History of transfusion     Lumbar spinal stenosis 02/28/2020    EXAM: MR LUMBAR SPINE W WO CONTRAST LOCATION: [facility] DATE/TIME: 7/11/2019 4:49 PM   INDICATION: Back pain going down the right leg.  Going on for 3 months.  Not improving. See above. History of melanoma. COMPARISON: None. CONTRAST: Gadavist 9. TECHNIQUE: Without and with IV contrast   FINDINGS:  Nomenclature is based on 5 lumbar type vertebral bodies. There is no concerning marrow rep    Malignant melanoma of right upper arm (H)     Menopausal hot flushes 07/23/2017    On venlafaxine for this.       Migraine headache     On antineoplastic chemotherapy     pembrolizumab    PN (peripheral neuropathy)     PONV (postoperative nausea and vomiting)     \"cold sweats\"    S/P colonoscopy     Stroke (H) 02/01/2020        Review of Systems  ROS:  Specifically negative for bowel/bladder dysfunction, balance changes, headache, dizziness, foot drop, fevers, chills, appetite changes, nausea/vomiting, unexplained weight loss. Otherwise 13 systems reviewed are negative. Please see the patient's intake questionnaire from today for details.    Reviewed Social, Family, Past Medical and Past Surgical history with patient, no significant changes noted since prior visit.     Objective:     /65 (BP Location: Left arm, Cuff Size: Adult Regular)   Pulse 87   SpO2 100%     PHYSICAL EXAMINATION:    --CONSTITUTIONAL: Well developed, well nourished, healthy appearing individual.  --PSYCHIATRIC: Appropriate mood and affect. No difficulty interacting due to temper, social withdrawal, or memory issues.  --RESPIRATORY: Normal rhythm and effort. No abnormal accessory muscle breathing patterns noted.   --MUSCULOSKELETAL:  Normal lumbar lordosis noted, no lateral shift.  --GROSS MOTOR: Easily arises from a seated position. Gait is non-antalgic  --LUMBAR SPINE:  Inspection reveals no evidence of deformity. Range of motion is mildly limited in lumbar flexion, extension, " lateral rotation.  Tenderness palpation over bilateral greater trochanteric areas.  --SACROILIAC JOINT: One Finger point test negative bilaterally.  --LOWER EXTREMITY MOTOR TESTING:  Plantar flexion left 5/5, right 5/5   Dorsiflexion left 5/5, right 5/5   Great toe MTP extension left 5/5, right 5/5  Knee flexion left 5/5, right 5/5  Knee extension left 5/5, right 5/5   Hip flexion left 5/5, right 5/5  Hip abduction left 5/5, right 5/5  Hip adduction left 5/5, right 5/5   --NEUROLOGIC:  Sensation to light touch is intact in the bilateral L4, L5, and S1 dermatomes.    RESULTS:   Imaging: Lumbar spine imaging was reviewed today.    PAIN Transforaminal DORON Inj Lumbosacral One Level Bilateral    Result Date: 4/1/2024  LUMBAR EPIDURAL STEROID INJECTION TRANSFORAMINAL APPROACH WITH FLUOROSCOPIC GUIDANCE Performed on: 4/1/24 Pre Procedure Diagnosis:  LBP, Lumbar radiculitis Post Procedure Diagnosis:  Same Procedure Performed:  Lumbar Transforaminal Epidural Steroid Injection with Fluoroscopic Guidance Clinical Scenario:  As per office notes Anesthesia/Fluids:  As per intra-procedure documentation Vital Signs:  As per intra-procedure documentation Level Injected: L4-5 Side Injected: Bilateral CC: Lashawn Ortega  is a 78 year-old female who presents today for a bilateral L4-5 transforminal epidural steroid injections as ordered by Dr. Worthington.  The patient complains of lower extremity pain.  The patient's MRI is reviewed.  The patient would like to proceed with the injection today. The patient denies any symptoms of an active infection and denies taking antibiotics. The patient denies taking any prescription blood thinning medications. The patient denies any allergies to iodine or iodine contrast.  The patient has had other conservative treatment but wishes to pursue spine injections.  The procedure of lumbar transforaminal epidural steroid injection was discussed in detail, using a spine model to demonstrate.  The  "patient had the opportunity to directly ask the physician questions regarding the procedure and the questions were answered prior to the consent form being presented.  The risks of the procedure, including but not limited to:  Back pain/soreness, infection, bleeding, permanent, nerve damage/injury, epidural hematoma (with the need for surgical evaluation), paralysis, allergic reaction,  worsening of back/leg pain or no change in pain. The patient elected to proceed and signed informed consent.   The patient was placed in a prone position on the fluoroscopy table.  A procedure pause was performed to verify the patient's identity, site, and side of the procedure.    The lower back was prepped and draped in usual fashion.  After anesthetizing the skin with 1% lidocaine, a 5\", 22 gauge needle was introduced at the rightL4 pedicle under fluoroscopic guidance.  After aspiration was negative for blood or CSF, 1 mL of Omnipaque-300 was injected on each side.  An epidural flow pattern without vascular uptake was seen.   Subsequently, 1 mL of 1% lidocaine was injected on the right side.  After waiting 2 minutes the patient sensation and muscle strength was tested and found to have no substantial change in strength or sensation.  Then 1 mL 10 mg of dexamethasone was slowly injected on the right side.  The exact same procedure was repeated on the left side with with a test dose of 1 mL of 1% lidocaine, followed by a 1 mL 10 mg of dexamethasone was injected epidural flow pattern was seen. PRE-PROCEDURE PAIN SCORE: 8/10 POST-PROCEDURE PAIN SCORE:  5/10  The patient tolerated the procedure well.  After a short period of observation the patient was discharged under their own power.   The patient was instructed to call the Spine Clinic if any questions/concerns arise after the procedure.  Patient will follow-up with Dr. Worthington in 2 to 4 weeks.                           "

## 2024-04-25 NOTE — PATIENT INSTRUCTIONS
[facility] Injection Requirements    A  is required for all fluoroscopically-guided injections.  Injection appointments may be cancelled if there are signs/symptoms of an active infection or if the patient is being actively treated with antibiotics for a diagnosed infection.  Patients may have their steroid injection cancelled if they have had another steroid injection within 2 weeks.  Diabetic patients will have their blood glucose levels checked the day of their injection and the appointment will be rescheduled if the blood glucose level is 300 or higher.  Patients with allergies to cortisone, local anesthetics, iodine, or contrast dye should contact the Spine Center to further discuss these considerations.  Patients scheduled for medial branch block diagnostic injections should refrain from taking pain medication the day of the procedure.  The medial branch block injection appointment will be rescheduled if the patient's pain rating is not 5/10 or greater at the time of the procedure.  Patients taking warfarin/Coumadin will have their INR checked the day of the procedure and the procedure may be rescheduled if the INR is greater than 3.0.  Please contact the Spine Center [phone]) if you are taking any prescription blood-thinning medications (aspirin, warfarin, Plavix, Lovenox, Eliquis, Brilinta, Effient, etc.) as special dosing adjustments may need to be made depending on the type of injection you are scheduled to receive.  It is recommended that you delay having your steroid injection if you have received any vaccines within 2 weeks.    ~Please call Nurse Navigation line [phone] with any questions or concerns about your treatment plan, if symptoms worsen and you would like to be seen urgently, or if you have problems controlling bladder and bowel function.

## 2024-04-25 NOTE — LETTER
4/25/2024         RE: Lashawn Ortega  [address] [place] [address]        Dear Colleague,    Thank you for referring your patient, Lashawn Ortega, to the [facility]. Please see a copy of my visit note below.      Assessment:     Diagnoses and all orders for this visit:  Trochanteric bursitis of both hips  -     PAIN US Large Joint Injection Bilateral; Future  Myofascial pain  Spinal stenosis of lumbar region with neurogenic claudication  Spondylolisthesis of lumbar region     Lashawn Ortega is a 79 year old y.o. female with past medical history significant for  reflux, migraine headache, low back pain, peripheral neuropathy, melanoma of right upper arm, stroke, elevated liver enzymes who presents today for follow-up regarding bilateral lateral thigh pain:    -The patient had minimal relief with her bilateral L4-5 transforaminal epidural steroid injections.  She does have point tenderness over the greater trochanter area on both sides.     Plan:     A shared decision making plan was used. The patient's values and choices were respected. Prior medical records from our last visit on 3/8/2024 were reviewed today. The following represents what was discussed and decided upon by the provider and the patient.        -DIAGNOSTIC TESTS: Images were personally reviewed and interpreted.   --MRI of the lumbar spine dated 7/11/2019 is personally reviewed and images interpreted and shown to patient.  There is multilevel degenerative changes in lumbar spine.  There is severe spinal canal stenosis at L3-4.  There is moderate spinal canal stenosis at L4-5.  There is mild to moderate spinal canal stenosis at L2-3.  There is no severe foraminal stenosis.  --CT of abdomen and pelvis July 2020.  I did not see any compression fractures.  -- X-ray of cervical spine dated 2/17/2023 is personally reviewed images interpreted and discussed with the patient.  There is a grade 1  anterolisthesis of C2 on C3 and C3 on C4.  There is mid and lower cervical facet arthropathy left greater than right.    -INTERVENTIONS: I ordered bilateral ultrasound-guided greater trochanteric bursa injections.  If these are not helpful then would recommend repeat of lumbar spine.    -MEDICATIONS: No changes to medications.  -  Discussed side effects of medications and proper use. Patient verbalized understanding.    -PHYSICAL THERAPY: Recommend that she continue with home exercises on a consistent basis.      -PATIENT EDUCATION: We discussed pain management in a multiple fashion including physical therapy, medication management, possible future injections.    -FOLLOW UP: Patient will follow-up 2 to 4 weeks after injections.  Advised to contact clinic if symptoms worsen or change.    Subjective:     Lashawn Ortega is a 79 year old female who presents today for follow-up regarding low back and lower extremity pain.  Patient notes that she had minimal relief with her bilateral L4-5 transforaminal epidural steroid injections.  She wonders if this is because she was very active.  The injections with moving furniture.  She does note that a lot of her pain is over her lateral thigh area especially.  If she is up on her feet for long time pain is worse.  With rest as well as doing some of the exercises.  She is currently taking cyclobenzaprine, gabapentin and naproxen which are somewhat helpful for her pain.  She denies any bowel or bladder changes, fevers, chills, unintentional weight loss she does also have low back and buttock pain proved with greater trochanter bursa injections.  She has pain behind her knee so.    Treatment to Date: MRI of lumbar spine dated 7/11/2019.  Right L5-S1 transforaminal epidural steroid injection done on 8/12/2019.  Several sessions of in-home physical therapy.  Bilateral L4-5 transforaminal epidural steroid injections done on 8/11/2020.  Right L4-5 transforaminal epidural steroid  injection done on 5/7/2021.  Right greater trochanter bursa injection under ultrasound guidance on 6/11/2021.  Bilateral L4-5 transforaminal epidural steroid injections done on 9/26/2022.  X-ray of cervical spine dated 2/17/2023.  Right greater trochanter bursa injection under ultrasound guidance on 3/2/2023.  Bilateral L4-5 transforaminal epidural steroid injections done on 4/1/2024.    Patient Active Problem List   Diagnosis     Migraine headache     LBP (low back pain)     Nonsmoker     Full code status     Peripheral neuropathy     History of melanoma - Melanoma of right upper arm - On Keytruda in the past - stage IIIA     Ischemic cerebrovascular accident (CVA) of frontal lobe (H) - 2/2020     Aphasia     Lumbar spinal stenosis     Essential tremor     On antineoplastic chemotherapy - PEMBROLIZUMAB - checkpoint inhibitor - in the past for melanoma     Female stress incontinence     Seronegative rheumatoid arthritis of both hands (H)     Rosacea     Gastroesophageal reflux disease, unspecified whether esophagitis present       Current Outpatient Medications   Medication Sig Dispense Refill     aloe vera Gel [ALOE VERA GEL] Apply 1 application topically 2 (two) times a day as needed.       aspirin 81 MG EC tablet [ASPIRIN 81 MG EC TABLET] Take 1 tablet (81 mg total) by mouth daily. 30 tablet 0     azelaic acid (FINACIA) 15 % external gel APPLY TOPICALLY DAILY TO SKIN EVERY MORNING.       cyclobenzaprine (FLEXERIL) 5 MG tablet TAKE 1 TABLET 3 TIMES DAILY AS NEEDED FOR MUSCLE SPASMS 90 tablet 1     diphenhydrAMINE-Acetaminophen (PERCOGESIC PO) Take 375 mg by mouth       doxycycline hyclate (VIBRA-TABS) 100 MG tablet TAKE 1 TABLET BY MOUTH TWICE A DAY. TAKE WITH FOOD AND FULL GLASS OF WATER. DO NOT LIE DOWN FOR 30 MINUTES AFTER TAKIN. CAUSES SUN SENSITIVITY.       fluticasone (FLONASE) 50 MCG/ACT nasal spray SPRAY 1 TO 2 SPRAYS INTO BOTH NOSTRILS DAILY AS NEEDED FOR ALLERGIES 48 g 2     gabapentin (NEURONTIN) 300 MG  capsule Take 3 capsules (900 mg) by mouth 3 times daily 900 capsule 3     guaiFENesin-dextromethorphan (MUCINEX DM) 600-30 mg Tb12 [GUAIFENESIN-DEXTROMETHORPHAN (MUCINEX DM) 600-30 MG TB12] Take 1 tablet by mouth 2 (two) times a day as needed.       hydroxychloroquine (PLAQUENIL) 200 MG tablet Take 1 tablet (200 mg) by mouth 2 times daily 180 tablet 1     menthol (BIOFREEZE, MENTHOL,) 4 % Gel [MENTHOL (BIOFREEZE, MENTHOL,) 4 % GEL] Apply 1 application topically daily as needed. Apply topically for pain       metroNIDAZOLE (METROCREAM) 0.75 % external cream APPLY TOPICALLY TO FACE EVERY NIGHT AT BEDTIME.       naproxen (NAPROSYN) 500 MG tablet TAKE 1 TABLET 2 TIMES DAILY AS NEEDED 180 tablet 3     neomycin-bacitracin-polymyxin (NEOSPORIN) ointment [NEOMYCIN-BACITRACIN-POLYMYXIN (NEOSPORIN) OINTMENT] Apply 1 application topically 2 (two) times a day as needed.       pantoprazole (PROTONIX) 40 MG EC tablet Take 1 tablet (40 mg) by mouth 2 times daily 180 tablet 1     Pseudoephedrine-DM-GG (ROBITUSSIN COUGH/COLD D PO)        rosuvastatin (CRESTOR) 5 MG tablet TAKE 1 TABLET BY MOUTH DAILY 90 tablet 3     UNABLE TO FIND MEDICATION NAME: benadryl topical cream       UNABLE TO FIND MEDICATION NAME: Magni- life cream       No current facility-administered medications for this visit.       Allergies   Allergen Reactions     Erythromycin Base [Erythromycin] Rash     PARALYSIS, EYE SWELLING, HEADACHE     Codeine Dizziness     Other: extreme weakness in legspt stopped taking medication and sxs were gone within a few hours       Hydrocodone Nausea and Headache     Keytruda [Pembrolizumab] Other (See Comments)     Elevated liver function tests (lfts)      Tramadol Diarrhea, Dizziness, Headache and Nausea and Vomiting     Alcohol Rash     Cholecalciferol Rash     Other Environmental Allergy Rash     mildew     Penicillins Swelling and Rash     Shellfish Containing Products [Shellfish-Derived Products] Swelling and Rash     shrimp  "    Sulfa (Sulfonamide Antibiotics) [Sulfa Antibiotics] Rash     Burning body rash     Vitamin D3 Complete [External Allergen Needs Reconciliation - See Comment] Rash     Higher dosage makes her break out       Past Medical History:   Diagnosis Date     Aphasia      Asthma      GERD (gastroesophageal reflux disease)      History of transfusion      Lumbar spinal stenosis 02/28/2020    EXAM: MR LUMBAR SPINE W WO CONTRAST LOCATION: [facility] DATE/TIME: 7/11/2019 4:49 PM   INDICATION: Back pain going down the right leg.  Going on for 3 months.  Not improving. See above. History of melanoma. COMPARISON: None. CONTRAST: Gadavist 9. TECHNIQUE: Without and with IV contrast   FINDINGS:  Nomenclature is based on 5 lumbar type vertebral bodies. There is no concerning marrow rep     Malignant melanoma of right upper arm (H)      Menopausal hot flushes 07/23/2017    On venlafaxine for this.        Migraine headache      On antineoplastic chemotherapy     pembrolizumab     PN (peripheral neuropathy)      PONV (postoperative nausea and vomiting)     \"cold sweats\"     S/P colonoscopy      Stroke (H) 02/01/2020        Review of Systems  ROS:  Specifically negative for bowel/bladder dysfunction, balance changes, headache, dizziness, foot drop, fevers, chills, appetite changes, nausea/vomiting, unexplained weight loss. Otherwise 13 systems reviewed are negative. Please see the patient's intake questionnaire from today for details.    Reviewed Social, Family, Past Medical and Past Surgical history with patient, no significant changes noted since prior visit.     Objective:     /65 (BP Location: Left arm, Cuff Size: Adult Regular)   Pulse 87   SpO2 100%     PHYSICAL EXAMINATION:    --CONSTITUTIONAL: Well developed, well nourished, healthy appearing individual.  --PSYCHIATRIC: Appropriate mood and affect. No difficulty interacting due to temper, social withdrawal, or memory issues.  --RESPIRATORY: Normal rhythm and " effort. No abnormal accessory muscle breathing patterns noted.   --MUSCULOSKELETAL:  Normal lumbar lordosis noted, no lateral shift.  --GROSS MOTOR: Easily arises from a seated position. Gait is non-antalgic  --LUMBAR SPINE:  Inspection reveals no evidence of deformity. Range of motion is mildly limited in lumbar flexion, extension, lateral rotation.  Tenderness palpation over bilateral greater trochanteric areas.  --SACROILIAC JOINT: One Finger point test negative bilaterally.  --LOWER EXTREMITY MOTOR TESTING:  Plantar flexion left 5/5, right 5/5   Dorsiflexion left 5/5, right 5/5   Great toe MTP extension left 5/5, right 5/5  Knee flexion left 5/5, right 5/5  Knee extension left 5/5, right 5/5   Hip flexion left 5/5, right 5/5  Hip abduction left 5/5, right 5/5  Hip adduction left 5/5, right 5/5   --NEUROLOGIC:  Sensation to light touch is intact in the bilateral L4, L5, and S1 dermatomes.    RESULTS:   Imaging: Lumbar spine imaging was reviewed today.    PAIN Transforaminal DORON Inj Lumbosacral One Level Bilateral    Result Date: 4/1/2024  LUMBAR EPIDURAL STEROID INJECTION TRANSFORAMINAL APPROACH WITH FLUOROSCOPIC GUIDANCE Performed on: 4/1/24 Pre Procedure Diagnosis:  LBP, Lumbar radiculitis Post Procedure Diagnosis:  Same Procedure Performed:  Lumbar Transforaminal Epidural Steroid Injection with Fluoroscopic Guidance Clinical Scenario:  As per office notes Anesthesia/Fluids:  As per intra-procedure documentation Vital Signs:  As per intra-procedure documentation Level Injected: L4-5 Side Injected: Bilateral CC: Lashawn Ortega  is a 78 year-old female who presents today for a bilateral L4-5 transforminal epidural steroid injections as ordered by Dr. Worthington.  The patient complains of lower extremity pain.  The patient's MRI is reviewed.  The patient would like to proceed with the injection today. The patient denies any symptoms of an active infection and denies taking antibiotics. The patient denies taking  "any prescription blood thinning medications. The patient denies any allergies to iodine or iodine contrast.  The patient has had other conservative treatment but wishes to pursue spine injections.  The procedure of lumbar transforaminal epidural steroid injection was discussed in detail, using a spine model to demonstrate.  The patient had the opportunity to directly ask the physician questions regarding the procedure and the questions were answered prior to the consent form being presented.  The risks of the procedure, including but not limited to:  Back pain/soreness, infection, bleeding, permanent, nerve damage/injury, epidural hematoma (with the need for surgical evaluation), paralysis, allergic reaction,  worsening of back/leg pain or no change in pain. The patient elected to proceed and signed informed consent.   The patient was placed in a prone position on the fluoroscopy table.  A procedure pause was performed to verify the patient's identity, site, and side of the procedure.    The lower back was prepped and draped in usual fashion.  After anesthetizing the skin with 1% lidocaine, a 5\", 22 gauge needle was introduced at the rightL4 pedicle under fluoroscopic guidance.  After aspiration was negative for blood or CSF, 1 mL of Omnipaque-300 was injected on each side.  An epidural flow pattern without vascular uptake was seen.   Subsequently, 1 mL of 1% lidocaine was injected on the right side.  After waiting 2 minutes the patient sensation and muscle strength was tested and found to have no substantial change in strength or sensation.  Then 1 mL 10 mg of dexamethasone was slowly injected on the right side.  The exact same procedure was repeated on the left side with with a test dose of 1 mL of 1% lidocaine, followed by a 1 mL 10 mg of dexamethasone was injected epidural flow pattern was seen. PRE-PROCEDURE PAIN SCORE: 8/10 POST-PROCEDURE PAIN SCORE:  5/10  The patient tolerated the procedure well.  After a " short period of observation the patient was discharged under their own power.   The patient was instructed to call the Spine Clinic if any questions/concerns arise after the procedure.  Patient will follow-up with Dr. Worthington in 2 to 4 weeks.                             Again, thank you for allowing me to participate in the care of your patient.        Sincerely,        Carlos Alberto Worthington, DO

## 2024-05-20 ENCOUNTER — RADIOLOGY INJECTION OFFICE VISIT (OUTPATIENT)
Dept: PHYSICAL MEDICINE AND REHAB | Facility: CLINIC | Age: 79
End: 2024-05-20
Attending: PAIN MEDICINE
Payer: COMMERCIAL

## 2024-05-20 VITALS
HEART RATE: 75 BPM | DIASTOLIC BLOOD PRESSURE: 73 MMHG | SYSTOLIC BLOOD PRESSURE: 147 MMHG | OXYGEN SATURATION: 100 % | TEMPERATURE: 97.2 F

## 2024-05-20 DIAGNOSIS — M70.62 TROCHANTERIC BURSITIS OF BOTH HIPS: ICD-10-CM

## 2024-05-20 DIAGNOSIS — M70.61 TROCHANTERIC BURSITIS OF BOTH HIPS: ICD-10-CM

## 2024-05-20 PROCEDURE — 20611 DRAIN/INJ JOINT/BURSA W/US: CPT | Mod: 50 | Performed by: PAIN MEDICINE

## 2024-05-20 RX ORDER — LIDOCAINE HYDROCHLORIDE 10 MG/ML
INJECTION, SOLUTION EPIDURAL; INFILTRATION; INTRACAUDAL; PERINEURAL
Status: COMPLETED | OUTPATIENT
Start: 2024-05-20 | End: 2024-05-20

## 2024-05-20 RX ORDER — METHYLPREDNISOLONE ACETATE 40 MG/ML
INJECTION, SUSPENSION INTRA-ARTICULAR; INTRALESIONAL; INTRAMUSCULAR; SOFT TISSUE
Status: COMPLETED | OUTPATIENT
Start: 2024-05-20 | End: 2024-05-20

## 2024-05-20 RX ADMIN — LIDOCAINE HYDROCHLORIDE 3 ML: 10 INJECTION, SOLUTION EPIDURAL; INFILTRATION; INTRACAUDAL; PERINEURAL at 14:59

## 2024-05-20 RX ADMIN — METHYLPREDNISOLONE ACETATE 40 MG: 40 INJECTION, SUSPENSION INTRA-ARTICULAR; INTRALESIONAL; INTRAMUSCULAR; SOFT TISSUE at 14:59

## 2024-05-20 ASSESSMENT — PAIN SCALES - GENERAL
PAINLEVEL: MILD PAIN (2)
PAINLEVEL: SEVERE PAIN (7)

## 2024-05-20 NOTE — PATIENT INSTRUCTIONS
DISCHARGE INSTRUCTIONS  During office hours (8:00 a.m.- 4:00 p.m.) questions or concerns may be answered  by calling [facility] Center Navigation Nurses at  [phone].  Messages received after hours will be returned the following business day.      In the case of an emergency, please dial 911 or seek assistance at the nearest Emergency Room/Urgent Care facility.     You may experience an increase in your symptoms for the first 2 days (It may take anywhere between 2 days- 2 weeks for the steroid to have maximum effect).    You may use ice on the injection site, as frequently as 20 minutes each hour if needed.    You may take your pain medicine.    You may shower. No swimming, tub bath or hot tub for 48 hours.  You may remove your bandaid/bandage as soon as you are home.    You may resume light activities, as tolerated.    Resume your usual diet as tolerated.    POSSIBLE STEROID SIDE EFFECTS (If steroid/cortisone was used for your procedure)  -If you experience these symptoms, it should only last for a short period  Swelling of the legs              Skin redness (flushing)     Mouth (oral) irritation   Blood sugar (glucose) levels            Sweats                    Mood changes  Headache  Sleeplessness  Weakened immune system for up to 14 days, which could increase the risk of dimas the COVID-19 virus and/or experiencing more severe symptoms of the disease, if exposed.  Decreased effectiveness of the flu vaccine if given within 2 weeks of the steroid.         POSSIBLE PROCEDURE SIDE EFFECTS  -Call the Spine Center if you are concerned  Increased Pain           Increased numbness/tingling      Nausea/Vomiting          Bruising/bleeding at site      Redness or swelling                                              Difficulty walking      Weakness           Fever greater than 100.5

## 2024-06-05 ENCOUNTER — TELEPHONE (OUTPATIENT)
Dept: PHYSICAL MEDICINE AND REHAB | Facility: CLINIC | Age: 79
End: 2024-06-05
Payer: COMMERCIAL

## 2024-06-05 NOTE — TELEPHONE ENCOUNTER
"Patient returned call. States she is \"doing a ot better. Dependent on the day I would say 50-75% better. I am back to doing exercises better and increasing little by little how many I do daily. I find walking really helps.\" Does report she continues to have pain/tenderness on the right iliac crest area.     Explained PSP will be notified of the update. Patient will be called if any further recommendations. Stated understanding.     "

## 2024-06-05 NOTE — TELEPHONE ENCOUNTER
Who's calling:   Patient             Family/Other name:    On C2C: yes or No: N/A       Providers name/other:    Dr. Worthington -   Reason for call:  PATIENT INFORMATION & QUESTIONS    Detailed Message: Patient was told to call back for post injection discussion. Per patient AVS it stated to schedule a follow up, patient would like someone from Dr. Worthington care team to call her to discuss.      Call back #: [phone]  Preferred Pharmacy:

## 2024-06-15 DIAGNOSIS — K21.9 GASTROESOPHAGEAL REFLUX DISEASE, UNSPECIFIED WHETHER ESOPHAGITIS PRESENT: ICD-10-CM

## 2024-06-15 RX ORDER — PANTOPRAZOLE SODIUM 40 MG/1
40 TABLET, DELAYED RELEASE ORAL 2 TIMES DAILY
Qty: 180 TABLET | Refills: 3 | Status: SHIPPED | OUTPATIENT
Start: 2024-06-15

## 2024-07-25 ENCOUNTER — HOSPITAL ENCOUNTER (OUTPATIENT)
Dept: PET IMAGING | Facility: HOSPITAL | Age: 79
Discharge: HOME | End: 2024-07-25
Attending: INTERNAL MEDICINE | Admitting: INTERNAL MEDICINE
Payer: COMMERCIAL

## 2024-07-25 LAB — GLUCOSE BLDC GLUCOMTR-MCNC: 71 MG/DL (ref 70–99)

## 2024-07-25 PROCEDURE — 78816 PET IMAGE W/CT FULL BODY: CPT | Mod: PS

## 2024-07-25 PROCEDURE — A9552 F18 FDG: HCPCS | Performed by: INTERNAL MEDICINE

## 2024-07-25 PROCEDURE — 82962 GLUCOSE BLOOD TEST: CPT

## 2024-07-25 PROCEDURE — 343N000001 HC RX 343: Performed by: INTERNAL MEDICINE

## 2024-07-25 RX ORDER — FLUDEOXYGLUCOSE F 18 200 MCI/ML
9-18 INJECTION, SOLUTION INTRAVENOUS ONCE
Status: COMPLETED | OUTPATIENT
Start: 2024-07-25 | End: 2024-07-25

## 2024-07-25 RX ADMIN — FLUDEOXYGLUCOSE F 18 10.22 MILLICURIE: 200 INJECTION, SOLUTION INTRAVENOUS at 09:21

## 2024-07-31 ENCOUNTER — TRANSFERRED RECORDS (OUTPATIENT)
Dept: HEALTH INFORMATION MANAGEMENT | Facility: CLINIC | Age: 79
End: 2024-07-31
Payer: COMMERCIAL

## 2024-08-28 NOTE — PATIENT INSTRUCTIONS - HE
Recommend they continue doing your physical therapy exercises on  at least every other day basis.    Recommend you continue with gabapentin as you are.    ~Please call Nurse Navigation line [phone] with any questions or concerns about your treatment plan, if symptoms worsen and you would like to be seen urgently, or if you have problems controlling bladder and bowel function.    
[Patient presents for infusion] : infusion therapy as documented below

## 2024-09-27 DIAGNOSIS — M06.042 SERONEGATIVE RHEUMATOID ARTHRITIS OF BOTH HANDS (H): ICD-10-CM

## 2024-09-27 DIAGNOSIS — M06.041 SERONEGATIVE RHEUMATOID ARTHRITIS OF BOTH HANDS (H): ICD-10-CM

## 2024-09-28 RX ORDER — HYDROXYCHLOROQUINE SULFATE 200 MG/1
200 TABLET, FILM COATED ORAL 2 TIMES DAILY
Qty: 180 TABLET | Refills: 3 | Status: SHIPPED | OUTPATIENT
Start: 2024-09-28

## 2024-09-30 ENCOUNTER — OFFICE VISIT (OUTPATIENT)
Dept: INTERNAL MEDICINE | Facility: CLINIC | Age: 79
End: 2024-09-30
Payer: COMMERCIAL

## 2024-09-30 VITALS
BODY MASS INDEX: 25.3 KG/M2 | DIASTOLIC BLOOD PRESSURE: 86 MMHG | WEIGHT: 148.2 LBS | HEIGHT: 64 IN | HEART RATE: 74 BPM | SYSTOLIC BLOOD PRESSURE: 125 MMHG | RESPIRATION RATE: 12 BRPM | TEMPERATURE: 97.8 F | OXYGEN SATURATION: 100 %

## 2024-09-30 DIAGNOSIS — M06.042 SERONEGATIVE RHEUMATOID ARTHRITIS OF BOTH HANDS (H): ICD-10-CM

## 2024-09-30 DIAGNOSIS — M06.041 SERONEGATIVE RHEUMATOID ARTHRITIS OF BOTH HANDS (H): ICD-10-CM

## 2024-09-30 DIAGNOSIS — M54.2 NECK PAIN: ICD-10-CM

## 2024-09-30 DIAGNOSIS — E78.2 MIXED HYPERLIPIDEMIA: ICD-10-CM

## 2024-09-30 DIAGNOSIS — H02.401 PTOSIS, RIGHT: ICD-10-CM

## 2024-09-30 DIAGNOSIS — Z01.818 PRE-OPERATIVE GENERAL PHYSICAL EXAMINATION: Primary | ICD-10-CM

## 2024-09-30 DIAGNOSIS — M48.062 SPINAL STENOSIS OF LUMBAR REGION WITH NEUROGENIC CLAUDICATION: ICD-10-CM

## 2024-09-30 DIAGNOSIS — C77.3 SECONDARY AND UNSPECIFIED MALIGNANT NEOPLASM OF AXILLA AND UPPER LIMB LYMPH NODES (H): ICD-10-CM

## 2024-09-30 LAB
ATRIAL RATE - MUSE: 66 BPM
DIASTOLIC BLOOD PRESSURE - MUSE: NORMAL MMHG
INTERPRETATION ECG - MUSE: NORMAL
P AXIS - MUSE: 51 DEGREES
PR INTERVAL - MUSE: 158 MS
QRS DURATION - MUSE: 84 MS
QT - MUSE: 446 MS
QTC - MUSE: 467 MS
R AXIS - MUSE: -23 DEGREES
SYSTOLIC BLOOD PRESSURE - MUSE: NORMAL MMHG
T AXIS - MUSE: 32 DEGREES
VENTRICULAR RATE- MUSE: 66 BPM

## 2024-09-30 PROCEDURE — 99214 OFFICE O/P EST MOD 30 MIN: CPT | Performed by: INTERNAL MEDICINE

## 2024-09-30 PROCEDURE — G2211 COMPLEX E/M VISIT ADD ON: HCPCS | Performed by: INTERNAL MEDICINE

## 2024-09-30 PROCEDURE — 93005 ELECTROCARDIOGRAM TRACING: CPT | Performed by: INTERNAL MEDICINE

## 2024-09-30 PROCEDURE — 93010 ELECTROCARDIOGRAM REPORT: CPT | Performed by: INTERNAL MEDICINE

## 2024-09-30 PROCEDURE — 90662 IIV NO PRSV INCREASED AG IM: CPT | Performed by: INTERNAL MEDICINE

## 2024-09-30 PROCEDURE — G0008 ADMIN INFLUENZA VIRUS VAC: HCPCS | Performed by: INTERNAL MEDICINE

## 2024-09-30 ASSESSMENT — PAIN SCALES - GENERAL: PAINLEVEL: SEVERE PAIN (6)

## 2024-10-03 NOTE — PATIENT INSTRUCTIONS
Future Appointments   Date Time Provider Department Center   11/15/2024  9:00 AM Renzo Newell MD [facility]   11/15/2024 10:15 AM [facility]

## 2024-11-07 ENCOUNTER — TELEPHONE (OUTPATIENT)
Dept: INTERNAL MEDICINE | Facility: CLINIC | Age: 79
End: 2024-11-07
Payer: COMMERCIAL

## 2024-11-07 NOTE — TELEPHONE ENCOUNTER
Called patient to relay below message from provider. She doesn't have a ride today, but will go to walk in care in the morning.

## 2024-11-07 NOTE — TELEPHONE ENCOUNTER
Could come into [facility] for this.    Renzo Newell MD  General Internal Medicine  [facility]  11/7/2024, 3:34 PM

## 2024-11-07 NOTE — TELEPHONE ENCOUNTER
Patient calling to report urinary symptoms that started about 5 days ago. She says 5 days ago she noticed burning with urination and low grade fever. Sometimes the burning was at the start of urination but mostly it is at the end. She thought she had a rash and started using Vagisil cream TID and increased her water intake. The symptoms improved and the last 2 days she barely noticed anything. Now today, she has had intermittent burning with urination. She notes it is especially bad with her first void in the morning, but improves throughout the day.

## 2024-11-07 NOTE — PROGRESS NOTES
Total Knee  Discharge Instructions  Dr. MAYRA Gaines” Negin II  [phone]    INCISION CARE  Wash your hands prior to dressing changes  TERRANCE Wound VAC: Postoperatively you had a TERRANCE Wound Vac placed on the incision. This was placed under sterile conditions in the operating room. It remains in place for 7 days postoperatively. After 7 days, the entire dressing must be removed, including all of the sticky adhesive. The dressing and battery pack provide gentle suction to the incision and provide several benefits over a traditional dressing:  It maintains the sterile environment of the OR and reduces the risk of infection  The suction removes unwanted buildup of blood/hematoma under the skin to reduce swelling  The suction also promotes fresh blood supply to the skin and soft tissue to speed up healing  The postoperative scar is reduced in size  Showering is permitted immediately after surgery, but the battery pack must be protected or removed during the shower.   After 7 days the TERRANCE Wound Vac is removed. If there is no drainage, no dressing is required. If there is some scant drainage a dry bandage can be applied and changed daily until seen in the office or until the drainage stops.   No creams or ointments to the incisions until 4 weeks post op.  Do not touch or pick at the incision  Check incision every day and notify surgeon immediately if any of the following signs or symptoms are seen:  Increase in redness  Increase in swelling around the incision and of the entire extremity  Increase in pain  NEW drainage or oozing from the incision  Pulling apart of the edges of the incision  Increase in overall body temperature (greater than 100.4 degrees)  Zip-Line: your incision was closed with a state of the art device.   Is a non-invasive and easy to use wound closure device that replaces sutures, staples and glue for surgical incisions  It minimizes scarring and eliminating “railroad” marks that come with staples or     [facility]    Care coordination - Customized care -  Comprehensive and complex medical care            Date of Service: 9/30/2024  Primary Provider: Renzo Newell    Patient Care Team:  Renzo Newell MD as PCP - General  Renzo Newell MD as Assigned PCP  Shoaib Gloria MD as MD (Hematology & Oncology)  Carlos Alberto Worthington DO as MD (Pain Medicine)  Bj Orantes MD, MD as Resident (Dermatology)  Sadi Castano MD Stanek, Richard Paul, MD as MD (Ophthalmology)  Rhina Childers CNP as Assigned Neuroscience Provider  Truman Vásquez MD as MD (Ophthalmology)           Patient's Pharmacy:    [id number] - [place], [place] - [address]  [address]  [facility] [address]  Phone: [phone] Fax: [phone]    [facility] - [place], [place] - [address]  [address]  [facility] [address]  [facility] [address]  Phone: [phone] Fax: [phone]     Patient's Contacts:  Name Home Phone Work Phone Mobile Phone Relationship Lgl Grd   DEYSI JOY   [phone] Niece No   CAMPBELL BERRIOS   [phone] Niece No     Patient's Insurance:    Payor: [employer] / Plan: [facility] MEDICARE ADVANTAGE / Product Type: HMO /           Preoperative examination    Lashawn Ortega is a 79 year old female (1945) who I am asked to see by his surgeon regarding his fitness for upcoming surgery.      Chief Complaint   Patient presents with    Pre-Op Exam     Ptosis Repair right upper lid      Subjective:     Patient comes in today for preoperative evaluation prior to her planned right eyelid repair by Dr. Vásquez.  This is reportedly due to ptosis.  She has had increasing problems with this.    She is otherwise doing about the same.  She has had her usual follow-up with specialist.    She does note some easy bruising at times.    Her neck has been sore.  It has been  sutures  It makes removal as atraumatic as peeling off a bandage  Can be removed at home or by a physical therapist or nurse at 14 days postoperatively    ACTIVITIES  Exercises:  Physical therapy will begin immediately while in the hospital. Patients going to a nursing home will get therapy as part of their care at the SNU/SNF facility. Patients going home may also have a therapist come to the house to help them mobilize until they can safely get to an outpatient therapy facility.  Elevate the affected leg most of the day during the first week post operatively. Caution must be taken to avoid pillow placement directly under the heel of the leg, as this can cause pressure ulcers even with a soft pillow. All pillows and blankets should be placed underneath of the thigh and calf so that the heel is free-floating.  Use cold packs for 20-30 minutes approximately 5 times per day.  You should perform the daily stretching and strengthening exercises as taught by the therapist as often as possible. This can be done many times a day.  Full weight bearing is allowed after surgery. It will be sore/painful to put weight on the leg, but this will help the bone to heal and prevent complications such as pneumonia, bed sores and blood clots. Mobilization is vital to the recovery process.  Activities of Daily Living:  No tub baths, hot tubs, or swimming pools for 4 weeks.  May shower and let water run over the incision immediately after surgery. The battery pack of the TERRANCE Wound Vac must be protected or removed while in the shower. After the TERRANCE is removed 7 days after surgery showering is permitted as long as there is no drainage from the wound.     Restrictions  Weight: It is ok to allow full weight bearing after surgery. Weight on the leg actually quickens the recovery process. While it will be sore/painful to put weight on the leg, it is safe to do so. Hip replacement after hip fracture has a much slower recover process. It can  worse over the few weeks.  She is using a neck pad on it.  It does sometimes seem to radiate into her forehead.    She has noticed some ear ringing in her left ear more than the right.    Her back has continued to give her troubles.  She has seen the spine clinic for this.    She does have some CMC joint pain in both hands.    She does have to go to the bathroom more often lately.    ______________________________________________________________________         9/27/2024     2:10 PM   Pre-op Questionnaire   Have you ever had a heart attack or stroke? Yes   Have you ever had surgery on your heart or blood vessels, such as a stent placement, a coronary artery bypass, or surgery on an artery in your head, neck, heart, or legs? No   Do you have chest pain with activity? No   Do you have a history of heart failure? Unknown   Do you currently have a cold, bronchitis or symptoms of other infection? No   Do you have a cough, shortness of breath, or wheezing? No   Do you or anyone in your family have previous history of blood clots? Unknown   Do you or does anyone in your family have a serious bleeding problem such as prolonged bleeding following surgeries or cuts? Unknown   Have you ever had problems with anemia or been told to take iron pills? No   Have you had any abnormal blood loss such as black, tarry or bloody stools, or abnormal vaginal bleeding? No   Have you ever had a blood transfusion? Yes   Have you ever had a transfusion reaction? No   Are you willing to have a blood transfusion if it is medically needed before, during, or after your surgery? Yes   Have you or any of your relatives ever had problems with anesthesia? Unknown   Do you have sleep apnea, excessive snoring or daytime drowsiness? No   Do you have any artifical heart valves or other implanted medical devices like a pacemaker, defibrillator, or continuous glucose monitor? No   Do you have artificial joints? No   Are you allergic to latex? No      ______________________________________________________________________     We reviewed her other issues noted in the assessment but not specifically addressed in the HPI above.           Patient Active Problem List   Diagnosis    Migraine headache    LBP (low back pain)    Nonsmoker    Full code status    Peripheral neuropathy    History of melanoma - Melanoma of right upper arm - On Keytruda in the past - stage IIIA    Ischemic cerebrovascular accident (CVA) of frontal lobe (H) - 2/2020    Aphasia    Lumbar spinal stenosis    Essential tremor    On antineoplastic chemotherapy - PEMBROLIZUMAB - checkpoint inhibitor - in the past for melanoma    Female stress incontinence    Seronegative rheumatoid arthritis of both hands (H)    Rosacea    Gastroesophageal reflux disease, unspecified whether esophagitis present       Past Surgical History:   Procedure Laterality Date    ABDOMEN SURGERY  01/1988    Hysterectomy    BIOPSY  05/2019    Taken on right upper arm    CATARACT EXTRACTION, BILATERAL  2022    COLONOSCOPY  04/2021    All tests have been good, last one had a polyp removed    HYSTERECTOMY  01/01/1988    IR LUMBAR EPIDURAL STEROID INJECTION  08/12/2019    IR LUMBAR EPIDURAL STEROID INJECTION  08/12/2019    LAPAROSCOPIC CHOLECYSTECTOMY N/A 07/14/2020    Procedure: CHOLECYSTECTOMY, LAPAROSCOPIC;  Surgeon: Lynn Christina MD;  Location: [facility];  Service: General    [place] ERCP DX COLLECTION SPECIMEN BRUSHING/WASHING N/A 06/19/2020    Procedure: ENDOSCOPIC RETROGRADE CHOLANGIO-PANCREATOGRAPHY WITH SPHINCTEROTOMY AND STONE EXTRACTION;  Surgeon: Daniel Das MD;  Location: [facility] OR;  Service: Gastroenterology    [place] ERCP DX COLLECTION SPECIMEN BRUSHING/WASHING N/A 07/15/2020    Procedure: ENDOSCOPIC RETROGRADE CHOLANGIOPANCREATOGRAPHY;  Surgeon: Daniel Das MD;  Location: [facility];  Service: Gastroenterology    SKIN CANCER EXCISION  05/2019    MELANOMA RESECTION WITH SENTINEL  take months to heal fully from a hip fracture and patients even make some slow benefits up to a year afterwards.   Driving: Many patients have questions about when it is safe to return to driving. The answer is that this is extremely variable. It depends on the extent of the surgery, as well as how quickly you heal. Certainly left leg surgeries make returning to driving easier while right leg surgeries require more extensive rehabilitation before driving can be safe. Until you can press down on the brake hard, and are off of all narcotics, driving is not permitted. Your surgeon cannot “clear” you to return to driving, only you can make the decision when you feel it is safe.    Medications  Anticoagulants: After upper extremity surgery most patients do not require an anticoagulant unless you have another injury that will be keeping you from mobilizing. Lower extremity surgery typically does require use of an anticoagulation medicine.   IF YOU HAD LOWER EXTREMITY SURGERY AND ARE NOT DISCHARGED HOME WITH ANY ANTICOAGULANT MEDICINE YOU SHOULD TAKE ASPIRIN 325mg DAILY FOR 30 DAYS POSTOPERATIVELY.  If you are discharged home with an anticoagulant such as Aspirin, Xarelto, Eliquis, Coumadin, or Lovenox, follow these simple instructions:   Notify surgeon immediately if any margarita bleeding is noted in the urine, stool, emesis, or from the nose or the incision. Blood in the stool will often appear as black rather than red. Blood in urine may appear as pink. Blood in emesis may appear as brown/black like coffee grounds.  You will need to apply pressure for longer periods of time to any cuts or abrasions to stop bleeding  Avoid alcohol while taking anticoagulants  Most anticoagulants are to be taken for 30 days postoperatively. After this time, you may stop using them unless instructed otherwise.   If you were already taking an anticoagulant (commonly Aspirin, Coumadin, or Plavix) you will likely be resuming your normal dose  LYMPH NODE      Social History     Occupational History    Not on file   Tobacco Use    Smoking status: Former     Current packs/day: 0.00     Average packs/day: 0.5 packs/day for 5.0 years (2.5 ttl pk-yrs)     Types: Cigarettes     Quit date: 1980     Years since quittin.8    Smokeless tobacco: Never    Tobacco comments:     70's   Vaping Use    Vaping status: Never Used   Substance and Sexual Activity    Alcohol use: Yes     Alcohol/week: 0.0 - 1.0 standard drinks of alcohol     Comment: Alcoholic Drinks/day: occ    Drug use: Never    Sexual activity: Not Currently     Partners: Male     Birth control/protection: Condom      Social History     Social History Narrative    , no children.  Has a niece involved in her care.            Patient of Dr. Newell since 2016.              had severe dementia and passed away in 2019.  Milt.      Family History   Problem Relation Age of Onset    Pancreatic Cancer Mother 82    Other Cancer Mother         Pancreatic cancer    Melanoma Father 75        Metastatic to colon    Other Cancer Father         Colon cancer - Melanoma Matastisized in Colon     Family history is noncontributory otherwise.    Allergies: Erythromycin base [erythromycin], Codeine, Hydrocodone, Keytruda [pembrolizumab], Tramadol, Alcohol, Cholecalciferol, Other environmental allergy, Penicillins, Shellfish containing products [shellfish-derived products], Sulfa (sulfonamide antibiotics) [sulfa antibiotics], and Vitamin d3 complete [external allergen needs reconciliation - see comment]     Current medications:  Current Outpatient Medications   Medication Instructions    aloe vera Gel 1 Application., 2 TIMES DAILY PRN    aspirin (ASA) 81 mg, Oral, DAILY    azelaic acid (FINACIA) 15 % external gel APPLY TOPICALLY DAILY TO SKIN EVERY MORNING.    cyclobenzaprine (FLEXERIL) 5 MG tablet TAKE 1 TABLET 3 TIMES DAILY AS NEEDED FOR MUSCLE SPASMS    diphenhydrAMINE-Acetaminophen (PERCOGESIC PO) 375  postoperatively and will be continuing that medication at the discretion of the prescribing physician.  Stool Softeners: You will be at greater risk of constipation after surgery due to being less mobile and the pain medications.  Take stool softeners as needed. Over the counter Colace 100 mg 1-2 capsules twice daily can be taken.  If stools become too loose or too frequent, please decreases the dosage or stop the stool softener.  If constipation occurs despite use of stool softeners, you are to continue the stool softeners and add a laxative (Milk of Magnesia 1 ounce daily as needed)  Drink plenty of fluids, and eat fruits and vegetables during your recovery time. Getting up and mobilizing will help the bowels to recover their regular function, as will weaning off of all narcotics when the pain becomes tolerable.  Pain Medications: Utilized after surgery are narcotics. This is some general information about these medications.  CLASSIFICATION: Pain medications are called Opioids and are narcotics  LEGALITIES: It is illegal to share narcotics with others  DRIVING: it is illegal to drive while under the influence of narcotics. Doing so is a DUI.  POTENTIAL SIDE EFFECTS: nausea, vomiting, itching, dizziness, drowsiness, dry mouth, constipation, and difficulty urinating.  POTENTIAL ADVERSE EFFECTS:  Opioid tolerance can develop with use of pain medications and this simply means that it requires more and more of the medication to control pain. However, this is seen more in patients that use opioids for longer periods of time.  Opioid dependence can develop with use of Opioids. People with opioid dependence will experience withdrawal symptoms upon cessation of the medication.  Opioid addiction can develop with use of Opioids. The incidence of this is very unlikely in patients who take the medications as ordered and stop the medications as instructed.  Opioid overdose can be dangerous, but is unlikely when the medication  "mg, Oral    fluticasone (FLONASE) 50 MCG/ACT nasal spray SPRAY 1 TO 2 SPRAYS INTO BOTH NOSTRILS DAILY AS NEEDED FOR ALLERGIES    gabapentin (NEURONTIN) 900 mg, Oral, 3 TIMES DAILY    guaiFENesin-dextromethorphan (MUCINEX DM) 600-30 mg Tb12 1 tablet, 2 TIMES DAILY PRN    hydroxychloroquine (PLAQUENIL) 200 mg, Oral, 2 TIMES DAILY    menthol (BIOFREEZE, MENTHOL,) 4 % Gel 1 Application., DAILY PRN    metroNIDAZOLE (METROCREAM) 0.75 % external cream APPLY TOPICALLY TO FACE EVERY NIGHT AT BEDTIME.    naproxen (NAPROSYN) 500 MG tablet TAKE 1 TABLET 2 TIMES DAILY AS NEEDED    neomycin-bacitracin-polymyxin (NEOSPORIN) ointment 1 Application., 2 TIMES DAILY PRN    pantoprazole (PROTONIX) 40 mg, Oral, 2 TIMES DAILY    Pseudoephedrine-DM-GG (ROBITUSSIN COUGH/COLD D PO) Oral    rosuvastatin (CRESTOR) 5 mg, Oral, DAILY    UNABLE TO FIND MEDICATION NAME: benadryl topical cream    UNABLE TO FIND MEDICATION NAME: Magni- life cream        Objective:     Wt Readings from Last 3 Encounters:   09/30/24 67.2 kg (148 lb 3.2 oz)   09/28/23 75.2 kg (165 lb 12.8 oz)   02/07/23 82.6 kg (182 lb 1.6 oz)     BP Readings from Last 3 Encounters:   09/30/24 125/86   05/20/24 (!) 147/73   04/25/24 106/65     /86 (BP Location: Right arm, Patient Position: Sitting, Cuff Size: Adult Regular)   Pulse 74   Temp 97.8  F (36.6  C) (Oral)   Resp 12   Ht 1.626 m (5' 4\")   Wt 67.2 kg (148 lb 3.2 oz)   LMP  (LMP Unknown)   SpO2 100%   BMI 25.44 kg/m     Patient is in no acute distress.  Mood good.  Insight good.  Eyes nonicteric.  Pupils equal.  Ears clear.  Neck is supple.  She is stiff when moving her neck and more of the left range of motion areas.  She does have CMC squaring of both hands but not severe.  No cervical adenopathy.  No thyromegaly.  Heart is regular rate and rhythm.  Lungs clear to auscultation bilaterally.  Respiratory effort is good.  Abdomen is soft, nontender, no hepatosplenomegaly.  Extremities no edema.       Diagnostics: " is taken as ordered and stopped when ordered. It is important not to mix opioids with alcohol as this can lead to over sedation and respiratory difficulty.  DOSAGE:  After the initial surgical pain begins to resolve, you may begin to decrease the pain medication. By the end of a few weeks, you should be off of pain medications.  Refills will not be given by the office during evening hours, on weekends, or after 6 weeks post-op. You are responsible for weaning off of pain medication. You can increase the time between narcotic pills, taking one every 4 then 6 then 8 hours and so on.  To seek refills on pain medications during the initial 6-week post-operative period, you must call the office to request the refill. The office will then notify you when to  the prescription. DO NOT wait until you are out of the medication to request a refill. Prescriptions will not be filled over the weekend and depending on the schedule, it may take a couple days for the prescription to be available. Someone will have to pick the prescription in person at the office.    FOLLOW-UP VISITS  You will need to follow up in the office with your surgeon in 3 weeks, or as instructed elsewhere in your discharge paperwork. Please call this number [phone] to schedule this appointment. If you are going to an SNF/SNU facility, they will arrange for you to follow up in the office.  If you have any concerns or suspected complications prior to your follow up visit, please call the office. Do not wait until your appointment time if you suspect complications. These will need to be addressed in the office promptly.      Rober Kam II, MD  Orthopaedic Surgery  [facility]                   "    Hospital Outpatient Visit on 07/25/2024   Component Date Value Ref Range Status    GLUCOSE BY METER POCT 07/25/2024 71  70 - 99 mg/dL Final       Results for orders placed or performed in visit on 09/30/24   EKG 12-lead, tracing only     Status: None   Result Value Ref Range    Systolic Blood Pressure  mmHg    Diastolic Blood Pressure  mmHg    Ventricular Rate 66 BPM    Atrial Rate 66 BPM    OH Interval 158 ms    QRS Duration 84 ms     ms    QTc 467 ms    P Axis 51 degrees    R AXIS -23 degrees    T Axis 32 degrees    Interpretation ECG       Sinus rhythm  Low voltage QRS  Nonspecific T wave abnormality  Abnormal ECG  When compared with ECG of 11-May-2020 15:48,  Nonspecific T wave abnormality no longer evident in Lateral leads  QT has lengthened  Confirmed by ODALIS VENEGAS, LES LOC:LEVI ([id number]) on 9/30/2024 4:20:46 PM       Recent Labs   Lab Test 07/25/24  0914 12/13/23  1003 09/28/23  1043 12/07/22  1023 09/22/22  1217   NA  --   --  141  --  139   POTASSIUM  --   --  4.0  --  4.1   CHLORIDE  --   --  104  --  102   CO2  --   --  27  --  25   ANIONGAP  --   --  10  --  12   GLC 71  --  88   < > 95   BUN  --   --  14.1  --  15.0   CR  --  0.9 0.87   < > 0.83   ALEJANDRO  --   --  9.7  --  9.7    < > = values in this interval not displayed.      Lab Results   Component Value Date    WBC 4.9 09/28/2023     Lab Results   Component Value Date    RBC 4.38 09/28/2023     Lab Results   Component Value Date    HGB 14.1 09/28/2023     Lab Results   Component Value Date    HCT 41.4 09/28/2023     No components found for: \"MCT\"  Lab Results   Component Value Date    MCV 95 09/28/2023     Lab Results   Component Value Date    MCH 32.2 09/28/2023     Lab Results   Component Value Date    MCHC 34.1 09/28/2023     Lab Results   Component Value Date    RDW 12.1 09/28/2023     Lab Results   Component Value Date     09/28/2023        Impression:     1. Pre-operative general physical examination    2. Ptosis, right    3. Mixed " hyperlipidemia    4. 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  Following up with dermatology related to this.  Continue to monitor.   5. Seronegative rheumatoid arthritis of both hands (H)   On hydroxychloroquine (Plaquenil).  No issues at this time.  Continue to monitor.   6. Neck pain    7. Spinal stenosis of lumbar region with neurogenic claudication        Plan:     Okay to proceed with surgery as planned.  EKG done today and looks good.  Stop aspirin 1 week before surgery and can restart afterwards.  Can take usual medications on the morning of surgery as needed.  Can avoid if prefer.  Continue current medications.  Follow up sooner if issues.       Renzo Newell MD  General Internal Medicine  [facility]    The longitudinal plan of care for the diagnoses and conditions as documented were addressed during this visit. Due to the added complexity in care, I will continue to support Rogelio in the subsequent management and with ongoing continuity of care.     Return in about 7 weeks (around 11/15/2024) for annual wellness visit, visit and blood work.     Future Appointments   Date Time Provider Department Center   11/15/2024  9:00 AM Renzo Newell MD [facility]   11/15/2024 10:15 AM [facility]

## 2024-11-08 ENCOUNTER — OFFICE VISIT (OUTPATIENT)
Dept: URGENT CARE | Facility: URGENT CARE | Age: 79
End: 2024-11-08
Payer: COMMERCIAL

## 2024-11-08 VITALS
TEMPERATURE: 99.1 F | RESPIRATION RATE: 16 BRPM | DIASTOLIC BLOOD PRESSURE: 75 MMHG | HEART RATE: 91 BPM | SYSTOLIC BLOOD PRESSURE: 135 MMHG | OXYGEN SATURATION: 96 %

## 2024-11-08 DIAGNOSIS — R30.0 DYSURIA: ICD-10-CM

## 2024-11-08 DIAGNOSIS — N30.00 ACUTE CYSTITIS WITHOUT HEMATURIA: Primary | ICD-10-CM

## 2024-11-08 LAB
ALBUMIN UR-MCNC: NEGATIVE MG/DL
APPEARANCE UR: CLEAR
BACTERIA #/AREA URNS HPF: ABNORMAL /HPF
BILIRUB UR QL STRIP: NEGATIVE
COLOR UR AUTO: YELLOW
GLUCOSE UR STRIP-MCNC: NEGATIVE MG/DL
HGB UR QL STRIP: NEGATIVE
KETONES UR STRIP-MCNC: NEGATIVE MG/DL
LEUKOCYTE ESTERASE UR QL STRIP: ABNORMAL
NITRATE UR QL: NEGATIVE
PH UR STRIP: 7 [PH] (ref 5–8)
RBC #/AREA URNS AUTO: ABNORMAL /HPF
SP GR UR STRIP: 1.01 (ref 1–1.03)
SQUAMOUS #/AREA URNS AUTO: ABNORMAL /LPF
UROBILINOGEN UR STRIP-ACNC: 0.2 E.U./DL
WBC #/AREA URNS AUTO: ABNORMAL /HPF

## 2024-11-08 PROCEDURE — 81001 URINALYSIS AUTO W/SCOPE: CPT | Performed by: PHYSICIAN ASSISTANT

## 2024-11-08 PROCEDURE — 87086 URINE CULTURE/COLONY COUNT: CPT | Performed by: PHYSICIAN ASSISTANT

## 2024-11-08 PROCEDURE — 99213 OFFICE O/P EST LOW 20 MIN: CPT | Performed by: PHYSICIAN ASSISTANT

## 2024-11-08 PROCEDURE — 87186 SC STD MICRODIL/AGAR DIL: CPT | Performed by: PHYSICIAN ASSISTANT

## 2024-11-08 RX ORDER — CIPROFLOXACIN 250 MG/1
250 TABLET, FILM COATED ORAL 2 TIMES DAILY
Qty: 10 TABLET | Refills: 0 | Status: SHIPPED | OUTPATIENT
Start: 2024-11-08

## 2024-11-08 RX ORDER — CIPROFLOXACIN 250 MG/1
250 TABLET, FILM COATED ORAL 2 TIMES DAILY
Qty: 10 TABLET | Refills: 0 | Status: SHIPPED | OUTPATIENT
Start: 2024-11-08 | End: 2024-11-08

## 2024-11-08 ASSESSMENT — ENCOUNTER SYMPTOMS
VOMITING: 0
DYSURIA: 1
CONSTIPATION: 0
FLANK PAIN: 0
FEVER: 0
BACK PAIN: 0
DIARRHEA: 1

## 2024-11-08 NOTE — PROGRESS NOTES
Assessment & Plan:        ICD-10-CM    1. Acute cystitis without hematuria  N30.00 ciprofloxacin (CIPRO) 250 MG tablet     DISCONTINUED: ciprofloxacin (CIPRO) 250 MG tablet      2. Dysuria  R30.0 UA Macroscopic with reflex to Microscopic and Culture - Clinic Collect     UA Microscopic with Reflex to Culture     Urine Culture            Plan/Clinical Decision Making:    Patient with dysuria for 4 days. Fluids helped. Had some vaginal irritation that improved with Vagisil. Still having persistent dysuria that worsened yesterday. No flank or back pain. No abdominal pain.   UA micro showing 10-25 WBCs. With symptoms will go ahead and treat with antibiotic.   Last GFR >60. Has multiple allergies. Review of chart shows treated with cipro in 2020. She would like to have that medication since tolerated well. Reviewed side effects, risk of tendon rupture. Cipro course prescribed. Call in two days if not better. UC pending.    Return if symptoms worsen or fail to improve, for in 2-3 days.     At the end of the encounter, I discussed results, diagnosis, medications. Discussed red flags for immediate return to clinic/ER, as well as indications for follow up if no improvement. Patient understood and agreed to plan. Patient was stable for discharge.        Dottie Giraldo PA-C on 11/8/2024 at 10:38 AM          Subjective:     HPI:    Rogelio is a 79 year old female who presents to clinic today for the following health issues:  Chief Complaint   Patient presents with    Urgent Care     - 4 Days  - Dysuria  - No other symptoms     HPI    Patient has 4 days of dysuria. Using cranberry juice and pushing fluids which helped, but then yesterday worsened. Had some itching of vaginal area. Has been using Vagisil which helped with vaginal area. No back or flank pain. Has had some suprapubic tenderness.     Review of Systems   Constitutional:  Negative for fever.   Gastrointestinal:  Positive for diarrhea (1 bout this morning). Negative for  "constipation and vomiting.   Genitourinary:  Positive for dysuria. Negative for flank pain.   Musculoskeletal:  Negative for back pain.         Patient Active Problem List   Diagnosis    Migraine headache    LBP (low back pain)    Nonsmoker    Full code status    Peripheral neuropathy    History of melanoma - Melanoma of right upper arm - On Keytruda in the past - stage IIIA    Ischemic cerebrovascular accident (CVA) of frontal lobe (H) - 2/2020    Aphasia    Lumbar spinal stenosis    Essential tremor    On antineoplastic chemotherapy - PEMBROLIZUMAB - checkpoint inhibitor - in the past for melanoma    Female stress incontinence    Seronegative rheumatoid arthritis of both hands (H)    Rosacea    Gastroesophageal reflux disease, unspecified whether esophagitis present        Past Medical History:   Diagnosis Date    Aphasia     Arthritis 2020    Hands aching, muscle spasms, swelling    Asthma     COPD (chronic obstructive pulmonary disease) (H) Childhood    Numerous waldron, spring with bonchial cough    GERD (gastroesophageal reflux disease)     History of transfusion     Lumbar spinal stenosis 02/28/2020    EXAM: MR LUMBAR SPINE W WO CONTRAST LOCATION: [facility] DATE/TIME: 7/11/2019 4:49 PM   INDICATION: Back pain going down the right leg.  Going on for 3 months.  Not improving. See above. History of melanoma. COMPARISON: None. CONTRAST: Gadavist 9. TECHNIQUE: Without and with IV contrast   FINDINGS:  Nomenclature is based on 5 lumbar type vertebral bodies. There is no concerning marrow rep    Malignant melanoma of right upper arm (H)     Menopausal hot flushes 07/23/2017    On venlafaxine for this.       Migraine headache     On antineoplastic chemotherapy     pembrolizumab    PN (peripheral neuropathy)     PONV (postoperative nausea and vomiting)     \"cold sweats\"    S/P colonoscopy     Stroke (H) 02/01/2020       Social History     Tobacco Use    Smoking status: Former     Current packs/day: 0.00     " Average packs/day: 0.5 packs/day for 5.0 years (2.5 ttl pk-yrs)     Types: Cigarettes     Quit date: 1980     Years since quittin.9    Smokeless tobacco: Never    Tobacco comments:     70's   Substance Use Topics    Alcohol use: Yes     Alcohol/week: 0.0 - 1.0 standard drinks of alcohol     Comment: Alcoholic Drinks/day: occ             Objective:     Vitals:    24 1014   BP: 135/75   Pulse: 91   Resp: 16   Temp: 99.1  F (37.3  C)   TempSrc: Oral   SpO2: 96%         Physical Exam   EXAM:   Pleasant, alert, appropriate appearance. NAD.  Head Exam: Normocephalic, atraumatic.  ABD: soft, mild suprapubic tenderness, .  Back: no CVA tenderness.       Results:  Results for orders placed or performed in visit on 24   UA Macroscopic with reflex to Microscopic and Culture - Clinic Collect     Status: Abnormal    Specimen: Urine, Clean Catch   Result Value Ref Range    Color Urine Yellow Colorless, Straw, Light Yellow, Yellow    Appearance Urine Clear Clear    Glucose Urine Negative Negative mg/dL    Bilirubin Urine Negative Negative    Ketones Urine Negative Negative mg/dL    Specific Gravity Urine 1.010 1.005 - 1.030    Blood Urine Negative Negative    pH Urine 7.0 5.0 - 8.0    Protein Albumin Urine Negative Negative mg/dL    Urobilinogen Urine 0.2 0.2, 1.0 E.U./dL    Nitrite Urine Negative Negative    Leukocyte Esterase Urine Small (A) Negative   UA Microscopic with Reflex to Culture     Status: Abnormal   Result Value Ref Range    Bacteria Urine Few (A) None Seen /HPF    RBC Urine 0-2 0-2 /HPF /HPF    WBC Urine 10-25 (A) 0-5 /HPF /HPF    Squamous Epithelials Urine Few (A) None Seen /LPF

## 2024-11-11 LAB
BACTERIA UR CULT: ABNORMAL
BACTERIA UR CULT: ABNORMAL

## 2024-11-11 SDOH — HEALTH STABILITY: PHYSICAL HEALTH: ON AVERAGE, HOW MANY MINUTES DO YOU ENGAGE IN EXERCISE AT THIS LEVEL?: 40 MIN

## 2024-11-11 SDOH — HEALTH STABILITY: PHYSICAL HEALTH: ON AVERAGE, HOW MANY DAYS PER WEEK DO YOU ENGAGE IN MODERATE TO STRENUOUS EXERCISE (LIKE A BRISK WALK)?: 0 DAYS

## 2024-11-11 ASSESSMENT — SOCIAL DETERMINANTS OF HEALTH (SDOH): HOW OFTEN DO YOU GET TOGETHER WITH FRIENDS OR RELATIVES?: ONCE A WEEK

## 2024-11-15 ENCOUNTER — ANCILLARY PROCEDURE (OUTPATIENT)
Dept: MAMMOGRAPHY | Facility: HOSPITAL | Age: 79
End: 2024-11-15
Attending: INTERNAL MEDICINE
Payer: COMMERCIAL

## 2024-11-15 ENCOUNTER — OFFICE VISIT (OUTPATIENT)
Dept: INTERNAL MEDICINE | Facility: CLINIC | Age: 79
End: 2024-11-15
Payer: COMMERCIAL

## 2024-11-15 VITALS
WEIGHT: 148.3 LBS | HEIGHT: 64 IN | DIASTOLIC BLOOD PRESSURE: 73 MMHG | TEMPERATURE: 97.9 F | OXYGEN SATURATION: 100 % | HEART RATE: 81 BPM | RESPIRATION RATE: 12 BRPM | BODY MASS INDEX: 25.32 KG/M2 | SYSTOLIC BLOOD PRESSURE: 118 MMHG

## 2024-11-15 DIAGNOSIS — E78.2 MIXED HYPERLIPIDEMIA: ICD-10-CM

## 2024-11-15 DIAGNOSIS — M06.042 SERONEGATIVE RHEUMATOID ARTHRITIS OF BOTH HANDS (H): ICD-10-CM

## 2024-11-15 DIAGNOSIS — M54.41 CHRONIC BILATERAL LOW BACK PAIN WITH RIGHT-SIDED SCIATICA: ICD-10-CM

## 2024-11-15 DIAGNOSIS — H91.90 HEARING LOSS, UNSPECIFIED HEARING LOSS TYPE, UNSPECIFIED LATERALITY: ICD-10-CM

## 2024-11-15 DIAGNOSIS — G25.0 ESSENTIAL TREMOR: ICD-10-CM

## 2024-11-15 DIAGNOSIS — Z00.00 MEDICARE ANNUAL WELLNESS VISIT, SUBSEQUENT: Primary | ICD-10-CM

## 2024-11-15 DIAGNOSIS — G89.29 CHRONIC BILATERAL LOW BACK PAIN WITH RIGHT-SIDED SCIATICA: ICD-10-CM

## 2024-11-15 DIAGNOSIS — Z12.31 VISIT FOR SCREENING MAMMOGRAM: ICD-10-CM

## 2024-11-15 DIAGNOSIS — I63.9 ISCHEMIC CEREBROVASCULAR ACCIDENT (CVA) OF FRONTAL LOBE (H): Chronic | ICD-10-CM

## 2024-11-15 DIAGNOSIS — L60.8 TOENAIL DEFORMITY: ICD-10-CM

## 2024-11-15 DIAGNOSIS — M06.041 SERONEGATIVE RHEUMATOID ARTHRITIS OF BOTH HANDS (H): ICD-10-CM

## 2024-11-15 DIAGNOSIS — Z85.820 HISTORY OF MELANOMA: ICD-10-CM

## 2024-11-15 DIAGNOSIS — N30.00 ACUTE CYSTITIS WITHOUT HEMATURIA: ICD-10-CM

## 2024-11-15 LAB
ALBUMIN SERPL BCG-MCNC: 4.5 G/DL (ref 3.5–5.2)
ALP SERPL-CCNC: 45 U/L (ref 40–150)
ALT SERPL W P-5'-P-CCNC: 16 U/L (ref 0–50)
ANION GAP SERPL CALCULATED.3IONS-SCNC: 12 MMOL/L (ref 7–15)
AST SERPL W P-5'-P-CCNC: 30 U/L (ref 0–45)
BILIRUB SERPL-MCNC: 0.7 MG/DL
BUN SERPL-MCNC: 8.4 MG/DL (ref 8–23)
CALCIUM SERPL-MCNC: 10.2 MG/DL (ref 8.8–10.4)
CHLORIDE SERPL-SCNC: 103 MMOL/L (ref 98–107)
CHOLEST SERPL-MCNC: 133 MG/DL
CREAT SERPL-MCNC: 0.8 MG/DL (ref 0.51–0.95)
EGFRCR SERPLBLD CKD-EPI 2021: 75 ML/MIN/1.73M2
ERYTHROCYTE [DISTWIDTH] IN BLOOD BY AUTOMATED COUNT: 11.8 % (ref 10–15)
FASTING STATUS PATIENT QL REPORTED: YES
FASTING STATUS PATIENT QL REPORTED: YES
GLUCOSE SERPL-MCNC: 103 MG/DL (ref 70–99)
HCO3 SERPL-SCNC: 26 MMOL/L (ref 22–29)
HCT VFR BLD AUTO: 43.3 % (ref 35–47)
HDLC SERPL-MCNC: 70 MG/DL
HGB BLD-MCNC: 14.6 G/DL (ref 11.7–15.7)
LDLC SERPL CALC-MCNC: 51 MG/DL
MCH RBC QN AUTO: 31.5 PG (ref 26.5–33)
MCHC RBC AUTO-ENTMCNC: 33.7 G/DL (ref 31.5–36.5)
MCV RBC AUTO: 94 FL (ref 78–100)
NONHDLC SERPL-MCNC: 63 MG/DL
PLATELET # BLD AUTO: 223 10E3/UL (ref 150–450)
POTASSIUM SERPL-SCNC: 4.3 MMOL/L (ref 3.4–5.3)
PROT SERPL-MCNC: 7 G/DL (ref 6.4–8.3)
RBC # BLD AUTO: 4.63 10E6/UL (ref 3.8–5.2)
SODIUM SERPL-SCNC: 141 MMOL/L (ref 135–145)
TRIGL SERPL-MCNC: 58 MG/DL
WBC # BLD AUTO: 4.4 10E3/UL (ref 4–11)

## 2024-11-15 PROCEDURE — 77063 BREAST TOMOSYNTHESIS BI: CPT

## 2024-11-15 PROCEDURE — 77067 SCR MAMMO BI INCL CAD: CPT

## 2024-11-15 ASSESSMENT — PAIN SCALES - GENERAL: PAINLEVEL_OUTOF10: SEVERE PAIN (6)

## 2024-11-15 NOTE — PROGRESS NOTES
[facility] - [facility] Specialists    Comprehensive and complex medical care - Chronic disease management - Shared decision making - Care coordination - Compassionate care    Patient advocacy - Rational deprescribing - Minimally disruptive medicine - Ethical focus - Customized care         Date of Service: 11/15/2024  Primary Provider: Renzo Newell    Patient Care Team:  Renzo Newell MD as PCP - General  Renzo Newell MD as Assigned PCP  Shoaib Gloria MD as MD (Hematology & Oncology)  Carlos Alberto Worthington DO as MD (Pain Medicine)  Bj Orantes MD, MD as Resident (Dermatology)  Sadi Castano MD Stanek, Richard Paul, MD as MD (Ophthalmology)  Truman Vásquez MD as MD (Ophthalmology)          Patient's Pharmacy:    [id number] - [place], [place] - [address] AT [facility] [address]  [address]  [facility] [address]  Phone: [phone] Fax: [phone]    [facility] - [facility] [address]  [address]  [facility] [address]  [facility] [address]  Phone: [phone] Fax: [phone]    [facility] [address] [facility]  [address]  [address]  [facility] [address]  Phone: [phone] Fax: [phone]     Patient's Contacts:  Name Home Phone Work Phone Mobile Phone Relationship Lgl Grd   MARIA TERESA JOYE   [phone] Niece No   CAMPBELL BERRIOS   [phone] Niece No     Patient's Insurance:    Payor: [employer] / Plan: [facility] MEDICARE ADVANTAGE / Product Type: HMO /      Subjective:     History of present illness:    Lashawn Ortega is an 79 year old here for an annual wellness visit.    The issues she would like to address at today's visit include the following:    Chief Complaint   Patient presents with    Physical     AWV    Patient Request     Should she do a bone density test? Hearing test?     Imm/Inj     COVID    Toe Injury      Check out big toe.            11/15/2024     8:42 AM   Additional Questions   Roomed by FARHANA Amanda   Accompanied by DAYANA     Patient comes in today for annual wellness visit and for other issues.    She would like to have an audiology evaluation for her hearing.  She has noticed worsening hearing over time.    We talked about a bone density test.  Her last 1 in 2015 was quite good.  We talked about rechecking and possibly medications if needed.    She would like to put this on hold at this period.    She recently had a urinary tract infection with E. coli present.  This was reviewed with her.  She did have dysuria and some suprapubic pressure.  This has improved.    She did work on drinking more fluids during this time.    She has had some issues with her left great toe with some deformity.  She is using Kerasal on it and it seems to be helping her.  She is able to clip her nails still even with her back pain.    She has a follow-up with spine center related to her back pain.  It is getting a bit worse and radiating down her right leg.    She does have some issues with arthritis in her thumb.  Her rheumatoid arthritis symptoms or inflammatory arthritis is doing well with the hydroxychloroquine.    We reviewed her tremor.  She is continuing to be careful for her balance.  Her neuropathy is progressing a bit.    We reviewed her other issues noted in the assessment but not specifically addressed in the HPI above.           Active Problem List:  Problem List as of 11/15/2024 Reviewed: 10/3/2024  9:00 AM by Renzo Newell MD         High    Nonsmoker    Full code status    Ischemic cerebrovascular accident (CVA) of frontal lobe (H) - 2/2020    History of melanoma - Melanoma of right upper arm - On Keytruda in the past - stage IIIA       Medium    LBP (low back pain)    Lumbar spinal stenosis    Essential tremor    Seronegative rheumatoid arthritis of both hands (H)       Low    Migraine headache    Peripheral  "neuropathy    Aphasia    On antineoplastic chemotherapy - PEMBROLIZUMAB - checkpoint inhibitor - in the past for melanoma    Female stress incontinence    Rosacea    Gastroesophageal reflux disease, unspecified whether esophagitis present        Past Medical History:   Diagnosis Date    Aphasia     Arthritis 2020    Hands aching, muscle spasms, swelling    Asthma     COPD (chronic obstructive pulmonary disease) (H) Childhood    Numerous waldron, spring with bonchial cough    GERD (gastroesophageal reflux disease)     History of transfusion     Lumbar spinal stenosis 02/28/2020    EXAM: MR LUMBAR SPINE W WO CONTRAST LOCATION: [facility] DATE/TIME: 7/11/2019 4:49 PM   INDICATION: Back pain going down the right leg.  Going on for 3 months.  Not improving. See above. History of melanoma. COMPARISON: None. CONTRAST: Gadavist 9. TECHNIQUE: Without and with IV contrast   FINDINGS:  Nomenclature is based on 5 lumbar type vertebral bodies. There is no concerning marrow rep    Malignant melanoma of right upper arm (H)     Menopausal hot flushes 07/23/2017    On venlafaxine for this.       Migraine headache     On antineoplastic chemotherapy     pembrolizumab    PN (peripheral neuropathy)     PONV (postoperative nausea and vomiting)     \"cold sweats\"    S/P colonoscopy     Stroke (H) 02/01/2020      Past Surgical History:   Procedure Laterality Date    ABDOMEN SURGERY  01/1988    Hysterectomy    BIOPSY  05/2019    Taken on right upper arm    CATARACT EXTRACTION, BILATERAL  2022    COLONOSCOPY  04/2021    All tests have been good, last one had a polyp removed    HYSTERECTOMY  01/01/1988    IR LUMBAR EPIDURAL STEROID INJECTION  08/12/2019    IR LUMBAR EPIDURAL STEROID INJECTION  08/12/2019    LAPAROSCOPIC CHOLECYSTECTOMY N/A 07/14/2020    Procedure: CHOLECYSTECTOMY, LAPAROSCOPIC;  Surgeon: Lynn Christina MD;  Location: [facility] OR;  Service: General    AK ERCP DX COLLECTION SPECIMEN BRUSHING/WASHING N/A " 2020    Procedure: ENDOSCOPIC RETROGRADE CHOLANGIO-PANCREATOGRAPHY WITH SPHINCTEROTOMY AND STONE EXTRACTION;  Surgeon: Daniel Das MD;  Location: [facility];  Service: Gastroenterology    NM ERCP DX COLLECTION SPECIMEN BRUSHING/WASHING N/A 07/15/2020    Procedure: ENDOSCOPIC RETROGRADE CHOLANGIOPANCREATOGRAPHY;  Surgeon: Daniel Das MD;  Location: [facility];  Service: Gastroenterology    SKIN CANCER EXCISION  2019    MELANOMA RESECTION WITH SENTINEL LYMPH NODE      Family History   Problem Relation Age of Onset    Pancreatic Cancer Mother 82    Other Cancer Mother         Pancreatic cancer    Melanoma Father 75        Metastatic to colon    Other Cancer Father         Colon cancer - Melanoma Matastisized in Colon      Family history is otherwise noncontributory.    Social History     Occupational History    Not on file   Tobacco Use    Smoking status: Former     Current packs/day: 0.00     Average packs/day: 0.5 packs/day for 5.0 years (2.5 ttl pk-yrs)     Types: Cigarettes     Quit date: 1980     Years since quittin.9    Smokeless tobacco: Never    Tobacco comments:     70's   Vaping Use    Vaping status: Never Used   Substance and Sexual Activity    Alcohol use: Yes     Alcohol/week: 0.0 - 1.0 standard drinks of alcohol     Comment: Alcoholic Drinks/day: occ    Drug use: Never    Sexual activity: Not Currently     Partners: Male     Birth control/protection: Condom      Social History     Social History Narrative    , no children.  Has a niece involved in her care.            Patient of Dr. Newell since .              had severe dementia and passed away in 2019.  Milt.      Current Outpatient Medications   Medication Instructions    aloe vera Gel 1 Application., 2 TIMES DAILY PRN    aspirin (ASA) 81 mg, Oral, DAILY    azelaic acid (FINACIA) 15 % external gel APPLY TOPICALLY DAILY TO SKIN EVERY MORNING.    ciprofloxacin (CIPRO) 250 mg, Oral, 2 TIMES DAILY     cyclobenzaprine (FLEXERIL) 5 MG tablet TAKE 1 TABLET 3 TIMES DAILY AS NEEDED FOR MUSCLE SPASMS    diphenhydrAMINE-Acetaminophen (PERCOGESIC PO) 375 mg    fluticasone (FLONASE) 50 MCG/ACT nasal spray SPRAY 1 TO 2 SPRAYS INTO BOTH NOSTRILS DAILY AS NEEDED FOR ALLERGIES    gabapentin (NEURONTIN) 900 mg, Oral, 3 TIMES DAILY    guaiFENesin-dextromethorphan (MUCINEX DM) 600-30 mg Tb12 1 tablet, 2 TIMES DAILY PRN    hydroxychloroquine (PLAQUENIL) 200 mg, Oral, 2 TIMES DAILY    menthol (BIOFREEZE, MENTHOL,) 4 % Gel 1 Application., DAILY PRN    metroNIDAZOLE (METROCREAM) 0.75 % external cream APPLY TOPICALLY TO FACE EVERY NIGHT AT BEDTIME.    naproxen (NAPROSYN) 500 MG tablet TAKE 1 TABLET 2 TIMES DAILY AS NEEDED    neomycin-bacitracin-polymyxin (NEOSPORIN) ointment 1 Application., 2 TIMES DAILY PRN    pantoprazole (PROTONIX) 40 mg, Oral, 2 TIMES DAILY    Pseudoephedrine-DM-GG (ROBITUSSIN COUGH/COLD D PO) No dose, route, or frequency recorded.    rosuvastatin (CRESTOR) 5 mg, Oral, DAILY    UNABLE TO FIND MEDICATION NAME: benadryl topical cream    UNABLE TO FIND MEDICATION NAME: Magni- life cream      Allergies: Erythromycin base [erythromycin], Codeine, Hydrocodone, Keytruda [pembrolizumab], Tramadol, Alcohol, Cholecalciferol, Other environmental allergy, Penicillins, Shellfish containing products [shellfish-derived products], Sulfa (sulfonamide antibiotics) [sulfa antibiotics], and Vitamin d3 complete [external allergen needs reconciliation - see comment]     Immunization History   Administered Date(s) Administered    COVID-19 12+ (Pfizer) 10/19/2023, 11/15/2024    COVID-19 Bivalent 12+ (Pfizer) 09/22/2022    COVID-19 MONOVALENT 12+ (Pfizer) 02/24/2021, 03/17/2021, 10/21/2021, 07/22/2022    DT (PEDS <7y) 03/27/1998    Hepatitis A (ADULT 19+) 05/14/2009, 05/19/2010    Influenza (High Dose) Trivalent,PF (Fluzone) 09/30/2024    Influenza Vaccine 65+ (Fluzone HD) 09/18/2020, 09/20/2021, 09/22/2022, 09/28/2023    Pneumo Conj  "13-V (2010&after) 05/19/2015    Pneumococcal 23 valent 05/19/2010    TDAP (Adacel,Boostrix) 05/19/2015    Td (Adult), Adsorbed 05/14/2009    Zoster recombinant adjuvanted (SHINGRIX) 09/28/2023, 03/08/2024    Zoster vaccine, live 02/01/2014      Objective:     Wt Readings from Last 3 Encounters:   11/15/24 67.3 kg (148 lb 4.8 oz)   09/30/24 67.2 kg (148 lb 3.2 oz)   09/28/23 75.2 kg (165 lb 12.8 oz)     BP Readings from Last 3 Encounters:   11/15/24 118/73   11/08/24 135/75   09/30/24 125/86       PHYSICAL EXAM  /73 (BP Location: Right arm, Patient Position: Sitting, Cuff Size: Adult Regular)   Pulse 81   Temp 97.9  F (36.6  C) (Oral)   Resp 12   Ht 1.626 m (5' 4\")   Wt 67.3 kg (148 lb 4.8 oz)   LMP  (LMP Unknown)   SpO2 100%   BMI 25.46 kg/m     The patient is comfortable, no acute distress.  Mood good.  Insight is good.  No skin lesions or nodules of concern.  Ears clear.  Eyes are nonicteric.  Pupils equal and reactive.  Throat is clear.  Neck is supple without mass, no thyromegaly. No cervical or epitrochlear adenopathy.  No axillary lymph nodes. Heart regular rate and rhythm.  Lungs clear to auscultation bilaterally.  Respiratory effort good.  Abdomen soft and nontender.  No hepatosplenomegaly.  Extremities show no edema.    Diagnostics:     Office Visit on 11/08/2024   Component Date Value Ref Range Status    Color Urine 11/08/2024 Yellow  Colorless, Straw, Light Yellow, Yellow Final    Appearance Urine 11/08/2024 Clear  Clear Final    Glucose Urine 11/08/2024 Negative  Negative mg/dL Final    Bilirubin Urine 11/08/2024 Negative  Negative Final    Ketones Urine 11/08/2024 Negative  Negative mg/dL Final    Specific Gravity Urine 11/08/2024 1.010  1.005 - 1.030 Final    Blood Urine 11/08/2024 Negative  Negative Final    pH Urine 11/08/2024 7.0  5.0 - 8.0 Final    Protein Albumin Urine 11/08/2024 Negative  Negative mg/dL Final    Urobilinogen Urine 11/08/2024 0.2  0.2, 1.0 E.U./dL Final    Nitrite " Urine 11/08/2024 Negative  Negative Final    Leukocyte Esterase Urine 11/08/2024 Small (A)  Negative Final    Bacteria Urine 11/08/2024 Few (A)  None Seen /HPF Final    RBC Urine 11/08/2024 0-2  0-2 /HPF /HPF Final    WBC Urine 11/08/2024 10-25 (A)  0-5 /HPF /HPF Final    Squamous Epithelials Urine 11/08/2024 Few (A)  None Seen /LPF Final    Culture 11/08/2024 10,000-50,000 CFU/mL Escherichia coli (A)   Final    Culture 11/08/2024 10,000-50,000 CFU/mL Escherichia coli (A)   Final       Results for orders placed or performed in visit on 11/15/24   MA Screen Bilateral w/Dustin     Status: Normal    Narrative    BILATERAL FULL FIELD DIGITAL SCREENING MAMMOGRAM WITH TOMOSYNTHESIS    Performed on: 11/15/24    Compared to: 10/19/2023, 10/17/2022, 10/15/2021, 10/14/2020, 09/17/2019,   and 09/13/2018    Technique:  This study was evaluated with the assistance of Computer-Aided   Detection.  Breast Tomosynthesis was used in interpretation.    Findings: There are scattered areas of fibroglandular density.  There is   no radiographic evidence of malignancy.     Impression    IMPRESSION: ACR BI-RADS Category 1: Negative    BREAST CANCER SCREENING RECOMMENDATION: Routine yearly mammography   beginning at age 40 or as discussed with your provider.    The results and recommendations of this examination will be communicated   to the patient.        Mariely Aaron MD     Results for orders placed or performed in visit on 11/15/24   Lipid panel reflex to direct LDL Fasting     Status: None   Result Value Ref Range    Cholesterol 133 <200 mg/dL    Triglycerides 58 <150 mg/dL    Direct Measure HDL 70 >=50 mg/dL    LDL Cholesterol Calculated 51 <100 mg/dL    Non HDL Cholesterol 63 <130 mg/dL    Patient Fasting > 8hrs? Yes     Narrative    Cholesterol  Desirable: < 200 mg/dL  Borderline High: 200 - 239 mg/dL  High: >= 240 mg/dL    Triglycerides  Normal: < 150 mg/dL  Borderline High: 150 - 199 mg/dL  High: 200-499 mg/dL  Very  High: >= 500 mg/dL    Direct Measure HDL  Female: >= 50 mg/dL   Male: >= 40 mg/dL    LDL Cholesterol  Desirable: < 100 mg/dL  Above Desirable: 100 - 129 mg/dL   Borderline High: 130 - 159 mg/dL   High:  160 - 189 mg/dL   Very High: >= 190 mg/dL    Non HDL Cholesterol  Desirable: < 130 mg/dL  Above Desirable: 130 - 159 mg/dL  Borderline High: 160 - 189 mg/dL  High: 190 - 219 mg/dL  Very High: >= 220 mg/dL   Comprehensive metabolic panel     Status: Abnormal   Result Value Ref Range    Sodium 141 135 - 145 mmol/L    Potassium 4.3 3.4 - 5.3 mmol/L    Carbon Dioxide (CO2) 26 22 - 29 mmol/L    Anion Gap 12 7 - 15 mmol/L    Urea Nitrogen 8.4 8.0 - 23.0 mg/dL    Creatinine 0.80 0.51 - 0.95 mg/dL    GFR Estimate 75 >60 mL/min/1.73m2    Calcium 10.2 8.8 - 10.4 mg/dL    Chloride 103 98 - 107 mmol/L    Glucose 103 (H) 70 - 99 mg/dL    Alkaline Phosphatase 45 40 - 150 U/L    AST 30 0 - 45 U/L    ALT 16 0 - 50 U/L    Protein Total 7.0 6.4 - 8.3 g/dL    Albumin 4.5 3.5 - 5.2 g/dL    Bilirubin Total 0.7 <=1.2 mg/dL    Patient Fasting > 8hrs? Yes    CBC with platelets     Status: Normal   Result Value Ref Range    WBC Count 4.4 4.0 - 11.0 10e3/uL    RBC Count 4.63 3.80 - 5.20 10e6/uL    Hemoglobin 14.6 11.7 - 15.7 g/dL    Hematocrit 43.3 35.0 - 47.0 %    MCV 94 78 - 100 fL    MCH 31.5 26.5 - 33.0 pg    MCHC 33.7 31.5 - 36.5 g/dL    RDW 11.8 10.0 - 15.0 %    Platelet Count 223 150 - 450 10e3/uL       Assessment:     1. Medicare annual wellness visit, subsequent    2. Mixed hyperlipidemia    3. Ischemic cerebrovascular accident (CVA) of frontal lobe (H) - 2/2020  No signs of recurrence.  Continue current management.   4. Hearing loss, unspecified hearing loss type, unspecified laterality    5. Acute cystitis without hematuria    6. Toenail deformity    7. History of melanoma - Melanoma of right upper arm - On Keytruda in the past - stage IIIA    8. Essential tremor    9. Chronic bilateral low back pain with right-sided sciatica     10. Seronegative rheumatoid arthritis of both hands (H)  No signs of progression.  Continue current management.        Plan:     Blood work done today.  COVID booster done today.  Audiology referral done.  No signs of recurrence of her stroke.  Arthritis doing well at this time but could refer to rheumatology in the future if needed.  Continue current medications  Follow up sooner if issues.    Orders Placed This Encounter   Procedures    COVID-19 12+ (PFIZER)    Lipid panel reflex to direct LDL Fasting    Comprehensive metabolic panel    CBC with platelets    Adult Audiology  Referral        A personalized health plan based on the identified health risks was provided to the patient on the AVS.       Renzo Newell MD  General Internal Medicine  [facility]      Return in about 1 year (around 11/15/2025) for annual wellness visit, visit and blood work.     Future Appointments   Date Time Provider Department Center   11/20/2024  9:20 AM Carlos Alberto Worthington DO [facility]         Health Maintenance   Topic Date Due    ANNUAL REVIEW OF  ORDERS  Never done    COVID-19 Vaccine (8 - 2024-25 season) 01/10/2025    DTAP/TDAP/TD IMMUNIZATION (2 - Td or Tdap) 05/19/2025    MEDICARE ANNUAL WELLNESS VISIT  11/15/2025    LIPID  11/15/2025    FALL RISK ASSESSMENT  11/15/2025    GLUCOSE  11/15/2027    ADVANCE CARE PLANNING  11/15/2029    DEXA  07/08/2030    HEPATITIS C SCREENING  Completed    PHQ-2 (once per calendar year)  Completed    INFLUENZA VACCINE  Completed    Pneumococcal Vaccine: 65+ Years  Completed    ZOSTER IMMUNIZATION  Completed    HPV IMMUNIZATION  Aged Out    MENINGITIS IMMUNIZATION  Aged Out    RSV MONOCLONAL ANTIBODY  Aged Out    MAMMO SCREENING  Discontinued    COLORECTAL CANCER SCREENING  Discontinued    RSV VACCINE  Discontinued    LUNG CANCER SCREENING  Discontinued        ______________________________________________________________________     Additional  required elements:    I have reviewed Opioid Use Disorder and Substance Use Disorder risk factors and made any needed referrals.  This may not apply to this individual patient, but this documentation is required by Medicare.    Memory screen:      11/15/2024     8:54 AM   MINI COG   Clock Draw Score 2 Normal   3 Item Recall 3 objects recalled   Mini Cog Total Score 5        PHQ-2 Score:         11/14/2024     9:57 AM 3/8/2024     9:00 AM   PHQ-2 ( 1999 Pfizer)   Q1: Little interest or pleasure in doing things 0  0   Q2: Feeling down, depressed or hopeless 0  0   PHQ-2 Score 0  0   Q1: Little interest or pleasure in doing things Not at all    Q2: Feeling down, depressed or hopeless Not at all    PHQ-2 Score 0        Patient-reported        Advanced care planning reviewed.        11/15/2024   Fall Risk   Gait Speed Test (Document in seconds) 4.34   Gait Speed Test Interpretation Less than or equal to 5.00 seconds - PASS             11/11/2024   Substance Use   Alcohol more than 3/day or more than 7/wk No   Do you have a current opioid prescription? No   How severe/bad is pain from 1 to 10? 6/10   Do you use any other substances recreationally? No          Last vision screening (if done):       No data to display

## 2024-11-15 NOTE — PATIENT INSTRUCTIONS
Patient Education   Preventing Falls: Care Instructions  Injuries and health problems such as trouble walking or poor eyesight can increase your risk of falling. So can some medicines. But there are things you can do to help prevent falls. You can exercise to get stronger. You can also arrange your home to make it safer.    Talk to your doctor about the medicines you take. Ask if any of them increase the risk of falls and whether they can be changed or stopped.   Try to exercise regularly. It can help improve your strength and balance. This can help lower your risk of falling.         Practice fall safety and prevention.   Wear low-heeled shoes that fit well and give your feet good support. Talk to your doctor if you have foot problems that make this hard.  Carry a cellphone or wear a medical alert device that you can use to call for help.  Use stepladders instead of chairs to reach high objects. Don't climb if you're at risk for falls. Ask for help, if needed.  Wear the correct eyeglasses, if you need them.        Make your home safer.   Remove rugs, cords, clutter, and furniture from walkways.  Keep your house well lit. Use night-lights in hallways and bathrooms.  Install and use sturdy handrails on stairways.  Wear nonskid footwear, even inside. Don't walk barefoot or in socks without shoes.        Be safe outside.   Use handrails, curb cuts, and ramps whenever possible.  Keep your hands free by using a shoulder bag or backpack.  Try to walk in well-lit areas. Watch out for uneven ground, changes in pavement, and debris.  Be careful in the winter. Walk on the grass or gravel when sidewalks are slippery. Use de-icer on steps and walkways. Add non-slip devices to shoes.    Put grab bars and nonskid mats in your shower or tub and near the toilet. Try to use a shower chair or bath bench when bathing.   Get into a tub or shower by putting in your weaker leg first. Get out with your strong side first. Have a phone or  "medical alert device in the bathroom with you.   Where can you learn more?  Go to https://www.[employer].net/patiented  Enter G117 in the search box to learn more about \"Preventing Falls: Care Instructions.\"  Current as of: July 17, 2023  Content Version: 14.2 2024 [employer].   Care instructions adapted under license by your healthcare professional. If you have questions about a medical condition or this instruction, always ask your healthcare professional. [employer] disclaims any warranty or liability for your use of this information.    Hearing Loss: Care Instructions  Overview     Hearing loss is a sudden or slow decrease in how well you hear. It can range from slight to profound. Permanent hearing loss can occur with aging. It also can happen when you are exposed long-term to loud noise. Examples include listening to loud music, riding motorcycles, or being around other loud machines.  Hearing loss can affect your work and home life. It can make you feel lonely or depressed. You may feel that you have lost your independence. But hearing aids and other devices can help you hear better and feel connected to others.  Follow-up care is a key part of your treatment and safety. Be sure to make and go to all appointments, and call your doctor if you are having problems. It's also a good idea to know your test results and keep a list of the medicines you take.  How can you care for yourself at home?  Avoid loud noises whenever possible. This helps keep your hearing from getting worse.  Always wear hearing protection around loud noises.  Wear a hearing aid as directed.  A professional can help you pick a hearing aid that will work best for you.  You can also get hearing aids over the counter for mild to moderate hearing loss.  Have hearing tests as your doctor suggests. They can show whether your hearing has changed. Your hearing aid may need to be adjusted.  Use other devices as needed. " "These may include:  Telephone amplifiers and hearing aids that can connect to a television, stereo, radio, or microphone.  Devices that use lights or vibrations. These alert you to the doorbell, a ringing telephone, or a baby monitor.  Television closed-captioning. This shows the words at the bottom of the screen. Most new TVs can do this.  TTY (text telephone). This lets you type messages back and forth on the telephone instead of talking or listening. These devices are also called TDD. When messages are typed on the keyboard, they are sent over the phone line to a receiving TTY. The message is shown on a monitor.  Use text messaging, social media, and email if it is hard for you to communicate by telephone.  Try to learn a listening technique called speechreading. It is not lipreading. You pay attention to people's gestures, expressions, posture, and tone of voice. These clues can help you understand what a person is saying. Face the person you are talking to, and have them face you. Make sure the lighting is good. You need to see the other person's face clearly.  Think about counseling if you need help to adjust to your hearing loss.  When should you call for help?  Watch closely for changes in your health, and be sure to contact your doctor if:    You think your hearing is getting worse.     You have new symptoms, such as dizziness or nausea.   Where can you learn more?  Go to https://www.[employer].net/patiented  Enter R798 in the search box to learn more about \"Hearing Loss: Care Instructions.\"  Current as of: September 27, 2023  Content Version: 14.2 2024 [facility].   Care instructions adapted under license by your healthcare professional. If you have questions about a medical condition or this instruction, always ask your healthcare professional. Healthwise[employer] Inc[employer] disclaims any warranty or liability for your use of this information.    Bladder Training: Care Instructions  Your Care " Instructions     Bladder training is used to treat urge incontinence and stress incontinence. Urge incontinence means that the need to urinate comes on so fast that you can't get to a toilet in time. Stress incontinence means that you leak urine because of pressure on your bladder. For example, it may happen when you laugh, cough, or lift something heavy.  Bladder training can increase how long you can wait before you have to urinate. It can also help your bladder hold more urine. And it can give you better control over the urge to urinate.  It is important to remember that bladder training takes a few weeks to a few months to make a difference. You may not see results right away, but don't give up.  Follow-up care is a key part of your treatment and safety. Be sure to make and go to all appointments, and call your doctor if you are having problems. It's also a good idea to know your test results and keep a list of the medicines you take.  How can you care for yourself at home?  Work with your doctor to come up with a bladder training program that is right for you. You may use one or more of the following methods.  Delayed urination  In the beginning, try to keep from urinating for 5 minutes after you first feel the need to go.  While you wait, take deep, slow breaths to relax. Kegel exercises can also help you delay the need to go to the bathroom.  After some practice, when you can easily wait 5 minutes to urinate, try to wait 10 minutes before you urinate.  Slowly increase the waiting period until you are able to control when you have to urinate.  Scheduled urination  Empty your bladder when you first wake up in the morning.  Schedule times throughout the day when you will urinate.  Start by going to the bathroom every hour, even if you don't need to go.  Slowly increase the time between trips to the bathroom.  When you have found a schedule that works well for you, keep doing it.  If you wake up during the night  "and have to urinate, do it. Apply your schedule to waking hours only.  Kegel exercises  These tighten and strengthen pelvic muscles, which can help you control the flow of urine. (If doing these exercises causes pain, stop doing them and talk with your doctor.) To do Kegel exercises:  Squeeze your muscles as if you were trying not to pass gas. Or squeeze your muscles as if you were stopping the flow of urine. Your belly, legs, and buttocks shouldn't move.  Hold the squeeze for 3 seconds, then relax for 5 to 10 seconds.  Start with 3 seconds, then add 1 second each week until you are able to squeeze for 10 seconds.  Repeat the exercise 10 times a session. Do 3 to 8 sessions a day.  When should you call for help?  Watch closely for changes in your health, and be sure to contact your doctor if:    Your incontinence is getting worse.     You do not get better as expected.   Where can you learn more?  Go to https://www.[employer].net/patiented  Enter V684 in the search box to learn more about \"Bladder Training: Care Instructions.\"  Current as of: November 15, 2023  Content Version: 14.2 2024 [employer].   Care instructions adapted under license by your healthcare professional. If you have questions about a medical condition or this instruction, always ask your healthcare professional. Healthwise[employer] Incorporated disclaims any warranty or liability for your use of this information.       "

## 2024-11-20 ENCOUNTER — OFFICE VISIT (OUTPATIENT)
Dept: PHYSICAL MEDICINE AND REHAB | Facility: CLINIC | Age: 79
End: 2024-11-20
Payer: COMMERCIAL

## 2024-11-20 VITALS — OXYGEN SATURATION: 96 % | HEART RATE: 89 BPM | DIASTOLIC BLOOD PRESSURE: 73 MMHG | SYSTOLIC BLOOD PRESSURE: 131 MMHG

## 2024-11-20 DIAGNOSIS — M70.61 TROCHANTERIC BURSITIS OF BOTH HIPS: Primary | ICD-10-CM

## 2024-11-20 DIAGNOSIS — M48.062 SPINAL STENOSIS OF LUMBAR REGION WITH NEUROGENIC CLAUDICATION: ICD-10-CM

## 2024-11-20 DIAGNOSIS — M79.18 MYOFASCIAL PAIN: ICD-10-CM

## 2024-11-20 DIAGNOSIS — M70.62 TROCHANTERIC BURSITIS OF BOTH HIPS: Primary | ICD-10-CM

## 2024-11-20 NOTE — PATIENT INSTRUCTIONS
[facility] Injection Requirements    A  is required for all fluoroscopically-guided injections.  Injection appointments may be cancelled if there are signs/symptoms of an active infection or if the patient is being actively treated with antibiotics for a diagnosed infection.  Patients may have their steroid injection cancelled if they have had another steroid injection within 2 weeks.  Diabetic patients will have their blood glucose levels checked the day of their injection and the appointment will be rescheduled if the blood glucose level is 300 or higher.  Patients with allergies to cortisone, local anesthetics, iodine, or contrast dye should contact the Spine Center to further discuss these considerations.  Patients scheduled for medial branch block diagnostic injections should refrain from taking pain medication the day of the procedure.  The medial branch block injection appointment will be rescheduled if the patient's pain rating is not 5/10 or greater at the time of the procedure.  Patients taking warfarin/Coumadin will have their INR checked the day of the procedure and the procedure may be rescheduled if the INR is greater than 3.0.  Please contact the Spine Center [phone]) if you are taking any prescription blood-thinning medications (aspirin, warfarin, Plavix, Lovenox, Eliquis, Brilinta, Effient, etc.) as special dosing adjustments may need to be made depending on the type of injection you are scheduled to receive.  It is recommended that you delay having your steroid injection if you have received any vaccines within 2 weeks.    ~Please call Nurse Navigation line [phone] with any questions or concerns about your treatment plan, if symptoms worsen and you would like to be seen urgently, or if you have problems controlling bladder and bowel function.

## 2024-11-20 NOTE — LETTER
11/20/2024      Lashawn Ortega  [address] [place] [address]      Dear Colleague,    Thank you for referring your patient, Lashawn Ortega, to the [facility]. Please see a copy of my visit note below.      Assessment:     Diagnoses and all orders for this visit:  Trochanteric bursitis of both hips  -     PAIN US Large Joint Injection Bilateral; Future  Myofascial pain  Spinal stenosis of lumbar region with neurogenic claudication     Lashawn Ortega is a 79 year old y.o. female with past medical history significant for reflux, migraine headache, low back pain, peripheral neuropathy, melanoma of right upper arm, stroke, elevated liver enzymes who presents today for follow-up regarding bilateral lateral thigh pain:    -Patient received 75% relief for 3 months with her last ultrasound-guided greater trochanteric bursa injections and 60% relief for 2 more months after that.  Pain is now returned.     Plan:     A shared decision making plan was used. The patient's values and choices were respected. Prior medical records from our last visit on 4/25/2024 were reviewed today. The following represents what was discussed and decided upon by the provider and the patient.        -DIAGNOSTIC TESTS: Images were personally reviewed and interpreted.   --MRI of the lumbar spine dated 7/11/2019 is personally reviewed and images interpreted and shown to patient.  There is multilevel degenerative changes in lumbar spine.  There is severe spinal canal stenosis at L3-4.  There is moderate spinal canal stenosis at L4-5.  There is mild to moderate spinal canal stenosis at L2-3.  There is no severe foraminal stenosis.  --CT of abdomen and pelvis July 2020.  I did not see any compression fractures.  -- X-ray of cervical spine dated 2/17/2023 is personally reviewed images interpreted and discussed with the patient.  There is a grade 1 anterolisthesis of C2 on C3 and C3 on C4.  There is mid and  lower cervical facet arthropathy left greater than right.    -INTERVENTIONS: I ordered bilateral greater trochanter bursa injection under ultrasound guidance.    -MEDICATIONS: No changes to medications.  -  Discussed side effects of medications and proper use. Patient verbalized understanding.    -PHYSICAL THERAPY: Recommended she continue with home exercises on a consistent basis.    -PATIENT EDUCATION: We discussed pain management in a multiple fashion including physical therapy, medication management, possible future injections.    -FOLLOW UP: Patient will follow-up as needed.  Advised to contact clinic if symptoms worsen or change.    Subjective:     Lashawn Ortega is a 79 year old female who presents today for follow-up regarding bilateral lateral thigh pain.  Patient notes that she had 3 months of 75% relief after her greater trochanter bursa injections.  Thereafter she had 2 more months of 60% relief before pain started returning.  Pain is worse with being on her feet for a long time and doing a lot of bending.  Cold and heat helps.  Sitting in recliner also helps.  Pain today is 7/10 its worst 8/10 as best 5/10.  She denies any bowel or bladder changes, fevers, chills, unintentional weight loss.  She points to both lateral thighs where her pain is.    Treatment to Date: MRI of lumbar spine dated 7/11/2019.  Right L5-S1 transforaminal epidural steroid injection done on 8/12/2019.  Several sessions of in-home physical therapy.  Bilateral L4-5 transforaminal epidural steroid injections done on 8/11/2020.  Right L4-5 transforaminal epidural steroid injection done on 5/7/2021.  Right greater trochanter bursa injection under ultrasound guidance on 6/11/2021.  Bilateral L4-5 transforaminal epidural steroid injections done on 9/26/2022.  X-ray of cervical spine dated 2/17/2023.  Right greater trochanter bursa injection under ultrasound guidance on 3/2/2023.  Bilateral L4-5 transforaminal epidural steroid injections  done on 4/1/2024.  Bilateral greater trochanter bursa injections under ultrasound guidance on 5/20/2024.    Patient Active Problem List   Diagnosis     Migraine headache     LBP (low back pain)     Nonsmoker     Full code status     Peripheral neuropathy     History of melanoma - Melanoma of right upper arm - On Keytruda in the past - stage IIIA     Ischemic cerebrovascular accident (CVA) of frontal lobe (H) - 2/2020     Aphasia     Lumbar spinal stenosis     Essential tremor     On antineoplastic chemotherapy - PEMBROLIZUMAB - checkpoint inhibitor - in the past for melanoma     Female stress incontinence     Seronegative rheumatoid arthritis of both hands (H)     Rosacea     Gastroesophageal reflux disease, unspecified whether esophagitis present       Current Outpatient Medications   Medication Sig Dispense Refill     aloe vera Gel [ALOE VERA GEL] Apply 1 application topically 2 (two) times a day as needed.       aspirin 81 MG EC tablet [ASPIRIN 81 MG EC TABLET] Take 1 tablet (81 mg total) by mouth daily. 30 tablet 0     azelaic acid (FINACIA) 15 % external gel APPLY TOPICALLY DAILY TO SKIN EVERY MORNING.       ciprofloxacin (CIPRO) 250 MG tablet Take 1 tablet (250 mg) by mouth 2 times daily. 10 tablet 0     cyclobenzaprine (FLEXERIL) 5 MG tablet TAKE 1 TABLET 3 TIMES DAILY AS NEEDED FOR MUSCLE SPASMS 90 tablet 1     diphenhydrAMINE-Acetaminophen (PERCOGESIC PO) Take 375 mg by mouth       fluticasone (FLONASE) 50 MCG/ACT nasal spray SPRAY 1 TO 2 SPRAYS INTO BOTH NOSTRILS DAILY AS NEEDED FOR ALLERGIES 48 g 2     gabapentin (NEURONTIN) 300 MG capsule Take 3 capsules (900 mg) by mouth 3 times daily 900 capsule 3     guaiFENesin-dextromethorphan (MUCINEX DM) 600-30 mg Tb12 [GUAIFENESIN-DEXTROMETHORPHAN (MUCINEX DM) 600-30 MG TB12] Take 1 tablet by mouth 2 (two) times a day as needed.       hydroxychloroquine (PLAQUENIL) 200 MG tablet TAKE 1 TABLET BY MOUTH TWICE  DAILY 180 tablet 3     menthol (BIOFREEZE, MENTHOL,) 4  % Gel [MENTHOL (BIOFREEZE, MENTHOL,) 4 % GEL] Apply 1 application topically daily as needed. Apply topically for pain       metroNIDAZOLE (METROCREAM) 0.75 % external cream APPLY TOPICALLY TO FACE EVERY NIGHT AT BEDTIME.       naproxen (NAPROSYN) 500 MG tablet TAKE 1 TABLET 2 TIMES DAILY AS NEEDED 180 tablet 3     neomycin-bacitracin-polymyxin (NEOSPORIN) ointment [NEOMYCIN-BACITRACIN-POLYMYXIN (NEOSPORIN) OINTMENT] Apply 1 application topically 2 (two) times a day as needed.       pantoprazole (PROTONIX) 40 MG EC tablet TAKE 1 TABLET BY MOUTH TWICE  DAILY 180 tablet 3     Pseudoephedrine-DM-GG (ROBITUSSIN COUGH/COLD D PO)        rosuvastatin (CRESTOR) 5 MG tablet TAKE 1 TABLET BY MOUTH DAILY 90 tablet 3     UNABLE TO FIND MEDICATION NAME: benadryl topical cream       UNABLE TO FIND MEDICATION NAME: Magni- life cream       No current facility-administered medications for this visit.       Allergies   Allergen Reactions     Erythromycin Base [Erythromycin] Rash     PARALYSIS, EYE SWELLING, HEADACHE     Codeine Dizziness     Other: extreme weakness in legspt stopped taking medication and sxs were gone within a few hours       Hydrocodone Nausea and Headache     Keytruda [Pembrolizumab] Other (See Comments)     Elevated liver function tests (lfts)      Tramadol Diarrhea, Dizziness, Headache and Nausea and Vomiting     Alcohol Rash     Cholecalciferol Rash     Other Environmental Allergy Rash     mildew     Penicillins Swelling and Rash     Shellfish Containing Products [Shellfish-Derived Products] Swelling and Rash     shrimp     Sulfa (Sulfonamide Antibiotics) [Sulfa Antibiotics] Rash     Burning body rash     Vitamin D3 Complete [External Allergen Needs Reconciliation - See Comment] Rash     Higher dosage makes her break out       Past Medical History:   Diagnosis Date     Aphasia      Arthritis 2020    Hands aching, muscle spasms, swelling     Asthma      COPD (chronic obstructive pulmonary disease) (H) Childhood     "Numerous waldron, spring with bonchial cough     GERD (gastroesophageal reflux disease)      History of transfusion      Lumbar spinal stenosis 02/28/2020    EXAM: MR LUMBAR SPINE W WO CONTRAST LOCATION: [facility] DATE/TIME: 7/11/2019 4:49 PM   INDICATION: Back pain going down the right leg.  Going on for 3 months.  Not improving. See above. History of melanoma. COMPARISON: None. CONTRAST: Gadavist 9. TECHNIQUE: Without and with IV contrast   FINDINGS:  Nomenclature is based on 5 lumbar type vertebral bodies. There is no concerning marrow rep     Malignant melanoma of right upper arm (H)      Menopausal hot flushes 07/23/2017    On venlafaxine for this.        Migraine headache      On antineoplastic chemotherapy     pembrolizumab     PN (peripheral neuropathy)      PONV (postoperative nausea and vomiting)     \"cold sweats\"     S/P colonoscopy      Stroke (H) 02/01/2020        Review of Systems  ROS:  Specifically negative for bowel/bladder dysfunction, balance changes, headache, dizziness, foot drop, fevers, chills, appetite changes, nausea/vomiting, unexplained weight loss. Otherwise 13 systems reviewed are negative. Please see the patient's intake questionnaire from today for details.    Reviewed Social, Family, Past Medical and Past Surgical history with patient, no significant changes noted since prior visit.     Objective:     /73   Pulse 89   LMP  (LMP Unknown)   SpO2 96%     PHYSICAL EXAMINATION:    --CONSTITUTIONAL: Well developed, well nourished, healthy appearing individual.  --PSYCHIATRIC: Appropriate mood and affect. No difficulty interacting due to temper, social withdrawal, or memory issues.  --RESPIRATORY: Normal rhythm and effort. No abnormal accessory muscle breathing patterns noted.   --MUSCULOSKELETAL:  Normal lumbar lordosis noted, no lateral shift.  --GROSS MOTOR: Easily arises from a seated position. Gait is non-antalgic  --LUMBAR SPINE:  Inspection reveals no evidence of " deformity. Range of motion is mildly limited in lumbar flexion, extension, lateral rotation.  Tenderness palpation in the lateral thighs bilaterally.  --SACROILIAC JOINT: One Finger point test negative.  --LOWER EXTREMITY MOTOR TESTING:  Plantar flexion left 5/5, right 5/5   Dorsiflexion left 5/5, right 5/5   Great toe MTP extension left 5/5, right 5/5  Knee flexion left 5/5, right 5/5  Knee extension left 5/5, right 5/5   Hip flexion left 5/5, right 5/5  Hip abduction left 5/5, right 5/5  Hip adduction left 5/5, right 5/5   --NEUROLOGIC: Sensation to light touch is intact in the bilateral L4, L5, and S1 dermatomes.    RESULTS:   Imaging: Lumbar spine imaging was reviewed today.   MA Screen Bilateral w/Dustin    Result Date: 11/15/2024  BILATERAL FULL FIELD DIGITAL SCREENING MAMMOGRAM WITH TOMOSYNTHESIS Performed on: 11/15/24 Compared to: 10/19/2023, 10/17/2022, 10/15/2021, 10/14/2020, 09/17/2019, and 09/13/2018 Technique:  This study was evaluated with the assistance of Computer-Aided Detection.  Breast Tomosynthesis was used in interpretation. Findings: There are scattered areas of fibroglandular density.  There is no radiographic evidence of malignancy.     IMPRESSION: ACR BI-RADS Category 1: Negative BREAST CANCER SCREENING RECOMMENDATION: Routine yearly mammography beginning at age 40 or as discussed with your provider. The results and recommendations of this examination will be communicated to the patient. Mariely Aaron MD                             Again, thank you for allowing me to participate in the care of your patient.        Sincerely,        Carlos Alberto Worthington, DO

## 2024-11-20 NOTE — PROGRESS NOTES
Assessment:     Diagnoses and all orders for this visit:  Trochanteric bursitis of both hips  -     PAIN US Large Joint Injection Bilateral; Future  Myofascial pain  Spinal stenosis of lumbar region with neurogenic claudication     Lashawn Ortega is a 79 year old y.o. female with past medical history significant for reflux, migraine headache, low back pain, peripheral neuropathy, melanoma of right upper arm, stroke, elevated liver enzymes who presents today for follow-up regarding bilateral lateral thigh pain:    -Patient received 75% relief for 3 months with her last ultrasound-guided greater trochanteric bursa injections and 60% relief for 2 more months after that.  Pain is now returned.     Plan:     A shared decision making plan was used. The patient's values and choices were respected. Prior medical records from our last visit on 4/25/2024 were reviewed today. The following represents what was discussed and decided upon by the provider and the patient.        -DIAGNOSTIC TESTS: Images were personally reviewed and interpreted.   --MRI of the lumbar spine dated 7/11/2019 is personally reviewed and images interpreted and shown to patient.  There is multilevel degenerative changes in lumbar spine.  There is severe spinal canal stenosis at L3-4.  There is moderate spinal canal stenosis at L4-5.  There is mild to moderate spinal canal stenosis at L2-3.  There is no severe foraminal stenosis.  --CT of abdomen and pelvis July 2020.  I did not see any compression fractures.  -- X-ray of cervical spine dated 2/17/2023 is personally reviewed images interpreted and discussed with the patient.  There is a grade 1 anterolisthesis of C2 on C3 and C3 on C4.  There is mid and lower cervical facet arthropathy left greater than right.    -INTERVENTIONS: I ordered bilateral greater trochanter bursa injection under ultrasound guidance.    -MEDICATIONS: No changes to medications.  -  Discussed side effects of medications and  proper use. Patient verbalized understanding.    -PHYSICAL THERAPY: Recommended she continue with home exercises on a consistent basis.    -PATIENT EDUCATION: We discussed pain management in a multiple fashion including physical therapy, medication management, possible future injections.    -FOLLOW UP: Patient will follow-up as needed.  Advised to contact clinic if symptoms worsen or change.    Subjective:     Lashawn Ortega is a 79 year old female who presents today for follow-up regarding bilateral lateral thigh pain.  Patient notes that she had 3 months of 75% relief after her greater trochanter bursa injections.  Thereafter she had 2 more months of 60% relief before pain started returning.  Pain is worse with being on her feet for a long time and doing a lot of bending.  Cold and heat helps.  Sitting in recliner also helps.  Pain today is 7/10 its worst 8/10 as best 5/10.  She denies any bowel or bladder changes, fevers, chills, unintentional weight loss.  She points to both lateral thighs where her pain is.    Treatment to Date: MRI of lumbar spine dated 7/11/2019.  Right L5-S1 transforaminal epidural steroid injection done on 8/12/2019.  Several sessions of in-home physical therapy.  Bilateral L4-5 transforaminal epidural steroid injections done on 8/11/2020.  Right L4-5 transforaminal epidural steroid injection done on 5/7/2021.  Right greater trochanter bursa injection under ultrasound guidance on 6/11/2021.  Bilateral L4-5 transforaminal epidural steroid injections done on 9/26/2022.  X-ray of cervical spine dated 2/17/2023.  Right greater trochanter bursa injection under ultrasound guidance on 3/2/2023.  Bilateral L4-5 transforaminal epidural steroid injections done on 4/1/2024.  Bilateral greater trochanter bursa injections under ultrasound guidance on 5/20/2024.    Patient Active Problem List   Diagnosis    Migraine headache    LBP (low back pain)    Nonsmoker    Full code status    Peripheral  neuropathy    History of melanoma - Melanoma of right upper arm - On Keytruda in the past - stage IIIA    Ischemic cerebrovascular accident (CVA) of frontal lobe (H) - 2/2020    Aphasia    Lumbar spinal stenosis    Essential tremor    On antineoplastic chemotherapy - PEMBROLIZUMAB - checkpoint inhibitor - in the past for melanoma    Female stress incontinence    Seronegative rheumatoid arthritis of both hands (H)    Rosacea    Gastroesophageal reflux disease, unspecified whether esophagitis present       Current Outpatient Medications   Medication Sig Dispense Refill    aloe vera Gel [ALOE VERA GEL] Apply 1 application topically 2 (two) times a day as needed.      aspirin 81 MG EC tablet [ASPIRIN 81 MG EC TABLET] Take 1 tablet (81 mg total) by mouth daily. 30 tablet 0    azelaic acid (FINACIA) 15 % external gel APPLY TOPICALLY DAILY TO SKIN EVERY MORNING.      ciprofloxacin (CIPRO) 250 MG tablet Take 1 tablet (250 mg) by mouth 2 times daily. 10 tablet 0    cyclobenzaprine (FLEXERIL) 5 MG tablet TAKE 1 TABLET 3 TIMES DAILY AS NEEDED FOR MUSCLE SPASMS 90 tablet 1    diphenhydrAMINE-Acetaminophen (PERCOGESIC PO) Take 375 mg by mouth      fluticasone (FLONASE) 50 MCG/ACT nasal spray SPRAY 1 TO 2 SPRAYS INTO BOTH NOSTRILS DAILY AS NEEDED FOR ALLERGIES 48 g 2    gabapentin (NEURONTIN) 300 MG capsule Take 3 capsules (900 mg) by mouth 3 times daily 900 capsule 3    guaiFENesin-dextromethorphan (MUCINEX DM) 600-30 mg Tb12 [GUAIFENESIN-DEXTROMETHORPHAN (MUCINEX DM) 600-30 MG TB12] Take 1 tablet by mouth 2 (two) times a day as needed.      hydroxychloroquine (PLAQUENIL) 200 MG tablet TAKE 1 TABLET BY MOUTH TWICE  DAILY 180 tablet 3    menthol (BIOFREEZE, MENTHOL,) 4 % Gel [MENTHOL (BIOFREEZE, MENTHOL,) 4 % GEL] Apply 1 application topically daily as needed. Apply topically for pain      metroNIDAZOLE (METROCREAM) 0.75 % external cream APPLY TOPICALLY TO FACE EVERY NIGHT AT BEDTIME.      naproxen (NAPROSYN) 500 MG tablet TAKE 1  TABLET 2 TIMES DAILY AS NEEDED 180 tablet 3    neomycin-bacitracin-polymyxin (NEOSPORIN) ointment [NEOMYCIN-BACITRACIN-POLYMYXIN (NEOSPORIN) OINTMENT] Apply 1 application topically 2 (two) times a day as needed.      pantoprazole (PROTONIX) 40 MG EC tablet TAKE 1 TABLET BY MOUTH TWICE  DAILY 180 tablet 3    Pseudoephedrine-DM-GG (ROBITUSSIN COUGH/COLD D PO)       rosuvastatin (CRESTOR) 5 MG tablet TAKE 1 TABLET BY MOUTH DAILY 90 tablet 3    UNABLE TO FIND MEDICATION NAME: benadryl topical cream      UNABLE TO FIND MEDICATION NAME: Magni- life cream       No current facility-administered medications for this visit.       Allergies   Allergen Reactions    Erythromycin Base [Erythromycin] Rash     PARALYSIS, EYE SWELLING, HEADACHE    Codeine Dizziness     Other: extreme weakness in legspt stopped taking medication and sxs were gone within a few hours      Hydrocodone Nausea and Headache    Keytruda [Pembrolizumab] Other (See Comments)     Elevated liver function tests (lfts)     Tramadol Diarrhea, Dizziness, Headache and Nausea and Vomiting    Alcohol Rash    Cholecalciferol Rash    Other Environmental Allergy Rash     mildew    Penicillins Swelling and Rash    Shellfish Containing Products [Shellfish-Derived Products] Swelling and Rash     shrimp    Sulfa (Sulfonamide Antibiotics) [Sulfa Antibiotics] Rash     Burning body rash    Vitamin D3 Complete [External Allergen Needs Reconciliation - See Comment] Rash     Higher dosage makes her break out       Past Medical History:   Diagnosis Date    Aphasia     Arthritis 2020    Hands aching, muscle spasms, swelling    Asthma     COPD (chronic obstructive pulmonary disease) (H) Childhood    Numerous waldron, spring with bonchial cough    GERD (gastroesophageal reflux disease)     History of transfusion     Lumbar spinal stenosis 02/28/2020    EXAM: MR LUMBAR SPINE W WO CONTRAST LOCATION: [facility] DATE/TIME: 7/11/2019 4:49 PM   INDICATION: Back pain going down the  "right leg.  Going on for 3 months.  Not improving. See above. History of melanoma. COMPARISON: None. CONTRAST: Gadavist 9. TECHNIQUE: Without and with IV contrast   FINDINGS:  Nomenclature is based on 5 lumbar type vertebral bodies. There is no concerning marrow rep    Malignant melanoma of right upper arm (H)     Menopausal hot flushes 07/23/2017    On venlafaxine for this.       Migraine headache     On antineoplastic chemotherapy     pembrolizumab    PN (peripheral neuropathy)     PONV (postoperative nausea and vomiting)     \"cold sweats\"    S/P colonoscopy     Stroke (H) 02/01/2020        Review of Systems  ROS:  Specifically negative for bowel/bladder dysfunction, balance changes, headache, dizziness, foot drop, fevers, chills, appetite changes, nausea/vomiting, unexplained weight loss. Otherwise 13 systems reviewed are negative. Please see the patient's intake questionnaire from today for details.    Reviewed Social, Family, Past Medical and Past Surgical history with patient, no significant changes noted since prior visit.     Objective:     /73   Pulse 89   LMP  (LMP Unknown)   SpO2 96%     PHYSICAL EXAMINATION:    --CONSTITUTIONAL: Well developed, well nourished, healthy appearing individual.  --PSYCHIATRIC: Appropriate mood and affect. No difficulty interacting due to temper, social withdrawal, or memory issues.  --RESPIRATORY: Normal rhythm and effort. No abnormal accessory muscle breathing patterns noted.   --MUSCULOSKELETAL:  Normal lumbar lordosis noted, no lateral shift.  --GROSS MOTOR: Easily arises from a seated position. Gait is non-antalgic  --LUMBAR SPINE:  Inspection reveals no evidence of deformity. Range of motion is mildly limited in lumbar flexion, extension, lateral rotation.  Tenderness palpation in the lateral thighs bilaterally.  --SACROILIAC JOINT: One Finger point test negative.  --LOWER EXTREMITY MOTOR TESTING:  Plantar flexion left 5/5, right 5/5   Dorsiflexion left 5/5, " right 5/5   Great toe MTP extension left 5/5, right 5/5  Knee flexion left 5/5, right 5/5  Knee extension left 5/5, right 5/5   Hip flexion left 5/5, right 5/5  Hip abduction left 5/5, right 5/5  Hip adduction left 5/5, right 5/5   --NEUROLOGIC: Sensation to light touch is intact in the bilateral L4, L5, and S1 dermatomes.    RESULTS:   Imaging: Lumbar spine imaging was reviewed today.   MA Screen Bilateral w/Dustin    Result Date: 11/15/2024  BILATERAL FULL FIELD DIGITAL SCREENING MAMMOGRAM WITH TOMOSYNTHESIS Performed on: 11/15/24 Compared to: 10/19/2023, 10/17/2022, 10/15/2021, 10/14/2020, 09/17/2019, and 09/13/2018 Technique:  This study was evaluated with the assistance of Computer-Aided Detection.  Breast Tomosynthesis was used in interpretation. Findings: There are scattered areas of fibroglandular density.  There is no radiographic evidence of malignancy.     IMPRESSION: ACR BI-RADS Category 1: Negative BREAST CANCER SCREENING RECOMMENDATION: Routine yearly mammography beginning at age 40 or as discussed with your provider. The results and recommendations of this examination will be communicated to the patient. Mareily Aaron MD

## 2024-12-05 DIAGNOSIS — J31.0 CHRONIC RHINITIS: ICD-10-CM

## 2024-12-05 RX ORDER — FLUTICASONE PROPIONATE 50 MCG
SPRAY, SUSPENSION (ML) NASAL
Qty: 48 G | Refills: 1 | Status: SHIPPED | OUTPATIENT
Start: 2024-12-05

## 2025-01-08 ENCOUNTER — TELEPHONE (OUTPATIENT)
Dept: PHYSICAL MEDICINE AND REHAB | Facility: CLINIC | Age: 80
End: 2025-01-08
Payer: COMMERCIAL

## 2025-01-08 NOTE — TELEPHONE ENCOUNTER
"PSP:  Dr. Worthington   Last clinic visit:  11/20/24 OV; 12/13/24 Bilateral trochanteric bursa injections  Reason for call: Update on how doing  Clinical information:  Patient states \"I'm doing pretty good. Didn't work as well as the ones I got in May, but I have been more active with all the holiday things. I do exercises to help with the pain and work through it. I am able to do more reps.\" Reports 50% relief of pain at this time. Does report she is using walker all the time, particularly now as she notes she is more unsteady with her neuropathy during the winter.\" She is satisfied at this time with results.  Advice given to patient: Explained PSP would be updated. She is encouraged to call once her pain starts to worsen for possible repeat injections. She is informed that they can be done if she has at least 50% relief for 3+ months. Stated understanding.   Provider to address: Status update   "

## 2025-02-24 ENCOUNTER — TELEPHONE (OUTPATIENT)
Dept: INTERNAL MEDICINE | Facility: CLINIC | Age: 80
End: 2025-02-24
Payer: COMMERCIAL

## 2025-02-24 NOTE — TELEPHONE ENCOUNTER
Reviewed and noted.    Renzo Newell MD  General Internal Medicine  [facility]  2/24/2025, 3:36 PM

## 2025-02-24 NOTE — TELEPHONE ENCOUNTER
Pt calling, she wanted to let PCP know that her wrist is doing much better. She is still doing the ice pack sleeves, but has no pain in wrist anymore. She still has some mobility she cannot fully do but she is exercising her wrist as well.

## 2025-03-07 NOTE — LETTER
Letter by Ronda Daly RN at      Author: Ronda Daly RN Service: -- Author Type: --    Filed:  Encounter Date: 11/19/2019 Status: Signed       [facility]  [facility] & [address] [address]      Lashawn Ortega  [address]  [facility] [address]    Dear Lashawn    We have reviewed the Health Care Directive dated 10/31/2019 which you presented for addition to your medical record. Unfortunately, our review indicates the document is not legally valid for the following reason(s): You did not sign the document.  In order to create a legal document you will need to sign the document and have your signature witnessed by 2 people or notarized. Notaries and witnesses cannot be named as your health care agents per state law. In addition, only one of your witnesses can be employed by our health care system.    We greatly value the opportunity to assist you in documenting your choices and to honor your   wishes. We apologize for any inconvenience. We have several options to assist you in updating   your document so it meets legal requirements.       Health Care Directives and Advance Care Planning resources can be viewed and printed   for free at our web site:[email or web address].       COPIES of completed Health Care Directives can be brought or mailed to any of our   locations, including the address listed below. You can also email a copy to [email or web address] .       For additional assistance or questions you can email us at [email or web address] or call [phone]      Sincerely,     Neptali Ahmadi [facility]  [facility] and [address] [address]   Email us: [email or web address] Call us: [phone]  Visit at: [email or web address]                Quality 137: Melanoma: Continuity Of Care - Recall System: Patient information entered into a recall system that includes: target date for the next exam specified AND a process to follow up with patients regarding missed or unscheduled appointments Quality 397: Melanoma: Reporting: Pathology report includes the pT Category, thickness, ulceration and mitotic rate, peripheral and deep margin status and presence or absence of microsatellitosis for invasive tumors. Detail Level: Detailed Quality 226: Preventive Care And Screening: Tobacco Use: Screening And Cessation Intervention: Patient screened for tobacco use and is an ex/non-smoker

## 2025-04-07 DIAGNOSIS — M51.26 LUMBAR DISC HERNIATION: ICD-10-CM

## 2025-04-07 DIAGNOSIS — E78.2 MIXED HYPERLIPIDEMIA: ICD-10-CM

## 2025-04-07 DIAGNOSIS — M48.062 SPINAL STENOSIS OF LUMBAR REGION WITH NEUROGENIC CLAUDICATION: ICD-10-CM

## 2025-04-07 DIAGNOSIS — M54.16 LUMBAR RADICULITIS: ICD-10-CM

## 2025-04-07 RX ORDER — GABAPENTIN 300 MG/1
900 CAPSULE ORAL 3 TIMES DAILY
Qty: 810 CAPSULE | Refills: 3 | Status: SHIPPED | OUTPATIENT
Start: 2025-04-07

## 2025-04-07 RX ORDER — ROSUVASTATIN CALCIUM 5 MG/1
5 TABLET, COATED ORAL DAILY
Qty: 90 TABLET | Refills: 3 | Status: SHIPPED | OUTPATIENT
Start: 2025-04-07

## 2025-05-27 ENCOUNTER — OFFICE VISIT (OUTPATIENT)
Dept: PHYSICAL MEDICINE AND REHAB | Facility: CLINIC | Age: 80
End: 2025-05-27
Payer: COMMERCIAL

## 2025-05-27 VITALS — SYSTOLIC BLOOD PRESSURE: 127 MMHG | OXYGEN SATURATION: 100 % | DIASTOLIC BLOOD PRESSURE: 74 MMHG | HEART RATE: 88 BPM

## 2025-05-27 DIAGNOSIS — M70.61 TROCHANTERIC BURSITIS OF BOTH HIPS: Primary | ICD-10-CM

## 2025-05-27 DIAGNOSIS — M67.921 BICEPS TENDINOPATHY OF RIGHT UPPER EXTREMITY: ICD-10-CM

## 2025-05-27 DIAGNOSIS — M70.62 TROCHANTERIC BURSITIS OF BOTH HIPS: Primary | ICD-10-CM

## 2025-05-27 DIAGNOSIS — M48.062 SPINAL STENOSIS OF LUMBAR REGION WITH NEUROGENIC CLAUDICATION: ICD-10-CM

## 2025-05-27 DIAGNOSIS — M54.16 LUMBAR RADICULITIS: ICD-10-CM

## 2025-05-27 ASSESSMENT — PAIN SCALES - GENERAL: PAINLEVEL_OUTOF10: SEVERE PAIN (7)

## 2025-05-27 NOTE — LETTER
5/27/2025      Lashawn Ortega  [address]  [place] [facility] [address]      Dear Colleague,    Thank you for referring your patient, Lashawn Ortega, to the [facility]. Please see a copy of my visit note below.      Assessment:     Diagnoses and all orders for this visit:  Trochanteric bursitis of both hips  -     PAIN US Large Joint Injection Bilateral; Future  Spinal stenosis of lumbar region with neurogenic claudication  Lumbar radiculitis  Biceps tendinopathy of right upper extremity  -     Physical Therapy  Referral; Future     Lashawn Ortega is a 80 year old y.o. female with past medical history significant for reflux, migraine headache, low back pain, peripheral neuropathy, melanoma of right upper arm, stroke, elevated liver enzymes who presents today for follow-up regarding bilateral lateral thigh pain:    - Patient received 50% relief for at least 3 months with her bilateral greater trochanter bursa injections.  Pain is now returned.     Plan:     A shared decision making plan was used. The patient's values and choices were respected. Prior medical records from our last visit on 11/20/2024 were reviewed today. The following represents what was discussed and decided upon by the provider and the patient.        -DIAGNOSTIC TESTS: Images were personally reviewed and interpreted.   --MRI of the lumbar spine dated 7/11/2019 is personally reviewed and images interpreted and shown to patient.  There is multilevel degenerative changes in lumbar spine.  There is severe spinal canal stenosis at L3-4.  There is moderate spinal canal stenosis at L4-5.  There is mild to moderate spinal canal stenosis at L2-3.  There is no severe foraminal stenosis.  --CT of abdomen and pelvis July 2020.  I did not see any compression fractures.  -- X-ray of cervical spine dated 2/17/2023 is personally reviewed images interpreted and discussed with the patient.  There is a grade 1  anterolisthesis of C2 on C3 and C3 on C4.  There is mid and lower cervical facet arthropathy left greater than right.    -INTERVENTIONS: I ordered bilateral greater trochanter bursa injections with ultrasound guidance.  Should there continue to be low back and intermittent right lower extremity pain I would recommend a right L4-5 transforaminal epidural steroid injection.    -MEDICATIONS: No changes to medications.  -  Discussed side effects of medications and proper use. Patient verbalized understanding.    -PHYSICAL THERAPY: I ordered physical therapy for right biceps tendinopathy.        -PATIENT EDUCATION: We discussed pain management today multiple fashion including physical therapy, medication management, possible future injections.    -FOLLOW UP: Patient will follow-up 2 to 4 weeks after injection.  Advised to contact clinic if symptoms worsen or change.    Subjective:     Lashawn Ortega is a 80 year old female who presents today for follow-up regarding bilateral lateral thigh pain.  Patient notes 50% relief for at least 3 months with her last injections.  She does note that she has had continued low back and intermittent right lower extremity pain with standing and walking which improves with sitting.  She has had epidurals in the past which have been helpful.  Pain today is 7/10 its worst is 9/10 as best as 4/10.  Pain is improved with doing physical therapy exercises as well as resting either in a sitting or lying position.  She notes new right shoulder pain for the last 2 to 3 weeks.  She has had physical therapy in the past, however it has been several years.  It is difficult for her to bring her arm overhead.  She is able to do her hair.  She denies any bowel or bladder changes, fevers, chills, unintentional weight loss.    Treatment to Date: MRI of lumbar spine dated 7/11/2019.  Right L5-S1 transforaminal epidural steroid injection done on 8/12/2019.  Several sessions of in-home physical therapy.   Bilateral L4-5 transforaminal epidural steroid injections done on 8/11/2020.  Right L4-5 transforaminal epidural steroid injection done on 5/7/2021.  Right greater trochanter bursa injection under ultrasound guidance on 6/11/2021.  Bilateral L4-5 transforaminal epidural steroid injections done on 9/26/2022.  X-ray of cervical spine dated 2/17/2023.  Right greater trochanter bursa injection under ultrasound guidance on 3/2/2023.  Bilateral L4-5 transforaminal epidural steroid injections done on 4/1/2024.  Bilateral greater trochanter bursa injections under ultrasound guidance on 5/20/2024 and 12/13/2024.     Patient Active Problem List   Diagnosis     Migraine headache     LBP (low back pain)     Nonsmoker     Full code status     Peripheral neuropathy     History of melanoma - Melanoma of right upper arm - On Keytruda in the past - stage IIIA     Ischemic cerebrovascular accident (CVA) of frontal lobe (H) - 2/2020     Aphasia     Lumbar spinal stenosis     Essential tremor     On antineoplastic chemotherapy - PEMBROLIZUMAB - checkpoint inhibitor - in the past for melanoma     Female stress incontinence     Seronegative rheumatoid arthritis of both hands (H)     Rosacea     Gastroesophageal reflux disease, unspecified whether esophagitis present       Current Outpatient Medications   Medication Sig Dispense Refill     aloe vera Gel [ALOE VERA GEL] Apply 1 application topically 2 (two) times a day as needed.       aspirin 81 MG EC tablet [ASPIRIN 81 MG EC TABLET] Take 1 tablet (81 mg total) by mouth daily. 30 tablet 0     azelaic acid (FINACIA) 15 % external gel APPLY TOPICALLY DAILY TO SKIN EVERY MORNING.       ciprofloxacin (CIPRO) 250 MG tablet Take 1 tablet (250 mg) by mouth 2 times daily. 10 tablet 0     cyclobenzaprine (FLEXERIL) 5 MG tablet TAKE 1 TABLET 3 TIMES DAILY AS NEEDED FOR MUSCLE SPASMS 90 tablet 1     diphenhydrAMINE-Acetaminophen (PERCOGESIC PO) Take 375 mg by mouth       fluticasone (FLONASE) 50  MCG/ACT nasal spray SPRAY 1 TO 2 SPRAYS INTO BOTH NOSTRILS DAILY AS NEEDED FOR ALLERGIES 48 g 1     gabapentin (NEURONTIN) 300 MG capsule TAKE 3 CAPSULES BY MOUTH 3 TIMES DAILY 810 capsule 3     guaiFENesin-dextromethorphan (MUCINEX DM) 600-30 mg Tb12 [GUAIFENESIN-DEXTROMETHORPHAN (MUCINEX DM) 600-30 MG TB12] Take 1 tablet by mouth 2 (two) times a day as needed.       hydroxychloroquine (PLAQUENIL) 200 MG tablet TAKE 1 TABLET BY MOUTH TWICE  DAILY 180 tablet 3     menthol (BIOFREEZE, MENTHOL,) 4 % Gel [MENTHOL (BIOFREEZE, MENTHOL,) 4 % GEL] Apply 1 application topically daily as needed. Apply topically for pain       metroNIDAZOLE (METROCREAM) 0.75 % external cream APPLY TOPICALLY TO FACE EVERY NIGHT AT BEDTIME.       neomycin-bacitracin-polymyxin (NEOSPORIN) ointment [NEOMYCIN-BACITRACIN-POLYMYXIN (NEOSPORIN) OINTMENT] Apply 1 application topically 2 (two) times a day as needed.       pantoprazole (PROTONIX) 40 MG EC tablet TAKE 1 TABLET BY MOUTH TWICE  DAILY 180 tablet 3     Pseudoephedrine-DM-GG (ROBITUSSIN COUGH/COLD D PO)        rosuvastatin (CRESTOR) 5 MG tablet TAKE 1 TABLET BY MOUTH DAILY 90 tablet 3     UNABLE TO FIND MEDICATION NAME: benadryl topical cream       UNABLE TO FIND MEDICATION NAME: Magni- life cream       No current facility-administered medications for this visit.       Allergies   Allergen Reactions     Erythromycin Base [Erythromycin] Rash     PARALYSIS, EYE SWELLING, HEADACHE     Codeine Dizziness     Other: extreme weakness in legspt stopped taking medication and sxs were gone within a few hours       Hydrocodone Nausea and Headache     Keytruda [Pembrolizumab] Other (See Comments)     Elevated liver function tests (lfts)      Tramadol Diarrhea, Dizziness, Headache and Nausea and Vomiting     Alcohol Rash     Cholecalciferol Rash     Other Environmental Allergy Rash     mildew     Penicillins Swelling and Rash     Shellfish Containing Products [Shellfish-Derived Products] Swelling and Rash  "    shrimp     Sulfa (Sulfonamide Antibiotics) [Sulfa Antibiotics] Rash     Burning body rash     Vitamin D3 Complete [External Allergen Needs Reconciliation - See Comment] Rash     Higher dosage makes her break out       Past Medical History:   Diagnosis Date     Aphasia      Arthritis 2020    Hands aching, muscle spasms, swelling     Asthma      COPD (chronic obstructive pulmonary disease) (H) Childhood    Numerous waldron, spring with bonchial cough     GERD (gastroesophageal reflux disease)      History of transfusion      Lumbar spinal stenosis 02/28/2020    EXAM: MR LUMBAR SPINE W WO CONTRAST LOCATION: [facility] DATE/TIME: 7/11/2019 4:49 PM   INDICATION: Back pain going down the right leg.  Going on for 3 months.  Not improving. See above. History of melanoma. COMPARISON: None. CONTRAST: Gadavist 9. TECHNIQUE: Without and with IV contrast   FINDINGS:  Nomenclature is based on 5 lumbar type vertebral bodies. There is no concerning marrow rep     Malignant melanoma of right upper arm (H)      Menopausal hot flushes 07/23/2017    On venlafaxine for this.        Migraine headache      On antineoplastic chemotherapy     pembrolizumab     PN (peripheral neuropathy)      PONV (postoperative nausea and vomiting)     \"cold sweats\"     S/P colonoscopy      Stroke (H) 02/01/2020        Review of Systems  ROS:  Specifically negative for bowel/bladder dysfunction, balance changes, headache, dizziness, foot drop, fevers, chills, appetite changes, nausea/vomiting, unexplained weight loss. Otherwise 13 systems reviewed are negative. Please see the patient's intake questionnaire from today for details.    Reviewed Social, Family, Past Medical and Past Surgical history with patient, no significant changes noted since prior visit.     Objective:     /74   Pulse 88   LMP  (LMP Unknown)   SpO2 100%     PHYSICAL EXAMINATION:    --CONSTITUTIONAL: Well developed, well nourished, healthy appearing " individual.  --PSYCHIATRIC: Appropriate mood and affect. No difficulty interacting due to temper, social withdrawal, or memory issues.  --RESPIRATORY: Normal rhythm and effort. No abnormal accessory muscle breathing patterns noted.   --MUSCULOSKELETAL:  Normal lumbar lordosis noted, no lateral shift.  --GROSS MOTOR: Easily arises from a seated position. Gait is non-antalgic  --LUMBAR SPINE:  Inspection reveals no evidence of deformity. Range of motion is mildly limited in lumbar flexion, extension, lateral rotation.  Tenderness palpation over bilateral greater trochanter bursa areas. Straight leg raising in the seated position is negative to radicular pain.   --SACROILIAC JOINT: One Finger point test negative.  --LOWER EXTREMITY MOTOR TESTING:  Plantar flexion left 5/5, right 5/5   Dorsiflexion left 5/5, right 5/5   Great toe MTP extension left 5/5, right 5/5  Knee flexion left 5/5, right 5/5  Knee extension left 5/5, right 5/5   Hip flexion left 5/5, right 5/5  Hip abduction left 5/5, right 5/5  Hip adduction left 5/5, right 5/5   --NEUROLOGIC:  Sensation to light touch is intact in the bilateral L4, L5, and S1 dermatomes.  -- Shoulders: Decreased range of motion in right shoulder abduction with a positive speeds test with tenderness over the right biceps tendon area.    RESULTS:   Imaging: Lumbar spine imaging was reviewed today.                       Again, thank you for allowing me to participate in the care of your patient.        Sincerely,        Carlos Alberto Worthington DO    Electronically signed

## 2025-05-27 NOTE — PATIENT INSTRUCTIONS
I ordered physical therapy for your right shoulder pain.    [facility] Injection Requirements    A  is required for all fluoroscopically-guided injections.  Injection appointments may be cancelled if there are signs/symptoms of an active infection or if the patient is being actively treated with antibiotics for a diagnosed infection.  Patients may have their steroid injection cancelled if they have had another steroid injection within 2 weeks.  Diabetic patients will have their blood glucose levels checked the day of their injection and the appointment will be rescheduled if the blood glucose level is 300 or higher.  Patients with allergies to cortisone, local anesthetics, iodine, or contrast dye should contact the Spine Center to further discuss these considerations.  Patients scheduled for medial branch block diagnostic injections should refrain from taking pain medication the day of the procedure.  The medial branch block injection appointment will be rescheduled if the patient's pain rating is not 5/10 or greater at the time of the procedure.  Patients taking warfarin/Coumadin will have their INR checked the day of the procedure and the procedure may be rescheduled if the INR is greater than 3.0.  Please contact the Spine Center [phone]) if you are taking any prescription blood-thinning medications (aspirin, warfarin, Plavix, Lovenox, Eliquis, Brilinta, Effient, etc.) as special dosing adjustments may need to be made depending on the type of injection you are scheduled to receive.  It is recommended that you delay having your steroid injection if you have received any vaccines within 2 weeks.    ~Please call Nurse Navigation line [phone] with any questions or concerns about your treatment plan, if symptoms worsen and you would like to be seen urgently, or if you have problems controlling bladder and bowel function.

## 2025-05-27 NOTE — PROGRESS NOTES
Assessment:     Diagnoses and all orders for this visit:  Trochanteric bursitis of both hips  -     PAIN US Large Joint Injection Bilateral; Future  Spinal stenosis of lumbar region with neurogenic claudication  Lumbar radiculitis  Biceps tendinopathy of right upper extremity  -     Physical Therapy  Referral; Future     Lashawn Ortega is a 80 year old y.o. female with past medical history significant for reflux, migraine headache, low back pain, peripheral neuropathy, melanoma of right upper arm, stroke, elevated liver enzymes who presents today for follow-up regarding bilateral lateral thigh pain:    - Patient received 50% relief for at least 3 months with her bilateral greater trochanter bursa injections.  Pain is now returned.     Plan:     A shared decision making plan was used. The patient's values and choices were respected. Prior medical records from our last visit on 11/20/2024 were reviewed today. The following represents what was discussed and decided upon by the provider and the patient.        -DIAGNOSTIC TESTS: Images were personally reviewed and interpreted.   --MRI of the lumbar spine dated 7/11/2019 is personally reviewed and images interpreted and shown to patient.  There is multilevel degenerative changes in lumbar spine.  There is severe spinal canal stenosis at L3-4.  There is moderate spinal canal stenosis at L4-5.  There is mild to moderate spinal canal stenosis at L2-3.  There is no severe foraminal stenosis.  --CT of abdomen and pelvis July 2020.  I did not see any compression fractures.  -- X-ray of cervical spine dated 2/17/2023 is personally reviewed images interpreted and discussed with the patient.  There is a grade 1 anterolisthesis of C2 on C3 and C3 on C4.  There is mid and lower cervical facet arthropathy left greater than right.    -INTERVENTIONS: I ordered bilateral greater trochanter bursa injections with ultrasound guidance.  Should there continue to be low back and  intermittent right lower extremity pain I would recommend a right L4-5 transforaminal epidural steroid injection.    -MEDICATIONS: No changes to medications.  -  Discussed side effects of medications and proper use. Patient verbalized understanding.    -PHYSICAL THERAPY: I ordered physical therapy for right biceps tendinopathy.        -PATIENT EDUCATION: We discussed pain management today multiple fashion including physical therapy, medication management, possible future injections.    -FOLLOW UP: Patient will follow-up 2 to 4 weeks after injection.  Advised to contact clinic if symptoms worsen or change.    Subjective:     Lashawn Ortega is a 80 year old female who presents today for follow-up regarding bilateral lateral thigh pain.  Patient notes 50% relief for at least 3 months with her last injections.  She does note that she has had continued low back and intermittent right lower extremity pain with standing and walking which improves with sitting.  She has had epidurals in the past which have been helpful.  Pain today is 7/10 its worst is 9/10 as best as 4/10.  Pain is improved with doing physical therapy exercises as well as resting either in a sitting or lying position.  She notes new right shoulder pain for the last 2 to 3 weeks.  She has had physical therapy in the past, however it has been several years.  It is difficult for her to bring her arm overhead.  She is able to do her hair.  She denies any bowel or bladder changes, fevers, chills, unintentional weight loss.    Treatment to Date: MRI of lumbar spine dated 7/11/2019.  Right L5-S1 transforaminal epidural steroid injection done on 8/12/2019.  Several sessions of in-home physical therapy.  Bilateral L4-5 transforaminal epidural steroid injections done on 8/11/2020.  Right L4-5 transforaminal epidural steroid injection done on 5/7/2021.  Right greater trochanter bursa injection under ultrasound guidance on 6/11/2021.  Bilateral L4-5 transforaminal  epidural steroid injections done on 9/26/2022.  X-ray of cervical spine dated 2/17/2023.  Right greater trochanter bursa injection under ultrasound guidance on 3/2/2023.  Bilateral L4-5 transforaminal epidural steroid injections done on 4/1/2024.  Bilateral greater trochanter bursa injections under ultrasound guidance on 5/20/2024 and 12/13/2024.     Patient Active Problem List   Diagnosis    Migraine headache    LBP (low back pain)    Nonsmoker    Full code status    Peripheral neuropathy    History of melanoma - Melanoma of right upper arm - On Keytruda in the past - stage IIIA    Ischemic cerebrovascular accident (CVA) of frontal lobe (H) - 2/2020    Aphasia    Lumbar spinal stenosis    Essential tremor    On antineoplastic chemotherapy - PEMBROLIZUMAB - checkpoint inhibitor - in the past for melanoma    Female stress incontinence    Seronegative rheumatoid arthritis of both hands (H)    Rosacea    Gastroesophageal reflux disease, unspecified whether esophagitis present       Current Outpatient Medications   Medication Sig Dispense Refill    aloe vera Gel [ALOE VERA GEL] Apply 1 application topically 2 (two) times a day as needed.      aspirin 81 MG EC tablet [ASPIRIN 81 MG EC TABLET] Take 1 tablet (81 mg total) by mouth daily. 30 tablet 0    azelaic acid (FINACIA) 15 % external gel APPLY TOPICALLY DAILY TO SKIN EVERY MORNING.      ciprofloxacin (CIPRO) 250 MG tablet Take 1 tablet (250 mg) by mouth 2 times daily. 10 tablet 0    cyclobenzaprine (FLEXERIL) 5 MG tablet TAKE 1 TABLET 3 TIMES DAILY AS NEEDED FOR MUSCLE SPASMS 90 tablet 1    diphenhydrAMINE-Acetaminophen (PERCOGESIC PO) Take 375 mg by mouth      fluticasone (FLONASE) 50 MCG/ACT nasal spray SPRAY 1 TO 2 SPRAYS INTO BOTH NOSTRILS DAILY AS NEEDED FOR ALLERGIES 48 g 1    gabapentin (NEURONTIN) 300 MG capsule TAKE 3 CAPSULES BY MOUTH 3 TIMES DAILY 810 capsule 3    guaiFENesin-dextromethorphan (MUCINEX DM) 600-30 mg Tb12 [GUAIFENESIN-DEXTROMETHORPHAN  (MUCINEX DM) 600-30 MG TB12] Take 1 tablet by mouth 2 (two) times a day as needed.      hydroxychloroquine (PLAQUENIL) 200 MG tablet TAKE 1 TABLET BY MOUTH TWICE  DAILY 180 tablet 3    menthol (BIOFREEZE, MENTHOL,) 4 % Gel [MENTHOL (BIOFREEZE, MENTHOL,) 4 % GEL] Apply 1 application topically daily as needed. Apply topically for pain      metroNIDAZOLE (METROCREAM) 0.75 % external cream APPLY TOPICALLY TO FACE EVERY NIGHT AT BEDTIME.      neomycin-bacitracin-polymyxin (NEOSPORIN) ointment [NEOMYCIN-BACITRACIN-POLYMYXIN (NEOSPORIN) OINTMENT] Apply 1 application topically 2 (two) times a day as needed.      pantoprazole (PROTONIX) 40 MG EC tablet TAKE 1 TABLET BY MOUTH TWICE  DAILY 180 tablet 3    Pseudoephedrine-DM-GG (ROBITUSSIN COUGH/COLD D PO)       rosuvastatin (CRESTOR) 5 MG tablet TAKE 1 TABLET BY MOUTH DAILY 90 tablet 3    UNABLE TO FIND MEDICATION NAME: benadryl topical cream      UNABLE TO FIND MEDICATION NAME: Magni- life cream       No current facility-administered medications for this visit.       Allergies   Allergen Reactions    Erythromycin Base [Erythromycin] Rash     PARALYSIS, EYE SWELLING, HEADACHE    Codeine Dizziness     Other: extreme weakness in legspt stopped taking medication and sxs were gone within a few hours      Hydrocodone Nausea and Headache    Keytruda [Pembrolizumab] Other (See Comments)     Elevated liver function tests (lfts)     Tramadol Diarrhea, Dizziness, Headache and Nausea and Vomiting    Alcohol Rash    Cholecalciferol Rash    Other Environmental Allergy Rash     mildew    Penicillins Swelling and Rash    Shellfish Containing Products [Shellfish-Derived Products] Swelling and Rash     shrimp    Sulfa (Sulfonamide Antibiotics) [Sulfa Antibiotics] Rash     Burning body rash    Vitamin D3 Complete [External Allergen Needs Reconciliation - See Comment] Rash     Higher dosage makes her break out       Past Medical History:   Diagnosis Date    Aphasia     Arthritis 2020    Hands  "aching, muscle spasms, swelling    Asthma     COPD (chronic obstructive pulmonary disease) (H) Childhood    Numerous waldron, spring with bonchial cough    GERD (gastroesophageal reflux disease)     History of transfusion     Lumbar spinal stenosis 02/28/2020    EXAM: MR LUMBAR SPINE W WO CONTRAST LOCATION: [facility] DATE/TIME: 7/11/2019 4:49 PM   INDICATION: Back pain going down the right leg.  Going on for 3 months.  Not improving. See above. History of melanoma. COMPARISON: None. CONTRAST: Gadavist 9. TECHNIQUE: Without and with IV contrast   FINDINGS:  Nomenclature is based on 5 lumbar type vertebral bodies. There is no concerning marrow rep    Malignant melanoma of right upper arm (H)     Menopausal hot flushes 07/23/2017    On venlafaxine for this.       Migraine headache     On antineoplastic chemotherapy     pembrolizumab    PN (peripheral neuropathy)     PONV (postoperative nausea and vomiting)     \"cold sweats\"    S/P colonoscopy     Stroke (H) 02/01/2020        Review of Systems  ROS:  Specifically negative for bowel/bladder dysfunction, balance changes, headache, dizziness, foot drop, fevers, chills, appetite changes, nausea/vomiting, unexplained weight loss. Otherwise 13 systems reviewed are negative. Please see the patient's intake questionnaire from today for details.    Reviewed Social, Family, Past Medical and Past Surgical history with patient, no significant changes noted since prior visit.     Objective:     /74   Pulse 88   LMP  (LMP Unknown)   SpO2 100%     PHYSICAL EXAMINATION:    --CONSTITUTIONAL: Well developed, well nourished, healthy appearing individual.  --PSYCHIATRIC: Appropriate mood and affect. No difficulty interacting due to temper, social withdrawal, or memory issues.  --RESPIRATORY: Normal rhythm and effort. No abnormal accessory muscle breathing patterns noted.   --MUSCULOSKELETAL:  Normal lumbar lordosis noted, no lateral shift.  --GROSS MOTOR: Easily arises " from a seated position. Gait is non-antalgic  --LUMBAR SPINE:  Inspection reveals no evidence of deformity. Range of motion is mildly limited in lumbar flexion, extension, lateral rotation.  Tenderness palpation over bilateral greater trochanter bursa areas. Straight leg raising in the seated position is negative to radicular pain.   --SACROILIAC JOINT: One Finger point test negative.  --LOWER EXTREMITY MOTOR TESTING:  Plantar flexion left 5/5, right 5/5   Dorsiflexion left 5/5, right 5/5   Great toe MTP extension left 5/5, right 5/5  Knee flexion left 5/5, right 5/5  Knee extension left 5/5, right 5/5   Hip flexion left 5/5, right 5/5  Hip abduction left 5/5, right 5/5  Hip adduction left 5/5, right 5/5   --NEUROLOGIC:  Sensation to light touch is intact in the bilateral L4, L5, and S1 dermatomes.  -- Shoulders: Decreased range of motion in right shoulder abduction with a positive speeds test with tenderness over the right biceps tendon area.    RESULTS:   Imaging: Lumbar spine imaging was reviewed today.

## 2025-07-21 ENCOUNTER — TELEPHONE (OUTPATIENT)
Dept: PHYSICAL MEDICINE AND REHAB | Facility: CLINIC | Age: 80
End: 2025-07-21
Payer: COMMERCIAL

## 2025-07-21 NOTE — TELEPHONE ENCOUNTER
"PSP:  Dr. Worthington   Last clinic visit:  5/27/25; 6/20/25 Bilateral greater trochanteric bursa injections  Reason for call: Update  Clinical information:  Calling to let PSP know the hips are \"doing very well since the injections. The back is still an issue, but I am building up on the exercises. I want to wait it out and see how it feels with more time and exercises.\" Also wanted to report she has had 3 sessions on PT for her shoulders and is now just doing HEP. PT felt she was doing very well and she should just call if needs to come back. \"I can reach above my head now!\"   Advice given to patient: Explained PSP will be notified of the update. Encouraged pt to call nurse navigation line if questions or concerns arise or pain worsens.   Provider to address: CHUCK as above   "

## 2025-07-27 DIAGNOSIS — K21.9 GASTROESOPHAGEAL REFLUX DISEASE, UNSPECIFIED WHETHER ESOPHAGITIS PRESENT: ICD-10-CM

## 2025-07-28 RX ORDER — PANTOPRAZOLE SODIUM 40 MG/1
40 TABLET, DELAYED RELEASE ORAL 2 TIMES DAILY
Qty: 180 TABLET | Refills: 1 | Status: SHIPPED | OUTPATIENT
Start: 2025-07-28

## 2025-08-11 ENCOUNTER — TRANSFERRED RECORDS (OUTPATIENT)
Dept: HEALTH INFORMATION MANAGEMENT | Facility: CLINIC | Age: 80
End: 2025-08-11
Payer: COMMERCIAL

## 2025-08-18 DIAGNOSIS — M06.041 SERONEGATIVE RHEUMATOID ARTHRITIS OF BOTH HANDS (H): ICD-10-CM

## 2025-08-18 DIAGNOSIS — M06.042 SERONEGATIVE RHEUMATOID ARTHRITIS OF BOTH HANDS (H): ICD-10-CM

## 2025-08-18 RX ORDER — HYDROXYCHLOROQUINE SULFATE 200 MG/1
200 TABLET, FILM COATED ORAL 2 TIMES DAILY
Qty: 180 TABLET | Refills: 3 | Status: SHIPPED | OUTPATIENT
Start: 2025-08-18